# Patient Record
Sex: MALE | Race: WHITE | NOT HISPANIC OR LATINO | Employment: FULL TIME | URBAN - METROPOLITAN AREA
[De-identification: names, ages, dates, MRNs, and addresses within clinical notes are randomized per-mention and may not be internally consistent; named-entity substitution may affect disease eponyms.]

---

## 2018-06-12 ENCOUNTER — TELEPHONE (OUTPATIENT)
Dept: FAMILY MEDICINE | Facility: CLINIC | Age: 58
End: 2018-06-12

## 2018-06-12 NOTE — TELEPHONE ENCOUNTER
Will you take as new pt,     SPoke to wife and she stated the pt is having an emergency with Hemorids so I informed her that he needs to get to an urgent care or emergency room.  Wife verbalized understood.  She stated she will schedule pt a appt with latrell for a later date if he approves

## 2018-06-12 NOTE — TELEPHONE ENCOUNTER
----- Message from Jerome Sales sent at 2018 11:05 AM CDT -----  Contact: Wife - Katherine Curry  MRN: [id number]  : 1960  PCP: No primary care provider on file.  Home Phone      [phone]      MESSAGE: new patient ([employer] ins) -- has pretty bad hemorrhoids -- states family friend of Dr Jerome's -- requesting appt today    Call Katherine @ [phone]    PCP: ????

## 2018-07-25 ENCOUNTER — OFFICE VISIT (OUTPATIENT)
Dept: URGENT CARE | Facility: CLINIC | Age: 58
End: 2018-07-25
Payer: COMMERCIAL

## 2018-07-25 VITALS
HEART RATE: 86 BPM | HEIGHT: 72 IN | OXYGEN SATURATION: 98 % | RESPIRATION RATE: 16 BRPM | WEIGHT: 210 LBS | SYSTOLIC BLOOD PRESSURE: 135 MMHG | BODY MASS INDEX: 28.44 KG/M2 | DIASTOLIC BLOOD PRESSURE: 91 MMHG | TEMPERATURE: 98 F

## 2018-07-25 DIAGNOSIS — L03.119 CELLULITIS AND ABSCESS OF LOWER EXTREMITY: Primary | ICD-10-CM

## 2018-07-25 DIAGNOSIS — L02.419 CELLULITIS AND ABSCESS OF LOWER EXTREMITY: Primary | ICD-10-CM

## 2018-07-25 DIAGNOSIS — T63.621A JELLYFISH STING, ACCIDENTAL OR UNINTENTIONAL, INITIAL ENCOUNTER: ICD-10-CM

## 2018-07-25 PROCEDURE — 99204 OFFICE O/P NEW MOD 45 MIN: CPT | Mod: S$GLB,,, | Performed by: INTERNAL MEDICINE

## 2018-07-25 PROCEDURE — 3008F BODY MASS INDEX DOCD: CPT | Mod: CPTII,S$GLB,, | Performed by: INTERNAL MEDICINE

## 2018-07-25 RX ORDER — SILVER SULFADIAZINE 10 G/1000G
CREAM TOPICAL 2 TIMES DAILY
Qty: 50 G | Refills: 0 | Status: SHIPPED | OUTPATIENT
Start: 2018-07-25 | End: 2018-12-31

## 2018-07-25 RX ORDER — AMOXICILLIN AND CLAVULANATE POTASSIUM 875; 125 MG/1; MG/1
1 TABLET, FILM COATED ORAL 2 TIMES DAILY
Qty: 20 TABLET | Refills: 0 | Status: SHIPPED | OUTPATIENT
Start: 2018-07-25 | End: 2018-08-04

## 2018-07-25 RX ORDER — SULFAMETHOXAZOLE AND TRIMETHOPRIM 800; 160 MG/1; MG/1
1 TABLET ORAL 2 TIMES DAILY
Qty: 10 TABLET | Refills: 0 | Status: SHIPPED | OUTPATIENT
Start: 2018-07-25 | End: 2018-07-30

## 2018-07-26 NOTE — PATIENT INSTRUCTIONS
Marine Bite or Sting  Youre most likely to contact biting or stinging marine life while swimming or wading in saltwater. These include jellyfish and coral, as well as sculpins, stingrays, and many other kinds of fish. Most dont attack humans, but may bite or sting if they are stepped on or touched. Even a dead jellyfish can release poison (venom) when handled. In [place], most marine animal bites or stings arent deadly. But they can be painful. They can be serious if the wound is deep, becomes infected, or causes an allergic reaction.  Your injury will be cleaned and examined. Bite or puncture wounds are often not closed with stitches. A jellyfish sting may be rinsed with sterile saline solution or vinegar. This prevents more toxins from being released. Any tentacles left in your skin will be removed. If stingray barbs are present, they need to be removed. If you have an allergic reaction that is severe, you may be given a steroid medicine to help control it. If you are in pain, medicine may be prescribed to make you more comfortable. Blood tests, ultrasound, wound culture, or X-rays may be done to check for other injuries or signs of infection. Treatment will depend on the location and severity of the wound.  Home care  The doctor may prescribe medicines to help relieve pain and prevent infection. Follow the doctors instructions for taking these medicines. If antibiotics have been prescribed, finish all of the medicine until it is gone, even if you feel better.  General care  · Wash your hands with soap and warm water before and after caring for the wound.  · Clean the wound with mild soap and water and pat dry. Do this as often as instructed by the doctor.  · Watch for signs of infection, including fever, increasing pain, redness, or pus (discharge).  · If bleeding occurs, apply gentle, even pressure.  · Cover the wound with a new, clean bandage. Apply the bandage snugly, but not too tight. Keep the  bandage clean and dry.  · Get plenty of rest and fluids.  · To prevent future stings, never handle any marine life. Even dead jellyfish on the beach can cause stings.    Follow-up care  Follow up with your healthcare provider or as advised.  When to seek medical advice  Call your healthcare provider right away if any of these occur:  · Swelling of the affected body part, hands, or face  · A sensation that there is something in the wound  · Fever of 100.4°F (38°C) or higher, or as directed by your healthcare provider.  · Signs of infection, including increasing pain, increasing redness or swelling, or discharge from the wound  · Numbness or tingling in the area of the wound  · Pain that is not relieved or worsens after taking medicine  · Your wound isnt healing   Call 911  Call 911 if any of these occur:  · Trouble breathing  · Generalized itching  · Your wound doesnt stop bleeding even after pressure is applied  Date Last Reviewed: 9/1/2016  © 4749-5212 [employer]. [address] [place] [address]. All rights reserved. This information is not intended as a substitute for professional medical care. Always follow your healthcare professional's instructions.  Please return here or go to the Emergency Department for any concerns or worsening of condition.  If you were prescribed antibiotics, please take them to completion.  If you were prescribed a narcotic medication, do not drive or operate heavy equipment or machinery while taking these medications.  Please follow up with your primary care doctor or specialist as needed.    If you  smoke, please stop smoking.  [place] over the counter(pulls infection out of wound)  Mix two packets to a quart of boiled water.  Make sure bowl you mixed it in has a lid and is micro-waveable .  Take 3 clean wash cloths and put one into solution that you mixed and place on affected area(very warm solution but NOT BOILING  HOT) for 10 minutes then discard to  dirty laundry repeat with second clean wash cloth (dip into solution) and third wash cloth each for 10 minutes(total soaks on affected area are for 30 minutes).  Then clean area with soap and water put either Neosporin or other antibiotic creams to affected area and bandage with sterile/clean dressing.  Place lid on solution and put in refrigerator then when ready to repeat process take bowl out and place in microwave until solution is steamming .  REPEAT Put warm compresses on affected area 4 to 5 times a day for the first 5 to 6 days.  SEE DERMATOLOGIST IF WORSEN OR WOUND CARE

## 2018-07-26 NOTE — PROGRESS NOTES
Subjective:       Patient ID: Luis Eduardo Curry is a 58 y.o. male.    Vitals:  height is 6' (1.829 m) and weight is 95.3 kg (210 lb). His oral temperature is 98.3 °F (36.8 °C). His blood pressure is 135/91 (abnormal) and his pulse is 86. His respiration is 16 and oxygen saturation is 98%.     Chief Complaint: Trauma (Bilateral Foot) and Blister    Rash   This is a new problem. The current episode started in the past 7 days. The problem has been gradually worsening (Pt. c/o jellyfish sting to bilateral ankle.) since onset. The affected locations include the left ankle and right ankle. The rash is characterized by blistering, redness, swelling and itchiness. He was exposed to an insect bite/sting and shellfish. Pertinent negatives include no fever, joint pain, shortness of breath or sore throat. Past treatments include antihistamine. The treatment provided no relief.     Review of Systems   Constitution: Negative for chills, fever, weakness and malaise/fatigue.   HENT: Negative for nosebleeds and sore throat.    Cardiovascular: Negative for chest pain and syncope.   Respiratory: Negative for shortness of breath.    Skin: Positive for itching and rash. Negative for dry skin.   Musculoskeletal: Negative for back pain, joint pain and neck pain.   Gastrointestinal: Negative for abdominal pain.   Genitourinary: Negative for hematuria.   Neurological: Negative for dizziness and numbness.       Objective:      Physical Exam   Constitutional: He is oriented to person, place, and time. He appears well-developed and well-nourished. He is cooperative.  Non-toxic appearance. He does not appear ill. No distress.   HENT:   Head: Normocephalic and atraumatic.   Right Ear: Hearing, tympanic membrane, external ear and ear canal normal.   Left Ear: Hearing, tympanic membrane, external ear and ear canal normal.   Nose: Nose normal. No mucosal edema, rhinorrhea or nasal deformity. No epistaxis. Right sinus exhibits no maxillary sinus  tenderness and no frontal sinus tenderness. Left sinus exhibits no maxillary sinus tenderness and no frontal sinus tenderness.   Mouth/Throat: Uvula is midline, oropharynx is clear and moist and mucous membranes are normal. No trismus in the jaw. Normal dentition. No uvula swelling. No posterior oropharyngeal erythema.   Eyes: Conjunctivae and lids are normal. Right eye exhibits no discharge. Left eye exhibits no discharge. No scleral icterus.   Sclera clear bilat   Neck: Trachea normal, normal range of motion, full passive range of motion without pain and phonation normal. Neck supple.   Cardiovascular: Normal rate, regular rhythm, normal heart sounds, intact distal pulses and normal pulses.    Pulmonary/Chest: Effort normal and breath sounds normal. No respiratory distress.   Abdominal: Soft. Normal appearance and bowel sounds are normal. He exhibits no distension, no pulsatile midline mass and no mass. There is no tenderness.   Musculoskeletal: Normal range of motion. He exhibits no edema or deformity.   Neurological: He is alert and oriented to person, place, and time. He exhibits normal muscle tone. Coordination normal.   Skin: Skin is warm, dry and intact. Capillary refill takes less than 2 seconds. Purpura and rash noted. Rash is pustular and vesicular. He is not diaphoretic. There is erythema. No pallor.        Psychiatric: He has a normal mood and affect. His speech is normal and behavior is normal. Judgment and thought content normal. Cognition and memory are normal.   Nursing note and vitals reviewed.      Assessment:       1. Cellulitis and abscess of lower extremity    2. Jellyfish sting, accidental or unintentional, initial encounter        Plan:         Cellulitis and abscess of lower extremity  -     amoxicillin-clavulanate 875-125mg (AUGMENTIN) 875-125 mg per tablet; Take 1 tablet by mouth 2 (two) times daily. for 10 days  Dispense: 20 tablet; Refill: 0  -     sulfamethoxazole-trimethoprim 800-160mg  (BACTRIM DS) 800-160 mg Tab; Take 1 tablet by mouth 2 (two) times daily. for 5 days  Dispense: 10 tablet; Refill: 0  -     silver sulfADIAZINE 1% (SILVADENE) 1 % cream; Apply topically 2 (two) times daily. KEEP REFRIGERATED  Dispense: 50 g; Refill: 0    Jellyfish sting, accidental or unintentional, initial encounter  -     amoxicillin-clavulanate 875-125mg (AUGMENTIN) 875-125 mg per tablet; Take 1 tablet by mouth 2 (two) times daily. for 10 days  Dispense: 20 tablet; Refill: 0  -     sulfamethoxazole-trimethoprim 800-160mg (BACTRIM DS) 800-160 mg Tab; Take 1 tablet by mouth 2 (two) times daily. for 5 days  Dispense: 10 tablet; Refill: 0  -     silver sulfADIAZINE 1% (SILVADENE) 1 % cream; Apply topically 2 (two) times daily. KEEP REFRIGERATED  Dispense: 50 g; Refill: 0      Take meds

## 2018-07-27 ENCOUNTER — OFFICE VISIT (OUTPATIENT)
Dept: WOUND CARE | Facility: HOSPITAL | Age: 58
End: 2018-07-27
Attending: NURSE PRACTITIONER
Payer: COMMERCIAL

## 2018-07-27 VITALS — SYSTOLIC BLOOD PRESSURE: 120 MMHG | HEART RATE: 63 BPM | DIASTOLIC BLOOD PRESSURE: 8 MMHG | RESPIRATION RATE: 20 BRPM

## 2018-07-27 DIAGNOSIS — S90.522A: ICD-10-CM

## 2018-07-27 PROCEDURE — 99211 OFF/OP EST MAY X REQ PHY/QHP: CPT

## 2018-07-27 NOTE — PROGRESS NOTES
[facility]  Wound Care  Progress Note    Problem List Items Addressed This Visit        Other    Non-thermal blister of ankle, left, initial encounter    Overview     HPI: 58 yr old male with history of being stung by a jelly fish on July 17, 2018. Developed blisters to left foot and posterior ankle    Location: left posterior ankle    Duration: July 17, 2018    Context: non-thermal blister due to jelly fish sting    Associated Signs & Symptoms: denies pain    Timing: n/a    Severity: n/a    Quality: n/a    Modifying Factors: n/a                 Physical Exam   Constitutional: He is oriented to person, place, and time. He appears well-developed and well-nourished.   HENT:   Head: Normocephalic.   Pulmonary/Chest: Effort normal.   Musculoskeletal: Normal range of motion.   Neurological: He is alert and oriented to person, place, and time.   Skin: Skin is warm and dry.   Non-thermal blister due to jelly fish sting. Blister opened up on the back of his left ankle. Superficial.   Psychiatric: He has a normal mood and affect. His behavior is normal. Judgment and thought content normal.   Vitals reviewed.    Will continue silvadene cream to left posterior ankle, E-scribed triamcinolone 0.1% cream to intact blisters tid. Will recheck in one week  See wound doc progress notes. Documents will be scanned.        Jessica Hopkins  [facility]

## 2018-07-31 ENCOUNTER — TELEPHONE (OUTPATIENT)
Dept: URGENT CARE | Facility: CLINIC | Age: 58
End: 2018-07-31

## 2018-12-31 ENCOUNTER — OFFICE VISIT (OUTPATIENT)
Dept: INTERNAL MEDICINE | Facility: CLINIC | Age: 58
End: 2018-12-31
Payer: COMMERCIAL

## 2018-12-31 VITALS
OXYGEN SATURATION: 96 % | DIASTOLIC BLOOD PRESSURE: 70 MMHG | RESPIRATION RATE: 16 BRPM | SYSTOLIC BLOOD PRESSURE: 112 MMHG | HEIGHT: 72 IN | WEIGHT: 220.25 LBS | BODY MASS INDEX: 29.83 KG/M2 | HEART RATE: 73 BPM

## 2018-12-31 DIAGNOSIS — Z00.00 WELLNESS EXAMINATION: Primary | ICD-10-CM

## 2018-12-31 DIAGNOSIS — Z12.11 SPECIAL SCREENING FOR MALIGNANT NEOPLASMS, COLON: ICD-10-CM

## 2018-12-31 DIAGNOSIS — R35.0 URINARY FREQUENCY: ICD-10-CM

## 2018-12-31 PROCEDURE — 99999 PR PBB SHADOW E&M-EST. PATIENT-LVL III: CPT | Mod: PBBFAC,,, | Performed by: INTERNAL MEDICINE

## 2018-12-31 PROCEDURE — 3008F BODY MASS INDEX DOCD: CPT | Mod: CPTII,S$GLB,, | Performed by: INTERNAL MEDICINE

## 2018-12-31 PROCEDURE — 99203 OFFICE O/P NEW LOW 30 MIN: CPT | Mod: S$GLB,,, | Performed by: INTERNAL MEDICINE

## 2018-12-31 NOTE — PROGRESS NOTES
Subjective:       Patient ID: Luis Eduardo Curry is a 58 y.o. male.    Chief Complaint: [facility] Care    Luis Eduardo Curry is a 58 y.o. male  Here for wellness exam.    Doing well .  No major issues.   Some urinary frequency .      Review of Systems   Constitutional: Negative for activity change and unexpected weight change.   HENT: Positive for hearing loss. Negative for rhinorrhea and trouble swallowing.    Eyes: Negative for discharge.   Respiratory: Negative for chest tightness and wheezing.    Cardiovascular: Negative for chest pain and palpitations.   Gastrointestinal: Negative for blood in stool, constipation, diarrhea and vomiting.   Endocrine: Negative for polydipsia and polyuria.   Genitourinary: Positive for difficulty urinating, frequency and urgency. Negative for hematuria.   Musculoskeletal: Negative for arthralgias, joint swelling and neck pain.   Neurological: Negative for weakness and headaches.   Psychiatric/Behavioral: Negative for confusion and dysphoric mood.       Objective:      Physical Exam   Constitutional: He is oriented to person, place, and time. He appears well-developed and well-nourished.   HENT:   Head: Normocephalic and atraumatic.   Neck: No JVD present.   Cardiovascular: Normal rate, regular rhythm and normal heart sounds.   Pulmonary/Chest: Effort normal and breath sounds normal.   Abdominal: Soft. Bowel sounds are normal. There is no tenderness.   Musculoskeletal: He exhibits no edema.   Neurological: He is alert and oriented to person, place, and time.   Skin: Skin is warm and dry.   Psychiatric: He has a normal mood and affect. His behavior is normal. Judgment and thought content normal.   Nursing note and vitals reviewed.      Assessment:       1. Wellness examination    2. Special screening for malignant neoplasms, colon    3. Urinary frequency        Plan:   Luis Eduardo was seen today for [facility].    Diagnoses and all orders for this visit:    Wellness examination  -     CBC  auto differential; Future  -     Comprehensive metabolic panel; Future  -     TSH; Future  -     PSA, Screening; Future  -     Lipid panel; Future  -     H.Pylori Antibody IgG; Future    Special screening for malignant neoplasms, colon  -     Case request GI: COLONOSCOPY    Urinary frequency  -     Urinalysis; Future      Problem List Items Addressed This Visit     None      Visit Diagnoses     Wellness examination    -  Primary    Relevant Orders    CBC auto differential    Comprehensive metabolic panel    TSH    PSA, Screening    Lipid panel    H.Pylori Antibody IgG    Special screening for malignant neoplasms, colon        Relevant Orders    Case request GI: COLONOSCOPY (Completed)

## 2019-01-05 ENCOUNTER — LAB VISIT (OUTPATIENT)
Dept: LAB | Facility: HOSPITAL | Age: 59
End: 2019-01-05
Attending: INTERNAL MEDICINE
Payer: COMMERCIAL

## 2019-01-05 DIAGNOSIS — R35.0 URINARY FREQUENCY: ICD-10-CM

## 2019-01-05 DIAGNOSIS — Z00.00 WELLNESS EXAMINATION: ICD-10-CM

## 2019-01-05 LAB
ALBUMIN SERPL BCP-MCNC: 4 G/DL
ALP SERPL-CCNC: 33 U/L
ALT SERPL W/O P-5'-P-CCNC: 26 U/L
ANION GAP SERPL CALC-SCNC: 7 MMOL/L
AST SERPL-CCNC: 16 U/L
BASOPHILS # BLD AUTO: 0.03 K/UL
BASOPHILS NFR BLD: 0.5 %
BILIRUB SERPL-MCNC: 0.6 MG/DL
BILIRUB UR QL STRIP: NEGATIVE
BUN SERPL-MCNC: 15 MG/DL
CALCIUM SERPL-MCNC: 9.1 MG/DL
CHLORIDE SERPL-SCNC: 109 MMOL/L
CHOLEST SERPL-MCNC: 195 MG/DL
CHOLEST/HDLC SERPL: 5.3 {RATIO}
CLARITY UR: CLEAR
CO2 SERPL-SCNC: 24 MMOL/L
COLOR UR: YELLOW
COMPLEXED PSA SERPL-MCNC: 4.6 NG/ML
CREAT SERPL-MCNC: 1.1 MG/DL
DIFFERENTIAL METHOD: NORMAL
EOSINOPHIL # BLD AUTO: 0.4 K/UL
EOSINOPHIL NFR BLD: 5.9 %
ERYTHROCYTE [DISTWIDTH] IN BLOOD BY AUTOMATED COUNT: 14.1 %
EST. GFR  (AFRICAN AMERICAN): >60 ML/MIN/1.73 M^2
EST. GFR  (NON AFRICAN AMERICAN): >60 ML/MIN/1.73 M^2
GLUCOSE SERPL-MCNC: 95 MG/DL
GLUCOSE UR QL STRIP: NEGATIVE
HCT VFR BLD AUTO: 48.5 %
HDLC SERPL-MCNC: 37 MG/DL
HDLC SERPL: 19 %
HGB BLD-MCNC: 16.2 G/DL
HGB UR QL STRIP: ABNORMAL
KETONES UR QL STRIP: NEGATIVE
LDLC SERPL CALC-MCNC: 138.8 MG/DL
LEUKOCYTE ESTERASE UR QL STRIP: NEGATIVE
LYMPHOCYTES # BLD AUTO: 2.5 K/UL
LYMPHOCYTES NFR BLD: 40.4 %
MCH RBC QN AUTO: 29.7 PG
MCHC RBC AUTO-ENTMCNC: 33.4 G/DL
MCV RBC AUTO: 89 FL
MONOCYTES # BLD AUTO: 0.4 K/UL
MONOCYTES NFR BLD: 7.1 %
NEUTROPHILS # BLD AUTO: 2.9 K/UL
NEUTROPHILS NFR BLD: 46.1 %
NITRITE UR QL STRIP: NEGATIVE
NONHDLC SERPL-MCNC: 158 MG/DL
PH UR STRIP: 7 [PH] (ref 5–8)
PLATELET # BLD AUTO: 208 K/UL
PMV BLD AUTO: 10.9 FL
POTASSIUM SERPL-SCNC: 4.5 MMOL/L
PROT SERPL-MCNC: 6.6 G/DL
PROT UR QL STRIP: NEGATIVE
RBC # BLD AUTO: 5.45 M/UL
SODIUM SERPL-SCNC: 140 MMOL/L
SP GR UR STRIP: 1.02 (ref 1–1.03)
TRIGL SERPL-MCNC: 96 MG/DL
TSH SERPL DL<=0.005 MIU/L-ACNC: 2.12 UIU/ML
URN SPEC COLLECT METH UR: ABNORMAL
UROBILINOGEN UR STRIP-ACNC: NEGATIVE EU/DL
WBC # BLD AUTO: 6.22 K/UL

## 2019-01-05 PROCEDURE — 36415 COLL VENOUS BLD VENIPUNCTURE: CPT

## 2019-01-05 PROCEDURE — 84443 ASSAY THYROID STIM HORMONE: CPT

## 2019-01-05 PROCEDURE — 85025 COMPLETE CBC W/AUTO DIFF WBC: CPT

## 2019-01-05 PROCEDURE — 86677 HELICOBACTER PYLORI ANTIBODY: CPT

## 2019-01-05 PROCEDURE — 80061 LIPID PANEL: CPT

## 2019-01-05 PROCEDURE — 84153 ASSAY OF PSA TOTAL: CPT

## 2019-01-05 PROCEDURE — 81003 URINALYSIS AUTO W/O SCOPE: CPT

## 2019-01-05 PROCEDURE — 80053 COMPREHEN METABOLIC PANEL: CPT

## 2019-01-07 ENCOUNTER — TELEPHONE (OUTPATIENT)
Dept: INTERNAL MEDICINE | Facility: CLINIC | Age: 59
End: 2019-01-07

## 2019-01-07 DIAGNOSIS — R97.20 ELEVATED PSA: Primary | ICD-10-CM

## 2019-01-07 LAB — H PYLORI IGG SERPL QL IA: NEGATIVE

## 2019-01-18 RX ORDER — BISACODYL 5 MG
20 TABLET, DELAYED RELEASE (ENTERIC COATED) ORAL ONCE
Qty: 4 TABLET | Refills: 0 | Status: ON HOLD | OUTPATIENT
Start: 2019-01-23 | End: 2019-01-24 | Stop reason: HOSPADM

## 2019-01-21 ENCOUNTER — HOSPITAL ENCOUNTER (OUTPATIENT)
Dept: PREADMISSION TESTING | Facility: HOSPITAL | Age: 59
Discharge: HOME | End: 2019-01-21
Attending: COLON & RECTAL SURGERY
Payer: COMMERCIAL

## 2019-01-21 ENCOUNTER — ANESTHESIA EVENT (OUTPATIENT)
Dept: ENDOSCOPY | Facility: HOSPITAL | Age: 59
End: 2019-01-21
Payer: COMMERCIAL

## 2019-01-21 VITALS — WEIGHT: 220.25 LBS | HEIGHT: 72 IN | BODY MASS INDEX: 29.83 KG/M2

## 2019-01-21 DIAGNOSIS — Z12.11 SCREENING FOR MALIGNANT NEOPLASM OF COLON: Primary | ICD-10-CM

## 2019-01-21 NOTE — DISCHARGE INSTRUCTIONS
Follow instructions as written on Dr. Tarango's Colonoscopy Prep Sheet      Colonoscopy Outpatient procedure instructions    Prep Review  . Dr. Tarango patients have nothing to eat or drink after morning prep is complete      Take medications as instructed by your doctor.    Wear something comfortable that is easy for you to take off and put on.   Do not wear any makeup, jewelry, or body piercings. Leave valuables at home or let your family member keep them for you. Do not bring them to the Surgery area.     Date/Day of Procedure: Thursday 1/24/19    Arrival time: Someone will call you the workday day before the procedure    between 1pm and 5pm to give you an arrival time   If asked to report to the hospital before 7:00 am report to the Emergency Room.  If asked to report to the hospital at 7:00 a.m. or later report to Patient Registration  It is not necessary to report earlier than the time you are told.   Ignore any automated/computer generated calls telling to what time to report to the hospital.   Plan to be at the hospital for about 4 hours, however, it could be longer.

## 2019-01-21 NOTE — ANESTHESIA PREPROCEDURE EVALUATION
01/21/2019  Luis Eduardo Curry is a 58 y.o., male.    Anesthesia Evaluation    I have reviewed the Patient Summary Reports.    I have reviewed the Nursing Notes.   I have reviewed the Medications.     Review of Systems  Anesthesia Hx:  No previous Anesthesia  Neg history of prior surgery. Denies Family Hx of Anesthesia complications.    Social:  Non-Smoker, Social Alcohol Use    Hematology/Oncology:  Hematology Normal   Oncology Normal     EENT/Dental:EENT/Dental Normal   Cardiovascular:  Cardiovascular Normal Exercise tolerance: good     Pulmonary:  Pulmonary Normal    Renal/:  Renal/ Normal     Hepatic/GI:  Hepatic/GI Normal    Musculoskeletal:  Musculoskeletal Normal    Neurological:  Neurology Normal    Endocrine:  Endocrine Normal    Dermatological:  Skin Normal    Psych:  Psychiatric Normal           Physical Exam  General:  Well nourished    Airway/Jaw/Neck:  Airway Findings: Mouth Opening: Normal Tongue: Normal  General Airway Assessment: Adult  Mallampati: II  TM Distance: Normal, at least 6 cm      Dental:  Dental Findings: In tact        Mental Status:  Mental Status Findings:  Cooperative, Alert and Oriented         Anesthesia Plan  Type of Anesthesia, risks & benefits discussed:  Anesthesia Type:  general  Patient's Preference:   Intra-op Monitoring Plan: standard ASA monitors  Intra-op Monitoring Plan Comments:   Post Op Pain Control Plan: multimodal analgesia  Post Op Pain Control Plan Comments:   Induction:   IV  Beta Blocker:  Patient is not currently on a Beta-Blocker (No further documentation required).       Informed Consent: Patient understands risks and agrees with Anesthesia plan.  Questions answered. Anesthesia consent signed with patient.  ASA Score: 1     Day of Surgery Review of History & Physical: I have interviewed and examined the patient. I have reviewed the patient's H&P  dated: 1/24/19. There are no significant changes.  H&P update referred to the surgeon.         Ready For Surgery From Anesthesia Perspective.

## 2019-01-24 ENCOUNTER — ANESTHESIA (OUTPATIENT)
Dept: ENDOSCOPY | Facility: HOSPITAL | Age: 59
End: 2019-01-24
Payer: COMMERCIAL

## 2019-01-24 ENCOUNTER — HOSPITAL ENCOUNTER (OUTPATIENT)
Facility: HOSPITAL | Age: 59
Discharge: HOME | End: 2019-01-24
Attending: COLON & RECTAL SURGERY | Admitting: COLON & RECTAL SURGERY
Payer: COMMERCIAL

## 2019-01-24 DIAGNOSIS — Z12.11 COLON CANCER SCREENING: ICD-10-CM

## 2019-01-24 PROCEDURE — 00811 ANES LWR INTST NDSC NOS: CPT | Mod: QZ,P1,33

## 2019-01-24 PROCEDURE — 45380 PR COLONOSCOPY,BIOPSY: ICD-10-PCS | Mod: 59,,, | Performed by: COLON & RECTAL SURGERY

## 2019-01-24 PROCEDURE — 63600175 PHARM REV CODE 636 W HCPCS

## 2019-01-24 PROCEDURE — 45380 COLONOSCOPY AND BIOPSY: CPT | Mod: 59,,, | Performed by: COLON & RECTAL SURGERY

## 2019-01-24 PROCEDURE — 88305 TISSUE EXAM BY PATHOLOGIST: CPT | Mod: 26,,, | Performed by: PATHOLOGY

## 2019-01-24 PROCEDURE — 45385 PR COLONOSCOPY,REMV LESN,SNARE: ICD-10-PCS | Mod: 33,,, | Performed by: COLON & RECTAL SURGERY

## 2019-01-24 PROCEDURE — 88305 TISSUE SPECIMEN TO PATHOLOGY - SURGERY: ICD-10-PCS | Mod: 26,,, | Performed by: PATHOLOGY

## 2019-01-24 PROCEDURE — 37000009 HC ANESTHESIA EA ADD 15 MINS: Performed by: COLON & RECTAL SURGERY

## 2019-01-24 PROCEDURE — 25000003 PHARM REV CODE 250: Performed by: COLON & RECTAL SURGERY

## 2019-01-24 PROCEDURE — 27201089 HC SNARE, DISP (ANY): Performed by: COLON & RECTAL SURGERY

## 2019-01-24 PROCEDURE — 45380 COLONOSCOPY AND BIOPSY: CPT | Performed by: COLON & RECTAL SURGERY

## 2019-01-24 PROCEDURE — 88305 TISSUE EXAM BY PATHOLOGIST: CPT | Performed by: PATHOLOGY

## 2019-01-24 PROCEDURE — 27201012 HC FORCEPS, HOT/COLD, DISP: Performed by: COLON & RECTAL SURGERY

## 2019-01-24 PROCEDURE — 37000008 HC ANESTHESIA 1ST 15 MINUTES: Performed by: COLON & RECTAL SURGERY

## 2019-01-24 PROCEDURE — 45385 COLONOSCOPY W/LESION REMOVAL: CPT | Mod: 33,,, | Performed by: COLON & RECTAL SURGERY

## 2019-01-24 PROCEDURE — 45385 COLONOSCOPY W/LESION REMOVAL: CPT | Performed by: COLON & RECTAL SURGERY

## 2019-01-24 RX ORDER — PROPOFOL 10 MG/ML
INJECTION, EMULSION INTRAVENOUS CONTINUOUS PRN
Status: DISCONTINUED | OUTPATIENT
Start: 2019-01-24 | End: 2019-01-24

## 2019-01-24 RX ORDER — PROPOFOL 10 MG/ML
INJECTION, EMULSION INTRAVENOUS
Status: DISCONTINUED | OUTPATIENT
Start: 2019-01-24 | End: 2019-01-24

## 2019-01-24 RX ORDER — LIDOCAINE HCL/PF 100 MG/5ML
SYRINGE (ML) INTRAVENOUS
Status: DISCONTINUED | OUTPATIENT
Start: 2019-01-24 | End: 2019-01-24

## 2019-01-24 RX ORDER — SODIUM CHLORIDE, SODIUM LACTATE, POTASSIUM CHLORIDE, CALCIUM CHLORIDE 600; 310; 30; 20 MG/100ML; MG/100ML; MG/100ML; MG/100ML
INJECTION, SOLUTION INTRAVENOUS CONTINUOUS
Status: DISCONTINUED | OUTPATIENT
Start: 2019-01-24 | End: 2019-01-24 | Stop reason: HOSPADM

## 2019-01-24 RX ADMIN — PROPOFOL 50 MG: 10 INJECTION, EMULSION INTRAVENOUS at 11:01

## 2019-01-24 RX ADMIN — PROPOFOL 50 MG: 10 INJECTION, EMULSION INTRAVENOUS at 10:01

## 2019-01-24 RX ADMIN — SODIUM CHLORIDE, SODIUM LACTATE, POTASSIUM CHLORIDE, AND CALCIUM CHLORIDE: .6; .31; .03; .02 INJECTION, SOLUTION INTRAVENOUS at 09:01

## 2019-01-24 RX ADMIN — PROPOFOL 120 MG: 10 INJECTION, EMULSION INTRAVENOUS at 10:01

## 2019-01-24 RX ADMIN — LIDOCAINE HYDROCHLORIDE 50 MG: 20 INJECTION, SOLUTION INTRAVENOUS at 10:01

## 2019-01-24 RX ADMIN — PROPOFOL 150 MCG/KG/MIN: 10 INJECTION, EMULSION INTRAVENOUS at 10:01

## 2019-01-24 NOTE — H&P
Procedure : Colonoscopy    Indication(s):  Family hx of CRC (sister, dx'd @ age 50)    Review of patient's allergies indicates:  No Known Allergies    History reviewed. No pertinent past medical history.    Prior to Admission medications    Medication Sig Start Date End Date Taking? Authorizing Provider   bisacodyl (DULCOLAX) 5 mg EC tablet TAKE 4 TABLETS BY MOUTH ONCE PER DR. AGUERO'S INSTRUCTION SHEET FOR 1 DOSE 1/23/19 1/23/19  Reece Aguero MD   polyethylene glycol (COLYTE) 240-22.72-6.72 -5.84 gram SolR TAKE 4,000 MILLILITERS BY MOUTH ONCE PER DR. AGUERO'S INSTRUCTION SHEET FOR 1 DOSE 1/23/19 1/23/19  Reece Aguero MD       Sedation Problems: NO    Family History   Problem Relation Age of Onset    No Known Problems Mother     Hypertension Father     No Known Problems Sister     No Known Problems Brother     No Known Problems Daughter     No Known Problems Son     No Known Problems Maternal Aunt     No Known Problems Maternal Uncle     No Known Problems Paternal Aunt     No Known Problems Paternal Uncle     No Known Problems Maternal Grandmother     No Known Problems Maternal Grandfather     No Known Problems Paternal Grandmother     No Known Problems Paternal Grandfather        Fam Hx of Sedation Problems: NO    Social History     Socioeconomic History    Marital status:      Spouse name: Not on file    Number of children: Not on file    Years of education: Not on file    Highest education level: Not on file   Social Needs    Financial resource strain: Not on file    Food insecurity - worry: Not on file    Food insecurity - inability: Not on file    Transportation needs - medical: Not on file    Transportation needs - non-medical: Not on file   Occupational History    Not on file   Tobacco Use    Smoking status: Former Smoker     Packs/day: 1.00     Years: 30.00     Pack years: 30.00     Types: Cigarettes    Smokeless tobacco: Never Used   Substance and Sexual Activity    Alcohol  use: Yes     Frequency: Monthly or less     Binge frequency: Weekly    Drug use: No    Sexual activity: Yes   Other Topics Concern    Not on file   Social History Narrative    Not on file       Review of Systems -     Respiratory ROS: no cough, shortness of breath, or wheezing  Cardiovascular ROS: no chest pain or dyspnea on exertion  Gastrointestinal ROS: no abdominal pain, change in bowel habits, or black or bloody stools  Musculoskeletal ROS: negative  Neurological ROS: no TIA or stroke symptoms        Physical Exam:  General: no distress  Head: normocephalic  Airway:  normal oropharynx, airway normal  Neck: supple, symmetrical, trachea midline  Lungs:  normal respiratory effort  Heart: regular rate and rhythm  Abdomen: soft, non-tender non-distented; bowel sounds normal; no masses,  no organomegaly  Extremities: no cyanosis or edema, or clubbing       Deep Sedation: Mallampati Score per anesthesia     SedationPlan :Moderate     ASA : I    Patient is medically cleared for anesthesia.    Anesthesia/Surgery risks, benefits and alternative options discussed and understood by patient/family.

## 2019-01-24 NOTE — TRANSFER OF CARE
Anesthesia Transfer of Care Note    Patient: Luis Eduardo Curry    Procedure(s) Performed: Procedure(s) (LRB):  COLONOSCOPY (N/A)    Patient location: PACU    Anesthesia Type: general    Transport from OR: Transported from OR on 6-10 L/min O2 by face mask with adequate spontaneous ventilation    Post pain: adequate analgesia    Post assessment: no apparent anesthetic complications and tolerated procedure well    Post vital signs: stable    Level of consciousness: sedated    Nausea/Vomiting: no nausea/vomiting    Complications: none    Transfer of care protocol was followed      Last vitals:   Visit Vitals  /86 (BP Location: Left arm, Patient Position: Lying)   Pulse 82   Temp 35.9 °C (96.6 °F)   Resp 16   SpO2 98%

## 2019-01-24 NOTE — DISCHARGE SUMMARY
Discharge Note  Short Stay      SUMMARY     Admit Date: 1/24/2019    Attending Physician: Reece Tarango MD     Discharge Physician: Reece Tarango MD    Discharge Date: 1/24/2019 11:11 AM    Admission Diagnosis: asymptomatic screening exam and family history of colon cancer (sister)    Final Diagnosis:  Colon polyps, diverticulosis    Procedures Performed:    Colonoscopy polypectomy cold forceps and Colonoscopy polypectomy hot snare    Disposition: Home or Self Care    Condition at Discharge:  Stable    Patient Instructions:   Current Discharge Medication List      STOP taking these medications       bisacodyl (DULCOLAX) 5 mg EC tablet Comments:   Reason for Stopping:         polyethylene glycol (COLYTE) 240-22.72-6.72 -5.84 gram SolR Comments:   Reason for Stopping:               Discharge Procedure Orders (must include Diet, Follow-up, Activity)   Discharge Procedure Orders (must include Diet, Follow-up, Activity)   Activity as tolerated        Repeat colonoscopy 3 years.  High fiber diet.

## 2019-01-24 NOTE — OP NOTE
Patient Name: Luis Eduardo Curry   Procedure Date: 2019  MRN: [id number]  : 1960  Age: 58 y.o.  Gender: male   Referring Physician :  Kiko Connor MD  Plan for Procedure: Monitored anaesthesia care  Indication: family history of colon cancer (sister, dx'd @49 yo)    Procedure:   Colonoscopy polypectomy cold forceps and Colonoscopy polypectomy hot snare    Surgeon(s) and Role:     * Reece Tarango MD - Primary    Complications: None     Medicines: monitored anesthesia care    Procedure:  Prior to the procedure, a History and Physical was performed, and patient medications, allergies and sensitivities were reviewed.  The patient's tolerance of previous anesthesia was reviewed. The risks and benefits of the procedure and the sedation options and risks were discussed with the patient.  All questions were answered and informed consent was obtained.    After I obtained informed consent, the scope was passed under direct vision.  Throughout the procedure, the patient's blood pressure, pulse, and oxygen saturations were monitored continuously.  The Olympus scope CF - [id number] was introduced through the anus and advanced to the cecum, identified by appendiceal orifice and ileocecal valve.  The colonoscopy was performed without difficulty.  The patient tolerated the procedure well.  The quality of the bowel preparation was good     Findings:  diverticulosis,  Marked in degree, involving the sigmoid  polyp(s) #1, 4 mm in size, located in the descending colon removed by cold biopsy and sent for pathology  #2, 8 mm in size, located in the rectum removed by snare cautery and retrieved for pathology  #3, 10 mm in size, located in the rectum removed by snare cautery and retrieved for pathology    EBL: none    Impression:  Three benign appearing colon polyps, removed as above.  Marked colonic diverticulosis involving  the sigmoid    Recommendation:  Repeat exam: 3 years  Return to my office prn  High fiber diet

## 2019-01-25 NOTE — ANESTHESIA POSTPROCEDURE EVALUATION
Anesthesia Post Evaluation    Patient: Luis Eduardo Curry    Procedure(s) Performed: Procedure(s) (LRB):  COLONOSCOPY (N/A)    Final Anesthesia Type: general  Patient location during evaluation: PACU  Patient participation: Yes- Able to Participate  Level of consciousness: awake and alert, oriented and awake  Post-procedure vital signs: reviewed and stable  Pain management: adequate  Airway patency: patent  PONV status at discharge: No PONV  Anesthetic complications: no      Cardiovascular status: blood pressure returned to baseline, hemodynamically stable and stable  Respiratory status: unassisted, spontaneous ventilation and room air  Hydration status: euvolemic  Follow-up not needed.        Visit Vitals  /86 (BP Location: Left arm, Patient Position: Lying)   Pulse 65   Temp 35.9 °C (96.6 °F)   Resp 16   SpO2 95%       Pain/Chon Score: Chon Score: 10 (1/24/2019 11:34 AM)

## 2019-02-20 VITALS
OXYGEN SATURATION: 95 % | DIASTOLIC BLOOD PRESSURE: 86 MMHG | SYSTOLIC BLOOD PRESSURE: 131 MMHG | TEMPERATURE: 97 F | RESPIRATION RATE: 16 BRPM | HEART RATE: 65 BPM

## 2019-04-03 ENCOUNTER — OFFICE VISIT (OUTPATIENT)
Dept: UROLOGY | Facility: CLINIC | Age: 59
End: 2019-04-03
Attending: UROLOGY
Payer: COMMERCIAL

## 2019-04-03 VITALS
DIASTOLIC BLOOD PRESSURE: 94 MMHG | HEART RATE: 75 BPM | WEIGHT: 220.25 LBS | SYSTOLIC BLOOD PRESSURE: 126 MMHG | BODY MASS INDEX: 29.83 KG/M2 | HEIGHT: 72 IN

## 2019-04-03 DIAGNOSIS — R97.20 ELEVATED PSA: ICD-10-CM

## 2019-04-03 DIAGNOSIS — N13.8 BPH WITH URINARY OBSTRUCTION: Primary | ICD-10-CM

## 2019-04-03 DIAGNOSIS — N40.1 BPH WITH URINARY OBSTRUCTION: Primary | ICD-10-CM

## 2019-04-03 PROCEDURE — 99999 PR PBB SHADOW E&M-EST. PATIENT-LVL III: ICD-10-PCS | Mod: PBBFAC,,, | Performed by: UROLOGY

## 2019-04-03 PROCEDURE — 99204 PR OFFICE/OUTPT VISIT, NEW, LEVL IV, 45-59 MIN: ICD-10-PCS | Mod: S$GLB,,, | Performed by: UROLOGY

## 2019-04-03 PROCEDURE — 99204 OFFICE O/P NEW MOD 45 MIN: CPT | Mod: S$GLB,,, | Performed by: UROLOGY

## 2019-04-03 PROCEDURE — 3008F BODY MASS INDEX DOCD: CPT | Mod: CPTII,S$GLB,, | Performed by: UROLOGY

## 2019-04-03 PROCEDURE — 99999 PR PBB SHADOW E&M-EST. PATIENT-LVL III: CPT | Mod: PBBFAC,,, | Performed by: UROLOGY

## 2019-04-03 PROCEDURE — 3008F PR BODY MASS INDEX (BMI) DOCUMENTED: ICD-10-PCS | Mod: CPTII,S$GLB,, | Performed by: UROLOGY

## 2019-04-03 RX ORDER — SULFAMETHOXAZOLE AND TRIMETHOPRIM 800; 160 MG/1; MG/1
TABLET ORAL
Qty: 4 TABLET | Refills: 0 | Status: SHIPPED | OUTPATIENT
Start: 2019-04-03 | End: 2019-05-24

## 2019-04-03 RX ORDER — TAMSULOSIN HYDROCHLORIDE 0.4 MG/1
0.4 CAPSULE ORAL DAILY
Qty: 30 CAPSULE | Refills: 12 | Status: SHIPPED | OUTPATIENT
Start: 2019-04-03 | End: 2020-04-29 | Stop reason: SDUPTHER

## 2019-04-03 NOTE — LETTER
April 3, 2019      Kiko Connor MD  [address]  [facility] [address]           [facility]  [address] [facility] [address]  Phone: [phone]          Patient: Luis Eduardo Curry   MR Number: [id number]   YOB: 1960   Date of Visit: 4/3/2019       Dear Dr. Kiko Connor:    Thank you for referring Luis Eduardo Curry to me for evaluation. Attached you will find relevant portions of my assessment and plan of care.    If you have questions, please do not hesitate to call me. I look forward to following Luis Eduardo Curry along with you.    Sincerely,    Chintan Mendez Jr., MD    Enclosure  CC:  No Recipients    If you would like to receive this communication electronically, please contact [email or web address] or [phone] to request more information on [employer] access.    For providers and/or their staff who would like to refer a patient to [facility], please contact us through our one-stop-shop provider referral line, [facility], at [phone].    If you feel you have received this communication in error or would no longer like to receive these types of communications, please e-mail [email or web address]

## 2019-04-03 NOTE — H&P (VIEW-ONLY)
Subjective:       Patient ID: Luis Eduardo Curry is a 59 y.o. male.    Chief Complaint: Elevated PSA (4.6) and Urinary Frequency (x4-5/nocturia x1-2)    HPI  the .  He is not taking Flomax.  patient has had lower urinary tract symptoms for over a year.  The getting worse.  His postvoid residual is 500 cc.  He has not wish to learn CIC or have a catheter put in for now.  He is comfortable.  He has a family history of prostate cancer and his PSA is 4.6  History reviewed. No pertinent past medical history.    Past Surgical History:   Procedure Laterality Date    COLONOSCOPY  2006    COLONOSCOPY N/A 1/24/2019    [facility]    POLYPECTOMY N/A 1/24/2019    [facility]       Family History   Problem Relation Age of Onset    No Known Problems Mother     Hypertension Father     No Known Problems Sister     No Known Problems Brother     No Known Problems Daughter     No Known Problems Son     No Known Problems Maternal Aunt     No Known Problems Maternal Uncle     No Known Problems Paternal Aunt     No Known Problems Paternal Uncle     No Known Problems Maternal Grandmother     No Known Problems Maternal Grandfather     No Known Problems Paternal Grandmother     No Known Problems Paternal Grandfather        Social History     Socioeconomic History    Marital status:      Spouse name: Not on file    Number of children: Not on file    Years of education: Not on file    Highest education level: Not on file   Occupational History    Not on file   Social Needs    Financial resource strain: Not on file    Food insecurity:     Worry: Not on file     Inability: Not on file    Transportation needs:     Medical: Not on file     Non-medical: Not on file   Tobacco Use    Smoking status: Former Smoker     Packs/day: 1.00     Years: 30.00     Pack years: 30.00     Types: Cigarettes    Smokeless tobacco: Never Used   Substance and Sexual  Activity    Alcohol use: Yes     Frequency: Monthly or less     Binge frequency: Weekly    Drug use: No    Sexual activity: Yes   Lifestyle    Physical activity:     Days per week: Not on file     Minutes per session: Not on file    Stress: Not on file   Relationships    Social connections:     Talks on phone: Not on file     Gets together: Not on file     Attends Gnosticism service: Not on file     Active member of club or organization: Not on file     Attends meetings of clubs or organizations: Not on file     Relationship status: Not on file   Other Topics Concern    Not on file   Social History Narrative    Not on file       Allergies:  Patient has no known allergies.    Medications:    Current Outpatient Medications:     tamsulosin (FLOMAX) 0.4 mg Cap, Take 1 capsule (0.4 mg total) by mouth once daily., Disp: 30 capsule, Rfl: 12    Review of Systems   Genitourinary: Positive for decreased urine volume and difficulty urinating.       Objective:      Physical Exam   Constitutional: He appears well-developed.   HENT:   Head: Normocephalic.   Cardiovascular: Normal rate.    Pulmonary/Chest: Effort normal.   Abdominal: Soft.   Genitourinary: Prostate normal.   Genitourinary Comments: 35 g benign no masses.  Urinalysis is clear   Neurological: He is alert.   Skin: Skin is warm.     Psychiatric: He has a normal mood and affect.       Assessment:       1. BPH with urinary obstruction    2. Elevated PSA        Plan:       Luis Eduardo was seen today for elevated psa and urinary frequency.    Diagnoses and all orders for this visit:    BPH with urinary obstruction  -     US Retroperitoneal Complete (Kidney and; Future    Elevated PSA    Other orders  -     tamsulosin (FLOMAX) 0.4 mg Cap; Take 1 capsule (0.4 mg total) by mouth once daily.     Schedule truss with biopsy and cystoscopy under IV sedation

## 2019-04-03 NOTE — PROGRESS NOTES
Subjective:       Patient ID: Luis Eduardo Curry is a 59 y.o. male.    Chief Complaint: Elevated PSA (4.6) and Urinary Frequency (x4-5/nocturia x1-2)    HPI  the .  He is not taking Flomax.  patient has had lower urinary tract symptoms for over a year.  The getting worse.  His postvoid residual is 500 cc.  He has not wish to learn CIC or have a catheter put in for now.  He is comfortable.  He has a family history of prostate cancer and his PSA is 4.6  History reviewed. No pertinent past medical history.    Past Surgical History:   Procedure Laterality Date    COLONOSCOPY  2006    COLONOSCOPY N/A 1/24/2019    [facility]    POLYPECTOMY N/A 1/24/2019    [facility]       Family History   Problem Relation Age of Onset    No Known Problems Mother     Hypertension Father     No Known Problems Sister     No Known Problems Brother     No Known Problems Daughter     No Known Problems Son     No Known Problems Maternal Aunt     No Known Problems Maternal Uncle     No Known Problems Paternal Aunt     No Known Problems Paternal Uncle     No Known Problems Maternal Grandmother     No Known Problems Maternal Grandfather     No Known Problems Paternal Grandmother     No Known Problems Paternal Grandfather        Social History     Socioeconomic History    Marital status:      Spouse name: Not on file    Number of children: Not on file    Years of education: Not on file    Highest education level: Not on file   Occupational History    Not on file   Social Needs    Financial resource strain: Not on file    Food insecurity:     Worry: Not on file     Inability: Not on file    Transportation needs:     Medical: Not on file     Non-medical: Not on file   Tobacco Use    Smoking status: Former Smoker     Packs/day: 1.00     Years: 30.00     Pack years: 30.00     Types: Cigarettes    Smokeless tobacco: Never Used   Substance and Sexual  Activity    Alcohol use: Yes     Frequency: Monthly or less     Binge frequency: Weekly    Drug use: No    Sexual activity: Yes   Lifestyle    Physical activity:     Days per week: Not on file     Minutes per session: Not on file    Stress: Not on file   Relationships    Social connections:     Talks on phone: Not on file     Gets together: Not on file     Attends Latter-day service: Not on file     Active member of club or organization: Not on file     Attends meetings of clubs or organizations: Not on file     Relationship status: Not on file   Other Topics Concern    Not on file   Social History Narrative    Not on file       Allergies:  Patient has no known allergies.    Medications:    Current Outpatient Medications:     tamsulosin (FLOMAX) 0.4 mg Cap, Take 1 capsule (0.4 mg total) by mouth once daily., Disp: 30 capsule, Rfl: 12    Review of Systems   Genitourinary: Positive for decreased urine volume and difficulty urinating.       Objective:      Physical Exam   Constitutional: He appears well-developed.   HENT:   Head: Normocephalic.   Cardiovascular: Normal rate.    Pulmonary/Chest: Effort normal.   Abdominal: Soft.   Genitourinary: Prostate normal.   Genitourinary Comments: 35 g benign no masses.  Urinalysis is clear   Neurological: He is alert.   Skin: Skin is warm.     Psychiatric: He has a normal mood and affect.       Assessment:       1. BPH with urinary obstruction    2. Elevated PSA        Plan:       Luis Eduardo was seen today for elevated psa and urinary frequency.    Diagnoses and all orders for this visit:    BPH with urinary obstruction  -     US Retroperitoneal Complete (Kidney and; Future    Elevated PSA    Other orders  -     tamsulosin (FLOMAX) 0.4 mg Cap; Take 1 capsule (0.4 mg total) by mouth once daily.     Schedule truss with biopsy and cystoscopy under IV sedation

## 2019-04-08 ENCOUNTER — HOSPITAL ENCOUNTER (OUTPATIENT)
Dept: RADIOLOGY | Facility: HOSPITAL | Age: 59
Discharge: HOME | End: 2019-04-08
Attending: UROLOGY
Payer: COMMERCIAL

## 2019-04-08 DIAGNOSIS — N13.8 BPH WITH URINARY OBSTRUCTION: ICD-10-CM

## 2019-04-08 DIAGNOSIS — N40.1 BPH WITH URINARY OBSTRUCTION: ICD-10-CM

## 2019-04-08 PROCEDURE — 76770 US EXAM ABDO BACK WALL COMP: CPT | Mod: 26,,, | Performed by: RADIOLOGY

## 2019-04-08 PROCEDURE — 76770 US RETROPERITONEAL COMPLETE: ICD-10-PCS | Mod: 26,,, | Performed by: RADIOLOGY

## 2019-04-08 PROCEDURE — 76770 US EXAM ABDO BACK WALL COMP: CPT | Mod: TC

## 2019-04-09 DIAGNOSIS — N28.89 LEFT RENAL MASS: Primary | ICD-10-CM

## 2019-04-11 ENCOUNTER — TELEPHONE (OUTPATIENT)
Dept: UROLOGY | Facility: CLINIC | Age: 59
End: 2019-04-11

## 2019-04-11 NOTE — TELEPHONE ENCOUNTER
----- Message from Chintan Mendez Jr., MD sent at 4/9/2019  7:23 AM CDT -----  L renal mass  Needs ct urogram before TRUS w bx and needs cysto also if not scheduled

## 2019-04-15 ENCOUNTER — TELEPHONE (OUTPATIENT)
Dept: UROLOGY | Facility: CLINIC | Age: 59
End: 2019-04-15

## 2019-04-15 NOTE — TELEPHONE ENCOUNTER
----- Message from Imelda Watson sent at 4/15/2019  4:33 PM CDT -----  Contact: pt [phone]  Patient Returning Call from [facility]    Who Left Message for Patient: Verena   Communication Preference: [phone]  Additional Information:

## 2019-04-18 DIAGNOSIS — Z11.59 NEED FOR HEPATITIS C SCREENING TEST: ICD-10-CM

## 2019-04-23 ENCOUNTER — HOSPITAL ENCOUNTER (OUTPATIENT)
Dept: RADIOLOGY | Facility: HOSPITAL | Age: 59
Discharge: HOME | End: 2019-04-23
Attending: UROLOGY
Payer: COMMERCIAL

## 2019-04-23 DIAGNOSIS — N28.89 LEFT RENAL MASS: Primary | ICD-10-CM

## 2019-04-23 PROCEDURE — 74178 CT UROGRAM ABD PELVIS W WO: ICD-10-PCS | Mod: 26,,, | Performed by: RADIOLOGY

## 2019-04-23 PROCEDURE — 74178 CT ABD&PLV WO CNTR FLWD CNTR: CPT | Mod: 26,,, | Performed by: RADIOLOGY

## 2019-04-23 PROCEDURE — 74178 CT ABD&PLV WO CNTR FLWD CNTR: CPT | Mod: TC

## 2019-04-23 PROCEDURE — 25500020 PHARM REV CODE 255: Performed by: UROLOGY

## 2019-04-23 RX ADMIN — IOHEXOL 150 ML: 350 INJECTION, SOLUTION INTRAVENOUS at 09:04

## 2019-04-26 DIAGNOSIS — N28.1 RENAL CYST, ACQUIRED, RIGHT: Primary | ICD-10-CM

## 2019-04-29 ENCOUNTER — TELEPHONE (OUTPATIENT)
Dept: UROLOGY | Facility: CLINIC | Age: 59
End: 2019-04-29

## 2019-04-29 NOTE — TELEPHONE ENCOUNTER
----- Message from Chintan Mendez Jr., MD sent at 4/26/2019  4:59 PM CDT -----  No evidense solid mass  On cysts  RTC 6 mos w repeat renal us

## 2019-05-01 ENCOUNTER — HOSPITAL ENCOUNTER (OUTPATIENT)
Facility: HOSPITAL | Age: 59
Discharge: HOME | End: 2019-05-01
Attending: UROLOGY | Admitting: UROLOGY
Payer: COMMERCIAL

## 2019-05-01 VITALS
HEART RATE: 57 BPM | TEMPERATURE: 98 F | OXYGEN SATURATION: 92 % | DIASTOLIC BLOOD PRESSURE: 83 MMHG | RESPIRATION RATE: 16 BRPM | SYSTOLIC BLOOD PRESSURE: 115 MMHG

## 2019-05-01 DIAGNOSIS — N40.1 BPH WITH URINARY OBSTRUCTION: Primary | ICD-10-CM

## 2019-05-01 DIAGNOSIS — R97.20 ELEVATED PSA: ICD-10-CM

## 2019-05-01 DIAGNOSIS — N13.8 BPH WITH URINARY OBSTRUCTION: Primary | ICD-10-CM

## 2019-05-01 LAB
BILIRUB UR QL STRIP: NEGATIVE
CLARITY UR: CLEAR
COLOR UR: YELLOW
GLUCOSE UR QL STRIP: NEGATIVE
HGB UR QL STRIP: ABNORMAL
KETONES UR QL STRIP: NEGATIVE
LEUKOCYTE ESTERASE UR QL STRIP: NEGATIVE
NITRITE UR QL STRIP: NEGATIVE
PH UR STRIP: 6 [PH] (ref 5–8)
PROT UR QL STRIP: NEGATIVE
SP GR UR STRIP: 1.02 (ref 1–1.03)
URN SPEC COLLECT METH UR: ABNORMAL
UROBILINOGEN UR STRIP-ACNC: NEGATIVE EU/DL

## 2019-05-01 PROCEDURE — 25000003 PHARM REV CODE 250: Performed by: UROLOGY

## 2019-05-01 PROCEDURE — 52000 CYSTOURETHROSCOPY: CPT | Mod: 59,,, | Performed by: UROLOGY

## 2019-05-01 PROCEDURE — 76872 US TRANSRECTAL: CPT | Mod: 26,,, | Performed by: UROLOGY

## 2019-05-01 PROCEDURE — 76942 ECHO GUIDE FOR BIOPSY: CPT | Mod: 26,59,, | Performed by: UROLOGY

## 2019-05-01 PROCEDURE — 88305 TISSUE SPECIMEN TO PATHOLOGY, UROLOGY: ICD-10-PCS | Mod: 26,,, | Performed by: PATHOLOGY

## 2019-05-01 PROCEDURE — 88305 TISSUE EXAM BY PATHOLOGIST: CPT | Performed by: PATHOLOGY

## 2019-05-01 PROCEDURE — 88341 IMHCHEM/IMCYTCHM EA ADD ANTB: CPT | Mod: 26,,, | Performed by: PATHOLOGY

## 2019-05-01 PROCEDURE — 88305 TISSUE EXAM BY PATHOLOGIST: CPT | Mod: 26,,, | Performed by: PATHOLOGY

## 2019-05-01 PROCEDURE — 63600175 PHARM REV CODE 636 W HCPCS: Performed by: UROLOGY

## 2019-05-01 PROCEDURE — 88342 IMHCHEM/IMCYTCHM 1ST ANTB: CPT | Performed by: PATHOLOGY

## 2019-05-01 PROCEDURE — 36000707: Performed by: UROLOGY

## 2019-05-01 PROCEDURE — 76942 PR U/S GUIDANCE FOR NEEDLE GUIDANCE: ICD-10-PCS | Mod: 26,59,, | Performed by: UROLOGY

## 2019-05-01 PROCEDURE — 88341 TISSUE SPECIMEN TO PATHOLOGY, UROLOGY: ICD-10-PCS | Mod: 26,,, | Performed by: PATHOLOGY

## 2019-05-01 PROCEDURE — 88342 TISSUE SPECIMEN TO PATHOLOGY, UROLOGY: ICD-10-PCS | Mod: 26,,, | Performed by: PATHOLOGY

## 2019-05-01 PROCEDURE — 55700 PR BIOPSY OF PROSTATE,NEEDLE/PUNCH: ICD-10-PCS | Mod: ,,, | Performed by: UROLOGY

## 2019-05-01 PROCEDURE — 52000 PR CYSTOURETHROSCOPY: ICD-10-PCS | Mod: 59,,, | Performed by: UROLOGY

## 2019-05-01 PROCEDURE — 55700 PR BIOPSY OF PROSTATE,NEEDLE/PUNCH: CPT | Mod: ,,, | Performed by: UROLOGY

## 2019-05-01 PROCEDURE — 36000706: Performed by: UROLOGY

## 2019-05-01 PROCEDURE — 76872 PR US TRANSRECTAL: ICD-10-PCS | Mod: 26,,, | Performed by: UROLOGY

## 2019-05-01 PROCEDURE — 81003 URINALYSIS AUTO W/O SCOPE: CPT

## 2019-05-01 PROCEDURE — 88342 IMHCHEM/IMCYTCHM 1ST ANTB: CPT | Mod: 26,,, | Performed by: PATHOLOGY

## 2019-05-01 RX ORDER — LIDOCAINE HYDROCHLORIDE 20 MG/ML
JELLY TOPICAL ONCE
Status: DISCONTINUED | OUTPATIENT
Start: 2019-05-01 | End: 2020-10-10 | Stop reason: HOSPADM

## 2019-05-01 RX ORDER — CIPROFLOXACIN 500 MG/1
500 TABLET ORAL ONCE
Status: DISCONTINUED | OUTPATIENT
Start: 2019-05-01 | End: 2019-05-24

## 2019-05-01 RX ORDER — FENTANYL CITRATE 50 UG/ML
INJECTION, SOLUTION INTRAMUSCULAR; INTRAVENOUS
Status: DISCONTINUED | OUTPATIENT
Start: 2019-05-01 | End: 2019-05-01 | Stop reason: HOSPADM

## 2019-05-01 RX ORDER — LIDOCAINE HYDROCHLORIDE 10 MG/ML
10 INJECTION INFILTRATION; PERINEURAL ONCE
Status: DISCONTINUED | OUTPATIENT
Start: 2019-05-01 | End: 2020-10-10 | Stop reason: HOSPADM

## 2019-05-01 RX ORDER — MIDAZOLAM HYDROCHLORIDE 1 MG/ML
INJECTION, SOLUTION INTRAMUSCULAR; INTRAVENOUS
Status: DISCONTINUED | OUTPATIENT
Start: 2019-05-01 | End: 2019-05-01 | Stop reason: HOSPADM

## 2019-05-01 RX ORDER — LIDOCAINE HYDROCHLORIDE 20 MG/ML
JELLY TOPICAL
Status: DISCONTINUED | OUTPATIENT
Start: 2019-05-01 | End: 2019-05-01 | Stop reason: HOSPADM

## 2019-05-01 NOTE — BRIEF OP NOTE
[facility]  Brief Operative Note     SUMMARY     Surgery Date: 5/1/2019     Surgeon(s) and Role:     * Chintan Mendez Jr., MD - Primary    Assisting Surgeon: None    Pre-op Diagnosis:  BPH with urinary obstruction [N40.1, N13.8]  Elevated PSA [R97.20]    Post-op Diagnosis:  Post-Op Diagnosis Codes:     * BPH with urinary obstruction [N40.1, N13.8]     * Elevated PSA [R97.20]    Procedure(s) (LRB):  BIOPSY-TRUS (N/A)  CYSTOSCOPY (N/A)    Anesthesia: RN IV Sedation    Description of the findings of the procedure: bph  El psa    Findings/Key Components: bph    Estimated Blood Loss: * No values recorded between 5/1/2019  8:16 AM and 5/1/2019  8:36 AM *         Specimens:   Specimen (12h ago, onward)    None          Discharge Note    SUMMARY     Admit Date: 5/1/2019    Discharge Date and Time:  05/01/2019 8:44 AM    Hospital Course (synopsis of major diagnoses, care, treatment, and services provided during the course of the hospital stay): sisi well     Final Diagnosis: Post-Op Diagnosis Codes:     * BPH with urinary obstruction [N40.1, N13.8]     * Elevated PSA [R97.20]    Disposition: Home or Self Care    Follow Up/Patient Instructions:     Medications:  Reconciled Home Medications:      Medication List      ASK your doctor about these medications    sulfamethoxazole-trimethoprim 800-160mg 800-160 mg Tab  Commonly known as:  BACTRIM DS  Start night before prostate biopsy every 12 hours for 4 doses only     tamsulosin 0.4 mg Cap  Commonly known as:  FLOMAX  Take 1 capsule (0.4 mg total) by mouth once daily.          Discharge Procedure Orders   Notify your health care provider if you experience any of the following:  temperature >100.4     Notify your health care provider if you experience any of the following:  persistent nausea and vomiting or diarrhea     Notify your health care provider if you experience any of the following:  severe uncontrolled pain     Notify your health care provider if you  experience any of the following:  difficulty breathing or increased cough     Notify your health care provider if you experience any of the following:  severe persistent headache     Notify your health care provider if you experience any of the following:  persistent dizziness, light-headedness, or visual disturbances     Notify your health care provider if you experience any of the following:  increased confusion or weakness     No dressing needed     Tissue Specimen To Pathology, Urology   Order Comments: 1. Left apex  2. Left middle  3. Left base  4. Right apex  5. Right middle  6. Right base     Order Specific Question Answer Comments   Directional Terms: Other(comment)    Clinical Information: see label    Specific Site: Prostate

## 2019-05-01 NOTE — DISCHARGE INSTRUCTIONS
1. Following your procedure, you will be taken by wheelchair to your car by an attendant.  2. Do not drive, operate machinery or drink alcoholic beverage for twelve (12) hours.  3. Do not smoke immediately after your procedure. It is recommended that you refrain from smoking for twelve (12) hours.  4. You may not remember your results of your tests as discussed by your physician. You need to make a follow-up appointment as discussed.  5. After Cystoscopy, you can anticipate burning on urination, frequency, and even passage of some blood. A chelsea intake of fluids will decrease these symptoms.  6. Increased fall risk due to sedation.  7. Circumstances may necessitate your physician admitting you to the hospital. Please be prepared for this if the doctor decides this is necessary.  8. If you must contact your physician and are unable to reach him, CALL THE [facility] EMERGENCY ROOM for assistance at [phone]. Explain your problem and you will be referred to a physician.Study Performed: Transrectal biopsy of prostate      Transrectal Ultrasound and Biopsy    A transrectal ultrasound is an imaging test. It uses sound waves to create pictures of a mans prostate gland. Your prostate gland is in front of your rectum. For this test, a special probe (transducer) is placed directly into your rectum. During the test, tissue samples (a biopsy) may also be taken. The test is done by a specially trained technologist called a sonographer.  Getting ready for your test  · You may be asked to clear your bowel before the test. This may be done by injecting liquid into your rectum (an enema). Or it can be done by drinking a special liquid.  · You may be asked not to eat or drink anything after midnight the night before the test.  · Tell your health care provider about any medicines, herbs, or supplements you are taking. This includes any over-the-counter medicines such as aspirin or ibuprofen.  · Answer any questions your provider  has about your medical history. This will help your provider tailor the test to your health needs.  During your test  · You may be asked to change into a gown. You will then lie on your side on an exam table, with your knees bent.  · The test is done with a hand-held probe. This is a short, slender aditi. It has a sterile, disposable cover. It is also greased (lubricated) with some gel. It is then gently placed inside your rectum.  · You will feel pressure from the probe. If you feel pain, let your provider know.  · If a biopsy is taken, it is done using a small probe with a very tiny needle on the end. This needle enters your prostate and removes several tiny samples of tissue. These samples are then sent to a lab to be examined. Any mild pain from the biopsy is usually minor.   After your test  Before leaving, you may need to wait for a short time while the images are reviewed. In most cases, you can go back to your normal routine after the test. If you had a biopsy, you may see some blood in your urine, sperm, or stool for a day or so. This is normal. Your health care provider will let you know when your test results are ready.  In some cases, a diagnosis cant be made from the tissue sample that was taken. If this happens, your provider will talk with you about whether you may need another biopsy. Or you may need a different procedure.  When to call your health care provider  Call your provider if you have:  · Very bloody urine or stool  · A fever lasting 24 to 48 hours  · Any other symptoms that your provider asks you to report, based on your medical condition   Date Last Reviewed: 6/19/2015 © 2000-2017 [employer]. [address] [place] [address]. All rights reserved. This information is not intended as a substitute for professional medical care. Always follow your healthcare professional's instructions.

## 2019-05-01 NOTE — OP NOTE
DATE OF PROCEDURE:  05/01/2019    PREOPERATIVE DIAGNOSES:  BPH with outlet obstruction and elevated PSA.    PROCEDURES PERFORMED:  Flexible cystoscopy and transrectal prostate ultrasound   with biopsy.    ANESTHESIA:  IV sedation per RN.    SURGEON:  Chintan Mendez M.D.    BLOOD LOSS:  Minimal.    NUMBER OF BIOPSY SPECIMENS TAKEN:  12.    INDICATIONS FOR PROCEDURE:  This gentleman had an elevated residual urine in the   office.  It was recommended that he do CIC, however, he preferred not doing   this.  I placed him on Flomax and he has been voiding better.  He also had an   elevated PSA and it was felt that TRUS with biopsy was indicated.    PROCEDURE IN DETAIL:  The patient was prepped and draped in supine position.    Xylocaine jelly was instilled per urethra.  The anterior urethra was normal.  No   strictures were noted.  The prostatic urethra was 4 cm with lateral lobe   enlargement.  There was a definite channel.  Both ureteral orifices were normal   size, shape and position.  The bladder was heavily trabeculated with grade 3   trabeculations.  There were no stones, tumors, foreign bodies or active   infections noted.  The patient was then placed in left lateral decubitus   position.  Sagittal and transverse views of the prostate were made.  The   patient's PSA was 4.5.  The prostate measured 4.6 x 3.2 x 5.8 cm for a total   volume of 46 g.  Random sextant biopsies were performed under IV sedation per   RN.  A total of 12 biopsies were made.  The patient tolerated the procedure   well.  He was discharged in satisfactory condition.  He will take his   antibiotics tonight and tomorrow and will call should he develop fever, chills   or any problems whatsoever.      TIMMY/IN  dd: 05/01/2019 08:48:59 (CDT)  td: 05/01/2019 09:01:20 (CDT)  Doc ID   #[id number]  Job ID #[id number]    CC:

## 2019-05-09 ENCOUNTER — TELEPHONE (OUTPATIENT)
Dept: UROLOGY | Facility: CLINIC | Age: 59
End: 2019-05-09

## 2019-05-24 ENCOUNTER — OFFICE VISIT (OUTPATIENT)
Dept: UROLOGY | Facility: CLINIC | Age: 59
End: 2019-05-24
Payer: COMMERCIAL

## 2019-05-24 VITALS
DIASTOLIC BLOOD PRESSURE: 87 MMHG | SYSTOLIC BLOOD PRESSURE: 129 MMHG | HEART RATE: 81 BPM | WEIGHT: 218 LBS | BODY MASS INDEX: 29.57 KG/M2

## 2019-05-24 DIAGNOSIS — N39.0 URINARY TRACT INFECTION IN MALE: ICD-10-CM

## 2019-05-24 DIAGNOSIS — N13.8 BPH WITH URINARY OBSTRUCTION: Primary | ICD-10-CM

## 2019-05-24 DIAGNOSIS — N40.1 BPH WITH URINARY OBSTRUCTION: Primary | ICD-10-CM

## 2019-05-24 PROBLEM — R82.90 ABNORMAL URINALYSIS: Status: ACTIVE | Noted: 2019-05-24

## 2019-05-24 PROCEDURE — 87086 URINE CULTURE/COLONY COUNT: CPT

## 2019-05-24 PROCEDURE — 99214 OFFICE O/P EST MOD 30 MIN: CPT | Mod: S$GLB,,, | Performed by: UROLOGY

## 2019-05-24 PROCEDURE — 87088 URINE BACTERIA CULTURE: CPT

## 2019-05-24 PROCEDURE — 99999 PR PBB SHADOW E&M-EST. PATIENT-LVL III: CPT | Mod: PBBFAC,,,

## 2019-05-24 PROCEDURE — 3008F PR BODY MASS INDEX (BMI) DOCUMENTED: ICD-10-PCS | Mod: CPTII,S$GLB,, | Performed by: UROLOGY

## 2019-05-24 PROCEDURE — 99999 PR PBB SHADOW E&M-EST. PATIENT-LVL III: ICD-10-PCS | Mod: PBBFAC,,,

## 2019-05-24 PROCEDURE — 99214 PR OFFICE/OUTPT VISIT, EST, LEVL IV, 30-39 MIN: ICD-10-PCS | Mod: S$GLB,,, | Performed by: UROLOGY

## 2019-05-24 PROCEDURE — 3008F BODY MASS INDEX DOCD: CPT | Mod: CPTII,S$GLB,, | Performed by: UROLOGY

## 2019-05-24 RX ORDER — SULFAMETHOXAZOLE AND TRIMETHOPRIM 800; 160 MG/1; MG/1
1 TABLET ORAL 2 TIMES DAILY
Qty: 20 TABLET | Refills: 0 | Status: SHIPPED | OUTPATIENT
Start: 2019-05-24 | End: 2019-06-03

## 2019-05-24 NOTE — PROGRESS NOTES
Urology - [facility]  Resident Clinic  Staff - Dr. Omalley    SUBJECTIVE:     Chief Complaint: urinary retention    History of Present Illness:  Luis Eduardo Curry is a 59 y.o. male who presents to clinic for symptoms of retention and dysuria.    He has had elevated PVRs >500 cc and started on flomax which has improved his symptoms.   His dad had prostate cancer and his PSA was 4.6. He underwent TRUS biopsy and cystoscopy 5/1. Biopsy results were negative. Volume was 46 g. Cysto showed trabeculated bladder.    Since the procedure he has been having worsening symptoms such as straining to void, weak stream, intermittency. He reports bilateral flank pain when straining to urinate. He reports more recent onset of cloudy urine.  AUA SS: 31 severe.    Subjective fever yesterday no chills. He has been taking tylenol and overall feels better but still has the urinary complaints.     Review of patient's allergies indicates:  No Known Allergies    History reviewed. No pertinent past medical history.  Past Surgical History:   Procedure Laterality Date    BIOPSY, WITH TRANSRECTAL PROSTATE ULTRASOUND N/A 5/1/2019    Performed by Chintan Mendez Jr., MD at [facility] OR    COLONOSCOPY  2006    COLONOSCOPY N/A 1/24/2019    [facility]    FLEXIBLE CYSTOSCOPY N/A 5/1/2019    Performed by [facility]intan [facility]endez Jr., MD at [facility] OR    POLYPECTOMY N/A 1/24/2019    Performed by Reece Tarango MD at [facility] ENDO     Family History   Problem Relation Age of Onset    No Known Problems Mother     Hypertension Father     No Known Problems Sister     No Known Problems Brother     No Known Problems Daughter     No Known Problems Son     No Known Problems Maternal Aunt     No Known Problems Maternal Uncle     No Known Problems Paternal Aunt     No Known Problems Paternal Uncle     No Known Problems Maternal Grandmother     No Known Problems Maternal Grandfather     No Known Problems Paternal Grandmother      No Known Problems Paternal Grandfather      Social History     Tobacco Use    Smoking status: Former Smoker     Packs/day: 1.00     Years: 30.00     Pack years: 30.00     Types: Cigarettes    Smokeless tobacco: Never Used   Substance Use Topics    Alcohol use: Yes     Frequency: Monthly or less     Binge frequency: Weekly    Drug use: No        Review of Systems   Constitutional: Negative for activity change, fatigue, fever and unexpected weight change.   HENT: Negative for sore throat and trouble swallowing.    Eyes: Negative for pain and discharge.   Respiratory: Negative for chest tightness and shortness of breath.    Cardiovascular: Negative for chest pain and palpitations.   Gastrointestinal: Negative for abdominal pain, constipation, diarrhea, nausea and vomiting.   Genitourinary:        As per HPI   Musculoskeletal: Negative for back pain and gait problem.   Skin: Negative for rash and wound.   Neurological: Negative for seizures and numbness.   Psychiatric/Behavioral: Negative for hallucinations. The patient is not nervous/anxious.        OBJECTIVE:     Anticoagulation:  No    Estimated body mass index is 29.57 kg/m² as calculated from the following:    Height as of 4/3/19: 6' (1.829 m).    Weight as of this encounter: 98.9 kg (218 lb).    Vital Signs (Most Recent)  Pulse: 81 (05/24/19 1416)  BP: 129/87 (05/24/19 1416)    Physical Exam   Nursing note and vitals reviewed.  Constitutional: He is oriented to person, place, and time. He appears well-developed and well-nourished. No distress.   Neck: No tracheal deviation present. No thyromegaly present.   Cardiovascular: Normal rate and intact distal pulses.    Pulmonary/Chest: Effort normal. No accessory muscle usage. No respiratory distress.   Abdominal: Soft. He exhibits no distension and no mass. There is no splenomegaly or hepatomegaly. There is no tenderness. There is no CVA tenderness. No hernia.   Suprapubic tenderness   Musculoskeletal: He  exhibits no edema or tenderness.   Neurological: He is alert and oriented to person, place, and time.   Skin: Skin is warm and dry. No lesion and no rash noted. No cyanosis.     Psychiatric: He has a normal mood and affect.       BMP  Lab Results   Component Value Date     01/05/2019    K 4.5 01/05/2019     01/05/2019    CO2 24 01/05/2019    BUN 15 01/05/2019    CREATININE 1.1 04/23/2019    CALCIUM 9.1 01/05/2019    ANIONGAP 7 (L) 01/05/2019    ESTGFRAFRICA >60 04/23/2019    EGFRNONAA >60 04/23/2019       Lab Results   Component Value Date    WBC 6.22 01/05/2019    HGB 16.2 01/05/2019    HCT 48.5 01/05/2019    MCV 89 01/05/2019     01/05/2019       Lab Results   Component Value Date    PSA 4.6 (H) 01/05/2019     Imaging:    CT Urogram 4/23/19: no evidence of renal masses or hydronephrosis. There are 2 bladder diverticula    Urine dipstick today + leuks, negative nitrite, and ++ blood.    A post void residual bladder scan was performed immediately after voiding. The patient's PVR was noted to be 308.    ASSESSMENT     1. BPH with urinary obstruction    2. Urinary tract infection in male        PLAN:     1. Urine culture today. Empiric bactrim sent to his pharmacy. Will tailor antibiotics as indicated.  He was instructed to go to the ED if he has fevers or call the resident on call.     2.  Discussed CIC vs indwelling catheter with the patient. Will plan to wait on either for now.  Discussed outlet procedures such as rezum and TURP in detail. He is interested in pursuing therapy but would like to research his options     Will have him follow up with Dr. Mendez to re-assess symptoms when he is not actively infected and discuss how to proceed for outlet procedure.    I spent 30 minutes with the patient of which more than half was spent in direct consultation with the patient in regards to our treatment and plan.     Frank De La Cruz MD

## 2019-05-26 LAB — BACTERIA UR CULT: NORMAL

## 2019-07-17 ENCOUNTER — OFFICE VISIT (OUTPATIENT)
Dept: UROLOGY | Facility: CLINIC | Age: 59
End: 2019-07-17
Attending: UROLOGY
Payer: COMMERCIAL

## 2019-07-17 VITALS
BODY MASS INDEX: 29.53 KG/M2 | HEIGHT: 72 IN | RESPIRATION RATE: 14 BRPM | SYSTOLIC BLOOD PRESSURE: 122 MMHG | HEART RATE: 81 BPM | DIASTOLIC BLOOD PRESSURE: 64 MMHG | WEIGHT: 218 LBS

## 2019-07-17 DIAGNOSIS — N40.1 BPH WITH URINARY OBSTRUCTION: Primary | ICD-10-CM

## 2019-07-17 DIAGNOSIS — N13.8 BPH WITH URINARY OBSTRUCTION: Primary | ICD-10-CM

## 2019-07-17 PROCEDURE — 3008F BODY MASS INDEX DOCD: CPT | Mod: CPTII,S$GLB,, | Performed by: UROLOGY

## 2019-07-17 PROCEDURE — 99999 PR PBB SHADOW E&M-EST. PATIENT-LVL III: CPT | Mod: PBBFAC,,, | Performed by: UROLOGY

## 2019-07-17 PROCEDURE — 99213 PR OFFICE/OUTPT VISIT, EST, LEVL III, 20-29 MIN: ICD-10-PCS | Mod: S$GLB,,, | Performed by: UROLOGY

## 2019-07-17 PROCEDURE — 99213 OFFICE O/P EST LOW 20 MIN: CPT | Mod: S$GLB,,, | Performed by: UROLOGY

## 2019-07-17 PROCEDURE — 99999 PR PBB SHADOW E&M-EST. PATIENT-LVL III: ICD-10-PCS | Mod: PBBFAC,,, | Performed by: UROLOGY

## 2019-07-17 PROCEDURE — 3008F PR BODY MASS INDEX (BMI) DOCUMENTED: ICD-10-PCS | Mod: CPTII,S$GLB,, | Performed by: UROLOGY

## 2019-07-17 NOTE — PROGRESS NOTES
Subjective:       Patient ID: Luis Eduardo Curry is a 59 y.o. male.    Chief Complaint: Follow-up    HPI patient has BPH without obstruction recent UTI.  Since his cystoscopy he is voiding much better and is very happy with his symptoms.  We will hold off on doing any sort of surgery but if he develops recurrent UTIs or his symptoms come back then he would benefit most from a laser TURP versus resume.  His urinalysis is negative today    History reviewed. No pertinent past medical history.    Past Surgical History:   Procedure Laterality Date    BIOPSY, WITH TRANSRECTAL PROSTATE ULTRASOUND N/A 5/1/2019    Performed by [facility]an [facility] at [facility]    COLONOSCOPY  2006    COLONOSCOPY N/A 1/24/2019    Performed by Reece Tarango [facility] at [facility]    FLEXIBLE CYSTOSCOPY N/A 5/1/2019    Performed by Chintan Mendez Jr., MD at [facility]    POLYPECTOMY N/A 1/24/2019    Performed by Reece Tarango MD at [facility]       Family History   Problem Relation Age of Onset    No Known Problems Mother     Hypertension Father     No Known Problems Sister     No Known Problems Brother     No Known Problems Daughter     No Known Problems Son     No Known Problems Maternal Aunt     No Known Problems Maternal Uncle     No Known Problems Paternal Aunt     No Known Problems Paternal Uncle     No Known Problems Maternal Grandmother     No Known Problems Maternal Grandfather     No Known Problems Paternal Grandmother     No Known Problems Paternal Grandfather        Social History     Socioeconomic History    Marital status:      Spouse name: Not on file    Number of children: Not on file    Years of education: Not on file    Highest education level: Not on file   Occupational History    Not on file   Social Needs    Financial resource strain: Not on file    Food insecurity:     Worry: Not on file     Inability: Not on file    Transportation needs:     Medical: Not on file     Non-medical: Not on file    Tobacco Use    Smoking status: Former Smoker     Packs/day: 1.00     Years: 30.00     Pack years: 30.00     Types: Cigarettes    Smokeless tobacco: Never Used   Substance and Sexual Activity    Alcohol use: Yes     Frequency: Monthly or less     Binge frequency: Weekly    Drug use: No    Sexual activity: Yes   Lifestyle    Physical activity:     Days per week: Not on file     Minutes per session: Not on file    Stress: Not on file   Relationships    Social connections:     Talks on phone: Not on file     Gets together: Not on file     Attends Sikhism service: Not on file     Active member of club or organization: Not on file     Attends meetings of clubs or organizations: Not on file     Relationship status: Not on file   Other Topics Concern    Not on file   Social History Narrative    Not on file       Allergies:  Patient has no known allergies.    Medications:    Current Outpatient Medications:     tamsulosin (FLOMAX) 0.4 mg Cap, Take 1 capsule (0.4 mg total) by mouth once daily., Disp: 30 capsule, Rfl: 12    Current Facility-Administered Medications:     lidocaine HCL 10 mg/ml (1%) injection 10 mL, 10 mL, Infiltration, Once, Chintan Mendez Jr., MD    lidocaine HCL 2% jelly, , Topical (Top), Once, Chintan Mendez Jr., MD    lidocaine HCl 2% urojet, , Urethral, Once, Chintan Mendez Jr., MD    Review of Systems   Constitutional: Negative for activity change, appetite change, chills, diaphoresis, fatigue, fever and unexpected weight change.   HENT: Negative for congestion, dental problem, hearing loss, mouth sores, postnasal drip, rhinorrhea, sinus pressure and trouble swallowing.    Eyes: Negative for pain, discharge and itching.   Respiratory: Negative for apnea, cough, choking, chest tightness, shortness of breath and wheezing.    Cardiovascular: Negative for chest pain, palpitations and leg swelling.   Gastrointestinal: Negative for abdominal distention, abdominal pain, anal bleeding, blood  in stool, constipation, diarrhea, nausea, rectal pain and vomiting.   Endocrine: Negative for polydipsia and polyuria.   Genitourinary: Negative for decreased urine volume, difficulty urinating, discharge, dysuria, enuresis, flank pain, frequency, genital sores, hematuria, penile pain, penile swelling, scrotal swelling, testicular pain and urgency.   Musculoskeletal: Negative for arthralgias, back pain and myalgias.   Skin: Negative for color change, rash and wound.   Neurological: Negative for dizziness, syncope, speech difficulty, light-headedness and headaches.   Hematological: Negative for adenopathy. Does not bruise/bleed easily.   Psychiatric/Behavioral: Negative for behavioral problems, confusion, hallucinations and sleep disturbance.       Objective:      Physical Exam   Constitutional: He appears well-developed.   HENT:   Head: Normocephalic.   Cardiovascular: Normal rate.    Pulmonary/Chest: Effort normal.   Abdominal: Soft.   Neurological: He is alert.   Skin: Skin is warm.     Psychiatric: He has a normal mood and affect.       Assessment:       1. BPH with urinary obstruction        Plan:       Luis Eduardo was seen today for follow-up.    Diagnoses and all orders for this visit:    BPH with urinary obstruction        return to clinic 6 months to evaluate symptoms

## 2019-11-19 ENCOUNTER — TELEPHONE (OUTPATIENT)
Dept: INTERNAL MEDICINE | Facility: CLINIC | Age: 59
End: 2019-11-19

## 2019-11-19 ENCOUNTER — OFFICE VISIT (OUTPATIENT)
Dept: INTERNAL MEDICINE | Facility: CLINIC | Age: 59
End: 2019-11-19
Payer: COMMERCIAL

## 2019-11-19 VITALS
RESPIRATION RATE: 16 BRPM | HEIGHT: 72 IN | BODY MASS INDEX: 29.95 KG/M2 | SYSTOLIC BLOOD PRESSURE: 100 MMHG | HEART RATE: 84 BPM | DIASTOLIC BLOOD PRESSURE: 80 MMHG | WEIGHT: 221.13 LBS

## 2019-11-19 DIAGNOSIS — K42.9 UMBILICAL HERNIA WITHOUT OBSTRUCTION AND WITHOUT GANGRENE: Primary | ICD-10-CM

## 2019-11-19 DIAGNOSIS — K40.90 RIGHT INGUINAL HERNIA: ICD-10-CM

## 2019-11-19 PROCEDURE — 99213 PR OFFICE/OUTPT VISIT, EST, LEVL III, 20-29 MIN: ICD-10-PCS | Mod: S$GLB,,, | Performed by: INTERNAL MEDICINE

## 2019-11-19 PROCEDURE — 99999 PR PBB SHADOW E&M-EST. PATIENT-LVL III: ICD-10-PCS | Mod: PBBFAC,,, | Performed by: INTERNAL MEDICINE

## 2019-11-19 PROCEDURE — 3008F PR BODY MASS INDEX (BMI) DOCUMENTED: ICD-10-PCS | Mod: CPTII,S$GLB,, | Performed by: INTERNAL MEDICINE

## 2019-11-19 PROCEDURE — 3008F BODY MASS INDEX DOCD: CPT | Mod: CPTII,S$GLB,, | Performed by: INTERNAL MEDICINE

## 2019-11-19 PROCEDURE — 99999 PR PBB SHADOW E&M-EST. PATIENT-LVL III: CPT | Mod: PBBFAC,,, | Performed by: INTERNAL MEDICINE

## 2019-11-19 PROCEDURE — 99213 OFFICE O/P EST LOW 20 MIN: CPT | Mod: S$GLB,,, | Performed by: INTERNAL MEDICINE

## 2019-11-19 NOTE — TELEPHONE ENCOUNTER
Referred to Dr. Mas per Dr. Connor for hernia. Referral, clinic notes, insurance and demographic information faxed to Dr. Mas's office.

## 2019-11-19 NOTE — PROGRESS NOTES
Subjective:       Patient ID: Luis Eduardo Curry is a 59 y.o. male.    Chief Complaint: Hernia    Luis Eduardo Curry is a 59 y.o. male  Went for [employer] physical .  Noted to have umbilical hernia .    Review of Systems   Constitutional: Negative for activity change and unexpected weight change.   HENT: Negative for rhinorrhea and trouble swallowing.    Eyes: Negative for discharge.   Respiratory: Negative for chest tightness and wheezing.    Cardiovascular: Negative for chest pain and palpitations.   Gastrointestinal: Negative for blood in stool, constipation, diarrhea and vomiting.        ++ hernia    Endocrine: Negative for polydipsia and polyuria.   Genitourinary: Negative for hematuria.   Musculoskeletal: Negative for arthralgias, joint swelling and neck pain.   Neurological: Negative for weakness and headaches.   Psychiatric/Behavioral: Negative for confusion and dysphoric mood.       Objective:      Physical Exam   Constitutional: He is oriented to person, place, and time. He appears well-developed and well-nourished.   HENT:   Head: Normocephalic and atraumatic.   Neck: No JVD present.   Cardiovascular: Normal rate, regular rhythm and normal heart sounds.   Pulmonary/Chest: Effort normal and breath sounds normal.   Abdominal: Soft. Bowel sounds are normal. There is no tenderness.       umbilical hernia +++ reducible.      R inguinal hernia     Musculoskeletal: He exhibits no edema.   Neurological: He is alert and oriented to person, place, and time.   Skin: Skin is warm and dry.   Psychiatric: He has a normal mood and affect. His behavior is normal. Judgment and thought content normal.   Nursing note and vitals reviewed.      Assessment:       1. Umbilical hernia without obstruction and without gangrene    2. Right inguinal hernia        Plan:   Luis Eduardo was seen today for hernia.    Diagnoses and all orders for this visit:    Umbilical hernia without obstruction and without gangrene  -     Ambulatory referral to  General Surgery    Right inguinal hernia  -     Ambulatory referral to General Surgery      Problem List Items Addressed This Visit     None      Visit Diagnoses     Umbilical hernia without obstruction and without gangrene    -  Primary    Relevant Orders    Ambulatory referral to General Surgery    Right inguinal hernia        Relevant Orders    Ambulatory referral to General Surgery

## 2019-12-02 ENCOUNTER — PATIENT OUTREACH (OUTPATIENT)
Dept: ADMINISTRATIVE | Facility: OTHER | Age: 59
End: 2019-12-02

## 2019-12-03 ENCOUNTER — OFFICE VISIT (OUTPATIENT)
Dept: SURGERY | Facility: CLINIC | Age: 59
End: 2019-12-03
Payer: COMMERCIAL

## 2019-12-03 VITALS
RESPIRATION RATE: 16 BRPM | HEART RATE: 80 BPM | WEIGHT: 225.31 LBS | SYSTOLIC BLOOD PRESSURE: 122 MMHG | HEIGHT: 72 IN | BODY MASS INDEX: 30.52 KG/M2 | DIASTOLIC BLOOD PRESSURE: 88 MMHG

## 2019-12-03 DIAGNOSIS — K42.9 UMBILICAL HERNIA WITHOUT OBSTRUCTION AND WITHOUT GANGRENE: ICD-10-CM

## 2019-12-03 DIAGNOSIS — K40.90 RIGHT INGUINAL HERNIA: Primary | ICD-10-CM

## 2019-12-03 PROCEDURE — 3008F PR BODY MASS INDEX (BMI) DOCUMENTED: ICD-10-PCS | Mod: CPTII,S$GLB,, | Performed by: SURGERY

## 2019-12-03 PROCEDURE — 99999 PR PBB SHADOW E&M-EST. PATIENT-LVL IV: CPT | Mod: PBBFAC,,, | Performed by: SURGERY

## 2019-12-03 PROCEDURE — 99204 OFFICE O/P NEW MOD 45 MIN: CPT | Mod: S$GLB,,, | Performed by: SURGERY

## 2019-12-03 PROCEDURE — 99999 PR PBB SHADOW E&M-EST. PATIENT-LVL IV: ICD-10-PCS | Mod: PBBFAC,,, | Performed by: SURGERY

## 2019-12-03 PROCEDURE — 99204 PR OFFICE/OUTPT VISIT, NEW, LEVL IV, 45-59 MIN: ICD-10-PCS | Mod: S$GLB,,, | Performed by: SURGERY

## 2019-12-03 PROCEDURE — 3008F BODY MASS INDEX DOCD: CPT | Mod: CPTII,S$GLB,, | Performed by: SURGERY

## 2019-12-03 RX ORDER — SODIUM CHLORIDE, SODIUM LACTATE, POTASSIUM CHLORIDE, CALCIUM CHLORIDE 600; 310; 30; 20 MG/100ML; MG/100ML; MG/100ML; MG/100ML
INJECTION, SOLUTION INTRAVENOUS CONTINUOUS
Status: CANCELLED | OUTPATIENT
Start: 2019-12-03

## 2019-12-03 RX ORDER — LIDOCAINE HYDROCHLORIDE 10 MG/ML
1 INJECTION, SOLUTION EPIDURAL; INFILTRATION; INTRACAUDAL; PERINEURAL ONCE
Status: DISCONTINUED | OUTPATIENT
Start: 2019-12-03 | End: 2020-10-10 | Stop reason: HOSPADM

## 2019-12-03 NOTE — PROGRESS NOTES
Subjective:       Patient ID: Luis Eduardo Curry is a 59 y.o. male.    Chief Complaint: Hernia    Review of patient's allergies indicates:  No Known Allergies  59-year-old male referred to me for umbilical and right inguinal hernia. He was going for a physical at work and was diagnosed with an umbilical hernia.  Then went see his primary care physician who diagnosed him with not only an umbilical hernia but a right inguinal hernia.  Patient really reports no pain or complaints but needs these hernias fixed prior to passing his physical exam.  Patient has never had hernia repair before.    History reviewed. No pertinent past medical history.  Past Surgical History:   Procedure Laterality Date    BIOPSY WITH TRANSRECTAL ULTRASOUND (TRUS) GUIDANCE N/A 5/1/2019    Procedure: BIOPSY, WITH TRANSRECTAL PROSTATE ULTRASOUND;  Surgeon: Chintan Mendez Jr., MD;  Location: [facility];  Service: Urology;  Laterality: N/A;    COLONOSCOPY  2006    COLONOSCOPY N/A 1/24/2019    Procedure: COLONOSCOPY;  Surgeon: Reece Tarango MD;  Location[facility];  Service: Endoscopy;  Laterality: N/A;    CYSTOSCOPY N/A 5/1/2019    Procedure: FLEXIBLE CYSTOSCOPY;  Surgeon: Chintan Mendez Jr., MD;  Location: [facility];  Service: Urology;  Laterality: N/A;     Family History   Problem Relation Age of Onset    No Known Problems Mother     Hypertension Father     No Known Problems Sister     No Known Problems Brother     No Known Problems Daughter     No Known Problems Son     No Known Problems Maternal Aunt     No Known Problems Maternal Uncle     No Known Problems Paternal Aunt     No Known Problems Paternal Uncle     No Known Problems Maternal Grandmother     No Known Problems Maternal Grandfather     No Known Problems Paternal Grandmother     No Known Problems Paternal Grandfather      Social History     Socioeconomic History    Marital status:      Spouse name: Not on file    Number of children: Not on file    Years of  education: Not on file    Highest education level: Not on file   Occupational History    Not on file   Social Needs    Financial resource strain: Not on file    Food insecurity:     Worry: Not on file     Inability: Not on file    Transportation needs:     Medical: Not on file     Non-medical: Not on file   Tobacco Use    Smoking status: Former Smoker     Packs/day: 1.00     Years: 30.00     Pack years: 30.00     Types: Cigarettes    Smokeless tobacco: Never Used   Substance and Sexual Activity    Alcohol use: Yes     Frequency: Monthly or less     Binge frequency: Weekly    Drug use: No    Sexual activity: Yes   Lifestyle    Physical activity:     Days per week: Not on file     Minutes per session: Not on file    Stress: Only a little   Relationships    Social connections:     Talks on phone: Not on file     Gets together: Not on file     Attends Hinduism service: Not on file     Active member of club or organization: Not on file     Attends meetings of clubs or organizations: Not on file     Relationship status: Not on file   Other Topics Concern    Not on file   Social History Narrative    Not on file     Vitals:    12/03/19 0933   BP: 122/88   Pulse: 80   Resp: 16       Review of Systems   All other systems reviewed and are negative.      Objective:      Physical Exam   Constitutional: He is oriented to person, place, and time. He appears well-developed and well-nourished.   HENT:   Head: Normocephalic.   Eyes: Pupils are equal, round, and reactive to light.   Neck: Normal range of motion.   Pulmonary/Chest: Effort normal.   Abdominal: Soft. A hernia (Small umbilical hernia and medium size right inguinal hernia both reducible.) is present.   Musculoskeletal: Normal range of motion.   Neurological: He is alert and oriented to person, place, and time.   Skin: Skin is warm and dry.   Psychiatric: He has a normal mood and affect.   Nursing note and vitals reviewed.      Assessment:       1. Umbilical  hernia without obstruction and without gangrene    2. Right inguinal hernia        Plan:         Luis Eduardo was seen today for hernia.    Diagnoses and all orders for this visit:    Umbilical hernia without obstruction and without gangrene    Right inguinal hernia      Patient will be scheduled for umbilical and right inguinal hernia repair with mesh.  No follow-ups on file.          Caio Mas Jr, MD

## 2019-12-03 NOTE — LETTER
December 3, 2019      Kiko Connor MD  [address]  [facility] [address]           [facility]  [address] [place] [id number]  Phone: [phone]          Patient: Luis Eduardo Curry   MR Number: [id number]   YOB: 1960   Date of Visit: 12/3/2019       Dear Dr. Kiko Connor:    Thank you for referring Luis Eduardo Curry to me for evaluation. Attached you will find relevant portions of my assessment and plan of care.    If you have questions, please do not hesitate to call me. I look forward to following Luis Eduardo Curry along with you.    Sincerely,    Caio Mas Jr., MD    Enclosure  CC:  No Recipients    If you would like to receive this communication electronically, please contact [email or web address] or [phone] to request more information on [facility] Link access.    For providers and/or their staff who would like to refer a patient to [facility], please contact us through our one-stop-shop provider referral line, [facility], at [phone].    If you feel you have received this communication in error or would no longer like to receive these types of communications, please e-mail [email or web address]

## 2020-01-06 ENCOUNTER — ANESTHESIA EVENT (OUTPATIENT)
Dept: SURGERY | Facility: HOSPITAL | Age: 60
End: 2020-01-06
Payer: COMMERCIAL

## 2020-01-06 ENCOUNTER — HOSPITAL ENCOUNTER (OUTPATIENT)
Dept: RADIOLOGY | Facility: HOSPITAL | Age: 60
Discharge: HOME | End: 2020-01-06
Attending: SURGERY
Payer: COMMERCIAL

## 2020-01-06 ENCOUNTER — HOSPITAL ENCOUNTER (OUTPATIENT)
Dept: PREADMISSION TESTING | Facility: HOSPITAL | Age: 60
Discharge: HOME | End: 2020-01-06
Attending: SURGERY
Payer: COMMERCIAL

## 2020-01-06 ENCOUNTER — HOSPITAL ENCOUNTER (OUTPATIENT)
Dept: PULMONOLOGY | Facility: HOSPITAL | Age: 60
Discharge: HOME | End: 2020-01-06
Attending: SURGERY
Payer: COMMERCIAL

## 2020-01-06 DIAGNOSIS — K40.90 RIGHT INGUINAL HERNIA: ICD-10-CM

## 2020-01-06 DIAGNOSIS — K42.9 UMBILICAL HERNIA WITHOUT OBSTRUCTION AND WITHOUT GANGRENE: ICD-10-CM

## 2020-01-06 PROCEDURE — 71046 X-RAY EXAM CHEST 2 VIEWS: CPT | Mod: TC

## 2020-01-06 PROCEDURE — 71046 XR CHEST PA AND LATERAL: ICD-10-PCS | Mod: 26,,, | Performed by: RADIOLOGY

## 2020-01-06 PROCEDURE — 93010 EKG 12-LEAD: ICD-10-PCS | Mod: ,,, | Performed by: INTERNAL MEDICINE

## 2020-01-06 PROCEDURE — 93010 ELECTROCARDIOGRAM REPORT: CPT | Mod: ,,, | Performed by: INTERNAL MEDICINE

## 2020-01-06 PROCEDURE — 93005 ELECTROCARDIOGRAM TRACING: CPT

## 2020-01-06 PROCEDURE — 71046 X-RAY EXAM CHEST 2 VIEWS: CPT | Mod: 26,,, | Performed by: RADIOLOGY

## 2020-01-06 NOTE — ANESTHESIA PREPROCEDURE EVALUATION
01/06/2020  Luis Eduardo Curry is a 59 y.o., male.    Pre-op Assessment    I have reviewed the Patient Summary Reports.    I have reviewed the Nursing Notes.   I have reviewed the Medications.     Review of Systems  Anesthesia Hx:  No previous Anesthesia  History of prior surgery of interest to airway management or planning: Denies Family Hx of Anesthesia complications.   Denies Personal Hx of Anesthesia complications.   Social:  Non-Smoker, Social Alcohol Use    Hematology/Oncology:  Hematology Normal   Oncology Normal     EENT/Dental:EENT/Dental Normal   Cardiovascular:  Cardiovascular Normal Exercise tolerance: good     Pulmonary:  Pulmonary Normal    Renal/:  Renal/ Normal     Hepatic/GI:  Hepatic/GI Normal    Musculoskeletal:  Musculoskeletal Normal    Neurological:  Neurology Normal    Endocrine:  Endocrine Normal    Dermatological:  Skin Normal    Psych:  Psychiatric Normal           Physical Exam  General:  Well nourished    Airway/Jaw/Neck:  Airway Findings: Mouth Opening: Normal Tongue: Normal  General Airway Assessment: Adult  Mallampati: II  TM Distance: Normal, at least 6 cm      Dental:  Dental Findings: In tact        Mental Status:  Mental Status Findings:  Cooperative, Alert and Oriented         Anesthesia Plan  Type of Anesthesia, risks & benefits discussed:  Anesthesia Type:  general  Patient's Preference:   Intra-op Monitoring Plan: standard ASA monitors  Intra-op Monitoring Plan Comments:   Post Op Pain Control Plan: multimodal analgesia  Post Op Pain Control Plan Comments:   Induction:   IV  Beta Blocker:  Patient is not currently on a Beta-Blocker (No further documentation required).       Informed Consent: Patient understands risks and agrees with Anesthesia plan.  Questions answered. Anesthesia consent signed with patient.  ASA Score: 1     Day of Surgery Review of History &  Physical: I have interviewed and examined the patient. I have reviewed the patient's H&P dated: 11/4/20. There are no significant changes.  H&P update referred to the surgeon.

## 2020-01-06 NOTE — DISCHARGE INSTRUCTIONS
[facility]  Pre Admit Instructions    Day and Date of Procedure:  Tuesday 1/14/20  Arrival time: 9am      · Call your doctor if you become ill before your surgery  · Someone will call you between 1 p.m. And 5 p.m.the workday before the procedure to give you an arrival time       - 7 a.m. To 5 p.m. Enter through Patient Registration Main Lobby  · You must have a responsible  to bring you home    Do NOT eat anything past: midnight   Ok for clear liquids until: 8am  Examples of clear liquids are: water, juice, or gatorade - NO Dairy     Please    · Do not wear makeup, jewelry, nail polish or body piercings  · Bring containers/solution for contacts, dentures, bridges - these and hearing aids will be removed before your procedure  · Do not bring cash, jewelry or valuables the day of your procedure   · No smoking at least 24 hours before your procedure  · Wear clothing that is comfortable and easy to take off and put on  · Do NOT shave for at least 5 days before your surgery    Review skin preparation handout before using. Shower with Hibiclens the Night before and morning of the procedure.                 Information about your stay (Please Review)    Before Surgery  1. Cafeteria Meals: 7am to 10am; 11am to 1:30 pm; Dinner/Supper must may be ordered between 11:00 am and 4 pm from the \[facility]\"" Cafe After [employer] Menu. Food will be available to  between 5 pm and 6 pm. The kitchen phone extension is [phone].  2. Your doctor may order and review labs, x-rays, ECG or other tests as a pre-surgery workup and will call you if there is need for follow up.  3. No smoking inside or outside the hospital on hospital grounds.  4. Wear clothing that is easy to take off and put on.  The hospital will provide you with a gown.  5. You may bring robe, slippers, nightwear, and toiletries (toothbrush, toothpaste, makeup).  6. If your doctor orders a Fleets Enema or other prep, follow package and/or doctors  orders.  7. Brush your teeth and rinse your mouth the morning of surgery, but dont swallow the water.  8. The nurse will ask questions and check your condition.  The doctor may thien your surgical site.  9. Compression boots may be put on your calves to reduce the risk of blood clots.  10. The doctor may order medicine to help you relax before surgery.  After Surgery  1. The nurse will check your temperature, breathing, blood pressure, heart rate, IV site, and surgery site.  2. A diet will be ordered-most start with ice chips and then advance slowly to other foods.  3. If you have IV fluids the IV pump will beep to let the nurse know that she needs to check it.  4. You may have a urinary catheter and staff may measure your oral intake and urine output.  5. Pain medication may be ordered by the doctor after surgery.  If you have a pain management device tell your caretakers not to press the button because of OVERDOSE RISK.  6. When the nurse or doctor tells you it is okay to get out of bed, ask for help until you are stable.  7. The nurse may ask you to turn, cough, and deep breathe to prevent lung problems.  You can use a pillow to hold your incision when you deep breathe or cough to reduce pain.  8. The nurse will give you discharge instructions--incision care, symptoms to report to your doctor, and your follow-up appointment when you are discharged.  You cannot drive yourself home.  Goal for Discharge from One Day Surgery  · Control pain with an oral medication  · Walk without feeling dizzy or weak  · Tolerate liquids well  · Urinate without difficulty    Things you can do to  Reduce the Risk of Infections or Complications  Wash Hands and use Waterless Hand Sanitizers  · Wash hands frequently with soap and warm water for at least 15 seconds.   · Use hand sanitizers (alcohol based) often at home and in public if hands are not visibly soiled  Take Antibiotic Exactly as Prescribed  · Do not stop antibiotics too soon;  you risk developing infection resistant to antibiotics  · Take your antibiotic even if you are feeling better and even if they upset your stomach  · Call the doctor if you cant tolerate the antibiotic or you have an allergic reaction  Stay Healthy  · Take medicines as prescribed by your doctor  · Keep your diabetes under control - diet and medication  · Get enough rest, exercise and eat a healthy diet  Keep the Wound Clean and Dry  · Wash hands before and after taking care of the incision (cut)  · Wash hands when you remove a dressing, before you touch/apply a new dressing  · Shower and clean incision with antibacterial soap and rinse well if the doctor approves  · Allow the cut to dry completely before putting on a clean dressing  · Do not touch the part of the bandage that will cover the incision  · Do not use ointments unless your doctor tells you to-can promote bacterial growth  · If ordered, put ointment directly on the dressing-do not touch the end of the tube  · Do not scrub, remove scabs, or leave a damp dressing on the incision  · Do not use peroxide or alcohol to clean the incision unless the doctor tells you to   · Do not let children, pets or anyone else contaminate the incision  Stop Smoking To Prevent Infection  · Stop smoking-Centers for Disease Control recommends 30 days before surgery  · Smokers get more infections after surgery-studies have shown 6 times the risk  · Smokers have more scarring and heal slower-open wounds get infected easier  Prevent Respiratory complications  · Stop smoking  · Turn, cough, and deep breathe even if you have some pain when you do so.  · Splint your incision with a pillow when you cough/deep breath, to help control pain.  · Do not lie in one position for long periods of time.   Prevent Blood Clots  · When you wake move your legs, flex your feet, rotate your ankles, wiggle your toes  · Get up when the doctor says its ok.  Dangle your feet from the side of the  bed  · Report symptoms-leg pain, redness/swelling, warm to touch; fever; shortness of breath, chest pain, severe upper back pain.

## 2020-01-14 ENCOUNTER — HOSPITAL ENCOUNTER (OUTPATIENT)
Facility: HOSPITAL | Age: 60
Discharge: HOME | End: 2020-01-14
Attending: SURGERY | Admitting: SURGERY
Payer: COMMERCIAL

## 2020-01-14 ENCOUNTER — ANESTHESIA (OUTPATIENT)
Dept: SURGERY | Facility: HOSPITAL | Age: 60
End: 2020-01-14
Payer: COMMERCIAL

## 2020-01-14 DIAGNOSIS — K42.9 UMBILICAL HERNIA WITHOUT OBSTRUCTION AND WITHOUT GANGRENE: Primary | ICD-10-CM

## 2020-01-14 DIAGNOSIS — K40.90 RIGHT INGUINAL HERNIA: ICD-10-CM

## 2020-01-14 PROCEDURE — 36000706: Performed by: SURGERY

## 2020-01-14 PROCEDURE — 25000003 PHARM REV CODE 250: Performed by: SURGERY

## 2020-01-14 PROCEDURE — 00830 ANES HERNIA RPR LWR ABD NOS: CPT | Mod: QZ,P1 | Performed by: NURSE ANESTHETIST, CERTIFIED REGISTERED

## 2020-01-14 PROCEDURE — 25000003 PHARM REV CODE 250: Performed by: NURSE ANESTHETIST, CERTIFIED REGISTERED

## 2020-01-14 PROCEDURE — 63600175 PHARM REV CODE 636 W HCPCS: Performed by: SURGERY

## 2020-01-14 PROCEDURE — 36000707: Performed by: SURGERY

## 2020-01-14 PROCEDURE — 49505 PR REPAIR ING HERNIA,5+Y/O,REDUCIBL: ICD-10-PCS | Mod: RT,,, | Performed by: SURGERY

## 2020-01-14 PROCEDURE — C1781 MESH (IMPLANTABLE): HCPCS | Performed by: SURGERY

## 2020-01-14 PROCEDURE — 63600175 PHARM REV CODE 636 W HCPCS: Performed by: NURSE ANESTHETIST, CERTIFIED REGISTERED

## 2020-01-14 PROCEDURE — 49505 PRP I/HERN INIT REDUC >5 YR: CPT | Mod: RT,,, | Performed by: SURGERY

## 2020-01-14 PROCEDURE — 71000033 HC RECOVERY, INTIAL HOUR: Performed by: SURGERY

## 2020-01-14 PROCEDURE — C1729 CATH, DRAINAGE: HCPCS | Performed by: SURGERY

## 2020-01-14 PROCEDURE — 37000008 HC ANESTHESIA 1ST 15 MINUTES: Performed by: SURGERY

## 2020-01-14 PROCEDURE — 49585 PR REPAIR UMBILICAL HERN,5+Y/O,REDUC: CPT | Mod: 51,,, | Performed by: SURGERY

## 2020-01-14 PROCEDURE — 37000009 HC ANESTHESIA EA ADD 15 MINS: Performed by: SURGERY

## 2020-01-14 PROCEDURE — 49585 PR REPAIR UMBILICAL HERN,5+Y/O,REDUC: ICD-10-PCS | Mod: 51,,, | Performed by: SURGERY

## 2020-01-14 DEVICE — MESH PROLENE HERNIA EXTENDED: Type: IMPLANTABLE DEVICE | Site: INGUINAL | Status: FUNCTIONAL

## 2020-01-14 RX ORDER — NEOSTIGMINE METHYLSULFATE 1 MG/ML
INJECTION, SOLUTION INTRAVENOUS
Status: DISCONTINUED | OUTPATIENT
Start: 2020-01-14 | End: 2020-01-14

## 2020-01-14 RX ORDER — SODIUM CHLORIDE 9 MG/ML
INJECTION, SOLUTION INTRAVENOUS CONTINUOUS
Status: DISCONTINUED | OUTPATIENT
Start: 2020-01-14 | End: 2020-01-14 | Stop reason: HOSPADM

## 2020-01-14 RX ORDER — MIDAZOLAM HYDROCHLORIDE 1 MG/ML
INJECTION INTRAMUSCULAR; INTRAVENOUS
Status: DISCONTINUED | OUTPATIENT
Start: 2020-01-14 | End: 2020-01-14

## 2020-01-14 RX ORDER — ROCURONIUM BROMIDE 10 MG/ML
INJECTION, SOLUTION INTRAVENOUS
Status: DISCONTINUED | OUTPATIENT
Start: 2020-01-14 | End: 2020-01-14

## 2020-01-14 RX ORDER — DEXAMETHASONE SODIUM PHOSPHATE 4 MG/ML
INJECTION, SOLUTION INTRA-ARTICULAR; INTRALESIONAL; INTRAMUSCULAR; INTRAVENOUS; SOFT TISSUE
Status: DISCONTINUED | OUTPATIENT
Start: 2020-01-14 | End: 2020-01-14

## 2020-01-14 RX ORDER — BUPIVACAINE HYDROCHLORIDE AND EPINEPHRINE 5; 5 MG/ML; UG/ML
INJECTION, SOLUTION EPIDURAL; INTRACAUDAL; PERINEURAL
Status: DISCONTINUED | OUTPATIENT
Start: 2020-01-14 | End: 2020-01-14 | Stop reason: HOSPADM

## 2020-01-14 RX ORDER — FENTANYL CITRATE 50 UG/ML
INJECTION, SOLUTION INTRAMUSCULAR; INTRAVENOUS
Status: DISCONTINUED | OUTPATIENT
Start: 2020-01-14 | End: 2020-01-14

## 2020-01-14 RX ORDER — HYDROMORPHONE HYDROCHLORIDE 1 MG/ML
1 INJECTION, SOLUTION INTRAMUSCULAR; INTRAVENOUS; SUBCUTANEOUS EVERY 4 HOURS PRN
Status: DISCONTINUED | OUTPATIENT
Start: 2020-01-14 | End: 2020-01-14 | Stop reason: HOSPADM

## 2020-01-14 RX ORDER — LIDOCAINE HYDROCHLORIDE 20 MG/ML
INJECTION, SOLUTION EPIDURAL; INFILTRATION; INTRACAUDAL; PERINEURAL
Status: DISCONTINUED | OUTPATIENT
Start: 2020-01-14 | End: 2020-01-14

## 2020-01-14 RX ORDER — ONDANSETRON HYDROCHLORIDE 2 MG/ML
INJECTION, SOLUTION INTRAMUSCULAR; INTRAVENOUS
Status: DISCONTINUED | OUTPATIENT
Start: 2020-01-14 | End: 2020-01-14

## 2020-01-14 RX ORDER — SODIUM CHLORIDE, SODIUM LACTATE, POTASSIUM CHLORIDE, CALCIUM CHLORIDE 600; 310; 30; 20 MG/100ML; MG/100ML; MG/100ML; MG/100ML
INJECTION, SOLUTION INTRAVENOUS CONTINUOUS
Status: DISCONTINUED | OUTPATIENT
Start: 2020-01-14 | End: 2020-01-14 | Stop reason: HOSPADM

## 2020-01-14 RX ORDER — ACETAMINOPHEN 10 MG/ML
INJECTION, SOLUTION INTRAVENOUS
Status: DISCONTINUED | OUTPATIENT
Start: 2020-01-14 | End: 2020-01-14

## 2020-01-14 RX ORDER — PROPOFOL 10 MG/ML
VIAL (ML) INTRAVENOUS
Status: DISCONTINUED | OUTPATIENT
Start: 2020-01-14 | End: 2020-01-14

## 2020-01-14 RX ORDER — CEFAZOLIN SODIUM 2 G/50ML
2 SOLUTION INTRAVENOUS
Status: COMPLETED | OUTPATIENT
Start: 2020-01-14 | End: 2020-01-14

## 2020-01-14 RX ORDER — KETOROLAC TROMETHAMINE 30 MG/ML
INJECTION, SOLUTION INTRAMUSCULAR; INTRAVENOUS
Status: DISCONTINUED | OUTPATIENT
Start: 2020-01-14 | End: 2020-01-14

## 2020-01-14 RX ORDER — HYDROCODONE BITARTRATE AND ACETAMINOPHEN 5; 325 MG/1; MG/1
2 TABLET ORAL EVERY 6 HOURS PRN
Qty: 30 TABLET | Refills: 0 | Status: ON HOLD | OUTPATIENT
Start: 2020-01-14 | End: 2020-10-10 | Stop reason: HOSPADM

## 2020-01-14 RX ORDER — GLYCOPYRROLATE 0.2 MG/ML
INJECTION INTRAMUSCULAR; INTRAVENOUS
Status: DISCONTINUED | OUTPATIENT
Start: 2020-01-14 | End: 2020-01-14

## 2020-01-14 RX ORDER — ONDANSETRON 2 MG/ML
4 INJECTION INTRAMUSCULAR; INTRAVENOUS EVERY 12 HOURS PRN
Status: DISCONTINUED | OUTPATIENT
Start: 2020-01-14 | End: 2020-01-14 | Stop reason: HOSPADM

## 2020-01-14 RX ORDER — HYDROCODONE BITARTRATE AND ACETAMINOPHEN 5; 325 MG/1; MG/1
1 TABLET ORAL EVERY 4 HOURS PRN
Status: DISCONTINUED | OUTPATIENT
Start: 2020-01-14 | End: 2020-01-14 | Stop reason: HOSPADM

## 2020-01-14 RX ADMIN — SODIUM CHLORIDE, SODIUM LACTATE, POTASSIUM CHLORIDE, AND CALCIUM CHLORIDE: .6; .31; .03; .02 INJECTION, SOLUTION INTRAVENOUS at 10:01

## 2020-01-14 RX ADMIN — FENTANYL CITRATE 50 MCG: 50 INJECTION, SOLUTION INTRAMUSCULAR; INTRAVENOUS at 11:01

## 2020-01-14 RX ADMIN — SODIUM CHLORIDE, SODIUM LACTATE, POTASSIUM CHLORIDE, AND CALCIUM CHLORIDE: .6; .31; .03; .02 INJECTION, SOLUTION INTRAVENOUS at 11:01

## 2020-01-14 RX ADMIN — KETOROLAC TROMETHAMINE 30 MG: 30 INJECTION, SOLUTION INTRAMUSCULAR; INTRAVENOUS at 11:01

## 2020-01-14 RX ADMIN — GLYCOPYRROLATE 0.4 MG: 0.2 INJECTION INTRAMUSCULAR; INTRAVENOUS at 11:01

## 2020-01-14 RX ADMIN — SODIUM CHLORIDE: 0.9 INJECTION, SOLUTION INTRAVENOUS at 12:01

## 2020-01-14 RX ADMIN — FENTANYL CITRATE 100 MCG: 50 INJECTION, SOLUTION INTRAMUSCULAR; INTRAVENOUS at 10:01

## 2020-01-14 RX ADMIN — PROPOFOL 160 MG: 10 INJECTION, EMULSION INTRAVENOUS at 10:01

## 2020-01-14 RX ADMIN — LIDOCAINE HYDROCHLORIDE 50 MG: 20 INJECTION, SOLUTION EPIDURAL; INFILTRATION; INTRACAUDAL; PERINEURAL at 10:01

## 2020-01-14 RX ADMIN — MIDAZOLAM HYDROCHLORIDE 2 MG: 1 INJECTION, SOLUTION INTRAMUSCULAR; INTRAVENOUS at 10:01

## 2020-01-14 RX ADMIN — ONDANSETRON 8 MG: 2 INJECTION, SOLUTION INTRAMUSCULAR; INTRAVENOUS at 10:01

## 2020-01-14 RX ADMIN — ROCURONIUM BROMIDE 30 MG: 10 INJECTION, SOLUTION INTRAVENOUS at 10:01

## 2020-01-14 RX ADMIN — DEXAMETHASONE SODIUM PHOSPHATE 8 MG: 4 INJECTION, SOLUTION INTRAMUSCULAR; INTRAVENOUS at 10:01

## 2020-01-14 RX ADMIN — NEOSTIGMINE METHYLSULFATE 4 MG: 1 INJECTION INTRAVENOUS at 11:01

## 2020-01-14 RX ADMIN — GLYCOPYRROLATE 0.2 MG: 0.2 INJECTION INTRAMUSCULAR; INTRAVENOUS at 11:01

## 2020-01-14 RX ADMIN — PROPOFOL 40 MG: 10 INJECTION, EMULSION INTRAVENOUS at 10:01

## 2020-01-14 RX ADMIN — CEFAZOLIN SODIUM 2 G: 2 SOLUTION INTRAVENOUS at 10:01

## 2020-01-14 RX ADMIN — FENTANYL CITRATE 50 MCG: 50 INJECTION, SOLUTION INTRAMUSCULAR; INTRAVENOUS at 10:01

## 2020-01-14 RX ADMIN — ACETAMINOPHEN 1000 MG: 10 INJECTION, SOLUTION INTRAVENOUS at 10:01

## 2020-01-14 NOTE — NURSING
Discharge instructions explained to patient and spouse and copy of said instructions provided. Patient and spouse verbalized an understanding of instructions regarding medications and upcoming follow-up appointments. PIV discontinued. Patient aaox4 without any distress, sob, or c/o pain. Patient transported via wheelchair for discharge home with spouse.

## 2020-01-14 NOTE — TRANSFER OF CARE
Anesthesia Transfer of Care Note    Patient: Luis Eduardo Curry    Procedure(s) Performed: Procedure(s) (LRB):  REPAIR, HERNIA, UMBILICAL (N/A)  REPAIR, HERNIA, INGUINAL (Right)    Patient location: PACU    Anesthesia Type: general    Transport from OR: Transported from OR on 6-10 L/min O2 by face mask with adequate spontaneous ventilation    Post pain: adequate analgesia    Post assessment: no apparent anesthetic complications and tolerated procedure well    Post vital signs: stable    Level of consciousness: awake    Nausea/Vomiting: no nausea/vomiting    Complications: none    Transfer of care protocol was followed      Last vitals:   Visit Vitals  /81

## 2020-01-14 NOTE — ANESTHESIA POSTPROCEDURE EVALUATION
Anesthesia Post Evaluation    Patient: Luis Eduardo Curry    Procedure(s) Performed: Procedure(s) (LRB):  REPAIR, HERNIA, UMBILICAL (N/A)  REPAIR, HERNIA, INGUINAL (Right)    Final Anesthesia Type: general    Patient location during evaluation: PACU  Patient participation: Yes- Able to Participate  Level of consciousness: awake and alert, oriented and awake  Post-procedure vital signs: reviewed and stable  Pain management: adequate  Airway patency: patent    PONV status at discharge: No PONV  Anesthetic complications: no      Cardiovascular status: blood pressure returned to baseline, hemodynamically stable and stable  Respiratory status: unassisted, spontaneous ventilation and room air  Hydration status: euvolemic  Follow-up not needed.          Vitals Value Taken Time   /91 1/14/2020 12:30 PM   Temp 35.7 °C (96.3 °F) 1/14/2020 12:16 PM   Pulse 68 1/14/2020 12:30 PM   Resp 19 1/14/2020 12:16 PM   SpO2 96 % 1/14/2020 12:16 PM         Event Time     Out of Recovery 12:05:05          Pain/Chon Score: Chon Score: 10 (1/14/2020 12:00 PM)

## 2020-01-14 NOTE — H&P
Patient ID: Luis Eduardo Curry is a 59 y.o. male.     Chief Complaint: Hernia     Review of patient's allergies indicates:  No Known Allergies  59-year-old male referred to me for umbilical and right inguinal hernia. He was going for a physical at work and was diagnosed with an umbilical hernia.  Then went see his primary care physician who diagnosed him with not only an umbilical hernia but a right inguinal hernia.  Patient really reports no pain or complaints but needs these hernias fixed prior to passing his physical exam.  Patient has never had hernia repair before.     History reviewed. No pertinent past medical history.        Past Surgical History:   Procedure Laterality Date    BIOPSY WITH TRANSRECTAL ULTRASOUND (TRUS) GUIDANCE N/A 5/1/2019     Procedure: BIOPSY, WITH TRANSRECTAL PROSTATE ULTRASOUND;  Surgeon: Chintan Mendez Jr., MD;  Location: [facility];  Service: Urology;  Laterality: N/A;    COLONOSCOPY   2006    COLONOSCOPY N/A 1/24/2019     Procedure: COLONOSCOPY;  Surgeon: Reece Tarango MD;  Location: [facility];  Service: Endoscopy;  Laterality: N/A;    CYSTOSCOPY N/A 5/1/2019     Procedure: FLEXIBLE CYSTOSCOPY;  Surgeon: Chintan Mendez Jr., MD;  Location: [facility];  Service: Urology;  Laterality: N/A;            Family History   Problem Relation Age of Onset    No Known Problems Mother      Hypertension Father      No Known Problems Sister      No Known Problems Brother      No Known Problems Daughter      No Known Problems Son      No Known Problems Maternal Aunt      No Known Problems Maternal Uncle      No Known Problems Paternal Aunt      No Known Problems Paternal Uncle      No Known Problems Maternal Grandmother      No Known Problems Maternal Grandfather      No Known Problems Paternal Grandmother      No Known Problems Paternal Grandfather        Social History               Socioeconomic History    Marital status:        Spouse name: Not on file    Number of  children: Not on file    Years of education: Not on file    Highest education level: Not on file   Occupational History    Not on file   Social Needs    Financial resource strain: Not on file    Food insecurity:       Worry: Not on file       Inability: Not on file    Transportation needs:       Medical: Not on file       Non-medical: Not on file   Tobacco Use    Smoking status: Former Smoker       Packs/day: 1.00       Years: 30.00       Pack years: 30.00       Types: Cigarettes    Smokeless tobacco: Never Used   Substance and Sexual Activity    Alcohol use: Yes       Frequency: Monthly or less       Binge frequency: Weekly    Drug use: No    Sexual activity: Yes   Lifestyle    Physical activity:       Days per week: Not on file       Minutes per session: Not on file    Stress: Only a little   Relationships    Social connections:       Talks on phone: Not on file       Gets together: Not on file       Attends [employer] service: Not on file       Active member of club or organization: Not on file       Attends meetings of clubs or organizations: Not on file       Relationship status: Not on file   Other Topics Concern    Not on file   Social History Narrative    Not on file             Vitals:     12/03/19 0933   BP: 122/88   Pulse: 80   Resp: 16         Review of Systems   All other systems reviewed and are negative.      Objective:   Physical Exam   Constitutional: He is oriented to person, place, and time. He appears well-developed and well-nourished.   HENT:   Head: Normocephalic.   Eyes: Pupils are equal, round, and reactive to light.   Neck: Normal range of motion.   Pulmonary/Chest: Effort normal.   Abdominal: Soft. A hernia (Small umbilical hernia and medium size right inguinal hernia both reducible.) is present.   Musculoskeletal: Normal range of motion.   Neurological: He is alert and oriented to person, place, and time.   Skin: Skin is warm and dry.   Psychiatric: He has a normal mood and  affect.   Nursing note and vitals reviewed.      Assessment:       1. Umbilical hernia without obstruction and without gangrene    2. Right inguinal hernia        Plan:          Luis Eduardo was seen today for hernia.     Diagnoses and all orders for this visit:     Umbilical hernia without obstruction and without gangrene     Right inguinal hernia        Patient will be scheduled for umbilical and right inguinal hernia repair with mesh.  No follow-ups on file.            Caio Mas Jr, MD

## 2020-01-14 NOTE — BRIEF OP NOTE
[facility]  Brief Operative Note    Surgery Date: 1/14/2020     Surgeon(s) and Role:     * Caio Mas Jr., MD - Primary    Assisting Surgeon: None    Pre-op Diagnosis:  Umbilical hernia without obstruction and without gangrene [K42.9]  Right inguinal hernia [K40.90]    Post-op Diagnosis:  Post-Op Diagnosis Codes:     * Umbilical hernia without obstruction and without gangrene [K42.9]     * Right inguinal hernia [K40.90]    Procedure(s) (LRB):  REPAIR, HERNIA, UMBILICAL (N/A)  REPAIR, HERNIA, INGUINAL (Right)    Anesthesia: General    Description of the findings of the procedure(s):  Small umbilical hernia, direct right inguinal hernia    Estimated Blood Loss:  15 mL         Specimens:   Specimen (12h ago, onward)    None            Discharge Note    OUTCOME: Patient tolerated treatment/procedure well without complication and is now ready for discharge.    DISPOSITION: Home or Self Care    FINAL DIAGNOSIS:  Umbilical hernia without obstruction and without gangrene    FOLLOWUP: In clinic

## 2020-01-14 NOTE — DISCHARGE INSTRUCTIONS
Having Laparoscopic Hernia Repair: DYLON    A hernia is a bulge at a weak spot in the wall of the abdomen. Tissue or organs may press into the weak spot. This may cause symptoms of discomfort or pain. If left untreated, a hernia can get worse and may lead to serious problems. Surgery can be done to repair a hernia.  What is laparoscopy?  Your hernia operation will be done with a technique called laparoscopy. For this, a thin lighted tube called a laparoscope is used. The scope allows the surgeon to work through a few small incisions. This is instead of the one larger incision that is made for open surgery. Recovery from laparoscopy is often faster and less painful than from open surgery.  What is DYLON?  DYLON is one way to do a hernia repair. It stands for transabdominal preperitoneal. The peritoneum is a membrane that covers the organs in the abdomen. During DYLON, the peritoneum is opened to reach the hernia.  Preparing for your surgery  · Schedule tests as you have been told. These make sure your heart and lungs are healthy for surgery.  · Tell your healthcare provider about all medicine you take. This includes aspirin, NSAIDs, blood thinning medicines (such as warfarin), herbs and other supplements. You may need to stop taking some or all of them before surgery.  · Ask your healthcare provider for help to quit smoking. This will help stop the hernia from being strained by smokers cough. It will also promote good blood flow for healing.  · Avoid heavy lifting. It can strain your hernia and make it worse.  · Follow any directions you are given for taking medicines and for not eating or drinking before surgery.  · Plan to have an adult family member or friend drive you home from the surgery. Arrange for help with chores and errands while you recover.  During the procedure  The surgery takes 1 to 2 hours. You can likely go home the same day. Before the surgery begins, an IV line is put into a vein in your arm or  hand. This line supplies fluids and medicines.   · To keep you free of pain during the surgery, youll be given anesthesia. This may be general anesthesia. This medicine puts you in a state like deep sleep through the procedure. Or, you may be given regional anesthesia. This numbs the abdomen and makes you relaxed and drowsy through the surgery.  · The surgeon makes 2 to 4 small incisions in the abdomen. The scope is put through one of the incisions. The scope sends live pictures to a video screen. This allows the surgeon to see inside the abdomen. Surgical tools are placed through the other small incisions.  · Your abdomen is inflated with carbon dioxide. This gas provides space for the surgeon to see and work to repair the hernia.  · After the repair, a patch of strong mesh is put over the weak spot in your abdominal wall. The patch acts like a patch on a tire. It stays in place permanently.  · The gas is released from your abdomen. Your incisions are then closed with sutures.  · For a small number of people, the laparoscopic procedure may not be able to be performed due to certain factors and the surgeon will have to perform an open procedure. These factors can include obesity, past history of abdominal surgery, bleeding, and the inability to see the organs. The surgeon will make this decision either before or during the procedure.  Risks and possible complications of hernia repair  · Bleeding  · Infection  · Numbness or pain in the groin or leg  · Urinary retention (inability to urinate)  · Bowel or bladder injury  · Hernia comes back or new hernia forms  · Injury to testes  · Problems from mesh  · Risks of anesthesia    Date Last Reviewed: 11/1/2016 © 2000-2017 [employer], [employer]. [address] [place] [address], [place] [address]. All rights reserved. This information is not intended as a substitute for professional medical care. Always follow your healthcare professional's instructions.

## 2020-01-14 NOTE — OP NOTE
[facility]  Umbilical Herniorrhaphy  Procedure Note    SUMMARY     Date of Procedure: 1/14/2020     Procedure: Procedure(s) (LRB):  REPAIR, HERNIA, UMBILICAL (N/A)  REPAIR, HERNIA, INGUINAL (Right)     Surgeon(s) and Role:     * Caio Mas Jr., MD - Primary    Assisting Surgeon: None    Indications: Symptomatic umbilical hernia, right inguinal hernia    Pre-Operative Diagnosis: umbilical hernia, right inguinal hernia    Post-Operative Diagnosis: umbilical hernia, direct right inguinal hernia    Anesthesia: General    Technical Procedures Used:  Open umbilical hernia repair, right inguinal hernia repair with mesh    Description of the Findings of the Procedure:     The patient was seen in the Holding Room. The risks, benefits, complications, treatment options, and expected outcomes were discussed with the patient. The possibilities of reaction to medication, pulmonary aspiration, perforation of viscus, bleeding, recurrent infection, the need for additional procedures, failure to diagnose a condition, and creating a complication requiring transfusion or operation were discussed with the patient. The patient concurred with the proposed plan, giving informed consent.  The site of surgery properly noted/marked. The patient was taken to Operating Room, identified as Luis Eduardo Curry and the procedure verified as Umbilical Herniorrhaphy and right inguinal hernia repair with mesh. A Time Out was held and the above information confirmed.    The patient was placed supine.  After establishing general anesthesia, the abdomen and right groin were prepped and draped in standard fashion. I injected 0.5% Marcaine with epinephrine prior to any skin incision. I made a small transverse incision just below the umbilicus with a scalpel and cautery was used to get through subcutaneous tissue down to the fascia.  I got around the hernia defect with a Lakia clamp and then dissected off the underside of the  umbilical skin away from the hernia sac. I then cleared around the fascia and hernia sac and placed the preperitoneal fat back through the hernia defect.  The defect was 1.5 cm and I did not need to use mesh.  I closed the defect primarily with 0 PDS interrupted x3.  I then tacked the underside of the umbilical skin to the fascia with a 3 0 Vicryl.  Skin was closed with interrupted 3 0 Vicryl subcuticular and then glue.  I then made incision in the right groin and cautery was used to get through the subcutaneous tissue through Saba's down to the external oblique aponeurosis.  The external oblique was sharply opened. I never did visualize the ilioinguinal nerve. I then got around the cord structures with blunt finger dissection and placed a Penrose around the cord structures.  I could immediately see the patient had a direct defect.  I did search for an indirect hernia sac running with the cord but patient did not have 1.  I then opened up the floor of the inguinal canal by incising transversalis fascia with cautery exposing the preperitoneal fat in the floor.  I then placed 3 ray tecs inside the floor in order to create a space from a mesh.  I then removed the Ray tecs and the extended Prolene hernia system mesh was brought on the field.  The round posterior layer was placed on top of the preperitoneal fat in the floor and then the anterior layer was sutured 1st to Prashant's ligament with 2-0 Vicryl interrupted.  I then sutured it to the shelving edge of the inguinal ligament and cut a slit in the mesh wrapped the mesh around the cord structures and sutured that this shelving edge.  The extended part of the mesh was tucked laterally underneath the external oblique and then superiorly and medially I sutured the mesh to the conjoint tendon with 2-0 Vicryl interrupted.  I then injected more 0.5% Marcaine with epinephrine where placed my sutures. The external oblique aponeurosis was closed with a running 3-0 Prolene.   Saba's was closed with a running 3-0 Vicryl. Skin was closed with running 4-O Monocryl subcuticular and then glue.  There was no specimen.    Instrument, sponge, and needle counts were correct prior to closure and at the conclusion of the case.     Significant Surgical Tasks Conducted by the Assistant(s), if Applicable:     Complications: None; patient tolerated the procedure well.    Total IV Fluids: ml    Estimated Blood Loss (EBL): Minimal           Drains:     Implants:  Extended Prolene hernia system mes    Specimens:  None            Condition: stable    Disposition: PACU - hemodynamically stable.    Attestation: I performed the procedure.

## 2020-01-15 VITALS
RESPIRATION RATE: 20 BRPM | HEART RATE: 70 BPM | TEMPERATURE: 97 F | OXYGEN SATURATION: 98 % | BODY MASS INDEX: 31.54 KG/M2 | SYSTOLIC BLOOD PRESSURE: 121 MMHG | DIASTOLIC BLOOD PRESSURE: 76 MMHG | WEIGHT: 225.31 LBS | HEIGHT: 71 IN

## 2020-01-15 NOTE — PLAN OF CARE
Informed by BRANDON Greenwood RN, patient lying in bed and appears asleep. Pulse ox dropped to 85% when asleep. Upon entrance into the patient's room, BRANDON Greenwood RN awakened patient and pulse ox laya to 98%. Patient and his wife states he has known sleep apnea and does wear cpap at home. Instructed to make sure to wear cpap machine upon arrival home and sleeping during the day. Both verbalized an understanding. No c/o distress, nausea or vomiting at this time. Will monitor.

## 2020-01-15 NOTE — NURSING
Bedside handoff report received from OR ALCIRA viveros RN. Patient aaox4 however visibly anxious. Wife at the bedside. Post op POC discussed. Patient concerned about something to eat and drink. Care assumed. Will monitor.

## 2020-01-20 ENCOUNTER — PATIENT OUTREACH (OUTPATIENT)
Dept: ADMINISTRATIVE | Facility: OTHER | Age: 60
End: 2020-01-20

## 2020-01-21 ENCOUNTER — OFFICE VISIT (OUTPATIENT)
Dept: SURGERY | Facility: CLINIC | Age: 60
End: 2020-01-21
Payer: COMMERCIAL

## 2020-01-21 VITALS — HEIGHT: 71 IN | BODY MASS INDEX: 31.36 KG/M2 | WEIGHT: 224 LBS

## 2020-01-21 DIAGNOSIS — K42.9 UMBILICAL HERNIA WITHOUT OBSTRUCTION AND WITHOUT GANGRENE: ICD-10-CM

## 2020-01-21 DIAGNOSIS — K40.90 RIGHT INGUINAL HERNIA: Primary | ICD-10-CM

## 2020-01-21 PROCEDURE — 99999 PR PBB SHADOW E&M-EST. PATIENT-LVL III: CPT | Mod: PBBFAC,,, | Performed by: SURGERY

## 2020-01-21 PROCEDURE — 99499 UNLISTED E&M SERVICE: CPT | Mod: S$GLB,,, | Performed by: SURGERY

## 2020-01-21 PROCEDURE — 99499 NO LOS: ICD-10-PCS | Mod: S$GLB,,, | Performed by: SURGERY

## 2020-01-21 PROCEDURE — 99999 PR PBB SHADOW E&M-EST. PATIENT-LVL III: ICD-10-PCS | Mod: PBBFAC,,, | Performed by: SURGERY

## 2020-01-21 NOTE — PROGRESS NOTES
Status post umbilical and right inguinal hernia repair with mesh.  Patient has really no complaints this morning.  He is postoperative day 7.  Minimal pain. Incisions are healing appropriately.  No evidence of redness or drainage. Patient will be discharged from surgery clinic in follow-up as needed.

## 2020-01-21 NOTE — LETTER
January 21, 2020      [facility]  [address] [place] [id number]  Phone: [phone]       Patient: Luis Eduardo Curry   YOB: 1960  Date of Visit: 01/21/2020    To Whom It May Concern:    Bob Curry  was at [facility] on 01/21/2020. He may return to work on 02/03/2020 with no restrictions. If you have any questions or concerns, or if I can be of further assistance, please do not hesitate to contact me.    Sincerely,    BOO Powell MD

## 2020-04-29 RX ORDER — TAMSULOSIN HYDROCHLORIDE 0.4 MG/1
0.4 CAPSULE ORAL DAILY
Qty: 30 CAPSULE | Refills: 11 | Status: SHIPPED | OUTPATIENT
Start: 2020-04-29 | End: 2020-10-15 | Stop reason: SDUPTHER

## 2020-04-29 RX ORDER — TAMSULOSIN HYDROCHLORIDE 0.4 MG/1
0.4 CAPSULE ORAL DAILY
Qty: 90 CAPSULE | Refills: 3 | Status: ON HOLD | OUTPATIENT
Start: 2020-04-29 | End: 2020-10-10 | Stop reason: HOSPADM

## 2020-10-04 ENCOUNTER — HOSPITAL ENCOUNTER (EMERGENCY)
Facility: HOSPITAL | Age: 60
Discharge: ANOTHER HOSPITAL | End: 2020-10-04
Attending: SURGERY
Payer: COMMERCIAL

## 2020-10-04 ENCOUNTER — HOSPITAL ENCOUNTER (INPATIENT)
Facility: HOSPITAL | Age: 60
LOS: 5 days | Discharge: HOME | DRG: 300 | End: 2020-10-10
Attending: EMERGENCY MEDICINE | Admitting: SURGERY
Payer: COMMERCIAL

## 2020-10-04 VITALS
HEIGHT: 72 IN | SYSTOLIC BLOOD PRESSURE: 126 MMHG | RESPIRATION RATE: 18 BRPM | TEMPERATURE: 98 F | WEIGHT: 225 LBS | BODY MASS INDEX: 30.48 KG/M2 | DIASTOLIC BLOOD PRESSURE: 76 MMHG | HEART RATE: 74 BPM | OXYGEN SATURATION: 93 %

## 2020-10-04 DIAGNOSIS — I16.9 HYPERTENSIVE CRISIS: Primary | ICD-10-CM

## 2020-10-04 DIAGNOSIS — R07.9 CHEST PAIN: ICD-10-CM

## 2020-10-04 DIAGNOSIS — I71.019 DISSECTION OF THORACIC AORTA: ICD-10-CM

## 2020-10-04 DIAGNOSIS — N19 RENAL FAILURE, UNSPECIFIED CHRONICITY: ICD-10-CM

## 2020-10-04 DIAGNOSIS — I71.00 AORTIC DISSECTION: ICD-10-CM

## 2020-10-04 DIAGNOSIS — I71.03 DISSECTION OF THORACOABDOMINAL AORTA: ICD-10-CM

## 2020-10-04 DIAGNOSIS — I71.00 DISSECTION OF AORTA: ICD-10-CM

## 2020-10-04 DIAGNOSIS — I71.019 ACUTE THORACIC AORTIC DISSECTION: ICD-10-CM

## 2020-10-04 DIAGNOSIS — I71.00 DISSECTION OF AORTA, UNSPECIFIED PORTION OF AORTA: ICD-10-CM

## 2020-10-04 LAB
ALBUMIN SERPL BCP-MCNC: 4.3 G/DL (ref 3.5–5.2)
ALP SERPL-CCNC: 37 U/L (ref 55–135)
ALT SERPL W/O P-5'-P-CCNC: 18 U/L (ref 10–44)
ANION GAP SERPL CALC-SCNC: 13 MMOL/L (ref 8–16)
APTT BLDCRRT: 26.8 SEC (ref 21–32)
AST SERPL-CCNC: 18 U/L (ref 10–40)
BASOPHILS # BLD AUTO: 0.06 K/UL (ref 0–0.2)
BASOPHILS NFR BLD: 0.7 % (ref 0–1.9)
BILIRUB SERPL-MCNC: 0.4 MG/DL (ref 0.1–1)
BNP SERPL-MCNC: 91 PG/ML (ref 0–99)
BUN SERPL-MCNC: 26 MG/DL (ref 6–20)
CALCIUM SERPL-MCNC: 9.7 MG/DL (ref 8.7–10.5)
CHLORIDE SERPL-SCNC: 104 MMOL/L (ref 95–110)
CK MB SERPL-MCNC: 2.8 NG/ML (ref 0.1–6.5)
CK MB SERPL-RTO: 1.1 % (ref 0–5)
CK SERPL-CCNC: 262 U/L (ref 20–200)
CK SERPL-CCNC: 262 U/L (ref 20–200)
CO2 SERPL-SCNC: 23 MMOL/L (ref 23–29)
CREAT SERPL-MCNC: 2.8 MG/DL (ref 0.5–1.4)
D DIMER PPP IA.FEU-MCNC: 2.04 MG/L FEU
DIFFERENTIAL METHOD: ABNORMAL
EOSINOPHIL # BLD AUTO: 0.5 K/UL (ref 0–0.5)
EOSINOPHIL NFR BLD: 5.3 % (ref 0–8)
ERYTHROCYTE [DISTWIDTH] IN BLOOD BY AUTOMATED COUNT: 12.6 % (ref 11.5–14.5)
EST. GFR  (AFRICAN AMERICAN): 27 ML/MIN/1.73 M^2
EST. GFR  (NON AFRICAN AMERICAN): 24 ML/MIN/1.73 M^2
GLUCOSE SERPL-MCNC: 114 MG/DL (ref 70–110)
HCT VFR BLD AUTO: 39.9 % (ref 40–54)
HGB BLD-MCNC: 13.3 G/DL (ref 14–18)
IMM GRANULOCYTES # BLD AUTO: 0.03 K/UL (ref 0–0.04)
IMM GRANULOCYTES NFR BLD AUTO: 0.3 % (ref 0–0.5)
INR PPP: 1 (ref 0.8–1.2)
LYMPHOCYTES # BLD AUTO: 2.7 K/UL (ref 1–4.8)
LYMPHOCYTES NFR BLD: 29.4 % (ref 18–48)
MCH RBC QN AUTO: 29.1 PG (ref 27–31)
MCHC RBC AUTO-ENTMCNC: 33.3 G/DL (ref 32–36)
MCV RBC AUTO: 87 FL (ref 82–98)
MONOCYTES # BLD AUTO: 0.6 K/UL (ref 0.3–1)
MONOCYTES NFR BLD: 6.9 % (ref 4–15)
NEUTROPHILS # BLD AUTO: 5.3 K/UL (ref 1.8–7.7)
NEUTROPHILS NFR BLD: 57.4 % (ref 38–73)
NRBC BLD-RTO: 0 /100 WBC
PLATELET # BLD AUTO: 195 K/UL (ref 150–350)
PMV BLD AUTO: 10.7 FL (ref 9.2–12.9)
POTASSIUM SERPL-SCNC: 3.8 MMOL/L (ref 3.5–5.1)
PROT SERPL-MCNC: 7 G/DL (ref 6–8.4)
PROTHROMBIN TIME: 10.4 SEC (ref 9–12.5)
RBC # BLD AUTO: 4.57 M/UL (ref 4.6–6.2)
SARS-COV-2 RDRP RESP QL NAA+PROBE: NEGATIVE
SODIUM SERPL-SCNC: 140 MMOL/L (ref 136–145)
TROPONIN I SERPL DL<=0.01 NG/ML-MCNC: 0.03 NG/ML (ref 0–0.03)
WBC # BLD AUTO: 9.17 K/UL (ref 3.9–12.7)

## 2020-10-04 PROCEDURE — 80053 COMPREHEN METABOLIC PANEL: CPT

## 2020-10-04 PROCEDURE — 82553 CREATINE MB FRACTION: CPT

## 2020-10-04 PROCEDURE — 96376 TX/PRO/DX INJ SAME DRUG ADON: CPT

## 2020-10-04 PROCEDURE — 82550 ASSAY OF CK (CPK): CPT

## 2020-10-04 PROCEDURE — 99291 PR CRITICAL CARE, E/M 30-74 MINUTES: ICD-10-PCS | Mod: ,,, | Performed by: EMERGENCY MEDICINE

## 2020-10-04 PROCEDURE — 96365 THER/PROPH/DIAG IV INF INIT: CPT

## 2020-10-04 PROCEDURE — 85025 COMPLETE CBC W/AUTO DIFF WBC: CPT

## 2020-10-04 PROCEDURE — 93010 ELECTROCARDIOGRAM REPORT: CPT | Mod: ,,, | Performed by: INTERNAL MEDICINE

## 2020-10-04 PROCEDURE — 99285 EMERGENCY DEPT VISIT HI MDM: CPT | Mod: 25,27

## 2020-10-04 PROCEDURE — 83880 ASSAY OF NATRIURETIC PEPTIDE: CPT

## 2020-10-04 PROCEDURE — U0002 COVID-19 LAB TEST NON-CDC: HCPCS

## 2020-10-04 PROCEDURE — 85730 THROMBOPLASTIN TIME PARTIAL: CPT

## 2020-10-04 PROCEDURE — 85610 PROTHROMBIN TIME: CPT

## 2020-10-04 PROCEDURE — 85379 FIBRIN DEGRADATION QUANT: CPT

## 2020-10-04 PROCEDURE — 93005 ELECTROCARDIOGRAM TRACING: CPT

## 2020-10-04 PROCEDURE — 93010 EKG 12-LEAD: ICD-10-PCS | Mod: ,,, | Performed by: INTERNAL MEDICINE

## 2020-10-04 PROCEDURE — 25000003 PHARM REV CODE 250: Performed by: STUDENT IN AN ORGANIZED HEALTH CARE EDUCATION/TRAINING PROGRAM

## 2020-10-04 PROCEDURE — 96361 HYDRATE IV INFUSION ADD-ON: CPT

## 2020-10-04 PROCEDURE — 99285 EMERGENCY DEPT VISIT HI MDM: CPT | Mod: 25

## 2020-10-04 PROCEDURE — 96368 THER/DIAG CONCURRENT INF: CPT

## 2020-10-04 PROCEDURE — 25000003 PHARM REV CODE 250

## 2020-10-04 PROCEDURE — 84484 ASSAY OF TROPONIN QUANT: CPT

## 2020-10-04 PROCEDURE — 99291 CRITICAL CARE FIRST HOUR: CPT | Mod: ,,, | Performed by: EMERGENCY MEDICINE

## 2020-10-04 PROCEDURE — 96374 THER/PROPH/DIAG INJ IV PUSH: CPT

## 2020-10-04 PROCEDURE — 63600175 PHARM REV CODE 636 W HCPCS: Performed by: SURGERY

## 2020-10-04 PROCEDURE — 96375 TX/PRO/DX INJ NEW DRUG ADDON: CPT

## 2020-10-04 PROCEDURE — 51702 INSERT TEMP BLADDER CATH: CPT

## 2020-10-04 PROCEDURE — 25000003 PHARM REV CODE 250: Performed by: SURGERY

## 2020-10-04 RX ORDER — NICARDIPINE HYDROCHLORIDE 0.2 MG/ML
5 INJECTION INTRAVENOUS CONTINUOUS
Status: DISCONTINUED | OUTPATIENT
Start: 2020-10-04 | End: 2020-10-04 | Stop reason: HOSPADM

## 2020-10-04 RX ORDER — PANTOPRAZOLE SODIUM 40 MG/1
40 TABLET, DELAYED RELEASE ORAL
Status: COMPLETED | OUTPATIENT
Start: 2020-10-04 | End: 2020-10-04

## 2020-10-04 RX ORDER — ESMOLOL HYDROCHLORIDE 20 MG/ML
INJECTION, SOLUTION INTRAVENOUS
Status: COMPLETED
Start: 2020-10-04 | End: 2020-10-04

## 2020-10-04 RX ORDER — ESMOLOL HYDROCHLORIDE 20 MG/ML
50 INJECTION, SOLUTION INTRAVENOUS CONTINUOUS
Status: DISCONTINUED | OUTPATIENT
Start: 2020-10-05 | End: 2020-10-07

## 2020-10-04 RX ORDER — NICARDIPINE HYDROCHLORIDE 0.2 MG/ML
5 INJECTION INTRAVENOUS CONTINUOUS
Status: DISCONTINUED | OUTPATIENT
Start: 2020-10-04 | End: 2020-10-05

## 2020-10-04 RX ORDER — ASPIRIN 325 MG
325 TABLET ORAL
Status: COMPLETED | OUTPATIENT
Start: 2020-10-04 | End: 2020-10-04

## 2020-10-04 RX ORDER — SODIUM CHLORIDE 9 MG/ML
1000 INJECTION, SOLUTION INTRAVENOUS
Status: COMPLETED | OUTPATIENT
Start: 2020-10-04 | End: 2020-10-04

## 2020-10-04 RX ORDER — ATORVASTATIN CALCIUM 40 MG/1
40 TABLET, FILM COATED ORAL
Status: COMPLETED | OUTPATIENT
Start: 2020-10-04 | End: 2020-10-04

## 2020-10-04 RX ORDER — MORPHINE SULFATE 2 MG/ML
2 INJECTION, SOLUTION INTRAMUSCULAR; INTRAVENOUS
Status: COMPLETED | OUTPATIENT
Start: 2020-10-04 | End: 2020-10-04

## 2020-10-04 RX ORDER — ESMOLOL HYDROCHLORIDE 20 MG/ML
20 INJECTION, SOLUTION INTRAVENOUS CONTINUOUS
Status: DISCONTINUED | OUTPATIENT
Start: 2020-10-04 | End: 2020-10-04 | Stop reason: HOSPADM

## 2020-10-04 RX ORDER — ONDANSETRON 2 MG/ML
4 INJECTION INTRAMUSCULAR; INTRAVENOUS
Status: COMPLETED | OUTPATIENT
Start: 2020-10-04 | End: 2020-10-04

## 2020-10-04 RX ADMIN — NICARDIPINE HYDROCHLORIDE 5 MG/HR: 0.2 INJECTION, SOLUTION INTRAVENOUS at 08:10

## 2020-10-04 RX ADMIN — MORPHINE SULFATE 2 MG: 2 INJECTION, SOLUTION INTRAMUSCULAR; INTRAVENOUS at 10:10

## 2020-10-04 RX ADMIN — ESMOLOL HYDROCHLORIDE 20 MCG/KG/MIN: 20 INJECTION INTRAVENOUS at 11:10

## 2020-10-04 RX ADMIN — NICARDIPINE HYDROCHLORIDE 10 MG/HR: 0.2 INJECTION, SOLUTION INTRAVENOUS at 11:10

## 2020-10-04 RX ADMIN — SODIUM CHLORIDE 1000 ML: 0.9 INJECTION, SOLUTION INTRAVENOUS at 08:10

## 2020-10-04 RX ADMIN — ESMOLOL HYDROCHLORIDE 20 MCG/KG/MIN: 20 INJECTION INTRAVENOUS at 10:10

## 2020-10-04 RX ADMIN — NITROGLYCERIN 1 INCH: 20 OINTMENT TOPICAL at 08:10

## 2020-10-04 RX ADMIN — MORPHINE SULFATE 2 MG: 2 INJECTION, SOLUTION INTRAMUSCULAR; INTRAVENOUS at 08:10

## 2020-10-04 RX ADMIN — ONDANSETRON 4 MG: 2 INJECTION INTRAMUSCULAR; INTRAVENOUS at 08:10

## 2020-10-04 RX ADMIN — PANTOPRAZOLE SODIUM 40 MG: 40 TABLET, DELAYED RELEASE ORAL at 08:10

## 2020-10-04 RX ADMIN — ATORVASTATIN CALCIUM 40 MG: 40 TABLET, FILM COATED ORAL at 08:10

## 2020-10-04 RX ADMIN — ASPIRIN 325 MG ORAL TABLET 325 MG: 325 PILL ORAL at 07:10

## 2020-10-05 PROBLEM — I71.00 DISSECTION OF AORTA: Status: ACTIVE | Noted: 2020-10-05

## 2020-10-05 LAB
ABO + RH BLD: NORMAL
ALBUMIN SERPL BCP-MCNC: 3.6 G/DL (ref 3.5–5.2)
ALP SERPL-CCNC: 28 U/L (ref 55–135)
ALT SERPL W/O P-5'-P-CCNC: 14 U/L (ref 10–44)
ANION GAP SERPL CALC-SCNC: 11 MMOL/L (ref 8–16)
ASCENDING AORTA: 3.88 CM
ASCENDING AORTA: 3.9 CM
AST SERPL-CCNC: 14 U/L (ref 10–40)
AV INDEX (PROSTH): 1.16
AV MEAN GRADIENT: 2 MMHG
AV PEAK GRADIENT: 4 MMHG
AV VALVE AREA: 3.93 CM2
AV VELOCITY RATIO: 1.06
BASOPHILS # BLD AUTO: 0.02 K/UL (ref 0–0.2)
BASOPHILS NFR BLD: 0.2 % (ref 0–1.9)
BILIRUB SERPL-MCNC: 0.6 MG/DL (ref 0.1–1)
BLD GP AB SCN CELLS X3 SERPL QL: NORMAL
BSA FOR ECHO PROCEDURE: 2.29 M2
BSA FOR ECHO PROCEDURE: 2.29 M2
BUN SERPL-MCNC: 26 MG/DL (ref 6–20)
CALCIUM SERPL-MCNC: 8.4 MG/DL (ref 8.7–10.5)
CHLORIDE SERPL-SCNC: 106 MMOL/L (ref 95–110)
CO2 SERPL-SCNC: 19 MMOL/L (ref 23–29)
CREAT SERPL-MCNC: 2.3 MG/DL (ref 0.5–1.4)
CV ECHO LV RWT: 0.45 CM
DIFFERENTIAL METHOD: ABNORMAL
DOP CALC AO PEAK VEL: 1.01 M/S
DOP CALC AO VTI: 21.04 CM
DOP CALC LVOT AREA: 3.4 CM2
DOP CALC LVOT DIAMETER: 2.08 CM
DOP CALC LVOT PEAK VEL: 1.07 M/S
DOP CALC LVOT STROKE VOLUME: 82.63 CM3
DOP CALCLVOT PEAK VEL VTI: 24.33 CM
ECHO LV POSTERIOR WALL: 1.03 CM (ref 0.6–1.1)
EOSINOPHIL # BLD AUTO: 0 K/UL (ref 0–0.5)
EOSINOPHIL NFR BLD: 0.2 % (ref 0–8)
ERYTHROCYTE [DISTWIDTH] IN BLOOD BY AUTOMATED COUNT: 12.4 % (ref 11.5–14.5)
EST. GFR  (AFRICAN AMERICAN): 34.9 ML/MIN/1.73 M^2
EST. GFR  (NON AFRICAN AMERICAN): 30.2 ML/MIN/1.73 M^2
FRACTIONAL SHORTENING: 43 % (ref 28–44)
GLUCOSE SERPL-MCNC: 129 MG/DL (ref 70–110)
HCT VFR BLD AUTO: 34.7 % (ref 40–54)
HGB BLD-MCNC: 11.5 G/DL (ref 14–18)
IMM GRANULOCYTES # BLD AUTO: 0.04 K/UL (ref 0–0.04)
IMM GRANULOCYTES NFR BLD AUTO: 0.4 % (ref 0–0.5)
INTERVENTRICULAR SEPTUM: 0.98 CM (ref 0.6–1.1)
LA MAJOR: 5.65 CM
LA MINOR: 5.25 CM
LA WIDTH: 5.16 CM
LEFT ATRIUM SIZE: 2.13 CM
LEFT ATRIUM VOLUME INDEX: 22.6 ML/M2
LEFT ATRIUM VOLUME: 50.85 CM3
LEFT INTERNAL DIMENSION IN SYSTOLE: 2.61 CM (ref 2.1–4)
LEFT VENTRICLE DIASTOLIC VOLUME INDEX: 42.83 ML/M2
LEFT VENTRICLE DIASTOLIC VOLUME: 96.47 ML
LEFT VENTRICLE MASS INDEX: 70 G/M2
LEFT VENTRICLE SYSTOLIC VOLUME INDEX: 11.1 ML/M2
LEFT VENTRICLE SYSTOLIC VOLUME: 24.96 ML
LEFT VENTRICULAR INTERNAL DIMENSION IN DIASTOLE: 4.58 CM (ref 3.5–6)
LEFT VENTRICULAR MASS: 158.78 G
LYMPHOCYTES # BLD AUTO: 1.1 K/UL (ref 1–4.8)
LYMPHOCYTES NFR BLD: 11.6 % (ref 18–48)
MCH RBC QN AUTO: 29 PG (ref 27–31)
MCHC RBC AUTO-ENTMCNC: 33.1 G/DL (ref 32–36)
MCV RBC AUTO: 88 FL (ref 82–98)
MONOCYTES # BLD AUTO: 0.6 K/UL (ref 0.3–1)
MONOCYTES NFR BLD: 6.7 % (ref 4–15)
NEUTROPHILS # BLD AUTO: 7.3 K/UL (ref 1.8–7.7)
NEUTROPHILS NFR BLD: 80.9 % (ref 38–73)
NRBC BLD-RTO: 0 /100 WBC
PLATELET # BLD AUTO: 154 K/UL (ref 150–350)
PMV BLD AUTO: 11 FL (ref 9.2–12.9)
POTASSIUM SERPL-SCNC: 4.2 MMOL/L (ref 3.5–5.1)
PROT SERPL-MCNC: 6 G/DL (ref 6–8.4)
RA MAJOR: 3.54 CM
RA PRESSURE: 8 MMHG
RA WIDTH: 2.39 CM
RBC # BLD AUTO: 3.96 M/UL (ref 4.6–6.2)
RV TISSUE DOPPLER FREE WALL SYSTOLIC VELOCITY 1 (APICAL 4 CHAMBER VIEW): 12.96 CM/S
SINUS: 4 CM
SINUS: 4.34 CM
SODIUM SERPL-SCNC: 136 MMOL/L (ref 136–145)
STJ: 3.56 CM
STJ: 3.6 CM
TDI LATERAL: 0.08 M/S
TDI SEPTAL: 0.05 M/S
TDI: 0.07 M/S
TRICUSPID ANNULAR PLANE SYSTOLIC EXCURSION: 2.04 CM
WBC # BLD AUTO: 9.07 K/UL (ref 3.9–12.7)

## 2020-10-05 PROCEDURE — 63600175 PHARM REV CODE 636 W HCPCS

## 2020-10-05 PROCEDURE — 25000003 PHARM REV CODE 250: Performed by: STUDENT IN AN ORGANIZED HEALTH CARE EDUCATION/TRAINING PROGRAM

## 2020-10-05 PROCEDURE — 94761 N-INVAS EAR/PLS OXIMETRY MLT: CPT

## 2020-10-05 PROCEDURE — 96366 THER/PROPH/DIAG IV INF ADDON: CPT

## 2020-10-05 PROCEDURE — 99291 CRITICAL CARE FIRST HOUR: CPT | Mod: ,,, | Performed by: STUDENT IN AN ORGANIZED HEALTH CARE EDUCATION/TRAINING PROGRAM

## 2020-10-05 PROCEDURE — 85025 COMPLETE CBC W/AUTO DIFF WBC: CPT

## 2020-10-05 PROCEDURE — 99291 PR CRITICAL CARE, E/M 30-74 MINUTES: ICD-10-PCS | Mod: ,,, | Performed by: STUDENT IN AN ORGANIZED HEALTH CARE EDUCATION/TRAINING PROGRAM

## 2020-10-05 PROCEDURE — 25000003 PHARM REV CODE 250: Performed by: SURGERY

## 2020-10-05 PROCEDURE — 99223 PR INITIAL HOSPITAL CARE,LEVL III: ICD-10-PCS | Mod: ,,, | Performed by: SURGERY

## 2020-10-05 PROCEDURE — 63600175 PHARM REV CODE 636 W HCPCS: Performed by: STUDENT IN AN ORGANIZED HEALTH CARE EDUCATION/TRAINING PROGRAM

## 2020-10-05 PROCEDURE — 20000000 HC ICU ROOM

## 2020-10-05 PROCEDURE — 27000221 HC OXYGEN, UP TO 24 HOURS

## 2020-10-05 PROCEDURE — 99255 PR INITIAL INPATIENT CONSULT,LEVL V: ICD-10-PCS | Mod: ,,, | Performed by: NURSE PRACTITIONER

## 2020-10-05 PROCEDURE — 99223 1ST HOSP IP/OBS HIGH 75: CPT | Mod: ,,, | Performed by: SURGERY

## 2020-10-05 PROCEDURE — 80053 COMPREHEN METABOLIC PANEL: CPT

## 2020-10-05 PROCEDURE — 99900035 HC TECH TIME PER 15 MIN (STAT)

## 2020-10-05 PROCEDURE — 99255 IP/OBS CONSLTJ NEW/EST HI 80: CPT | Mod: ,,, | Performed by: NURSE PRACTITIONER

## 2020-10-05 PROCEDURE — 86901 BLOOD TYPING SEROLOGIC RH(D): CPT

## 2020-10-05 PROCEDURE — 63600175 PHARM REV CODE 636 W HCPCS: Performed by: INTERNAL MEDICINE

## 2020-10-05 RX ORDER — SODIUM CHLORIDE 0.9 % (FLUSH) 0.9 %
10 SYRINGE (ML) INJECTION
Status: DISCONTINUED | OUTPATIENT
Start: 2020-10-05 | End: 2020-10-10 | Stop reason: HOSPADM

## 2020-10-05 RX ORDER — MIDAZOLAM HYDROCHLORIDE 1 MG/ML
2 INJECTION INTRAMUSCULAR; INTRAVENOUS ONCE
Status: COMPLETED | OUTPATIENT
Start: 2020-10-05 | End: 2020-10-05

## 2020-10-05 RX ORDER — ACETAMINOPHEN 500 MG
1000 TABLET ORAL
Status: COMPLETED | OUTPATIENT
Start: 2020-10-05 | End: 2020-10-05

## 2020-10-05 RX ORDER — TAMSULOSIN HYDROCHLORIDE 0.4 MG/1
0.4 CAPSULE ORAL DAILY
Status: DISCONTINUED | OUTPATIENT
Start: 2020-10-05 | End: 2020-10-10 | Stop reason: HOSPADM

## 2020-10-05 RX ORDER — FENTANYL CITRATE 50 UG/ML
INJECTION, SOLUTION INTRAMUSCULAR; INTRAVENOUS
Status: COMPLETED
Start: 2020-10-05 | End: 2020-10-05

## 2020-10-05 RX ORDER — SODIUM CHLORIDE, SODIUM LACTATE, POTASSIUM CHLORIDE, CALCIUM CHLORIDE 600; 310; 30; 20 MG/100ML; MG/100ML; MG/100ML; MG/100ML
INJECTION, SOLUTION INTRAVENOUS CONTINUOUS
Status: DISCONTINUED | OUTPATIENT
Start: 2020-10-05 | End: 2020-10-05

## 2020-10-05 RX ORDER — FENTANYL CITRATE 50 UG/ML
25 INJECTION, SOLUTION INTRAMUSCULAR; INTRAVENOUS ONCE
Status: COMPLETED | OUTPATIENT
Start: 2020-10-05 | End: 2020-10-05

## 2020-10-05 RX ORDER — OXYCODONE HYDROCHLORIDE 5 MG/1
5 TABLET ORAL EVERY 6 HOURS PRN
Status: DISCONTINUED | OUTPATIENT
Start: 2020-10-05 | End: 2020-10-10 | Stop reason: HOSPADM

## 2020-10-05 RX ORDER — SODIUM CHLORIDE 9 MG/ML
INJECTION, SOLUTION INTRAVENOUS CONTINUOUS
Status: DISCONTINUED | OUTPATIENT
Start: 2020-10-05 | End: 2020-10-06

## 2020-10-05 RX ORDER — NICARDIPINE HYDROCHLORIDE 0.2 MG/ML
5 INJECTION INTRAVENOUS CONTINUOUS
Status: DISCONTINUED | OUTPATIENT
Start: 2020-10-05 | End: 2020-10-07

## 2020-10-05 RX ORDER — ACETAMINOPHEN 325 MG/1
650 TABLET ORAL EVERY 6 HOURS
Status: DISCONTINUED | OUTPATIENT
Start: 2020-10-05 | End: 2020-10-06

## 2020-10-05 RX ORDER — MIDAZOLAM HYDROCHLORIDE 1 MG/ML
INJECTION INTRAMUSCULAR; INTRAVENOUS
Status: DISPENSED
Start: 2020-10-05 | End: 2020-10-06

## 2020-10-05 RX ORDER — HEPARIN SODIUM 5000 [USP'U]/ML
5000 INJECTION, SOLUTION INTRAVENOUS; SUBCUTANEOUS EVERY 8 HOURS
Status: DISCONTINUED | OUTPATIENT
Start: 2020-10-05 | End: 2020-10-10 | Stop reason: HOSPADM

## 2020-10-05 RX ORDER — MORPHINE SULFATE 4 MG/ML
4 INJECTION, SOLUTION INTRAMUSCULAR; INTRAVENOUS EVERY 4 HOURS PRN
Status: DISCONTINUED | OUTPATIENT
Start: 2020-10-05 | End: 2020-10-10 | Stop reason: HOSPADM

## 2020-10-05 RX ORDER — LABETALOL HCL 20 MG/4 ML
20 SYRINGE (ML) INTRAVENOUS EVERY 6 HOURS PRN
Status: DISCONTINUED | OUTPATIENT
Start: 2020-10-05 | End: 2020-10-10 | Stop reason: HOSPADM

## 2020-10-05 RX ORDER — MIDAZOLAM HYDROCHLORIDE 1 MG/ML
INJECTION INTRAMUSCULAR; INTRAVENOUS
Status: COMPLETED
Start: 2020-10-05 | End: 2020-10-05

## 2020-10-05 RX ADMIN — OXYCODONE HYDROCHLORIDE 5 MG: 5 TABLET ORAL at 04:10

## 2020-10-05 RX ADMIN — SODIUM CHLORIDE: 0.9 INJECTION, SOLUTION INTRAVENOUS at 05:10

## 2020-10-05 RX ADMIN — MIDAZOLAM 2 MG: 1 INJECTION INTRAMUSCULAR; INTRAVENOUS at 12:10

## 2020-10-05 RX ADMIN — ACETAMINOPHEN 650 MG: 325 TABLET ORAL at 11:10

## 2020-10-05 RX ADMIN — HEPARIN SODIUM 5000 UNITS: 5000 INJECTION INTRAVENOUS; SUBCUTANEOUS at 05:10

## 2020-10-05 RX ADMIN — NICARDIPINE HYDROCHLORIDE 15 MG/HR: 0.2 INJECTION, SOLUTION INTRAVENOUS at 11:10

## 2020-10-05 RX ADMIN — Medication 20 MG: at 07:10

## 2020-10-05 RX ADMIN — ESMOLOL HYDROCHLORIDE 25 MCG/KG/MIN: 20 INJECTION INTRAVENOUS at 02:10

## 2020-10-05 RX ADMIN — SODIUM CHLORIDE, SODIUM LACTATE, POTASSIUM CHLORIDE, AND CALCIUM CHLORIDE: 600; 310; 30; 20 INJECTION, SOLUTION INTRAVENOUS at 02:10

## 2020-10-05 RX ADMIN — MIDAZOLAM HYDROCHLORIDE 2 MG: 1 INJECTION INTRAMUSCULAR; INTRAVENOUS at 12:10

## 2020-10-05 RX ADMIN — TAMSULOSIN HYDROCHLORIDE 0.4 MG: 0.4 CAPSULE ORAL at 09:10

## 2020-10-05 RX ADMIN — NICARDIPINE HYDROCHLORIDE 15 MG/HR: 0.2 INJECTION, SOLUTION INTRAVENOUS at 06:10

## 2020-10-05 RX ADMIN — FENTANYL CITRATE 25 MCG: 50 INJECTION, SOLUTION INTRAMUSCULAR; INTRAVENOUS at 12:10

## 2020-10-05 RX ADMIN — ESMOLOL HYDROCHLORIDE 50 MCG/KG/MIN: 20 INJECTION INTRAVENOUS at 07:10

## 2020-10-05 RX ADMIN — ESMOLOL HYDROCHLORIDE 50 MCG/KG/MIN: 20 INJECTION INTRAVENOUS at 10:10

## 2020-10-05 RX ADMIN — SODIUM CHLORIDE, SODIUM LACTATE, POTASSIUM CHLORIDE, AND CALCIUM CHLORIDE: 600; 310; 30; 20 INJECTION, SOLUTION INTRAVENOUS at 11:10

## 2020-10-05 RX ADMIN — NICARDIPINE HYDROCHLORIDE 15 MG/HR: 0.2 INJECTION, SOLUTION INTRAVENOUS at 08:10

## 2020-10-05 RX ADMIN — NICARDIPINE HYDROCHLORIDE 15 MG/HR: 0.2 INJECTION, SOLUTION INTRAVENOUS at 12:10

## 2020-10-05 RX ADMIN — MIDAZOLAM 2 MG: 1 INJECTION INTRAMUSCULAR; INTRAVENOUS at 01:10

## 2020-10-05 RX ADMIN — NICARDIPINE HYDROCHLORIDE 5 MG/HR: 0.2 INJECTION, SOLUTION INTRAVENOUS at 09:10

## 2020-10-05 RX ADMIN — NICARDIPINE HYDROCHLORIDE 15 MG/HR: 0.2 INJECTION, SOLUTION INTRAVENOUS at 05:10

## 2020-10-05 RX ADMIN — ACETAMINOPHEN 1000 MG: 500 TABLET ORAL at 01:10

## 2020-10-05 RX ADMIN — HEPARIN SODIUM 5000 UNITS: 5000 INJECTION INTRAVENOUS; SUBCUTANEOUS at 01:10

## 2020-10-05 RX ADMIN — ACETAMINOPHEN 650 MG: 325 TABLET ORAL at 06:10

## 2020-10-05 RX ADMIN — HEPARIN SODIUM 5000 UNITS: 5000 INJECTION INTRAVENOUS; SUBCUTANEOUS at 09:10

## 2020-10-05 RX ADMIN — NICARDIPINE HYDROCHLORIDE 10 MG/HR: 0.2 INJECTION, SOLUTION INTRAVENOUS at 04:10

## 2020-10-05 NOTE — ED PROVIDER NOTES
Encounter Date: 10/4/2020       History     Chief Complaint   Patient presents with    Abdominal Pain     Transfer from [place] for descending aortic disection. On Esmolol at 20 and cardene at 10     HPI   60yoM h/o BPH transferred from [facility] for concern for descending thoracic aortic dissection. The pt presented tonight with tearing substernal chest pain and back pain. Pt was started on cardene and esmolol ggt for BP and HR control. The pt was found to have mildly elevated trop and Cr 2.8. He was hemodynamically stable on transfer with pain improved to 2/10. Here the pt is having mild chest pain and a headache. He was found to have nitro paste on his chest which was removed. Pt also notes some abdominal discomfort. Denies any n/v/d, fevers/chills, cough. The pt denies prior diagnosis of HTN.     Review of patient's allergies indicates:  No Known Allergies  History reviewed. No pertinent past medical history.  Past Surgical History:   Procedure Laterality Date    BIOPSY WITH TRANSRECTAL ULTRASOUND (TRUS) GUIDANCE N/A 5/1/2019    Procedure: BIOPSY, WITH TRANSRECTAL PROSTATE ULTRASOUND;  Surgeon: Chintan Mendez Jr., MD;  Location: [facility];  Service: Urology;  Laterality: N/A;    COLONOSCOPY  2006    COLONOSCOPY N/A 1/24/2019    Procedure: COLONOSCOPY;  Surgeon: Reece Tarango MD;  Location: [facility];  Service: Endoscopy;  Laterality: N/A;    CYSTOSCOPY N/A 5/1/2019    Procedure: FLEXIBLE CYSTOSCOPY;  Surgeon: Chintan Mendez Jr., MD;  Location: [facility];  Service: Urology;  Laterality: N/A;    UMBILICAL HERNIA REPAIR N/A 1/14/2020    Procedure: REPAIR, HERNIA, UMBILICAL;  Surgeon: Caio Mas Jr., MD;  Location: [facility];  Service: General;  Laterality: N/A;     Family History   Problem Relation Age of Onset    No Known Problems Mother     Hypertension Father     No Known Problems Sister     No Known Problems Brother     No Known Problems Daughter     No Known Problems Son     No Known  Problems Maternal Aunt     No Known Problems Maternal Uncle     No Known Problems Paternal Aunt     No Known Problems Paternal Uncle     No Known Problems Maternal Grandmother     No Known Problems Maternal Grandfather     No Known Problems Paternal Grandmother     No Known Problems Paternal Grandfather      Social History     Tobacco Use    Smoking status: Former Smoker     Packs/day: 1.00     Years: 30.00     Pack years: 30.00     Types: Cigarettes    Smokeless tobacco: Never Used   Substance Use Topics    Alcohol use: Yes     Frequency: Monthly or less     Binge frequency: Weekly     Comment: socially    Drug use: No     Review of Systems   Constitutional: Negative for chills and fever.   HENT: Negative for sore throat.    Respiratory: Negative for shortness of breath.    Cardiovascular: Positive for chest pain.   Gastrointestinal: Positive for abdominal distention and abdominal pain. Negative for diarrhea and nausea.   Genitourinary: Negative for dysuria.   Musculoskeletal: Negative for back pain.   Skin: Negative for rash.   Neurological: Positive for headaches. Negative for weakness.   Hematological: Does not bruise/bleed easily.       Physical Exam     Initial Vitals [10/04/20 2323]   BP Pulse Resp Temp SpO2   (!) 133/90 70 20 97.6 °F (36.4 °C) 96 %      MAP       --         Physical Exam    Nursing note and vitals reviewed.  Constitutional:   Lying in bed. No acute respiratory distress.    HENT:   Head: Normocephalic and atraumatic.   Eyes: EOM are normal. Pupils are equal, round, and reactive to light.   Neck: Normal range of motion. Neck supple.   Cardiovascular: Normal rate, regular rhythm and intact distal pulses.   No murmur heard.  Pulmonary/Chest: Breath sounds normal. No respiratory distress. He has no wheezes. He has no rales.   Abdominal:   abd is distended, full. No focal tenderness. No rebound or guarding.    Musculoskeletal: Normal range of motion. No tenderness or edema.    Neurological: He is alert and oriented to person, place, and time. He has normal strength. No cranial nerve deficit.   Skin: Skin is warm and dry. Capillary refill takes less than 2 seconds.       MDm PGY4   61yo M transferred with aortic dissection on esmolol and cardene ggt. VS are notable for /76 pulse 71 RR 15 95% RA. On exam the pt appears mildly uncomfortable. No respiratory distress. Abd is full, distended. No rebound or guarding. Bedside US shows very distended bladder 2+L of urine. The pt is still requiring cardene and esmolol. CT abd/pelvis was non-contrast and shows concern for dissection in descending an ascending aorta. No pericardial effusion seen. Discussed with vascular surgery who requested a TTE from cardiology and a renal doppler US. The TTE did not show evidence of ascending proximal dissection however, this is not definitive. They recommend further evaluation with JANAE.     Discussed with vascular surgery who will talk with their attending for final plan. Anticipate admission to the CCU.     Pulliam placed with 2L of output. Cr elevation is likely in part related to obstruction.     [facility]   Emergency Medicine PGY4  2:13 AM    ED Course   Procedures  Labs Reviewed   TYPE & SCREEN          Imaging Results          US Renal Artery Stenosis Hyperten (xpd) (Final result)  Result time 10/05/20 05:42:26   Procedure changed from US Renal Artery Stenosis Hyperten Ltd     Final result by Violeta Perla MD (10/05/20 05:42:26)                 Impression:      No sonographic evidence of renal artery stenosis or other acute sonographic abnormality.    Electronically signed by resident: Gianni Medrano  Date:    10/05/2020  Time:    05:19    Electronically signed by: Violeta Perla MD  Date:    10/05/2020  Time:    05:42             Narrative:    EXAMINATION:  US RENAL ARTERY STENOSIS HYPERTEN (XPD)    CLINICAL HISTORY:  aortic dissection, ? renal artery involvement;    TECHNIQUE:  Renal Ultrasound  with Grayscale and Doppler were performed.    COMPARISON:  Renal ultrasound 04/08/2019, CT urogram 04/23/2019    FINDINGS:  Duplex scan of the kidneys was performed using B-mode/gray scale imaging and Doppler spectral analysis and color flow.    RIGHT: The right kidney measures 12.9cm and demonstrates no focal abnormality.  No right-sided hydronephrosis. The right renal vein appears patent.  The highest right renal artery velocity is 59.6 centimeters/second with a renal artery to aortic ratio of 0.7.    Resistive indices: Upper 0.65.  Mid 0.72.  Lower 0.66.    LEFT: The left kidney measures 12.1cm and demonstrates no focal abnormality.  No left-sided hydronephrosis.  The left renal vein appears patent.  The highest renal artery velocity is 79.5 cm/s and the RAA to aorta ratio is 1.0.    Resistive indices: Upper 0.57.  Mid 0.64.  Lower 0.63.    The urinary bladder is decompressed and contains a Pulliam catheter..                                              Attending Attestation:   Physician Attestation Statement for Resident:  As the supervising MD   Physician Attestation Statement: I have personally seen and examined this patient.   I agree with the above history. -:   As the supervising MD I agree with the above PE.    As the supervising MD I agree with the above treatment, course, plan, and disposition.        Attending Critical Care:   Critical Care Times:   Direct Patient Care (initial evaluation, reassessments, and time considering the case)................................................................25 minutes.   Additional History from reviewing old medical records or taking additional history from the family, EMS, PCP, etc.......................5 minutes.   Ordering, Reviewing, and Interpreting Diagnostic Studies...............................................................................................................5 minutes.    Documentation..................................................................................................................................................................................5 minutes.   Consultation with other Physicians. .................................................................................................................................................10 minutes.   ==============================================================  · Total Critical Care Time - exclusive of procedural time: 50 minutes.  ==============================================================  Critical care was necessary to treat or prevent imminent or life-threatening deterioration of the following conditions: abdominal aortic aneurysm (AAA) (Dissection instead of aneurysm).   Critical care was time spent personally by me on the following activities: obtaining history from patient or relative, examination of patient, review of x-rays / CT sent with the patient, review of old charts, ordering lab, x-rays, and/or EKG, development of treatment plan with patient or relative, ordering and performing treatments and interventions, evaluation of patient's response to treatment, discussion with consultants, interpretation of cardiac measurements and re-evaluation of patient's conition.   Critical Care Condition: potentially life-threatening       Attending ED Notes:   STAFF ATTENDING PHYSICIAN NOTE:  I have individually/jointly evaluated Luis Eduardo Curry and discussed their ED management with the resident physician. I have also reviewed their notes, assessments, and procedures documented.  I was present during all critical portions of any procedure(s) performed on Luis Eduardo Curry.   ____________________  Colby Parker MD, [facility]  Emergency Medicine Staff                      Clinical Impression:       ICD-10-CM ICD-9-CM   1. Aortic dissection  I71.00 441.00   2. Dissection of aorta  I71.00 441.00   3. Dissection of thoracic  aorta  I71.01 441.01                      Disposition:   Disposition: Admitted  Condition: Critical     ED Disposition Condition    Admit                             Harmeet Parker MD  10/06/20 2040

## 2020-10-05 NOTE — PLAN OF CARE
SICU PLAN OF CARE NOTE    Dx: Abdominal aneurysm     Goals of Care: SBP <120, HR <80  Vital Signs: /66   Pulse 69   Temp 97.6 °F (36.4 °C) (Oral)   Resp (!) 26   Ht 6' (1.829 m)   Wt 102.1 kg (225 lb 1.4 oz)   SpO2 96%   BMI 30.53 kg/m²     Exam: well developed, well nourished, fatigued, no distress    Cardiac: NSR    Resp: 5L NC    Neuro: AAO x4, Follows Commands and Moves All Extremities    Gtts: Cardene @15, esmolol @50, LR @125    Urine Output: Urinary Catheter 625cc/shift    Diet: NPO     Labs/Accuchecks: CBC and CMP trended and reviewed    Skin: Admit Skin Note: Skin assessed for breakdown on admit, redness noted to sacrum, no breakdown present, foam dressing applied, pt can freely move all extremities, pt repositions independently, bed rest order implemented, bed plugged into wall, will continue to monitor and assess skin frequently for breakdown.        Shift Events: pt admitted to SICU from ED @0230, all VSS at this time.   Renal US done at bedside, Arterial line placed at bedside by MD Vizcarra. Patient and Spouse updated on plan of care, all questions answered and emotional support provided.

## 2020-10-05 NOTE — CONSULTS
[facility]  Adult Nutrition  Consult Note    SUMMARY     Recommendations    1.) Advance diet as tolerated to regular per SLP texture recommendations once medically appropriate.   2.) RD will continue to monitor.    Goals: 1.) Pt to meet >75% of estimated energy and protein needs over the course of 7 days.  Nutrition Goal Status: new  Communication of RD Recs: (POC)    Reason for Assessment    Reason For Assessment: consult  Diagnosis: (Dissection of aorta)  Relevant Medical History: none  Interdisciplinary Rounds: did not attend  General Information Comments: Pt reports UBW of 102.2kg, pt denies any weight changes and reports typically consuming 1 meal per day. NFPE completed, however no s/s of malnutrition observed. GI- abdominal discomfort, LBM unknown. Pt NPO for potential procedure today.  Nutrition Discharge Planning: Unable to determine at this time.    Nutrition Risk Screen    Nutrition Risk Screen: no indicators present    Nutrition/Diet History    Spiritual, Cultural Beliefs, Alevism Practices, Values that Affect Care: no    Anthropometrics    Temp: 97.7 °F (36.5 °C)  Height: 6' (182.9 cm)  Height (inches): 72 in  Weight Method: Bed Scale  Weight: 103.4 kg (228 lb)  Weight (lb): 228 lb  Ideal Body Weight (IBW), Male: 178 lb  % Ideal Body Weight, Male (lb): 128.09 %  BMI (Calculated): 30.9  BMI Grade: 30 - 34.9- obesity - grade I  Usual Body Weight (UBW), k kg  % Usual Body Weight: 101.6       Lab/Procedures/Meds    Pertinent Labs Reviewed: reviewed  Pertinent Labs Comments: BUN 26, Cr 1.8, GFR 24, Glucose 114  Pertinent Medications Reviewed: reviewed  Pertinent Medications Comments: -    Physical Findings/Assessment     Edema 1+ generalized  Skin redness sacrum    Estimated/Assessed Needs    Weight Used For Calorie Calculations: 103.5 kg (228 lb 2.8 oz)  Energy Calorie Requirements (kcal): 2271  Energy Need Method: Montague-St Jeor(x1.2PAL)  Protein Requirements: 121-145gm  (1.5-1.8gm/kg IBW)  Weight Used For Protein Calculations: 80.9 kg (178 lb 5.6 oz)(IBW)  Fluid Requirements (mL): 1mL/kcal or per MD Recommendations  Estimated Fluid Requirement Method: RDA Method  RDA Method (mL): 2271         Nutrition Prescription Ordered    Current Diet Order: NPO (10/5)    Evaluation of Received Nutrient/Fluid Intake    I/O: I: 784; O: 2475; -1.8L since admission  Energy Calories Required: not meeting needs  Protein Required: not meeting needs  % Intake of Estimated Energy Needs: 0  % Meal Intake: 0    Nutrition Risk    Level of Risk/Frequency of Follow-up: high , 2x weekly    Assessment and Plan        Nutrition Problem  Inadequate energy intake    Related to (etiology):   NPO     Signs and Symptoms (as evidenced by):   NPO    Interventions(treatment strategy):  1.) Collaboration with other providers    Nutrition Diagnosis Status:   new     Monitor and Evaluation    Food and Nutrient Intake: energy intake, food and beverage intake  Food and Nutrient Adminstration: diet order  Knowledge/Beliefs/Attitudes: food and nutrition knowledge/skill  Anthropometric Measurements: weight, weight change  Biochemical Data, Medical Tests and Procedures: electrolyte and renal panel, gastrointestinal profile  Nutrition-Focused Physical Findings: skin, overall appearance     Malnutrition Assessment  Malnutrition Type: acute illness or injury              Orbital Region (Subcutaneous Fat Loss): well nourished  Upper Arm Region (Subcutaneous Fat Loss): well nourished   Judaism Region (Muscle Loss): well nourished  Clavicle Bone Region (Muscle Loss): well nourished  Clavicle and Acromion Bone Region (Muscle Loss): well nourished  Anterior Thigh Region (Muscle Loss): well nourished  Posterior Calf Region (Muscle Loss): well nourished   Edema (Fluid Accumulation): 1-->trace   Subcutaneous Fat Loss (Final Summary): well nourished  Muscle Loss Evaluation (Final Summary): well nourished         Nutrition Follow-Up    RD  Follow-up?: Yes

## 2020-10-05 NOTE — EICU
Rounding (Video Assessment):  Yes    Intervention Initiated From:  Bedside    Zach Communicated with Bedside Nurse regarding:  Documentation    Nurse Notified: Adrien completed timeout, prior to calling eLERT  Yes    Doctor Notified: Dr. Sanchez  Yes    Comments: Called for documentation for JANAE. Patient has received Versed 2 mg IVP and Fentanyl 25 mcg IVP, prior toarrival..Sleeping soundly. Mouth probe in  1252 Advanced JANAE probe pt wakened  1253 Versed 2 mg IVP per Adrien, RN  1254 JANAE probe advanced. Patient provided with emotional support. VSS  1309 Consult to CT surgeon for Type A dissection.   1316 Versed 2 mg IVP per RN  1320 JANAE completed. VSS, Patient tolerated well.

## 2020-10-05 NOTE — ED TRIAGE NOTES
60 y.o. male presents to ER ED 05/ED 05   Chief Complaint   Patient presents with    Chest Pain     Pt appears distressed, uncomfortable.     Midsternal chest pain, began about 30-45 mins PTA while riding tractor.   Pain is constant since onset, 9/10, and described as burning, radiates to back. Worse with inspiration.   Denies precipitating factors, slight relief with belching.     Denies SOB, dizziness.    States he took 600 mg ibuprofen PTA.

## 2020-10-05 NOTE — PLAN OF CARE
CM obtained discharge planning assessment at bedside from patient and spouse.  No discharge needs are noted at this time.      Kiko Connor MD  [address] / [facility] [address]      [facility] [place] - [address]  [address]  [facility] [address]  Phone: [phone] Fax: [phone]    Payor: [facility] BLUE SHIELD / Plan: BCBS ALL OUT OF STATE / Product Type: PPO /     Extended Emergency Contact Information  Primary Emergency Contact: Katherine Curry  Address: [address]           [place] [address] [facility]  Home Phone: [phone]  Mobile Phone: [phone]  Relation: Spouse    Future Appointments   Date Time Provider Department Center   10/5/2020  2:00 PM INPATIENT, JANAE [facility]          10/05/20 1133   Discharge Assessment   Assessment Type Discharge Planning Assessment   Confirmed/corrected address and phone number on facesheet? Yes   Assessment information obtained from? Caregiver;Patient;Medical Record   Communicated expected length of stay with patient/caregiver yes   Prior to hospitilization cognitive status: Alert/Oriented   Prior to hospitalization functional status: Independent   Current cognitive status: Alert/Oriented   Current Functional Status:   (unable to assess)   Facility Arrived From: [facility]   Lives With spouse   Able to Return to Prior Arrangements yes   Is patient able to care for self after discharge? Unable to determine at this time (comments)   Who are your caregiver(s) and their phone number(s)? Katherine Curry (spouse)- [phone]   Readmission Within the Last 30 Days other (see comments)  (transfer from [facility])   Patient currently being followed by outpatient case management? No   Patient currently receives any other outside agency services? No   Equipment Currently Used at Home CPAP   Do you have any problems affording any of your prescribed medications? No    Is the patient taking medications as prescribed? yes   Does the patient have transportation home? Yes   Transportation Anticipated family or friend will provide   Dialysis Name and Scheduled days N/A   Does the patient receive services at the [facility]mad[facility] Clinic? No   Discharge Plan A Home with family   Discharge Plan B Home Health  (no preference)   DME Needed Upon Discharge  other (see comments)  (TBD)   Patient/Family in Agreement with Plan yes       Casie Abreu MPH, RN, CM  Ext. [phone]

## 2020-10-05 NOTE — ED PROVIDER NOTES
[facility]                                                 Chief Complaint  60 y.o. male with Chest Pain      History of Present Illness  Luis Eduardo Curry presents to the emergency room with excruciating chest pain  Patient with sharp excruciating chest pain started 30 minutes ago per interview  Patient has no coronary artery disease history, patient had no cardiac history   Patient is normal sinus rhythm on EKG with 10/10 left parasternal chest pain now    The history is provided by the patient   device was not used during this ER visit  History reviewed. No pertinent past medical history.     Past Surgical History   -- BIOPSY WITH TRANSRECTAL ULTRASOUND (TRUS) GUIDANCE     -- COLONOSCOPY     -- COLONOSCOPY     -- CYSTOSCOPY     -- UMBILICAL HERNIA REPAIR        No Known Allergies     I have reviewed all of this patient's past medical, surgical, family, and social   histories as well as active allergies and medications documented in the  electronic medical record    Review of Systems and Physical Exam      Review of Systems  -- Constitution - no fever, denies fatigue, no weakness, no chills  -- Eyes - no tearing or redness, no visual disturbance  -- Ear, Nose - no tinnitus or earache, no nasal congestion or discharge  -- Mouth,Throat - no sore throat, no toothache, normal voice, normal swallowing  -- Respiratory - denies cough and congestion, no shortness of breath, no ROSADO  -- Cardiovascular - chest pain, no palpitations, denies claudication  -- Gastrointestinal - denies abdominal pain, nausea, vomiting, or diarrhea  -- Musculoskeletal - denies back pain, negative for trauma or injury  -- Neurological - no headache, denies weakness or seizure; no LOC  -- Skin - denies pallor, rash, or changes in skin. no hives or welts noted    Vital Signs  His tympanic temperature is 96.6 °F (35.9 °C).   His blood pressure is 175/82 and his pulse is 79.   His respiration is 22 and oxygen  saturation is 94%     Physical Exam  -- Nursing note and vitals reviewed  -- Constitutional: Appears well-developed and well-nourished  -- Head: Atraumatic. Normocephalic. No obvious abnormality  -- Eyes: Pupils are equal and reactive to light. Normal conjunctiva and lids  -- Cardiac: Normal rate, regular rhythm and normal heart sounds  -- Pulmonary: Normal respiratory effort, breath sounds clear to auscultation  -- Abdominal: Soft, no tenderness. Normal bowel sounds. Normal liver edge  -- Musculoskeletal: Normal range of motion, no effusions. Joints stable   -- Neurological: No focal deficits. Showed good interaction with staff  -- Vascular: Posterior tibial, dorsalis pedis and radial pulses 2+ bilaterally      Emergency Room Course      Lab Results     K 3.8      CO2 23   BUN 26 (H)   CREATININE 2.8 (H)    (H)   ALKPHOS 37 (L)   AST 18   ALT 18   BILITOT 0.4   ALBUMIN 4.3   PROT 7.0   WBC 9.17   HGB 13.3 (L)   HCT 39.9 (L)       (H)    (H)   CPKMB 2.8   TROPONINI 0.033 (H)   INR 1.0   BNP 91   DDIMER 2.04 (H)     EKG  -- The EKG findings today were without concerning findings from baseline     Radiology  -- Chest x-ray showed no infiltrate and showed no acute pathology  -- The CT of the abdomen and pelvis was negative for acute pathology     Additional Work up  -- rapid Coronavirus PCR was negative    Medications Given  niCARdipine 40 mg/200 mL (0.2 mg/mL) infusion (7.5 mg/hr Intravenous Rate/Dose Change 10/4/20 2205)   esmolol 2000 mg in sodium chloride 0.9% 100 mL (20 mg/mL)   aspirin tablet 325 mg (325 mg Oral Given 10/4/20 1940)   0.9%  NaCl infusion (1,000 mLs Intravenous New Bag 10/4/20 2039)   atorvastatin tablet 40 mg (40 mg Oral Given 10/4/20 2039)   pantoprazole EC tablet 40 mg (40 mg Oral Given 10/4/20 2039)   nitroGLYCERIN 2% TD oint ointment 1 inch (1 inch Topical (Top) Given 10/4/20 2039)   morphine injection 2 mg (2 mg Intravenous Given 10/4/20 2034)    ondansetron injection 4 mg (4 mg Intravenous Given 10/4/20 2034)     Critical Care ED Physician Time (minutes):  -- Performed by: Ramón West M.D.  -- Date/Time: 10:13 PM 10/4/2020   -- Direct Patient Care (Face Time): 5  -- Additional History from Records or Taking Additional History: 0  -- Ordering, Reviewing, and Interpreting Diagnostic Studies: 5  -- Total Time in Documentation: 5  -- Consultation with Other Physicians: 5  -- Consultation with Family Related to Condition: 0  -- Total Critical Care Time: 20     ED Physician Management  -- Diagnosis management comments: 60 y.o. male with aortic dissection  -- patient with severe acute dissection noted on CT of the chest today  -- I immediately contacted vascular surgery at [facility] on the finding  -- patient discussed at length, esmolol and Cardene drips initiated ASAP  -- patient will be transferred to [facility] as soon as possible for eval  -- patient with no previous knowledge of dissection or or vascular disease  -- blood pressure markedly improved, patient stable, transfer asap to [facility]  -- patient wife and son extensively counseled on urgency and diagnosis    Diagnosis  [R07.9] Chest pain  [I16.9] Hypertensive crisis (Primary)  [N19] Renal failure, unspecified chronicity  [I71.00] Dissection of aorta, unspecified portion of aorta    Disposition and Plan  -- Disposition: transfer  -- Condition: stable    This note is dictated on M*Modal word recognition program.  There are word recognition mistakes that are occasionally missed on review.         Ramón West MD  10/04/20 2400

## 2020-10-05 NOTE — ED NOTES
Pt reporting headache at this time. Pt rates pain 7/10 on 0-10 scale. MD Kirk aware. Awaiting medication orders.

## 2020-10-05 NOTE — ED NOTES
Patient placed on continuous cardiac monitor, automatic blood pressure cuff and continuous pulse oximeter. BP cuff cycling Q10 minutes.

## 2020-10-05 NOTE — ED NOTES
Pt's wife at bedside and provided with SICU room #[id number] and educated on visitation policy of 8am -6pm. Pt's wife verbalized understanding.

## 2020-10-05 NOTE — ED NOTES
Pt departing via [facility] [place] [facility].  Wife will drive and meet pt there.  Pt remains AAO x 4, RR even/unlabored on 2 L NC. VSS.   Pt resting comfortably.   Esmolol and cardene, 2L NC, cardiac monitoring continuing in route.  Report given to receiving nurse and transport team.

## 2020-10-05 NOTE — ED NOTES
Admitted to 10th floor SICU. Diagnosis, medications, & POC discussed with patient and patient's wife. Both verbalized understanding. All questions and concerns answered. No needs expressed at the time. Pt is awake, alert and oriented x4 with no acute distress noted. Respirations even and unlabored. Pt on 5L oxygen per NC, sats 95%. Per stretcher out of ED to floor by RN on continuous cardiac monitor, automated BP cuff, and pulse oximeter.

## 2020-10-05 NOTE — ED NOTES
Pt request additional pain meds prior to departure. Reports pain 8/10 at this time.   Provider notified and order placed for morphine 2mg.

## 2020-10-05 NOTE — HPI
Mr. Curry is a 60 year old male with a mhx of HTN, BPH who initially presented to [place] for tearing substernal chest and back pain. Pt had a CT chest without contrast significant for a descending thoracic aortic aneurysm. Pt started on cardene and esmolol and transferred to [facility] for futher care.   Upon arrival, patient BP was 133/90, HR 70, saturating 96% on 6L NC. Labwork significant for elevated sCr 2.8. Vascular surgery and cardiology evaluated pt in ED. TTE without evidence of a type A dissection. Antihypertensives were titrated to SBP <120, HR <80, patient admitted to the critical care service for closer monitoring.

## 2020-10-05 NOTE — ASSESSMENT & PLAN NOTE
JANAE to rule out type A dissection  -No absolute contraindications of esophageal stricture, tumor, perforation, laceration,or diverticulum and/or active GI bleed  -The risks, benefits & alternatives of the procedure were explained to the patient.   -The risks of transesophageal echo include but are not limited to:  Dental trauma, esophageal trauma/perforation, bleeding, laryngospasm/brochospasm, aspiration, sore throat/hoarseness, & dislodgement of the endotracheal tube/nasogastric tube (where applicable).    -The risks of moderate sedation include hypotension, respiratory depression, arrhythmias, bronchospasm, & death.    -Informed consent was obtained. The patient is agreeable to proceed with the procedure and all questions and concerns addressed.    Case discussed with an attending in echocardiography lab.    Further recommendations per attending addendum

## 2020-10-05 NOTE — ED NOTES
Patient does not want to receive CT scan at this time per MD Elena. Vascular surgery at bedside with patient and patient's wife.

## 2020-10-05 NOTE — PLAN OF CARE
SICU PLAN OF CARE NOTE    Dx: Dissection of aorta    Neuro: AAO x4, follows commands, moves all extremities    Vital Signs: BP (!) 109/59   Pulse 64   Temp 97.6 °F (36.4 °C)   Resp 10   Ht 6' (1.829 m)   Wt 103 kg (227 lb 1.2 oz)   SpO2 (!) 94%   BMI 30.80 kg/m²     Cardiac: 60-70s, NSR, goal = SBP <120 / HR <80    Respiratory: Nasal Cannula, 3L O2    Diet: NPO    Gtts: Esmolol, Nicardipine and MIVF    Urine Output: Urinary Catheter 1180 cc/shift    Skin: Pt repositioned independently Q2H. Redness on sacrum evaluated by wound care, no orders placed for wound care. Sacral foam dressing in place. No new skin breakdown over shift.      Shift Events: Pt's VSS throughout shift. Pt had AJNAE completed by cardiology team at bedside, revealed type A dissection. CTS consulted, results of JANAE further reviewed, deemed not conclusive. CT scan of abd/chest/pelvis with contrast scheduled @ 0400 tomorrow morning. Plan for surgical intervention or furhter medical management pending scan results. Pt and spouse updated on Plan of care, all questions answered. Will continue to monitor.

## 2020-10-05 NOTE — CONSULTS
[facility]  Cardiology  Consult Note    Patient Name: Luis Eduardo Curry  MRN: [id number]  Admission Date: 10/4/2020  Hospital Length of Stay: 0 days  Code Status: Full Code   Attending Provider: JOSY Santos II, MD   Consulting Provider: Tacho Melendez MD  Primary Care Physician: Kiko Connor MD  Principal Problem:<principal problem not specified>    Patient information was obtained from patient.     Consults  Subjective:     Chief Complaint:  Chest and back pain     HPI:   Luis Eduardo Curry is a 60 y.o. male who presents to [facility] ER for evaluation of chest pain. Patient without significant past medical history. Tearing substernal chest pain and midscapular back pain beginning yesterday evening prompting presentation to outside ER. Non-contrast CT scan of chest revealed likely descending thoracic aortic aneurysm. Anti-impulse control was started and the patient was transferred. He was admitted to vascular surgery overnight. CT w/o contrast demonstrating descending aneurysm and dissection with possible involvement into the arch.    Limited study given the lack of intravenous contrast material.  There is aneurysmal dilatation of the distal aortic arch and ectasia of the descending thoracic aorta.  There is an acute appearing dissection involving at least the distal aortic arch and descending thoracic aorta with associated periaortic mediastinal hemorrhage.  The absence of IV contrast, evaluation for active bleeding and the extent of the dissection cannot be adequately assessed.       Dysphagia or odynophagia:  No  Liver Disease, esophageal disease, or known varices:  No  Upper GI Bleeding: No  Snoring:  No  Sleep Apnea:  No  Prior neck surgery or radiation:  no  History of anesthetic difficulties:  No  Family history of anesthetic difficulties:  No  Last oral intake:  last night before midnight  Able to move neck in all directions:  Yes  Anticoagulation/Antiplatelets:   Platelet count:  150  INR: 1      History reviewed. No pertinent past medical history.    Past Surgical History:   Procedure Laterality Date    BIOPSY WITH TRANSRECTAL ULTRASOUND (TRUS) GUIDANCE N/A 5/1/2019    Procedure: BIOPSY, WITH TRANSRECTAL PROSTATE ULTRASOUND;  Surgeon: Chintan Mendez Jr., MD;  Location: [facility] OR;  Service: Urology;  Laterality: N/A;    COLONOSCOPY  2006    COLONOSCOPY N/A 1/24/2019    Procedure: COLONOSCOPY;  Surgeon: Reece Tarango MD;  Location: [facility];  Service: Endoscopy;  Laterality: N/A;    CYSTOSCOPY N/A 5/1/2019    Procedure: FLEXIBLE CYSTOSCOPY;  Surgeon: Chintan Mendez Jr., MD;  Location: [facility] OR;  Service: Urology;  Laterality: N/A;    UMBILICAL HERNIA REPAIR N/A 1/14/2020    Procedure: REPAIR, HERNIA, UMBILICAL;  Surgeon: Caio Mas Jr., MD;  Location: [facility] OR;  Service: General;  Laterality: N/A;       Review of patient's allergies indicates:  No Known Allergies    Current Facility-Administered Medications on File Prior to Encounter   Medication    [COMPLETED] 0.9%  NaCl infusion    [COMPLETED] aspirin tablet 325 mg    [COMPLETED] atorvastatin tablet 40 mg    [COMPLETED] morphine injection 2 mg    [COMPLETED] morphine injection 2 mg    [COMPLETED] nitroGLYCERIN 2% TD oint ointment 1 inch    [COMPLETED] ondansetron injection 4 mg    [COMPLETED] pantoprazole EC tablet 40 mg    [DISCONTINUED] esmolol 2000 mg in sodium chloride 0.9% 100 mL (20 mg/mL)    [DISCONTINUED] niCARdipine 40 mg/200 mL (0.2 mg/mL) infusion     Current Outpatient Medications on File Prior to Encounter   Medication Sig    HYDROcodone-acetaminophen (NORCO) 5-325 mg per tablet Take 2 tablets by mouth every 6 (six) hours as needed for Pain.    tamsulosin (FLOMAX) 0.4 mg Cap Take 1 capsule (0.4 mg total) by mouth once daily.    tamsulosin (FLOMAX) 0.4 mg Cap Take 1 capsule (0.4 mg total) by mouth once daily.     Family History     Problem Relation (Age of Onset)    Hypertension Father    No Known  Problems Mother, Sister, Brother, Daughter, Son, Maternal Aunt, Maternal Uncle, Paternal Aunt, Paternal Uncle, Maternal Grandmother, Maternal Grandfather, Paternal Grandmother, Paternal Grandfather        Tobacco Use    Smoking status: Former Smoker     Packs/day: 1.00     Years: 30.00     Pack years: 30.00     Types: Cigarettes    Smokeless tobacco: Never Used   Substance and Sexual Activity    Alcohol use: Yes     Frequency: Monthly or less     Binge frequency: Weekly     Comment: socially    Drug use: No    Sexual activity: Yes     Review of Systems   All other systems reviewed and are negative.    Objective:     Vital Signs (Most Recent):  Temp: 97.7 °F (36.5 °C) (10/05/20 1100)  Pulse: 66 (10/05/20 1200)  Resp: (!) 22 (10/05/20 1200)  BP: (!) 106/56 (10/05/20 1100)  SpO2: 95 % (10/05/20 1200) Vital Signs (24h Range):  Temp:  [96.6 °F (35.9 °C)-97.7 °F (36.5 °C)] 97.7 °F (36.5 °C)  Pulse:  [59-82] 66  Resp:  [2-34] 22  SpO2:  [91 %-100 %] 95 %  BP: ()/() 106/56  Arterial Line BP: (113-129)/(55-67) 125/61     Weight: 103.4 kg (228 lb)  Body mass index is 30.92 kg/m².    SpO2: 95 %  O2 Device (Oxygen Therapy): nasal cannula      Intake/Output Summary (Last 24 hours) at 10/5/2020 1240  Last data filed at 10/5/2020 1200  Gross per 24 hour   Intake 784 ml   Output 3340 ml   Net -2556 ml       Lines/Drains/Airways     Drain                 Urethral Catheter 10/05/20 0103 Non-latex 16 Fr. less than 1 day          Arterial Line            Arterial Line 10/05/20 0630 Right Radial less than 1 day          Peripheral Intravenous Line                 Peripheral IV - Single Lumen 10/04/20 1925 18 G Left Antecubital less than 1 day         Peripheral IV - Single Lumen 10/04/20 2049 18 G Right Antecubital less than 1 day                Physical Exam   Constitutional: He appears well-developed and well-nourished. No distress.   HENT:   Head: Normocephalic and atraumatic.   Eyes: EOM are normal. Right eye  exhibits no discharge. Left eye exhibits no discharge. No scleral icterus.   Neck: Normal range of motion. Neck supple.   Cardiovascular: Normal rate, regular rhythm, S1 normal, S2 normal, normal heart sounds, intact distal pulses and normal pulses.  No extrasystoles are present. Exam reveals no gallop, no S3, no S4, no distant heart sounds, no friction rub and no opening snap.   No murmur heard.  Pulses:       Radial pulses are 2+ on the right side and 2+ on the left side.        Dorsalis pedis pulses are 2+ on the right side and 2+ on the left side.        Posterior tibial pulses are 2+ on the right side and 2+ on the left side.   Pulmonary/Chest: Effort normal. No respiratory distress. He has no wheezes. He has no rales.   Abdominal: Soft. Bowel sounds are normal. He exhibits no distension. There is no abdominal tenderness.   Musculoskeletal: Normal range of motion.         General: No deformity or edema.   Neurological: He is alert.   Skin: Skin is warm and dry. No rash noted. He is not diaphoretic. No erythema.   Nursing note and vitals reviewed.      Significant Labs: All pertinent lab results from the last 24 hours have been reviewed.    Significant Imaging: Reviewed    Assessment and Plan:     Dissection of aorta  JANAE to rule out type A dissection  -No absolute contraindications of esophageal stricture, tumor, perforation, laceration,or diverticulum and/or active GI bleed  -The risks, benefits & alternatives of the procedure were explained to the patient.   -The risks of transesophageal echo include but are not limited to:  Dental trauma, esophageal trauma/perforation, bleeding, laryngospasm/brochospasm, aspiration, sore throat/hoarseness, & dislodgement of the endotracheal tube/nasogastric tube (where applicable).    -The risks of moderate sedation include hypotension, respiratory depression, arrhythmias, bronchospasm, & death.    -Informed consent was obtained. The patient is agreeable to proceed with the  procedure and all questions and concerns addressed.    Case discussed with an attending in echocardiography lab.    Further recommendations per attending addendum          VTE Risk Mitigation (From admission, onward)         Ordered     heparin (porcine) injection 5,000 Units  Every 8 hours      10/05/20 0133     IP VTE HIGH RISK PATIENT  Once      10/05/20 0133     Place sequential compression device  Until discontinued      10/05/20 0133                Thank you for your consult. I will follow-up with patient. Please contact us if you have any additional questions.    Tacho Melendez MD  Cardiology   [facility]

## 2020-10-05 NOTE — HPI
Mr. Curry is a 60 year old male with a mhx of HTN, BPH who initially presented to [place] for tearing substernal chest and back pain. Pt had a CT chest without contrast significant for a descending thoracic aortic aneurysm. Pt started on cardene and esmolol and transferred [place] [facility] [place]her [place].   Upon arrival, patient BP was 133/90, HR 70, saturating 96% on 6L NC. Labwork significant for elevated sCr 2.8. Vascular surgery and cardiology evaluated pt in ED. TTE without evidence of a type A dissection. Antihypertensives were titrated to SBP <120, HR <80, patient admitted to the critical care service for closer monitoring.

## 2020-10-05 NOTE — PLAN OF CARE
Recommendations     1.) Advance diet as tolerated to regular per SLP texture recommendations once medically appropriate.   2.) RD will continue to monitor.     Goals: 1.) Pt to meet >75% of estimated energy and protein needs over the course of 7 days.  Nutrition Goal Status: new  Communication of RD Recs: (POC)

## 2020-10-05 NOTE — PLAN OF CARE
SW is following this Pt for DC planning needs. There are no identified needs at this time. Discharge Disposition: TBD - pending patient progress; JANAE to be completed.     SW will continue to coordinate with patient, family, team and insurance to complete patient's discharge plan.    Aura Vines LMSW   - Case Management

## 2020-10-05 NOTE — ED NOTES
Pt aware of pending transfer and v/u.  Risks and benefits explained, and pt v/u, agrees with plan of care. Wife to bedside, also aware and agrees with plan.   Witnessed signing of transfer consent by pt.

## 2020-10-05 NOTE — ASSESSMENT & PLAN NOTE
Would consider CTA of chest/abd/pelvis for more accurate images of possible dissection. Dr. Hollingsworth to review and give final recommendations.

## 2020-10-05 NOTE — ED TRIAGE NOTES
Luis Eduardo Curry, a 60 y.o. male presents to the ED w/ complaint of abdominal pain. Pt transferred from [facility] for further management of descending aortic dissection. Pt reporting 5/10 pain on 0-10 scale to chest at this time. Pt also reports headache at this time. Pt arrives to ED with 2in nitro paste to chest. Removed per MD Kirk. Pt has Cardene infusing at 10 mcg/kg/min and Esmolol at 20 mcg/kg/min. SBP goal <140 and HR goal <90 per MD Kirk. Pt also on 6L oxygen per NC. Pt denies SOB, N/V, dizziness.     Triage note:  Chief Complaint   Patient presents with    Abdominal Pain     Transfer from [place] for descending aortic disection. On Esmolol at 20 and cardene at 10     Review of patient's allergies indicates:  No Known Allergies  History reviewed. No pertinent past medical history.

## 2020-10-05 NOTE — PROGRESS NOTES
Report received from RN. Pt transported to SICU [id number] with portable telemetry Nasal Cannula 5L. Pt connected to ICU monitor Wall O2. MD Thompson called and made aware of patient arrival. New orders received and implemented. Pt remains on cardene @15, esmolol @ 50, and LR @125. Pt assessed, immediate needs met. Family updated on the patient's current condition and plan of care and educated on visiting hours. All questions answered, emotional support provided.     Admit Skin Note: Skin assessed for breakdown on admit, redness noted to sacrum, no breakdown present, foam dressing applied, pt can freely move all extremities, pt repositions independently, bed rest order implemented, bed plugged into wall, will continue to monitor and assess skin frequently for breakdown.

## 2020-10-05 NOTE — SUBJECTIVE & OBJECTIVE
Past medical hx as noted above.     Past Surgical History:   Procedure Laterality Date    BIOPSY WITH TRANSRECTAL ULTRASOUND (TRUS) GUIDANCE N/A 5/1/2019    Procedure: BIOPSY, WITH TRANSRECTAL PROSTATE ULTRASOUND;  Surgeon: Chintan Mendez Jr., MD;  Location: [facility];  Service: Urology;  Laterality: N/A;    COLONOSCOPY  2006    COLONOSCOPY N/A 1/24/2019    Procedure: COLONOSCOPY;  Surgeon: Reece Traango MD;  Location: [facility];  Service: Endoscopy;  Laterality: N/A;    CYSTOSCOPY N/A 5/1/2019    Procedure: FLEXIBLE CYSTOSCOPY;  Surgeon: Chintan Mendez Jr., MD;  Location: [facility];  Service: Urology;  Laterality: N/A;    UMBILICAL HERNIA REPAIR N/A 1/14/2020    Procedure: REPAIR, HERNIA, UMBILICAL;  Surgeon: Caio Mas Jr., MD;  Location: [facility];  Service: General;  Laterality: N/A;       Review of patient's allergies indicates:  No Known Allergies    Family History     Problem Relation (Age of Onset)    Hypertension Father    No Known Problems Mother, Sister, Brother, Daughter, Son, Maternal Aunt, Maternal Uncle, Paternal Aunt, Paternal Uncle, Maternal Grandmother, Maternal Grandfather, Paternal Grandmother, Paternal Grandfather        Tobacco Use    Smoking status: Former Smoker     Packs/day: 1.00     Years: 30.00     Pack years: 30.00     Types: Cigarettes    Smokeless tobacco: Never Used   Substance and Sexual Activity    Alcohol use: Yes     Frequency: Monthly or less     Binge frequency: Weekly     Comment: socially    Drug use: No    Sexual activity: Yes      Review of Systems   Constitutional: Negative for activity change, appetite change, fatigue and fever.   Respiratory: Positive for shortness of breath.    Cardiovascular: Positive for chest pain.   Gastrointestinal: Negative for abdominal pain, diarrhea, nausea and vomiting.   Endocrine: Negative for cold intolerance and heat intolerance.   Genitourinary: Negative for difficulty urinating.   Musculoskeletal: Negative for arthralgias.    Skin: Negative for color change.   Neurological: Negative for headaches.   Psychiatric/Behavioral: Negative for agitation and confusion.     Objective:     Vital Signs (Most Recent):  Temp: 97.6 °F (36.4 °C) (10/05/20 0230)  Pulse: 73 (10/05/20 0230)  Resp: 17 (10/05/20 0230)  BP: 122/71 (10/05/20 0230)  SpO2: 95 % (10/05/20 0230) Vital Signs (24h Range):  Temp:  [96.6 °F (35.9 °C)-97.6 °F (36.4 °C)] 97.6 °F (36.4 °C)  Pulse:  [69-82] 73  Resp:  [14-22] 17  SpO2:  [91 %-100 %] 95 %  BP: (121-243)/() 122/71     Weight: 102.1 kg (225 lb 1.4 oz)  Body mass index is 30.53 kg/m².      Intake/Output Summary (Last 24 hours) at 10/5/2020 0300  Last data filed at 10/5/2020 0212  Gross per 24 hour   Intake --   Output 2050 ml   Net -2050 ml       Physical Exam  Vitals signs and nursing note reviewed.   Constitutional:       General: He is not in acute distress.     Appearance: Normal appearance.   HENT:      Head: Normocephalic.   Eyes:      Extraocular Movements: Extraocular movements intact.   Neck:      Musculoskeletal: Normal range of motion.   Cardiovascular:      Rate and Rhythm: Normal rate and regular rhythm.      Pulses: Normal pulses.   Pulmonary:      Effort: Pulmonary effort is normal. No respiratory distress.      Breath sounds: Normal breath sounds.   Abdominal:      Palpations: Abdomen is soft.      Tenderness: There is no abdominal tenderness.   Musculoskeletal:         General: No swelling.   Skin:     General: Skin is warm and dry.   Neurological:      General: No focal deficit present.      Mental Status: He is alert and oriented to person, place, and time.         Vents:       Lines/Drains/Airways     Drain                 Urethral Catheter 10/05/20 0103 Non-latex 16 Fr. less than 1 day          Peripheral Intravenous Line                 Peripheral IV - Single Lumen 10/04/20 1925 18 G Left Antecubital less than 1 day         Peripheral IV - Single Lumen 10/04/20 2049 18 G Right Antecubital less  than 1 day                Significant Labs:    CBC/Anemia Profile:  Recent Labs   Lab 10/04/20  1923   WBC 9.17   HGB 13.3*   HCT 39.9*      MCV 87   RDW 12.6        Chemistries:  Recent Labs   Lab 10/04/20  1923      K 3.8      CO2 23   BUN 26*   CREATININE 2.8*   CALCIUM 9.7   ALBUMIN 4.3   PROT 7.0   BILITOT 0.4   ALKPHOS 37*   ALT 18   AST 18      Significant Imaging: I have reviewed all pertinent imaging results/findings within the past 24 hours.

## 2020-10-05 NOTE — PROGRESS NOTES
SJ Skin Integrity Evaluation      Subjective    SJ skin integrity champion evaluation of patient as part of the comprehensive skin care team .       Luis Eduardo Curry is being evaluated as per the Epic  pressure injury skin report.  He has been admitted to SICU for 1 day .   Skin injury was noted on admit 10/4/20            Limited Physical exam     Lesion 1: Buttocks        Assessment :       Lesion 1:  Moisture Associated dermatitis     POA : yes    Consults: N/A    Follow up:    Patient will be re evaluated weekly.

## 2020-10-05 NOTE — CONSULTS
VASCULAR SURGERY SERVICE    CHIEF COMPLAINT: chest pain    HISTORY OF PRESENT ILLNESS: Luis Eduardo Curry is a 60 y.o. male who presents to [facility] ER for evaluation of chest pain. Patient without significant past medical history. Tearing substernal chest pain and midscapular back pain beginning yesterday evening prompting presentation to outside ER. Non-contrast CT scan of chest revealed likely descending thoracic aortic aneurysm. Anti-impulse control was started and the patient was transferred.     On my evaluation, the patient has resolved his chest pain. He reports no abdominal, or flank, or lower extremity pain.       History reviewed. No pertinent past medical history.    Past Surgical History:   Procedure Laterality Date    BIOPSY WITH TRANSRECTAL ULTRASOUND (TRUS) GUIDANCE N/A 5/1/2019    Procedure: BIOPSY, WITH TRANSRECTAL PROSTATE ULTRASOUND;  Surgeon: Chintan Mendez Jr., MD;  Location: [facility];  Service: Urology;  Laterality: N/A;    COLONOSCOPY  2006    COLONOSCOPY N/A 1/24/2019    Procedure: COLONOSCOPY;  Surgeon: Reece Tarango MD;  Location: [facility];  Service: Endoscopy;  Laterality: N/A;    CYSTOSCOPY N/A 5/1/2019    Procedure: FLEXIBLE CYSTOSCOPY;  Surgeon: Chintan Mendez Jr., MD;  Location: [facility] OR;  Service: Urology;  Laterality: N/A;    UMBILICAL HERNIA REPAIR N/A 1/14/2020    Procedure: REPAIR, HERNIA, UMBILICAL;  Surgeon: Caio Mas Jr., MD;  Location: [facility];  Service: General;  Laterality: N/A;         Current Facility-Administered Medications:     esmolol 2000 mg in sodium chloride 0.9% 100 mL (20 mg/mL), 50 mcg/kg/min, Intravenous, Continuous, Harmeet Parker MD, 20 mcg/kg/min at 10/04/20 2350    lidocaine (PF) 10 mg/ml (1%) injection 10 mg, 1 mL, Intradermal, Once, Caio Mas Jr., MD    lidocaine HCL 10 mg/ml (1%) injection 10 mL, 10 mL, Infiltration, Once, Chintan Mendez Jr., MD    lidocaine HCL 2% jelly, , Topical (Top), Once, Chintan Mendez Jr., MD     lidocaine HCl 2% urojet, , Urethral, Once, Chintan Mendez Jr., MD    niCARdipine 40 mg/200 mL (0.2 mg/mL) infusion, 5 mg/hr, Intravenous, Continuous, Hailey Ramirez MD, Last Rate: 50 mL/hr at 10/04/20 2350, 10 mg/hr at 10/04/20 2350    Current Outpatient Medications:     HYDROcodone-acetaminophen (NORCO) 5-325 mg per tablet, Take 2 tablets by mouth every 6 (six) hours as needed for Pain., Disp: 30 tablet, Rfl: 0    tamsulosin (FLOMAX) 0.4 mg Cap, Take 1 capsule (0.4 mg total) by mouth once daily., Disp: 30 capsule, Rfl: 11    tamsulosin (FLOMAX) 0.4 mg Cap, Take 1 capsule (0.4 mg total) by mouth once daily., Disp: 90 capsule, Rfl: 3    Review of patient's allergies indicates:  No Known Allergies    Family History   Problem Relation Age of Onset    No Known Problems Mother     Hypertension Father     No Known Problems Sister     No Known Problems Brother     No Known Problems Daughter     No Known Problems Son     No Known Problems Maternal Aunt     No Known Problems Maternal Uncle     No Known Problems Paternal Aunt     No Known Problems Paternal Uncle     No Known Problems Maternal Grandmother     No Known Problems Maternal Grandfather     No Known Problems Paternal Grandmother     No Known Problems Paternal Grandfather        Social History     Tobacco Use    Smoking status: Former Smoker     Packs/day: 1.00     Years: 30.00     Pack years: 30.00     Types: Cigarettes    Smokeless tobacco: Never Used   Substance Use Topics    Alcohol use: Yes     Frequency: Monthly or less     Binge frequency: Weekly     Comment: socially    Drug use: No       REVIEW OF SYSTEMS:  General: No chills, fever, malaise, changes in weight  HEENT: No visual changes, difficulty hearing  Cardiovascular: +chest pain, palpitations, claudication  Pulmonary: No dyspnea, cough, wheezing  Gastrointestinal: No nausea, vomiting, diarrhea, constipation  Genitourinary: No dysuria, low urine output, hematuria  Endocrine:  No polydipsia, polyphagia  Hematologic: No fatigue with exertion, pica, pallor  Musculoskeletal: No extremity or joint pain, no back pain, no difficulty with ambulation  Neurologic: no seizures, no headaches, no weakness  Psychiatric: no mood disturbance    PHYSICAL EXAM:   /81   Pulse 75   Temp 97.6 °F (36.4 °C) (Oral)   Resp 17   Ht 6' (1.829 m)   Wt 102.1 kg (225 lb 1.4 oz)   SpO2 96%   BMI 30.53 kg/m²   Constitutional:  Alert,   Well-appearing  In no distress.   Neurological: Normal speech  no focal findings  CN II - XII grossly intact.    Psychiatric: Mood and affect appropriate and symmetric.   HEENT: Normocephalic / atraumatic  PERRLA  Midline trachea  No scars across the neck   Cardiac: Regular rate and rhythm.   Pulmonary: Normal pulmonary effort.   Abdomen: Soft, not distended.    Skin: Warm and well perfused   Vascular: 2+ radial, 2+ femoral, 2+ DP   Musculoskeletal: Normal ROM     DIAGNOSTICS:  Recent Labs     10/04/20  1923   WBC 9.17   HGB 13.3*   HCT 39.9*           Recent Labs     10/04/20  1923      K 3.8      CO2 23   BUN 26*   CREATININE 2.8*   CALCIUM 9.7   ANIONGAP 13   ESTGFRAFRICA 27*   EGFRNONAA 24*       Recent Labs     10/04/20  1923   ALT 18   AST 18   ALKPHOS 37*   BILITOT 0.4     IMPRESSION & PLAN:  60 y.o. male with acute aortic dissection; acute renal insufficiency    Unclear if Type A or Type B based on non-contrast CT chest. No available evidence to suggest malperfusion of the mesenteric circulation or lower extremity circulation. Chemical evidence of renal insufficiency may suggest renal malperfusion.    Needs stat transthoracic echocardiogram in ER prior to disposition to better evaluate TAAD or TBAD. Needs stat renal duplex to evaluate if renal arteries are involved. These need to be performed prior to disposition from the ER.     If there is evidence of TAAD on echo would recommend cardiothoracic surgery consultation. Renal artery dissection on  duplex would prompt urgent endovascular intervention.    Continue strict anti-impulse control (HR<80, SBP<120).     Needs right radial arterial catheter.    Place Pulliam catheter.    Robert Smalls MD PGY-7  Vascular and Endovascular Surgery Fellow  10/05/2020

## 2020-10-05 NOTE — PROCEDURES
"Luis Eduardo Curry is a 60 y.o. male patient.    Temp: 97.6 °F (36.4 °C) (10/05/20 0230)  Pulse: 63 (10/05/20 0530)  Resp: 16 (10/05/20 0530)  BP: 112/69 (10/05/20 0515)  SpO2: 96 % (10/05/20 0530)  Weight: 103.5 kg (228 lb 2.8 oz) (10/05/20 0400)  Height: 6' (182.9 cm) (10/04/20 2345)       Arterial Line    Date/Time: 10/5/2020 6:32 AM  Location procedure was performed: [facility] CRITICAL CARE SURGERY  Performed by: Samuel Vizcarra MD  Authorized by: Anatoliy Moses MD   Assisting provider: Kailey Sanon MD  Pre-op Diagnosis: AAA  Consent Done: Yes  Consent: Written consent obtained.  Risks and benefits: risks, benefits and alternatives were discussed  Consent given by: patient  Patient understanding: patient states understanding of the procedure being performed  Patient consent: the patient's understanding of the procedure matches consent given  Procedure consent: procedure consent matches procedure scheduled  Relevant documents: relevant documents present and verified  Test results: test results available and properly labeled  Patient identity confirmed: , MRN and name  Time out: Immediately prior to procedure a "time out" was called to verify the correct patient, procedure, equipment, support staff and site/side marked as required.  Preparation: Patient was prepped and draped in the usual sterile fashion.  Indications: hemodynamic monitoring  Location: right radial  Anesthesia: local infiltration    Anesthesia:  Local Anesthetic: lidocaine 1% without epinephrine  Anesthetic total: 1 mL  Patient sedated: no  Seth's test normal: yes  Needle gauge: 20  Number of attempts: 2  Complications: No  Estimated blood loss (mL): 1  Specimens: No  Implants: No  Post-procedure: dressing applied  Post-procedure CMS: normal  Patient tolerance: Patient tolerated the procedure well with no immediate complications          Samuel Vizcarra  10/5/2020      Samuel Vizcarra MD  PGY-3 Anesthesiology   [phone] " (pager)  10/5/2020 6:34 AM

## 2020-10-05 NOTE — HPI
"Luis Eduardo Curry is a 60 y.o. male who presents to [facility] ER for evaluation of chest pain. Patient without significant past medical history. Tearing substernal chest pain and midscapular back pain beginning yesterday evening prompting presentation to outside ER. Non-contrast CT scan of chest revealed likely descending thoracic aortic aneurysm. Anti-impulse control was started and the patient was transferred.      On my evaluation, the patient has resolved his chest pain. He reports no abdominal, or flank, or lower extremity pain.      Dysphagia or odynophagia:  {Blank single:56720::"Yes","No"}  Liver Disease, esophageal disease, or known varices:  {Blank single:27929::"Yes","No"}  Upper GI Bleeding: {Blank single:19197::"Yes","No"}  Snoring:  {Blank single:19197::"Yes","No"}  Sleep Apnea:  {Blank single:19197::"Yes","No"}  Prior neck surgery or radiation:  {Blank single:19197::"Yes","No"}  History of anesthetic difficulties:  {Blank single:19197::"Yes","No"}  Family history of anesthetic difficulties:  {Blank single:89102::"Yes","No"}  Last oral intake:  {Blank single:19197::"last night before midnight","12 hours ago","24 hours ago"}  Able to move neck in all directions:  {Blank single:50309::"Yes","No"}  Anticoagulation/Antiplatelets:   Platelet count: 150  INR: 1    "

## 2020-10-05 NOTE — ASSESSMENT & PLAN NOTE
  Neuro/Psych:   -- Sedation: None  -- Pain: Oxycodone prn, tylenol prn             Cards:   -- Goal SBP <120, DBP <80  -- Cardene and esmolol infusions titrated to goal.  -- Beside TTE with normal EF, no overt sign of type A dissection. Recommend a JANAE for further investigation.  -- Plan discussed with vascular surgery, will continue with medical management at this time.     Pulm:   -- Goal O2 sat > 90%  -- Wean as able      Renal:  -- Keep buckley for strict I/O      FEN / GI:   -- Replace lytes as needed  -- Nutrition: NPO, IVF      ID:   -- Tm: afebrile; WBC 9.17  -- Abx: none needed at this time      Heme/Onc:   -- H/H stable 13.3/39.9  -- Daily CBC      Endo:   -- Gluc goal 140-180      PPx:   Feeding: NPO  Thromboembolic prevention: scd  HOB >30  Glucose control: Critical care goal 140-180 g/dl, ISS     Dispo/Code Status/Palliative:   -- SICU / Full Code

## 2020-10-05 NOTE — PROGRESS NOTES
No evidence of TAAD on TTE.  >2L UOP on placement of buckley.  Suspect post-obstructive renal insufficiency which will resolve with decompression.  Plan admit SICU for impulse control.  JANAE vs. CTA in AM pending renal function.    Robert Smalls MD PGY-7  Vascular and Endovascular Surgery Fellow  10/05/2020

## 2020-10-05 NOTE — CONSULTS
[facility]  Cardiothoracic Surgery  Consult Note    Patient Name: Luis Eduardo Curry  MRN: [id number]  Admission Date: 10/4/2020  Attending Physician: JOSY Santos II, MD  Referring Provider: Ramón West MD    Patient information was obtained from patient and past medical records.     Inpatient consult to Cardiothoracic Surgery  Consult performed by: Ashlie Wynn NP  Consult ordered by: JOSY Santos II, MD  Reason for consult: Aortic dissection         Subjective:     Principal Problem: Dissection of aorta    History of Present Illness: Mr. Curry is a 60 year old male with a mhx of HTN, BPH who initially presented to [place] for tearing substernal chest and back pain. Pt had a CT chest without contrast significant for a descending thoracic aortic aneurysm. Pt started on cardene and esmolol and [place] [facility] [place].   Upon arrival, patient BP was 133/90, HR 70, saturating 96% on 6L NC. Labwork significant for elevated sCr 2.8. Vascular surgery and cardiology evaluated pt in ED. TTE without evidence of a type A dissection. Antihypertensives were titrated to SBP <120, HR <80, patient admitted to the critical care service for closer monitoring.     Current Facility-Administered Medications on File Prior to Encounter   Medication    [COMPLETED] 0.9%  NaCl infusion    [COMPLETED] aspirin tablet 325 mg    [COMPLETED] atorvastatin tablet 40 mg    [COMPLETED] morphine injection 2 mg    [COMPLETED] morphine injection 2 mg    [COMPLETED] nitroGLYCERIN 2% TD oint ointment 1 inch    [COMPLETED] ondansetron injection 4 mg    [COMPLETED] pantoprazole EC tablet 40 mg    [DISCONTINUED] esmolol 2000 mg in sodium chloride 0.9% 100 mL (20 mg/mL)    [DISCONTINUED] niCARdipine 40 mg/200 mL (0.2 mg/mL) infusion     Current Outpatient Medications on File Prior to Encounter   Medication Sig    HYDROcodone-acetaminophen (NORCO) 5-325 mg per tablet Take 2 tablets by mouth every 6  (six) hours as needed for Pain.    tamsulosin (FLOMAX) 0.4 mg Cap Take 1 capsule (0.4 mg total) by mouth once daily.    tamsulosin (FLOMAX) 0.4 mg Cap Take 1 capsule (0.4 mg total) by mouth once daily.       Review of patient's allergies indicates:  No Known Allergies    History reviewed. No pertinent past medical history.  Past Surgical History:   Procedure Laterality Date    BIOPSY WITH TRANSRECTAL ULTRASOUND (TRUS) GUIDANCE N/A 5/1/2019    Procedure: BIOPSY, WITH TRANSRECTAL PROSTATE ULTRASOUND;  Surgeon: Chintan Mendez Jr., MD;  Location: [facility] OR;  Service: Urology;  Laterality: N/A;    COLONOSCOPY  2006    COLONOSCOPY N/A 1/24/2019    Procedure: COLONOSCOPY;  Surgeon: Reece Tarango MD;  Location: [facility];  Service: Endoscopy;  Laterality: N/A;    CYSTOSCOPY N/A 5/1/2019    Procedure: FLEXIBLE CYSTOSCOPY;  Surgeon: Chintan Mendez Jr., MD;  Location: [facility] OR;  Service: Urology;  Laterality: N/A;    UMBILICAL HERNIA REPAIR N/A 1/14/2020    Procedure: REPAIR, HERNIA, UMBILICAL;  Surgeon: Caio Mas Jr., MD;  Location: [facility] OR;  Service: General;  Laterality: N/A;     Family History     Problem Relation (Age of Onset)    Hypertension Father    No Known Problems Mother, Sister, Brother, Daughter, Son, Maternal Aunt, Maternal Uncle, Paternal Aunt, Paternal Uncle, Maternal Grandmother, Maternal Grandfather, Paternal Grandmother, Paternal Grandfather        Tobacco Use    Smoking status: Former Smoker     Packs/day: 1.00     Years: 30.00     Pack years: 30.00     Types: Cigarettes    Smokeless tobacco: Never Used   Substance and Sexual Activity    Alcohol use: Yes     Frequency: Monthly or less     Binge frequency: Weekly     Comment: socially    Drug use: No    Sexual activity: Yes     Review of Systems   Constitutional: Negative for activity change and fatigue.   Respiratory: Negative for shortness of breath.    Cardiovascular: Negative for chest pain, palpitations and leg swelling.    Gastrointestinal: Negative for abdominal pain, diarrhea and vomiting.   Endocrine: Negative for polydipsia, polyphagia and polyuria.   Genitourinary: Negative for dysuria.   Musculoskeletal: Negative for gait problem.   Skin: Negative for rash.   Allergic/Immunologic: Negative for immunocompromised state.   Neurological: Negative for dizziness, syncope and weakness.   Hematological: Does not bruise/bleed easily.   Psychiatric/Behavioral: Negative for behavioral problems.     Objective:     Vital Signs (Most Recent):  Temp: 97.7 °F (36.5 °C) (10/05/20 1100)  Pulse: 62 (10/05/20 1404)  Resp: 10 (10/05/20 1404)  BP: 118/65 (10/05/20 1400)  SpO2: 95 % (10/05/20 1404) Vital Signs (24h Range):  Temp:  [96.6 °F (35.9 °C)-97.7 °F (36.5 °C)] 97.7 °F (36.5 °C)  Pulse:  [59-82] 62  Resp:  [2-34] 10  SpO2:  [91 %-100 %] 95 %  BP: ()/() 118/65  Arterial Line BP: ()/(55-67) 112/61     Weight: 103.4 kg (228 lb)  Body mass index is 30.92 kg/m².    SpO2: 95 %  O2 Device (Oxygen Therapy): nasal cannula     Intake/Output - Last 3 Shifts       10/03 0700 - 10/04 0659 10/04 0700 - 10/05 0659 10/05 0700 - 10/06 0659    I.V. (mL/kg)  784 (7.6)     Total Intake(mL/kg)  784 (7.6)     Urine (mL/kg/hr)  2675 800 (1.1)    Total Output  2675 800    Net  -1891 -800                  Lines/Drains/Airways     Drain                 Urethral Catheter 10/05/20 0103 Non-latex 16 Fr. less than 1 day          Arterial Line            Arterial Line 10/05/20 0630 Right Radial less than 1 day          Peripheral Intravenous Line                 Peripheral IV - Single Lumen 10/04/20 1925 18 G Left Antecubital less than 1 day         Peripheral IV - Single Lumen 10/04/20 2049 18 G Right Antecubital less than 1 day                   Physical Exam  Constitutional:       Appearance: He is well-developed.   HENT:      Head: Normocephalic and atraumatic.   Eyes:      Extraocular Movements: Extraocular movements intact.   Neck:       Musculoskeletal: Normal range of motion.   Cardiovascular:      Rate and Rhythm: Normal rate and regular rhythm.      Heart sounds: Normal heart sounds.   Pulmonary:      Effort: Pulmonary effort is normal.      Breath sounds: Normal breath sounds.   Abdominal:      Palpations: Abdomen is soft.   Musculoskeletal: Normal range of motion.   Skin:     General: Skin is warm and dry.      Capillary Refill: Capillary refill takes less than 2 seconds.   Neurological:      Mental Status: He is alert and oriented to person, place, and time.   Psychiatric:         Mood and Affect: Mood normal.         Behavior: Behavior normal.         Significant Labs:  BMP:   Recent Labs   Lab 10/05/20  0748   *      K 4.2      CO2 19*   BUN 26*   CREATININE 2.3*   CALCIUM 8.4*     CBC:   Recent Labs   Lab 10/05/20  0529   WBC 9.07   RBC 3.96*   HGB 11.5*   HCT 34.7*      MCV 88   MCH 29.0   MCHC 33.1     Coagulation:   Recent Labs   Lab 10/04/20  1923   INR 1.0   APTT 26.8       Significant Diagnostics:  I have reviewed all pertinent imaging results/findings    JANAE: 10/5/20  · Aortic dissection from aortic arch to decending aorta. There also appears to be a dissection flap in the ascending aorta but I cannot be certain.  · Ascending aorta at the upper limits of normal.  · Enlarged descending thoracic aorta and arch.  · The left ventricle is normal in size with normal systolic function. The estimated ejection fraction is 55%.  · Normal right ventricular systolic function.  · No thrombus is present in the appendage.  · No interatrial septal defect present.  · Trivial pericardial effusion.  · Normal left ventricular diastolic function.    Assessment/Plan:     NYHA Score: NYHA I: cardiac disease, but without resulting limitations of physical activity    * Dissection of aorta  Would consider CTA of chest/abd/pelvis for more accurate images of possible dissection. Dr. Hollingsworth to review and give final recommendations.          Thank you for your consult. I will follow-up with patient. Please contact us if you have any additional questions.    Ashlie Wynn NP  Cardiothoracic Surgery  [facility]

## 2020-10-05 NOTE — H&P
[facility]  Critical Care - Surgery  History & Physical    Patient Name: Luis Eduardo Curry  MRN: [id number]  Admission Date: 10/4/2020  Code Status: Full Code  Attending Physician: JOSY Santos II, MD   Primary Care Provider: Kiko Connor MD   Principal Problem: <principal problem not specified>    Subjective:     HPI:  Mr. Curry is a 60 year old male with a mhx of HTN, BPH who initially presented to [place] for tearing substernal chest and back pain. Pt had a CT chest without contrast significant for a descending thoracic aortic aneurysm. Pt started on cardene and esmolol and transferred to [facility] [place] for futher care.   Upon arrival, patient BP was 133/90, HR 70, saturating 96% on 6L NC. Labwork significant for elevated sCr 2.8. Vascular surgery and cardiology evaluated pt in ED. TTE without evidence of a type A dissection. Antihypertensives were titrated to SBP <120, HR <80, patient admitted to the critical care service for closer monitoring.     Hospital/ICU Course:  No notes on file    Past medical hx as noted above.     Past Surgical History:   Procedure Laterality Date    BIOPSY WITH TRANSRECTAL ULTRASOUND (TRUS) GUIDANCE N/A 5/1/2019    Procedure: BIOPSY, WITH TRANSRECTAL PROSTATE ULTRASOUND;  Surgeon: Chintan Mendez Jr., MD;  Location: [facility];  Service: Urology;  Laterality: N/A;    COLONOSCOPY  2006    COLONOSCOPY N/A 1/24/2019    Procedure: COLONOSCOPY;  Surgeon: Reece Tarango MD;  Location: [facility];  Service: Endoscopy;  Laterality: N/A;    CYSTOSCOPY N/A 5/1/2019    Procedure: FLEXIBLE CYSTOSCOPY;  Surgeon: Chintan Mendez Jr., MD;  Location: [facility];  Service: Urology;  Laterality: N/A;    UMBILICAL HERNIA REPAIR N/A 1/14/2020    Procedure: REPAIR, HERNIA, UMBILICAL;  Surgeon: Caio Mas Jr., MD;  Location: [facility];  Service: General;  Laterality: N/A;       Review of patient's allergies indicates:  No Known Allergies    Family History     Problem Relation (Age of  Onset)    Hypertension Father    No Known Problems Mother, Sister, Brother, Daughter, Son, Maternal Aunt, Maternal Uncle, Paternal Aunt, Paternal Uncle, Maternal Grandmother, Maternal Grandfather, Paternal Grandmother, Paternal Grandfather        Tobacco Use    Smoking status: Former Smoker     Packs/day: 1.00     Years: 30.00     Pack years: 30.00     Types: Cigarettes    Smokeless tobacco: Never Used   Substance and Sexual Activity    Alcohol use: Yes     Frequency: Monthly or less     Binge frequency: Weekly     Comment: socially    Drug use: No    Sexual activity: Yes      Review of Systems   Constitutional: Negative for activity change, appetite change, fatigue and fever.   Respiratory: Positive for shortness of breath.    Cardiovascular: Positive for chest pain.   Gastrointestinal: Negative for abdominal pain, diarrhea, nausea and vomiting.   Endocrine: Negative for cold intolerance and heat intolerance.   Genitourinary: Negative for difficulty urinating.   Musculoskeletal: Negative for arthralgias.   Skin: Negative for color change.   Neurological: Negative for headaches.   Psychiatric/Behavioral: Negative for agitation and confusion.     Objective:     Vital Signs (Most Recent):  Temp: 97.6 °F (36.4 °C) (10/05/20 0230)  Pulse: 73 (10/05/20 0230)  Resp: 17 (10/05/20 0230)  BP: 122/71 (10/05/20 0230)  SpO2: 95 % (10/05/20 0230) Vital Signs (24h Range):  Temp:  [96.6 °F (35.9 °C)-97.6 °F (36.4 °C)] 97.6 °F (36.4 °C)  Pulse:  [69-82] 73  Resp:  [14-22] 17  SpO2:  [91 %-100 %] 95 %  BP: (121-243)/() 122/71     Weight: 102.1 kg (225 lb 1.4 oz)  Body mass index is 30.53 kg/m².      Intake/Output Summary (Last 24 hours) at 10/5/2020 0300  Last data filed at 10/5/2020 0212  Gross per 24 hour   Intake --   Output 2050 ml   Net -2050 ml       Physical Exam  Vitals signs and nursing note reviewed.   Constitutional:       General: He is not in acute distress.     Appearance: Normal appearance.   HENT:       Head: Normocephalic.   Eyes:      Extraocular Movements: Extraocular movements intact.   Neck:      Musculoskeletal: Normal range of motion.   Cardiovascular:      Rate and Rhythm: Normal rate and regular rhythm.      Pulses: Normal pulses.   Pulmonary:      Effort: Pulmonary effort is normal. No respiratory distress.      Breath sounds: Normal breath sounds.   Abdominal:      Palpations: Abdomen is soft.      Tenderness: There is no abdominal tenderness.   Musculoskeletal:         General: No swelling.   Skin:     General: Skin is warm and dry.   Neurological:      General: No focal deficit present.      Mental Status: He is alert and oriented to person, place, and time.         Vents:       Lines/Drains/Airways     Drain                 Urethral Catheter 10/05/20 0103 Non-latex 16 Fr. less than 1 day          Peripheral Intravenous Line                 Peripheral IV - Single Lumen 10/04/20 1925 18 G Left Antecubital less than 1 day         Peripheral IV - Single Lumen 10/04/20 2049 18 G Right Antecubital less than 1 day                Significant Labs:    CBC/Anemia Profile:  Recent Labs   Lab 10/04/20  1923   WBC 9.17   HGB 13.3*   HCT 39.9*      MCV 87   RDW 12.6        Chemistries:  Recent Labs   Lab 10/04/20  1923      K 3.8      CO2 23   BUN 26*   CREATININE 2.8*   CALCIUM 9.7   ALBUMIN 4.3   PROT 7.0   BILITOT 0.4   ALKPHOS 37*   ALT 18   AST 18      Significant Imaging: I have reviewed all pertinent imaging results/findings within the past 24 hours.    Assessment/Plan:     Dissection of aorta    Neuro/Psych:   -- Sedation: None  -- Pain: Oxycodone prn, tylenol prn             Cards: Descending Aortic Aneurysm   -- Goal SBP <120, DBP <80  -- Cardene and esmolol infusions titrated to goal.  -- Beside TTE with normal EF, no overt sign of type A dissection. Recommend a JANAE for further investigation.  -- Plan discussed with vascular surgery, will continue with medical management at this  time.  -- f/u official CT read.     Pulm:   -- Goal O2 sat > 90%  -- Wean as able      Renal:  -- Keep buckley for strict I/O      FEN / GI:   -- Replace lytes as needed  -- Nutrition: NPO, IVF      ID:   -- Tm: afebrile; WBC 9.17  -- Abx: none needed at this time      Heme/Onc:   -- H/H stable 13.3/39.9  -- Daily CBC      Endo:   -- Gluc goal 140-180      PPx:   Feeding: NPO  Thromboembolic prevention: scd  HOB >30  Glucose control: Critical care goal 140-180 g/dl, ISS     Dispo/Code Status/Palliative:   -- SICU / Full Code         Kailey Sanon MD  Critical Care - Surgery  [facility]

## 2020-10-05 NOTE — ED NOTES
O 2 sats improved with supplemental O2 via 2 L NC, tolerating well, cont to monitor.    Pt now lying in stretcher, resting comfortably, NAD noted.   Currently denies nausea, has not vomited since zofran administration.   Chest pain has decreased to 6/10.  Remains hypertensive but improving from arrival.   AAO x 4, RR even/unlabored, shallow.

## 2020-10-05 NOTE — SUBJECTIVE & OBJECTIVE
History reviewed. No pertinent past medical history.    Past Surgical History:   Procedure Laterality Date    BIOPSY WITH TRANSRECTAL ULTRASOUND (TRUS) GUIDANCE N/A 5/1/2019    Procedure: BIOPSY, WITH TRANSRECTAL PROSTATE ULTRASOUND;  Surgeon: Chintan Mendez Jr., MD;  Location: [facility];  Service: Urology;  Laterality: N/A;    COLONOSCOPY  2006    COLONOSCOPY N/A 1/24/2019    Procedure: COLONOSCOPY;  Surgeon: Reece Tarango MD;  Location: [facility];  Service: Endoscopy;  Laterality: N/A;    CYSTOSCOPY N/A 5/1/2019    Procedure: FLEXIBLE CYSTOSCOPY;  Surgeon: Chintan Mendez Jr., MD;  Location: [facility] OR;  Service: Urology;  Laterality: N/A;    UMBILICAL HERNIA REPAIR N/A 1/14/2020    Procedure: REPAIR, HERNIA, UMBILICAL;  Surgeon: Caio Mas Jr., MD;  Location: [facility];  Service: General;  Laterality: N/A;       Review of patient's allergies indicates:  No Known Allergies    Current Facility-Administered Medications on File Prior to Encounter   Medication    [COMPLETED] 0.9%  NaCl infusion    [COMPLETED] aspirin tablet 325 mg    [COMPLETED] atorvastatin tablet 40 mg    [COMPLETED] morphine injection 2 mg    [COMPLETED] morphine injection 2 mg    [COMPLETED] nitroGLYCERIN 2% TD oint ointment 1 inch    [COMPLETED] ondansetron injection 4 mg    [COMPLETED] pantoprazole EC tablet 40 mg    [DISCONTINUED] esmolol 2000 mg in sodium chloride 0.9% 100 mL (20 mg/mL)    [DISCONTINUED] niCARdipine 40 mg/200 mL (0.2 mg/mL) infusion     Current Outpatient Medications on File Prior to Encounter   Medication Sig    HYDROcodone-acetaminophen (NORCO) 5-325 mg per tablet Take 2 tablets by mouth every 6 (six) hours as needed for Pain.    tamsulosin (FLOMAX) 0.4 mg Cap Take 1 capsule (0.4 mg total) by mouth once daily.    tamsulosin (FLOMAX) 0.4 mg Cap Take 1 capsule (0.4 mg total) by mouth once daily.     Family History     Problem Relation (Age of Onset)    Hypertension Father    No Known Problems Mother,  Sister, Brother, Daughter, Son, Maternal Aunt, Maternal Uncle, Paternal Aunt, Paternal Uncle, Maternal Grandmother, Maternal Grandfather, Paternal Grandmother, Paternal Grandfather        Tobacco Use    Smoking status: Former Smoker     Packs/day: 1.00     Years: 30.00     Pack years: 30.00     Types: Cigarettes    Smokeless tobacco: Never Used   Substance and Sexual Activity    Alcohol use: Yes     Frequency: Monthly or less     Binge frequency: Weekly     Comment: socially    Drug use: No    Sexual activity: Yes     Review of Systems   All other systems reviewed and are negative.    Objective:     Vital Signs (Most Recent):  Temp: 97.7 °F (36.5 °C) (10/05/20 1100)  Pulse: 66 (10/05/20 1200)  Resp: (!) 22 (10/05/20 1200)  BP: (!) 106/56 (10/05/20 1100)  SpO2: 95 % (10/05/20 1200) Vital Signs (24h Range):  Temp:  [96.6 °F (35.9 °C)-97.7 °F (36.5 °C)] 97.7 °F (36.5 °C)  Pulse:  [59-82] 66  Resp:  [2-34] 22  SpO2:  [91 %-100 %] 95 %  BP: ()/() 106/56  Arterial Line BP: (113-129)/(55-67) 125/61     Weight: 103.4 kg (228 lb)  Body mass index is 30.92 kg/m².    SpO2: 95 %  O2 Device (Oxygen Therapy): nasal cannula      Intake/Output Summary (Last 24 hours) at 10/5/2020 1240  Last data filed at 10/5/2020 1200  Gross per 24 hour   Intake 784 ml   Output 3340 ml   Net -2556 ml       Lines/Drains/Airways     Drain                 Urethral Catheter 10/05/20 0103 Non-latex 16 Fr. less than 1 day          Arterial Line            Arterial Line 10/05/20 0630 Right Radial less than 1 day          Peripheral Intravenous Line                 Peripheral IV - Single Lumen 10/04/20 1925 18 G Left Antecubital less than 1 day         Peripheral IV - Single Lumen 10/04/20 2049 18 G Right Antecubital less than 1 day                Physical Exam   Constitutional: He appears well-developed and well-nourished. No distress.   HENT:   Head: Normocephalic and atraumatic.   Eyes: EOM are normal. Right eye exhibits no discharge.  Left eye exhibits no discharge. No scleral icterus.   Neck: Normal range of motion. Neck supple.   Cardiovascular: Normal rate, regular rhythm, S1 normal, S2 normal, normal heart sounds, intact distal pulses and normal pulses.  No extrasystoles are present. Exam reveals no gallop, no S3, no S4, no distant heart sounds, no friction rub and no opening snap.   No murmur heard.  Pulses:       Radial pulses are 2+ on the right side and 2+ on the left side.        Dorsalis pedis pulses are 2+ on the right side and 2+ on the left side.        Posterior tibial pulses are 2+ on the right side and 2+ on the left side.   Pulmonary/Chest: Effort normal. No respiratory distress. He has no wheezes. He has no rales.   Abdominal: Soft. Bowel sounds are normal. He exhibits no distension. There is no abdominal tenderness.   Musculoskeletal: Normal range of motion.         General: No deformity or edema.   Neurological: He is alert.   Skin: Skin is warm and dry. No rash noted. He is not diaphoretic. No erythema.   Nursing note and vitals reviewed.      Significant Labs: All pertinent lab results from the last 24 hours have been reviewed.    Significant Imaging: Reviewed

## 2020-10-05 NOTE — SUBJECTIVE & OBJECTIVE
Current Facility-Administered Medications on File Prior to Encounter   Medication    [COMPLETED] 0.9%  NaCl infusion    [COMPLETED] aspirin tablet 325 mg    [COMPLETED] atorvastatin tablet 40 mg    [COMPLETED] morphine injection 2 mg    [COMPLETED] morphine injection 2 mg    [COMPLETED] nitroGLYCERIN 2% TD oint ointment 1 inch    [COMPLETED] ondansetron injection 4 mg    [COMPLETED] pantoprazole EC tablet 40 mg    [DISCONTINUED] esmolol 2000 mg in sodium chloride 0.9% 100 mL (20 mg/mL)    [DISCONTINUED] niCARdipine 40 mg/200 mL (0.2 mg/mL) infusion     Current Outpatient Medications on File Prior to Encounter   Medication Sig    HYDROcodone-acetaminophen (NORCO) 5-325 mg per tablet Take 2 tablets by mouth every 6 (six) hours as needed for Pain.    tamsulosin (FLOMAX) 0.4 mg Cap Take 1 capsule (0.4 mg total) by mouth once daily.    tamsulosin (FLOMAX) 0.4 mg Cap Take 1 capsule (0.4 mg total) by mouth once daily.       Review of patient's allergies indicates:  No Known Allergies    History reviewed. No pertinent past medical history.  Past Surgical History:   Procedure Laterality Date    BIOPSY WITH TRANSRECTAL ULTRASOUND (TRUS) GUIDANCE N/A 5/1/2019    Procedure: BIOPSY, WITH TRANSRECTAL PROSTATE ULTRASOUND;  Surgeon: Chintan Mendez Jr., MD;  Location: [facility];  Service: Urology;  Laterality: N/A;    COLONOSCOPY  2006    COLONOSCOPY N/A 1/24/2019    Procedure: COLONOSCOPY;  Surgeon: Reece Tarango MD;  Location: [facility];  Service: Endoscopy;  Laterality: N/A;    CYSTOSCOPY N/A 5/1/2019    Procedure: FLEXIBLE CYSTOSCOPY;  Surgeon: Chintan Mendez Jr., MD;  Location: [facility];  Service: Urology;  Laterality: N/A;    UMBILICAL HERNIA REPAIR N/A 1/14/2020    Procedure: REPAIR, HERNIA, UMBILICAL;  Surgeon: Caio Mas Jr., MD;  Location: [facility];  Service: General;  Laterality: N/A;     Family History     Problem Relation (Age of Onset)    Hypertension Father    No Known Problems Mother, Sister,  Brother, Daughter, Son, Maternal Aunt, Maternal Uncle, Paternal Aunt, Paternal Uncle, Maternal Grandmother, Maternal Grandfather, Paternal Grandmother, Paternal Grandfather        Tobacco Use    Smoking status: Former Smoker     Packs/day: 1.00     Years: 30.00     Pack years: 30.00     Types: Cigarettes    Smokeless tobacco: Never Used   Substance and Sexual Activity    Alcohol use: Yes     Frequency: Monthly or less     Binge frequency: Weekly     Comment: socially    Drug use: No    Sexual activity: Yes     Review of Systems   Constitutional: Negative for activity change and fatigue.   Respiratory: Negative for shortness of breath.    Cardiovascular: Negative for chest pain, palpitations and leg swelling.   Gastrointestinal: Negative for abdominal pain, diarrhea and vomiting.   Endocrine: Negative for polydipsia, polyphagia and polyuria.   Genitourinary: Negative for dysuria.   Musculoskeletal: Negative for gait problem.   Skin: Negative for rash.   Allergic/Immunologic: Negative for immunocompromised state.   Neurological: Negative for dizziness, syncope and weakness.   Hematological: Does not bruise/bleed easily.   Psychiatric/Behavioral: Negative for behavioral problems.     Objective:     Vital Signs (Most Recent):  Temp: 97.7 °F (36.5 °C) (10/05/20 1100)  Pulse: 62 (10/05/20 1404)  Resp: 10 (10/05/20 1404)  BP: 118/65 (10/05/20 1400)  SpO2: 95 % (10/05/20 1404) Vital Signs (24h Range):  Temp:  [96.6 °F (35.9 °C)-97.7 °F (36.5 °C)] 97.7 °F (36.5 °C)  Pulse:  [59-82] 62  Resp:  [2-34] 10  SpO2:  [91 %-100 %] 95 %  BP: ()/() 118/65  Arterial Line BP: ()/(55-67) 112/61     Weight: 103.4 kg (228 lb)  Body mass index is 30.92 kg/m².    SpO2: 95 %  O2 Device (Oxygen Therapy): nasal cannula     Intake/Output - Last 3 Shifts       10/03 0700 - 10/04 0659 10/04 0700 - 10/05 0659 10/05 0700 - 10/06 0659    I.V. (mL/kg)  784 (7.6)     Total Intake(mL/kg)  784 (7.6)     Urine (mL/kg/hr)  0332 086  (1.1)    Total Output  2675 800    Net  -1891 -800                  Lines/Drains/Airways     Drain                 Urethral Catheter 10/05/20 0103 Non-latex 16 Fr. less than 1 day          Arterial Line            Arterial Line 10/05/20 0630 Right Radial less than 1 day          Peripheral Intravenous Line                 Peripheral IV - Single Lumen 10/04/20 1925 18 G Left Antecubital less than 1 day         Peripheral IV - Single Lumen 10/04/20 2049 18 G Right Antecubital less than 1 day                   Physical Exam  Constitutional:       Appearance: He is well-developed.   HENT:      Head: Normocephalic and atraumatic.   Eyes:      Extraocular Movements: Extraocular movements intact.   Neck:      Musculoskeletal: Normal range of motion.   Cardiovascular:      Rate and Rhythm: Normal rate and regular rhythm.      Heart sounds: Normal heart sounds.   Pulmonary:      Effort: Pulmonary effort is normal.      Breath sounds: Normal breath sounds.   Abdominal:      Palpations: Abdomen is soft.   Musculoskeletal: Normal range of motion.   Skin:     General: Skin is warm and dry.      Capillary Refill: Capillary refill takes less than 2 seconds.   Neurological:      Mental Status: He is alert and oriented to person, place, and time.   Psychiatric:         Mood and Affect: Mood normal.         Behavior: Behavior normal.         Significant Labs:  BMP:   Recent Labs   Lab 10/05/20  0748   *      K 4.2      CO2 19*   BUN 26*   CREATININE 2.3*   CALCIUM 8.4*     CBC:   Recent Labs   Lab 10/05/20  0529   WBC 9.07   RBC 3.96*   HGB 11.5*   HCT 34.7*      MCV 88   MCH 29.0   MCHC 33.1     Coagulation:   Recent Labs   Lab 10/04/20  1923   INR 1.0   APTT 26.8       Significant Diagnostics:  I have reviewed all pertinent imaging results/findings    JANAE: 10/5/20  · Aortic dissection from aortic arch to decending aorta. There also appears to be a dissection flap in the ascending aorta but I cannot be  certain.  · Ascending aorta at the upper limits of normal.  · Enlarged descending thoracic aorta and arch.  · The left ventricle is normal in size with normal systolic function. The estimated ejection fraction is 55%.  · Normal right ventricular systolic function.  · No thrombus is present in the appendage.  · No interatrial septal defect present.  · Trivial pericardial effusion.  · Normal left ventricular diastolic function.

## 2020-10-06 PROBLEM — I71.019 ACUTE THORACIC AORTIC DISSECTION: Status: ACTIVE | Noted: 2020-10-05

## 2020-10-06 LAB
ALBUMIN SERPL BCP-MCNC: 3.3 G/DL (ref 3.5–5.2)
ALP SERPL-CCNC: 28 U/L (ref 55–135)
ALT SERPL W/O P-5'-P-CCNC: 11 U/L (ref 10–44)
ANION GAP SERPL CALC-SCNC: 9 MMOL/L (ref 8–16)
AST SERPL-CCNC: 11 U/L (ref 10–40)
BASOPHILS # BLD AUTO: 0.02 K/UL (ref 0–0.2)
BASOPHILS NFR BLD: 0.3 % (ref 0–1.9)
BILIRUB SERPL-MCNC: 0.6 MG/DL (ref 0.1–1)
BUN SERPL-MCNC: 18 MG/DL (ref 6–20)
CALCIUM SERPL-MCNC: 8.2 MG/DL (ref 8.7–10.5)
CHLORIDE SERPL-SCNC: 108 MMOL/L (ref 95–110)
CO2 SERPL-SCNC: 20 MMOL/L (ref 23–29)
CREAT SERPL-MCNC: 1.9 MG/DL (ref 0.5–1.4)
DIFFERENTIAL METHOD: ABNORMAL
EOSINOPHIL # BLD AUTO: 0.2 K/UL (ref 0–0.5)
EOSINOPHIL NFR BLD: 2.4 % (ref 0–8)
ERYTHROCYTE [DISTWIDTH] IN BLOOD BY AUTOMATED COUNT: 12 % (ref 11.5–14.5)
EST. GFR  (AFRICAN AMERICAN): 43.9 ML/MIN/1.73 M^2
EST. GFR  (NON AFRICAN AMERICAN): 38 ML/MIN/1.73 M^2
GLUCOSE SERPL-MCNC: 106 MG/DL (ref 70–110)
HCT VFR BLD AUTO: 34 % (ref 40–54)
HGB BLD-MCNC: 11.3 G/DL (ref 14–18)
IMM GRANULOCYTES # BLD AUTO: 0.02 K/UL (ref 0–0.04)
IMM GRANULOCYTES NFR BLD AUTO: 0.3 % (ref 0–0.5)
LYMPHOCYTES # BLD AUTO: 1.6 K/UL (ref 1–4.8)
LYMPHOCYTES NFR BLD: 20.6 % (ref 18–48)
MAGNESIUM SERPL-MCNC: 1.9 MG/DL (ref 1.6–2.6)
MCH RBC QN AUTO: 29.6 PG (ref 27–31)
MCHC RBC AUTO-ENTMCNC: 33.2 G/DL (ref 32–36)
MCV RBC AUTO: 89 FL (ref 82–98)
MONOCYTES # BLD AUTO: 0.6 K/UL (ref 0.3–1)
MONOCYTES NFR BLD: 8.1 % (ref 4–15)
NEUTROPHILS # BLD AUTO: 5.4 K/UL (ref 1.8–7.7)
NEUTROPHILS NFR BLD: 68.3 % (ref 38–73)
NRBC BLD-RTO: 0 /100 WBC
PHOSPHATE SERPL-MCNC: 3.1 MG/DL (ref 2.7–4.5)
PLATELET # BLD AUTO: 147 K/UL (ref 150–350)
PMV BLD AUTO: 10.9 FL (ref 9.2–12.9)
POTASSIUM SERPL-SCNC: 4 MMOL/L (ref 3.5–5.1)
PROT SERPL-MCNC: 5.6 G/DL (ref 6–8.4)
RBC # BLD AUTO: 3.82 M/UL (ref 4.6–6.2)
SODIUM SERPL-SCNC: 137 MMOL/L (ref 136–145)
WBC # BLD AUTO: 7.92 K/UL (ref 3.9–12.7)

## 2020-10-06 PROCEDURE — 99233 PR SUBSEQUENT HOSPITAL CARE,LEVL III: ICD-10-PCS | Mod: ,,, | Performed by: SURGERY

## 2020-10-06 PROCEDURE — 25000003 PHARM REV CODE 250: Performed by: STUDENT IN AN ORGANIZED HEALTH CARE EDUCATION/TRAINING PROGRAM

## 2020-10-06 PROCEDURE — 84100 ASSAY OF PHOSPHORUS: CPT

## 2020-10-06 PROCEDURE — 99233 SBSQ HOSP IP/OBS HIGH 50: CPT | Mod: ,,, | Performed by: SURGERY

## 2020-10-06 PROCEDURE — 25500020 PHARM REV CODE 255: Performed by: SURGERY

## 2020-10-06 PROCEDURE — 94761 N-INVAS EAR/PLS OXIMETRY MLT: CPT

## 2020-10-06 PROCEDURE — 63600175 PHARM REV CODE 636 W HCPCS: Performed by: STUDENT IN AN ORGANIZED HEALTH CARE EDUCATION/TRAINING PROGRAM

## 2020-10-06 PROCEDURE — 25000003 PHARM REV CODE 250: Performed by: SURGERY

## 2020-10-06 PROCEDURE — 80053 COMPREHEN METABOLIC PANEL: CPT

## 2020-10-06 PROCEDURE — 99900035 HC TECH TIME PER 15 MIN (STAT)

## 2020-10-06 PROCEDURE — 83735 ASSAY OF MAGNESIUM: CPT

## 2020-10-06 PROCEDURE — 85025 COMPLETE CBC W/AUTO DIFF WBC: CPT

## 2020-10-06 PROCEDURE — 20000000 HC ICU ROOM

## 2020-10-06 RX ORDER — LABETALOL 100 MG/1
200 TABLET, FILM COATED ORAL EVERY 12 HOURS
Status: DISCONTINUED | OUTPATIENT
Start: 2020-10-06 | End: 2020-10-06

## 2020-10-06 RX ORDER — ACETAMINOPHEN 325 MG/1
650 TABLET ORAL EVERY 6 HOURS
Status: DISCONTINUED | OUTPATIENT
Start: 2020-10-06 | End: 2020-10-10 | Stop reason: HOSPADM

## 2020-10-06 RX ORDER — POLYETHYLENE GLYCOL 3350 17 G/17G
17 POWDER, FOR SOLUTION ORAL DAILY
Status: DISCONTINUED | OUTPATIENT
Start: 2020-10-06 | End: 2020-10-10 | Stop reason: HOSPADM

## 2020-10-06 RX ORDER — LISINOPRIL 20 MG/1
20 TABLET ORAL DAILY
Status: DISCONTINUED | OUTPATIENT
Start: 2020-10-06 | End: 2020-10-10 | Stop reason: HOSPADM

## 2020-10-06 RX ORDER — NIFEDIPINE 30 MG/1
30 TABLET, EXTENDED RELEASE ORAL DAILY
Status: DISCONTINUED | OUTPATIENT
Start: 2020-10-06 | End: 2020-10-10 | Stop reason: HOSPADM

## 2020-10-06 RX ORDER — MUPIROCIN 20 MG/G
OINTMENT TOPICAL 2 TIMES DAILY
Status: DISCONTINUED | OUTPATIENT
Start: 2020-10-06 | End: 2020-10-10 | Stop reason: HOSPADM

## 2020-10-06 RX ORDER — METOPROLOL TARTRATE 25 MG/1
12.5 TABLET ORAL 2 TIMES DAILY
Status: DISCONTINUED | OUTPATIENT
Start: 2020-10-06 | End: 2020-10-06

## 2020-10-06 RX ORDER — AMLODIPINE BESYLATE 5 MG/1
5 TABLET ORAL DAILY
Status: DISCONTINUED | OUTPATIENT
Start: 2020-10-06 | End: 2020-10-06

## 2020-10-06 RX ADMIN — NICARDIPINE HYDROCHLORIDE 15 MG/HR: 0.2 INJECTION, SOLUTION INTRAVENOUS at 01:10

## 2020-10-06 RX ADMIN — ESMOLOL HYDROCHLORIDE 50 MCG/KG/MIN: 20 INJECTION INTRAVENOUS at 04:10

## 2020-10-06 RX ADMIN — METOPROLOL TARTRATE 12.5 MG: 25 TABLET, FILM COATED ORAL at 10:10

## 2020-10-06 RX ADMIN — LISINOPRIL 20 MG: 20 TABLET ORAL at 01:10

## 2020-10-06 RX ADMIN — ACETAMINOPHEN 650 MG: 325 TABLET ORAL at 11:10

## 2020-10-06 RX ADMIN — NIFEDIPINE 30 MG: 30 TABLET, FILM COATED, EXTENDED RELEASE ORAL at 01:10

## 2020-10-06 RX ADMIN — MUPIROCIN: 20 OINTMENT TOPICAL at 10:10

## 2020-10-06 RX ADMIN — LABETALOL HYDROCHLORIDE 300 MG: 300 TABLET, FILM COATED ORAL at 08:10

## 2020-10-06 RX ADMIN — HEPARIN SODIUM 5000 UNITS: 5000 INJECTION INTRAVENOUS; SUBCUTANEOUS at 06:10

## 2020-10-06 RX ADMIN — HEPARIN SODIUM 5000 UNITS: 5000 INJECTION INTRAVENOUS; SUBCUTANEOUS at 01:10

## 2020-10-06 RX ADMIN — IOHEXOL 100 ML: 350 INJECTION, SOLUTION INTRAVENOUS at 05:10

## 2020-10-06 RX ADMIN — ESMOLOL HYDROCHLORIDE 50 MCG/KG/MIN: 20 INJECTION INTRAVENOUS at 10:10

## 2020-10-06 RX ADMIN — AMLODIPINE BESYLATE 5 MG: 5 TABLET ORAL at 10:10

## 2020-10-06 RX ADMIN — TAMSULOSIN HYDROCHLORIDE 0.4 MG: 0.4 CAPSULE ORAL at 07:10

## 2020-10-06 RX ADMIN — NICARDIPINE HYDROCHLORIDE 15 MG/HR: 0.2 INJECTION, SOLUTION INTRAVENOUS at 06:10

## 2020-10-06 RX ADMIN — ACETAMINOPHEN 650 MG: 325 TABLET ORAL at 05:10

## 2020-10-06 RX ADMIN — ACETAMINOPHEN 650 MG: 325 TABLET ORAL at 03:10

## 2020-10-06 RX ADMIN — HEPARIN SODIUM 5000 UNITS: 5000 INJECTION INTRAVENOUS; SUBCUTANEOUS at 10:10

## 2020-10-06 RX ADMIN — NICARDIPINE HYDROCHLORIDE 15 MG/HR: 0.2 INJECTION, SOLUTION INTRAVENOUS at 04:10

## 2020-10-06 RX ADMIN — ACETAMINOPHEN 650 MG: 325 TABLET ORAL at 07:10

## 2020-10-06 RX ADMIN — POLYETHYLENE GLYCOL 3350 17 G: 17 POWDER, FOR SOLUTION ORAL at 01:10

## 2020-10-06 NOTE — PLAN OF CARE
SW is following this Pt for DC planning needs. There are no identified needs at this time. Discharge Disposition: TBD    SW will continue to coordinate with patient, family, team and insurance to complete patient's discharge plan.    Aura Vines LMSW   - Case Management

## 2020-10-06 NOTE — PROGRESS NOTES
VASCULAR SURGERY SERVICE  Daily Progress Note    Assessment/Plan  60 y.o. male with acute TBAD  # Neurologic: PRN pain control. OK to mobilize to chair.  # Cardiovascular: CTA shows TBAD this morning. Improved hemodynamics. HR <80. SBP <120. Still requiring esmolol and cardene to achieve goals. Maintain monitoring lines.   # Pulmonary: Encourage pulmonary hygiene. Incentive spirometry.   # Gastrointestinal: OK to start clear liquid diet. Start bowel regimen.   # Genitourinary: Postobstructive CHRISTINE/ARF. Improving with Pulliam decompression. Excellent urine output. Replete electrolytes. Will need outpatient urology eval.  # Hematologic: Acceptable hemoglobin/hematocrit/platelets. No need for anticoagulation. No need for antiplatelets. No need for transfusion.  # Infectious Disease: No evidence of infection. No need for antibiotics.  # Endocrine: Keep blood glucose 100-180.   # Prophylaxis: DVT prophylaxis with SQH    Disposition: ICU.    Subjective  No chest pain. No abdominal pain. No lower extremity pain.    Objective  Vital Signs:  BP (!) 116/56 (BP Location: Right arm, Patient Position: Lying)   Pulse 70   Temp 97.6 °F (36.4 °C) (Oral)   Resp 13   Ht 6' (1.829 m)   Wt 103 kg (227 lb 1.2 oz)   SpO2 96%   BMI 30.80 kg/m²     Intake / Output:    Intake/Output Summary (Last 24 hours) at 10/6/2020 0657  Last data filed at 10/6/2020 0600  Gross per 24 hour   Intake 3115 ml   Output 3220 ml   Net -105 ml       Physical Exam:  General: NAD  Cardiac: RRR  Pulmonary: unlabored   Abdomen: soft    Diagnostic Data:  Recent Labs     10/04/20  1923 10/05/20  0529 10/06/20  0307   WBC 9.17 9.07 7.92   HGB 13.3* 11.5* 11.3*   HCT 39.9* 34.7* 34.0*    154 147*        Recent Labs     10/04/20  1923 10/05/20  0748 10/06/20  0307    136 137   K 3.8 4.2 4.0    106 108   CO2 23 19* 20*   BUN 26* 26* 18   CREATININE 2.8* 2.3* 1.9*   CALCIUM 9.7 8.4* 8.2*   ANIONGAP 13 11 9   ESTGFRAFRICA 27* 34.9* 43.9*   EGFRNONAA  24* 30.2* 38.0*       Recent Labs     10/04/20  1923 10/05/20  0748 10/06/20  0307   ALT 18 14 11   AST 18 14 11   ALKPHOS 37* 28* 28*   BILITOT 0.4 0.6 0.6       Robert Smalls MD PGY-7  Vascular and Endovascular Surgery Fellow  10/06/2020

## 2020-10-06 NOTE — SUBJECTIVE & OBJECTIVE
Interval History/Significant Events: NAEON. still requiring antihypertensive drips to reach SBP and HR goals    Follow-up For: Procedure(s) (LRB):  ECHOCARDIOGRAM, TRANSESOPHAGEAL (N/A)    Post-Operative Day:      Objective:     Vital Signs (Most Recent):  Temp: 97.4 °F (36.3 °C) (10/06/20 0700)  Pulse: 72 (10/06/20 0700)  Resp: 15 (10/06/20 0700)  BP: (!) 116/56 (10/05/20 1800)  SpO2: 95 % (10/06/20 0700) Vital Signs (24h Range):  Temp:  [97.4 °F (36.3 °C)-97.8 °F (36.6 °C)] 97.4 °F (36.3 °C)  Pulse:  [59-83] 72  Resp:  [7-33] 15  SpO2:  [91 %-97 %] 95 %  BP: ()/(52-68) 116/56  Arterial Line BP: ()/(54-68) 116/59     Weight: 103 kg (227 lb 1.2 oz)  Body mass index is 30.8 kg/m².      Intake/Output Summary (Last 24 hours) at 10/6/2020 0800  Last data filed at 10/6/2020 0700  Gross per 24 hour   Intake 3115 ml   Output 3345 ml   Net -230 ml       Physical Exam  Vitals signs and nursing note reviewed.   Constitutional:       General: He is not in acute distress.     Appearance: Normal appearance.   HENT:      Head: Normocephalic.   Eyes:      Extraocular Movements: Extraocular movements intact.   Neck:      Musculoskeletal: Normal range of motion.   Cardiovascular:      Rate and Rhythm: Normal rate and regular rhythm.      Pulses: Normal pulses.   Pulmonary:      Effort: Pulmonary effort is normal. No respiratory distress.      Breath sounds: Normal breath sounds.   Abdominal:      Palpations: Abdomen is soft.      Tenderness: There is no abdominal tenderness.   Musculoskeletal:         General: No swelling.   Skin:     General: Skin is warm and dry.   Neurological:      General: No focal deficit present.      Mental Status: He is alert and oriented to person, place, and time.         Vents:       Lines/Drains/Airways     Drain                 Urethral Catheter 10/05/20 0103 Non-latex 16 Fr. 1 day          Arterial Line            Arterial Line 10/05/20 0630 Right Radial 1 day          Peripheral  Intravenous Line                 Peripheral IV - Single Lumen 10/04/20 1925 18 G Left Antecubital 1 day         Peripheral IV - Single Lumen 10/04/20 2049 18 G Right Antecubital 1 day                Significant Labs:    CBC/Anemia Profile:  Recent Labs   Lab 10/04/20  1923 10/05/20  0529 10/06/20  0307   WBC 9.17 9.07 7.92   HGB 13.3* 11.5* 11.3*   HCT 39.9* 34.7* 34.0*    154 147*   MCV 87 88 89   RDW 12.6 12.4 12.0        Chemistries:  Recent Labs   Lab 10/04/20  1923 10/05/20  0748 10/06/20  0307    136 137   K 3.8 4.2 4.0    106 108   CO2 23 19* 20*   BUN 26* 26* 18   CREATININE 2.8* 2.3* 1.9*   CALCIUM 9.7 8.4* 8.2*   ALBUMIN 4.3 3.6 3.3*   PROT 7.0 6.0 5.6*   BILITOT 0.4 0.6 0.6   ALKPHOS 37* 28* 28*   ALT 18 14 11   AST 18 14 11   MG  --   --  1.9   PHOS  --   --  3.1       All pertinent labs within the past 24 hours have been reviewed.    Significant Imaging:  I have reviewed all pertinent imaging results/findings within the past 24 hours.

## 2020-10-06 NOTE — ASSESSMENT & PLAN NOTE
  Neuro/Psych:   -- Sedation: None  -- Pain: Oxycodone prn, tylenol prn             Cards:   -- Goal SBP <120, DBP <80  -- continue Cardene and esmolol infusions titrated to goal.  -- Beside TTE with normal EF, no overt sign of type A dissection. JANAE and CTA  Show TBAD distal to L subclavian with hypoperfusion to right renal artery  -- Plan discussed with vascular surgery, will continue with medical management at this time.     Pulm:   -- Goal O2 sat > 90%  -- on 3L NC , wean as tolerated      Renal:  - postobstructive CHRISTINE - cr. 2/3-> 1.9   - Keep buckley for strict I/O  - good UOP 150cc/hr overnight       FEN / GI:   -- Replace lytes as needed  -- Nutrition: NPO, IVF      ID:   -- Tm: afebrile; WBC 9.17  -- Abx: none needed at this time      Heme/Onc:   -- H/H stable 11.3/34  -- Daily CBC      Endo:   -- Gluc goal 140-180      PPx:   Feeding: NPO  Thromboembolic prevention: scd  HOB >30  Glucose control: Critical care goal 140-180 g/dl, ISS     Dispo/Code Status/Palliative:   -- SICU / Full Code

## 2020-10-06 NOTE — PLAN OF CARE
SICU PLAN OF CARE NOTE    Dx: Dissection of aorta    Shift Events: esmolol weaned off.    Neuro: AAO x4    Vital Signs: BP (!) 116/56 (BP Location: Right arm, Patient Position: Lying)   Pulse 78   Temp 97.5 °F (36.4 °C) (Oral)   Resp 17   Ht 6' (1.829 m)   Wt 103 kg (227 lb 1.2 oz)   SpO2 (!) 94%   BMI 30.80 kg/m²     Respiratory: Room Air    Diet: Clear Liquid    Gtts: Nicardipine    Urine Output: 100-300 cc/hr         Skin: Pt turned q 2. Waffle mattress inflated.

## 2020-10-06 NOTE — PLAN OF CARE
SICU PLAN OF CARE NOTE    Dx: Dissection of aorta    Shift Events: NAEON, CT scan    Goals of Care: Comfort, maintain BP/HR parameters, plan TBD    Neuro: AAO x4, Follows Commands, and Moves All Extremities    Vital Signs: BP (!) 116/56 (BP Location: Right arm, Patient Position: Lying)   Pulse 70   Temp 97.6 °F (36.4 °C) (Oral)   Resp 13   Ht 6' (1.829 m)   Wt 103 kg (227 lb 1.2 oz)   SpO2 96%   BMI 30.80 kg/m²     Respiratory: 3L Nasal Cannula    Diet: Clear Liquid    Gtts: MIVF, Esmolol and Nicardipine    Urine Output: Urinary Catheter 150 cc/hour    Skin: Valorie bed in use, foam on sacrum, pt repositions self in bed, no skin breakdown noted

## 2020-10-06 NOTE — PROGRESS NOTES
[facility]  Critical Care - Surgery  Progress Note    Patient Name: Luis Eduardo Curry  MRN: [id number]  Admission Date: 10/4/2020  Hospital Length of Stay: 1 days  Code Status: Full Code  Attending Provider: JOSY Santos II, MD  Primary Care Provider: Kiko Connor MD   Principal Problem: Dissection of aorta    Subjective:     Hospital/ICU Course:  No notes on file    Interval History/Significant Events: NAEON. still requiring antihypertensive drips to reach SBP and HR goals    Follow-up For: Procedure(s) (LRB):  ECHOCARDIOGRAM, TRANSESOPHAGEAL (N/A)    Post-Operative Day:      Objective:     Vital Signs (Most Recent):  Temp: 97.4 °F (36.3 °C) (10/06/20 0700)  Pulse: 72 (10/06/20 0700)  Resp: 15 (10/06/20 0700)  BP: (!) 116/56 (10/05/20 1800)  SpO2: 95 % (10/06/20 0700) Vital Signs (24h Range):  Temp:  [97.4 °F (36.3 °C)-97.8 °F (36.6 °C)] 97.4 °F (36.3 °C)  Pulse:  [59-83] 72  Resp:  [7-33] 15  SpO2:  [91 %-97 %] 95 %  BP: ()/(52-68) 116/56  Arterial Line BP: ()/(54-68) 116/59     Weight: 103 kg (227 lb 1.2 oz)  Body mass index is 30.8 kg/m².      Intake/Output Summary (Last 24 hours) at 10/6/2020 0800  Last data filed at 10/6/2020 0700  Gross per 24 hour   Intake 3115 ml   Output 3345 ml   Net -230 ml       Physical Exam  Vitals signs and nursing note reviewed.   Constitutional:       General: He is not in acute distress.     Appearance: Normal appearance.   HENT:      Head: Normocephalic.   Eyes:      Extraocular Movements: Extraocular movements intact.   Neck:      Musculoskeletal: Normal range of motion.   Cardiovascular:      Rate and Rhythm: Normal rate and regular rhythm.      Pulses: Normal pulses.   Pulmonary:      Effort: Pulmonary effort is normal. No respiratory distress.      Breath sounds: Normal breath sounds.   Abdominal:      Palpations: Abdomen is soft.      Tenderness: There is no abdominal tenderness.   Musculoskeletal:         General: No swelling.   Skin:      General: Skin is warm and dry.   Neurological:      General: No focal deficit present.      Mental Status: He is alert and oriented to person, place, and time.         Vents:       Lines/Drains/Airways     Drain                 Urethral Catheter 10/05/20 0103 Non-latex 16 Fr. 1 day          Arterial Line            Arterial Line 10/05/20 0630 Right Radial 1 day          Peripheral Intravenous Line                 Peripheral IV - Single Lumen 10/04/20 1925 18 G Left Antecubital 1 day         Peripheral IV - Single Lumen 10/04/20 2049 18 G Right Antecubital 1 day                Significant Labs:    CBC/Anemia Profile:  Recent Labs   Lab 10/04/20  1923 10/05/20  0529 10/06/20  0307   WBC 9.17 9.07 7.92   HGB 13.3* 11.5* 11.3*   HCT 39.9* 34.7* 34.0*    154 147*   MCV 87 88 89   RDW 12.6 12.4 12.0        Chemistries:  Recent Labs   Lab 10/04/20  1923 10/05/20  0748 10/06/20  0307    136 137   K 3.8 4.2 4.0    106 108   CO2 23 19* 20*   BUN 26* 26* 18   CREATININE 2.8* 2.3* 1.9*   CALCIUM 9.7 8.4* 8.2*   ALBUMIN 4.3 3.6 3.3*   PROT 7.0 6.0 5.6*   BILITOT 0.4 0.6 0.6   ALKPHOS 37* 28* 28*   ALT 18 14 11   AST 18 14 11   MG  --   --  1.9   PHOS  --   --  3.1       All pertinent labs within the past 24 hours have been reviewed.    Significant Imaging:  I have reviewed all pertinent imaging results/findings within the past 24 hours.    Assessment/Plan:     * Dissection of aorta    Neuro/Psych:   -- Sedation: None  -- Pain: Oxycodone prn, tylenol prn             Cards:   -- Goal SBP <120, DBP <80  -- continue Cardene and esmolol infusions titrated to goal.  -- Beside TTE with normal EF, no overt sign of type A dissection. JANAE and CTA  Show TBAD distal to L subclavian with hypoperfusion to right renal artery  -- Plan discussed with vascular surgery, will continue with medical management at this time.     Pulm:   -- Goal O2 sat > 90%  -- on 3L NC , wean as tolerated      Renal:  - postobstructive CHRISTINE -  cr. 2/3-> 1.9   - Keep buckley for strict I/O  - good UOP 150cc/hr overnight       FEN / GI:   -- Replace lytes as needed  -- Nutrition: NPO, IVF      ID:   -- Tm: afebrile; WBC 9.17  -- Abx: none needed at this time      Heme/Onc:   -- H/H stable 11.3/34  -- Daily CBC      Endo:   -- Gluc goal 140-180      PPx:   Feeding: NPO  Thromboembolic prevention: scd  HOB >30  Glucose control: Critical care goal 140-180 g/dl, ISS     Dispo/Code Status/Palliative:   -- SICU / Full Code         Critical care was time spent personally by me on the following activities: development of treatment plan with patient or surrogate and bedside caregivers, discussions with consultants, evaluation of patient's response to treatment, examination of patient, ordering and performing treatments and interventions, ordering and review of laboratory studies, ordering and review of radiographic studies, pulse oximetry, re-evaluation of patient's condition.  This critical care time did not overlap with that of any other provider or involve time for any procedures.     Danielle Alicea MD  Critical Care - Surgery  [facility]

## 2020-10-06 NOTE — NURSING
Pt transported to and from CT without acute events. Pt transported on cardiac monitoring, 3L O2 NC, and current gtts. Accompanied by RN and PCT. Returned to room 70822, spouse informed of room change. Cardiac monitoring and O2 in use. Call light in reach.

## 2020-10-07 LAB
ALBUMIN SERPL BCP-MCNC: 2.9 G/DL (ref 3.5–5.2)
ALP SERPL-CCNC: 29 U/L (ref 55–135)
ALT SERPL W/O P-5'-P-CCNC: 9 U/L (ref 10–44)
ANION GAP SERPL CALC-SCNC: 10 MMOL/L (ref 8–16)
AST SERPL-CCNC: 9 U/L (ref 10–40)
BASOPHILS # BLD AUTO: 0.03 K/UL (ref 0–0.2)
BASOPHILS NFR BLD: 0.4 % (ref 0–1.9)
BILIRUB SERPL-MCNC: 0.5 MG/DL (ref 0.1–1)
BUN SERPL-MCNC: 13 MG/DL (ref 6–20)
CALCIUM SERPL-MCNC: 7.6 MG/DL (ref 8.7–10.5)
CHLORIDE SERPL-SCNC: 109 MMOL/L (ref 95–110)
CO2 SERPL-SCNC: 19 MMOL/L (ref 23–29)
CREAT SERPL-MCNC: 1.7 MG/DL (ref 0.5–1.4)
DIFFERENTIAL METHOD: ABNORMAL
EOSINOPHIL # BLD AUTO: 0.3 K/UL (ref 0–0.5)
EOSINOPHIL NFR BLD: 4.6 % (ref 0–8)
ERYTHROCYTE [DISTWIDTH] IN BLOOD BY AUTOMATED COUNT: 12.1 % (ref 11.5–14.5)
EST. GFR  (AFRICAN AMERICAN): 50.3 ML/MIN/1.73 M^2
EST. GFR  (NON AFRICAN AMERICAN): 43.5 ML/MIN/1.73 M^2
GLUCOSE SERPL-MCNC: 94 MG/DL (ref 70–110)
HCT VFR BLD AUTO: 32.8 % (ref 40–54)
HGB BLD-MCNC: 10.5 G/DL (ref 14–18)
IMM GRANULOCYTES # BLD AUTO: 0.02 K/UL (ref 0–0.04)
IMM GRANULOCYTES NFR BLD AUTO: 0.3 % (ref 0–0.5)
LYMPHOCYTES # BLD AUTO: 1.8 K/UL (ref 1–4.8)
LYMPHOCYTES NFR BLD: 26.2 % (ref 18–48)
MAGNESIUM SERPL-MCNC: 1.7 MG/DL (ref 1.6–2.6)
MCH RBC QN AUTO: 28.7 PG (ref 27–31)
MCHC RBC AUTO-ENTMCNC: 32 G/DL (ref 32–36)
MCV RBC AUTO: 90 FL (ref 82–98)
MONOCYTES # BLD AUTO: 0.6 K/UL (ref 0.3–1)
MONOCYTES NFR BLD: 9.4 % (ref 4–15)
NEUTROPHILS # BLD AUTO: 4 K/UL (ref 1.8–7.7)
NEUTROPHILS NFR BLD: 59.1 % (ref 38–73)
NRBC BLD-RTO: 0 /100 WBC
PHOSPHATE SERPL-MCNC: 3 MG/DL (ref 2.7–4.5)
PLATELET # BLD AUTO: 144 K/UL (ref 150–350)
PMV BLD AUTO: 11.5 FL (ref 9.2–12.9)
POTASSIUM SERPL-SCNC: 3.9 MMOL/L (ref 3.5–5.1)
PROT SERPL-MCNC: 5.4 G/DL (ref 6–8.4)
RBC # BLD AUTO: 3.66 M/UL (ref 4.6–6.2)
SODIUM SERPL-SCNC: 138 MMOL/L (ref 136–145)
WBC # BLD AUTO: 6.8 K/UL (ref 3.9–12.7)

## 2020-10-07 PROCEDURE — 85025 COMPLETE CBC W/AUTO DIFF WBC: CPT

## 2020-10-07 PROCEDURE — 25000003 PHARM REV CODE 250: Performed by: STUDENT IN AN ORGANIZED HEALTH CARE EDUCATION/TRAINING PROGRAM

## 2020-10-07 PROCEDURE — 84100 ASSAY OF PHOSPHORUS: CPT

## 2020-10-07 PROCEDURE — 99233 PR SUBSEQUENT HOSPITAL CARE,LEVL III: ICD-10-PCS | Mod: ,,, | Performed by: SURGERY

## 2020-10-07 PROCEDURE — 20000000 HC ICU ROOM

## 2020-10-07 PROCEDURE — 63600175 PHARM REV CODE 636 W HCPCS: Performed by: STUDENT IN AN ORGANIZED HEALTH CARE EDUCATION/TRAINING PROGRAM

## 2020-10-07 PROCEDURE — 25000003 PHARM REV CODE 250: Performed by: SURGERY

## 2020-10-07 PROCEDURE — 80053 COMPREHEN METABOLIC PANEL: CPT

## 2020-10-07 PROCEDURE — 99233 SBSQ HOSP IP/OBS HIGH 50: CPT | Mod: ,,, | Performed by: SURGERY

## 2020-10-07 PROCEDURE — 83735 ASSAY OF MAGNESIUM: CPT

## 2020-10-07 RX ORDER — POTASSIUM CHLORIDE 1.5 G/1.58G
40 POWDER, FOR SOLUTION ORAL
Status: DISCONTINUED | OUTPATIENT
Start: 2020-10-07 | End: 2020-10-10 | Stop reason: HOSPADM

## 2020-10-07 RX ORDER — LANOLIN ALCOHOL/MO/W.PET/CERES
800 CREAM (GRAM) TOPICAL
Status: DISCONTINUED | OUTPATIENT
Start: 2020-10-07 | End: 2020-10-10 | Stop reason: HOSPADM

## 2020-10-07 RX ORDER — SODIUM,POTASSIUM PHOSPHATES 280-250MG
2 POWDER IN PACKET (EA) ORAL
Status: DISCONTINUED | OUTPATIENT
Start: 2020-10-07 | End: 2020-10-10 | Stop reason: HOSPADM

## 2020-10-07 RX ORDER — POTASSIUM CHLORIDE 1.5 G/1.58G
60 POWDER, FOR SOLUTION ORAL
Status: DISCONTINUED | OUTPATIENT
Start: 2020-10-07 | End: 2020-10-10 | Stop reason: HOSPADM

## 2020-10-07 RX ADMIN — HEPARIN SODIUM 5000 UNITS: 5000 INJECTION INTRAVENOUS; SUBCUTANEOUS at 05:10

## 2020-10-07 RX ADMIN — MUPIROCIN: 20 OINTMENT TOPICAL at 08:10

## 2020-10-07 RX ADMIN — ACETAMINOPHEN 650 MG: 325 TABLET ORAL at 05:10

## 2020-10-07 RX ADMIN — LISINOPRIL 20 MG: 20 TABLET ORAL at 08:10

## 2020-10-07 RX ADMIN — HEPARIN SODIUM 5000 UNITS: 5000 INJECTION INTRAVENOUS; SUBCUTANEOUS at 09:10

## 2020-10-07 RX ADMIN — ACETAMINOPHEN 650 MG: 325 TABLET ORAL at 11:10

## 2020-10-07 RX ADMIN — LABETALOL HYDROCHLORIDE 300 MG: 300 TABLET, FILM COATED ORAL at 08:10

## 2020-10-07 RX ADMIN — OXYCODONE HYDROCHLORIDE 5 MG: 5 TABLET ORAL at 03:10

## 2020-10-07 RX ADMIN — HEPARIN SODIUM 5000 UNITS: 5000 INJECTION INTRAVENOUS; SUBCUTANEOUS at 02:10

## 2020-10-07 RX ADMIN — NIFEDIPINE 30 MG: 30 TABLET, FILM COATED, EXTENDED RELEASE ORAL at 08:10

## 2020-10-07 RX ADMIN — POLYETHYLENE GLYCOL 3350 17 G: 17 POWDER, FOR SOLUTION ORAL at 08:10

## 2020-10-07 RX ADMIN — POTASSIUM CHLORIDE 40 MEQ: 1.5 POWDER, FOR SOLUTION ORAL at 06:10

## 2020-10-07 RX ADMIN — TAMSULOSIN HYDROCHLORIDE 0.4 MG: 0.4 CAPSULE ORAL at 08:10

## 2020-10-07 RX ADMIN — Medication 800 MG: at 10:10

## 2020-10-07 RX ADMIN — Medication 800 MG: at 06:10

## 2020-10-07 NOTE — PROGRESS NOTES
[facility]  Critical Care - Surgery  Progress Note    Patient Name: Luis Eduardo Curry  MRN: [id number]  Admission Date: 10/4/2020  Hospital Length of Stay: 2 days  Code Status: Full Code  Attending Provider: JOSY Santos II, MD  Primary Care Provider: Kiko Connor MD   Principal Problem: Acute thoracic aortic dissection    Subjective:     Hospital/ICU Course:  No notes on file    Interval History/Significant Events: NAEON    Follow-up For: Procedure(s) (LRB):  ECHOCARDIOGRAM, TRANSESOPHAGEAL (N/A)    Objective:     Vital Signs (Most Recent):  Temp: 97.6 °F (36.4 °C) (10/07/20 0300)  Pulse: 68 (10/07/20 0400)  Resp: 16 (10/07/20 0400)  BP: 108/65 (10/07/20 0400)  SpO2: 97 % (10/07/20 0400) Vital Signs (24h Range):  Temp:  [97.2 °F (36.2 °C)-98.2 °F (36.8 °C)] 97.6 °F (36.4 °C)  Pulse:  [65-82] 68  Resp:  [12-39] 16  SpO2:  [91 %-99 %] 97 %  BP: ()/(54-71) 108/65  Arterial Line BP: ()/(48-69) 107/57     Weight: 103 kg (227 lb 1.2 oz)  Body mass index is 30.8 kg/m².      Intake/Output Summary (Last 24 hours) at 10/7/2020 0535  Last data filed at 10/7/2020 0300  Gross per 24 hour   Intake 2676 ml   Output 4800 ml   Net -2124 ml       Physical Exam  Vitals signs and nursing note reviewed.   Constitutional:       General: He is not in acute distress.     Appearance: Normal appearance.   HENT:      Head: Normocephalic.   Eyes:      Extraocular Movements: Extraocular movements intact.   Neck:      Musculoskeletal: Normal range of motion.   Cardiovascular:      Rate and Rhythm: Normal rate and regular rhythm.      Pulses: Normal pulses.   Pulmonary:      Effort: Pulmonary effort is normal. No respiratory distress.      Breath sounds: Normal breath sounds.   Abdominal:      Palpations: Abdomen is soft.      Tenderness: There is no abdominal tenderness.   Musculoskeletal:         General: No swelling.   Skin:     General: Skin is warm and dry.   Neurological:      General: No focal deficit  present.      Mental Status: He is alert and oriented to person, place, and time.       Vents:       Lines/Drains/Airways     Drain                 Urethral Catheter 10/05/20 0103 Non-latex 16 Fr. 2 days          Arterial Line            Arterial Line 10/05/20 0630 Right Radial 1 day          Peripheral Intravenous Line                 Peripheral IV - Single Lumen 10/04/20 1925 18 G Left Antecubital 2 days         Peripheral IV - Single Lumen 10/04/20 2049 18 G Right Antecubital 2 days                Significant Labs:    CBC/Anemia Profile:  Recent Labs   Lab 10/06/20  0307 10/07/20  0328   WBC 7.92 6.80   HGB 11.3* 10.5*   HCT 34.0* 32.8*   * 144*   MCV 89 90   RDW 12.0 12.1        Chemistries:  Recent Labs   Lab 10/05/20  0748 10/06/20  0307 10/07/20  0328    137 138   K 4.2 4.0 3.9    108 109   CO2 19* 20* 19*   BUN 26* 18 13   CREATININE 2.3* 1.9* 1.7*   CALCIUM 8.4* 8.2* 7.6*   ALBUMIN 3.6 3.3* 2.9*   PROT 6.0 5.6* 5.4*   BILITOT 0.6 0.6 0.5   ALKPHOS 28* 28* 29*   ALT 14 11 9*   AST 14 11 9*   MG  --  1.9 1.7   PHOS  --  3.1 3.0           Assessment/Plan:     * Acute thoracic aortic dissection    Neuro/Psych:   -- Sedation: None  -- Pain: Oxycodone prn, tylenol prn             Cards:   -- Goal SBP <120, DBP <80  -- Cardene and esmolol infusions prn but transitioned to PO labetalol 300 mg q12, lisinopril 20 mg qd, nefedipine 30 mg qd   -- Beside TTE with normal EF, no overt sign of type A dissection. JANAE and CTA  Show TBAD distal to L subclavian with hypoperfusion to right renal artery  -- Plan discussed with vascular surgery, will continue with medical management at this time.     Pulm:   -- Goal O2 sat > 90%  -- on RA      Renal:  - postobstructive CHRISTINE - cr. 2/3-> 1.9 now 1.7  - Voiding trial  - good UOP      FEN / GI:   -- Replace lytes as needed  -- Nutrition: clear liquids      ID:   -- Tm: afebrile; WBC wnl  -- Abx: none needed at this time      Heme/Onc:   -- H/H stable   --  Daily CBC      Endo:   -- Gluc goal 140-180      PPx:   Feeding: NPO  Thromboembolic prevention: scd  HOB >30  Glucose control: Critical care goal 140-180 g/dl, ISS     Dispo/Code Status/Palliative:   -- SICU / Full Code  - step down per vascular         Critical care was time spent personally by me on the following activities: development of treatment plan with patient or surrogate and bedside caregivers, discussions with consultants, evaluation of patient's response to treatment, examination of patient, ordering and performing treatments and interventions, ordering and review of laboratory studies, ordering and review of radiographic studies, pulse oximetry, re-evaluation of patient's condition.  This critical care time did not overlap with that of any other provider or involve time for any procedures.     Samuel Vizcarra MD  Critical Care - Surgery  [facility]

## 2020-10-07 NOTE — ASSESSMENT & PLAN NOTE
60 year old male with type B aortic dissection    -Doing well.  Pain resolving  -Continue BP control, off drips since 10PM.  Would like to see 24hrs off drips for BP control prior to step down  -Continue buckley for urinary retention, will likely need long term  -Cr stabilized at 1.7  -Continue regular diet

## 2020-10-07 NOTE — PLAN OF CARE
SW is following this Pt for DC planning needs. There are no identified needs at this time. Discharge Disposition: home - pending patient progress    SW will continue to coordinate with patient, family, team and insurance to complete patient's discharge plan.    Aura Vines LMSW   - Case Management

## 2020-10-07 NOTE — ASSESSMENT & PLAN NOTE
  Neuro/Psych:   -- Sedation: None  -- Pain: Oxycodone prn, tylenol prn             Cards:   -- Goal SBP <120, DBP <80  -- Cardene and esmolol infusions prn but transitioned to PO labetalol 300 mg q12, lisinopril 20 mg qd, nefedipine 30 mg qd   -- Beside TTE with normal EF, no overt sign of type A dissection. JANAE and CTA  Show TBAD distal to L subclavian with hypoperfusion to right renal artery  -- Plan discussed with vascular surgery, will continue with medical management at this time.     Pulm:   -- Goal O2 sat > 90%  -- on RA      Renal:  - postobstructive CHRISTINE - cr. 2/3-> 1.9 now 1.7  - Voiding trial  - good UOP      FEN / GI:   -- Replace lytes as needed  -- Nutrition: clear liquids      ID:   -- Tm: afebrile; WBC wnl  -- Abx: none needed at this time      Heme/Onc:   -- H/H stable   -- Daily CBC      Endo:   -- Gluc goal 140-180      PPx:   Feeding: NPO  Thromboembolic prevention: scd  HOB >30  Glucose control: Critical care goal 140-180 g/dl, ISS     Dispo/Code Status/Palliative:   -- SICU / Full Code  - step down per vascular

## 2020-10-07 NOTE — PLAN OF CARE
Pt vss, afebrile, sats >90% on 4L NC, Esmolol gtt wean off.   Lab monitored .  UO monitored, WNL. Frequent rounds made in order to ensure pain control, none noted at this time. Plan for chest closure tomorrow. POC reviewed with patient and family, no questions/concerns at this time. Will continue to monitor.     No skin breakdown noted, pt turns and repositions independently. Linens changed. Will continue to monitor

## 2020-10-07 NOTE — SUBJECTIVE & OBJECTIVE
Interval History/Significant Events: NAEON    Follow-up For: Procedure(s) (LRB):  ECHOCARDIOGRAM, TRANSESOPHAGEAL (N/A)    Objective:     Vital Signs (Most Recent):  Temp: 97.6 °F (36.4 °C) (10/07/20 0300)  Pulse: 68 (10/07/20 0400)  Resp: 16 (10/07/20 0400)  BP: 108/65 (10/07/20 0400)  SpO2: 97 % (10/07/20 0400) Vital Signs (24h Range):  Temp:  [97.2 °F (36.2 °C)-98.2 °F (36.8 °C)] 97.6 °F (36.4 °C)  Pulse:  [65-82] 68  Resp:  [12-39] 16  SpO2:  [91 %-99 %] 97 %  BP: ()/(54-71) 108/65  Arterial Line BP: ()/(48-69) 107/57     Weight: 103 kg (227 lb 1.2 oz)  Body mass index is 30.8 kg/m².      Intake/Output Summary (Last 24 hours) at 10/7/2020 0535  Last data filed at 10/7/2020 0300  Gross per 24 hour   Intake 2676 ml   Output 4800 ml   Net -2124 ml       Physical Exam  Vitals signs and nursing note reviewed.   Constitutional:       General: He is not in acute distress.     Appearance: Normal appearance.   HENT:      Head: Normocephalic.   Eyes:      Extraocular Movements: Extraocular movements intact.   Neck:      Musculoskeletal: Normal range of motion.   Cardiovascular:      Rate and Rhythm: Normal rate and regular rhythm.      Pulses: Normal pulses.   Pulmonary:      Effort: Pulmonary effort is normal. No respiratory distress.      Breath sounds: Normal breath sounds.   Abdominal:      Palpations: Abdomen is soft.      Tenderness: There is no abdominal tenderness.   Musculoskeletal:         General: No swelling.   Skin:     General: Skin is warm and dry.   Neurological:      General: No focal deficit present.      Mental Status: He is alert and oriented to person, place, and time.       Vents:       Lines/Drains/Airways     Drain                 Urethral Catheter 10/05/20 0103 Non-latex 16 Fr. 2 days          Arterial Line            Arterial Line 10/05/20 0630 Right Radial 1 day          Peripheral Intravenous Line                 Peripheral IV - Single Lumen 10/04/20 1925 18 G Left Antecubital 2  days         Peripheral IV - Single Lumen 10/04/20 2049 18 G Right Antecubital 2 days                Significant Labs:    CBC/Anemia Profile:  Recent Labs   Lab 10/06/20  0307 10/07/20  0328   WBC 7.92 6.80   HGB 11.3* 10.5*   HCT 34.0* 32.8*   * 144*   MCV 89 90   RDW 12.0 12.1        Chemistries:  Recent Labs   Lab 10/05/20  0748 10/06/20  0307 10/07/20  0328    137 138   K 4.2 4.0 3.9    108 109   CO2 19* 20* 19*   BUN 26* 18 13   CREATININE 2.3* 1.9* 1.7*   CALCIUM 8.4* 8.2* 7.6*   ALBUMIN 3.6 3.3* 2.9*   PROT 6.0 5.6* 5.4*   BILITOT 0.6 0.6 0.5   ALKPHOS 28* 28* 29*   ALT 14 11 9*   AST 14 11 9*   MG  --  1.9 1.7   PHOS  --  3.1 3.0

## 2020-10-07 NOTE — PROGRESS NOTES
[facility]  Vascular Surgery  Progress Note    Patient Name: Luis Eduardo Curry  MRN: [id number]  Admission Date: 10/4/2020  Primary Care Provider: Kiko Connor MD    Subjective:     Interval History: No acute events overnight.  Pain well controlled, improving    Post-Op Info:  Procedure(s) (LRB):  ECHOCARDIOGRAM, TRANSESOPHAGEAL (N/A)           Medications:  Continuous Infusions:   esmolol Stopped (10/06/20 2300)    niCARdipine Stopped (10/06/20 1800)     Scheduled Meds:   acetaminophen  650 mg Oral Q6H    heparin (porcine)  5,000 Units Subcutaneous Q8H    labetalol  300 mg Oral Q12H    lisinopriL  20 mg Oral Daily    mupirocin   Nasal BID    NIFEdipine  30 mg Oral Daily    polyethylene glycol  17 g Oral Daily    tamsulosin  0.4 mg Oral Daily     PRN Meds:labetalol, magnesium oxide, magnesium oxide, morphine, oxyCODONE, potassium chloride, potassium chloride, potassium chloride, potassium, sodium phosphates, potassium, sodium phosphates, potassium, sodium phosphates, sodium chloride 0.9%     Objective:     Vital Signs (Most Recent):  Temp: 97.6 °F (36.4 °C) (10/07/20 0300)  Pulse: 77 (10/07/20 0645)  Resp: (!) 33 (10/07/20 0645)  BP: 116/68 (10/07/20 0600)  SpO2: 97 % (10/07/20 0645) Vital Signs (24h Range):  Temp:  [97.2 °F (36.2 °C)-98.2 °F (36.8 °C)] 97.6 °F (36.4 °C)  Pulse:  [65-82] 77  Resp:  [12-39] 33  SpO2:  [91 %-100 %] 97 %  BP: ()/(54-71) 116/68  Arterial Line BP: ()/(46-71) 110/56         Physical Exam  HENT:      Head: Normocephalic.      Mouth/Throat:      Mouth: Mucous membranes are moist.   Eyes:      Extraocular Movements: Extraocular movements intact.      Pupils: Pupils are equal, round, and reactive to light.   Cardiovascular:      Rate and Rhythm: Normal rate and regular rhythm.      Comments: Palpable DP pulses bilaterally  Pulmonary:      Effort: Pulmonary effort is normal. No respiratory distress.      Breath sounds: No wheezing or rhonchi.    Abdominal:      General: Abdomen is flat.      Palpations: Abdomen is soft.   Skin:     General: Skin is warm.      Capillary Refill: Capillary refill takes less than 2 seconds.   Neurological:      General: No focal deficit present.      Mental Status: He is alert and oriented to person, place, and time.         Significant Labs:  BMP:   Recent Labs   Lab 10/07/20  0328   GLU 94      K 3.9      CO2 19*   BUN 13   CREATININE 1.7*   CALCIUM 7.6*   MG 1.7     CBC:   Recent Labs   Lab 10/07/20  0328   WBC 6.80   RBC 3.66*   HGB 10.5*   HCT 32.8*   *   MCV 90   MCH 28.7   MCHC 32.0       Significant Diagnostics:  I have reviewed all pertinent imaging results/findings within the past 24 hours.    Assessment/Plan:     * Acute thoracic aortic dissection  60 year old male with type B aortic dissection    -Doing well.  Pain resolving  -Continue BP control, off drips since 10PM.  Would like to see 24hrs off drips for BP control prior to step down  -Continue buckley for urinary retention, will likely need long term  -Cr stabilized at 1.7  -Continue regular diet        Ashutosh Lake MD  Vascular Surgery  [facility]

## 2020-10-07 NOTE — PLAN OF CARE
POC reviewed with pt. Pt on room air, SpO2>95%. HR NSR, maintained <80. SBP maintained <120 off cardene and esmolol gtts. AAOx4, pt moves independently, OOBTC for most of day. [place] UOP ~ cc/hr. Pt progressed to regular diet today, tolerating well. Pt has transfer orders, awaiting bed on [facility]. All questions and concerns addressed with the team. See assessment flowsheets for more details.

## 2020-10-08 LAB
ALBUMIN SERPL BCP-MCNC: 2.8 G/DL (ref 3.5–5.2)
ALP SERPL-CCNC: 42 U/L (ref 55–135)
ALT SERPL W/O P-5'-P-CCNC: 17 U/L (ref 10–44)
ANION GAP SERPL CALC-SCNC: 10 MMOL/L (ref 8–16)
AST SERPL-CCNC: 22 U/L (ref 10–40)
BASOPHILS # BLD AUTO: 0.05 K/UL (ref 0–0.2)
BASOPHILS NFR BLD: 0.7 % (ref 0–1.9)
BILIRUB SERPL-MCNC: 0.4 MG/DL (ref 0.1–1)
BUN SERPL-MCNC: 15 MG/DL (ref 6–20)
CALCIUM SERPL-MCNC: 8 MG/DL (ref 8.7–10.5)
CHLORIDE SERPL-SCNC: 110 MMOL/L (ref 95–110)
CO2 SERPL-SCNC: 19 MMOL/L (ref 23–29)
CREAT SERPL-MCNC: 1.7 MG/DL (ref 0.5–1.4)
DIFFERENTIAL METHOD: ABNORMAL
EOSINOPHIL # BLD AUTO: 0.5 K/UL (ref 0–0.5)
EOSINOPHIL NFR BLD: 7.2 % (ref 0–8)
ERYTHROCYTE [DISTWIDTH] IN BLOOD BY AUTOMATED COUNT: 12.6 % (ref 11.5–14.5)
EST. GFR  (AFRICAN AMERICAN): 50.3 ML/MIN/1.73 M^2
EST. GFR  (NON AFRICAN AMERICAN): 43.5 ML/MIN/1.73 M^2
GLUCOSE SERPL-MCNC: 89 MG/DL (ref 70–110)
HCT VFR BLD AUTO: 33.4 % (ref 40–54)
HGB BLD-MCNC: 10.7 G/DL (ref 14–18)
IMM GRANULOCYTES # BLD AUTO: 0.01 K/UL (ref 0–0.04)
IMM GRANULOCYTES NFR BLD AUTO: 0.1 % (ref 0–0.5)
LYMPHOCYTES # BLD AUTO: 2.2 K/UL (ref 1–4.8)
LYMPHOCYTES NFR BLD: 32.4 % (ref 18–48)
MAGNESIUM SERPL-MCNC: 1.9 MG/DL (ref 1.6–2.6)
MCH RBC QN AUTO: 28.8 PG (ref 27–31)
MCHC RBC AUTO-ENTMCNC: 32 G/DL (ref 32–36)
MCV RBC AUTO: 90 FL (ref 82–98)
MONOCYTES # BLD AUTO: 0.6 K/UL (ref 0.3–1)
MONOCYTES NFR BLD: 9.4 % (ref 4–15)
NEUTROPHILS # BLD AUTO: 3.4 K/UL (ref 1.8–7.7)
NEUTROPHILS NFR BLD: 50.2 % (ref 38–73)
NRBC BLD-RTO: 0 /100 WBC
PHOSPHATE SERPL-MCNC: 3.6 MG/DL (ref 2.7–4.5)
PLATELET # BLD AUTO: 163 K/UL (ref 150–350)
PMV BLD AUTO: 11.4 FL (ref 9.2–12.9)
POTASSIUM SERPL-SCNC: 4.3 MMOL/L (ref 3.5–5.1)
PROT SERPL-MCNC: 5.6 G/DL (ref 6–8.4)
RBC # BLD AUTO: 3.71 M/UL (ref 4.6–6.2)
SODIUM SERPL-SCNC: 139 MMOL/L (ref 136–145)
WBC # BLD AUTO: 6.83 K/UL (ref 3.9–12.7)

## 2020-10-08 PROCEDURE — 83735 ASSAY OF MAGNESIUM: CPT

## 2020-10-08 PROCEDURE — 20000000 HC ICU ROOM

## 2020-10-08 PROCEDURE — 99233 SBSQ HOSP IP/OBS HIGH 50: CPT | Mod: ,,, | Performed by: SURGERY

## 2020-10-08 PROCEDURE — 80053 COMPREHEN METABOLIC PANEL: CPT

## 2020-10-08 PROCEDURE — 85025 COMPLETE CBC W/AUTO DIFF WBC: CPT

## 2020-10-08 PROCEDURE — 25000003 PHARM REV CODE 250: Performed by: STUDENT IN AN ORGANIZED HEALTH CARE EDUCATION/TRAINING PROGRAM

## 2020-10-08 PROCEDURE — 84100 ASSAY OF PHOSPHORUS: CPT

## 2020-10-08 PROCEDURE — 99233 PR SUBSEQUENT HOSPITAL CARE,LEVL III: ICD-10-PCS | Mod: ,,, | Performed by: SURGERY

## 2020-10-08 PROCEDURE — 25000003 PHARM REV CODE 250: Performed by: SURGERY

## 2020-10-08 PROCEDURE — 63600175 PHARM REV CODE 636 W HCPCS: Performed by: STUDENT IN AN ORGANIZED HEALTH CARE EDUCATION/TRAINING PROGRAM

## 2020-10-08 RX ORDER — AMOXICILLIN 250 MG
1 CAPSULE ORAL 2 TIMES DAILY
Status: DISCONTINUED | OUTPATIENT
Start: 2020-10-08 | End: 2020-10-10 | Stop reason: HOSPADM

## 2020-10-08 RX ADMIN — ACETAMINOPHEN 650 MG: 325 TABLET ORAL at 11:10

## 2020-10-08 RX ADMIN — ACETAMINOPHEN 650 MG: 325 TABLET ORAL at 05:10

## 2020-10-08 RX ADMIN — HEPARIN SODIUM 5000 UNITS: 5000 INJECTION INTRAVENOUS; SUBCUTANEOUS at 03:10

## 2020-10-08 RX ADMIN — DOCUSATE SODIUM 50MG AND SENNOSIDES 8.6MG 1 TABLET: 8.6; 5 TABLET, FILM COATED ORAL at 09:10

## 2020-10-08 RX ADMIN — DOCUSATE SODIUM 50MG AND SENNOSIDES 8.6MG 1 TABLET: 8.6; 5 TABLET, FILM COATED ORAL at 11:10

## 2020-10-08 RX ADMIN — HEPARIN SODIUM 5000 UNITS: 5000 INJECTION INTRAVENOUS; SUBCUTANEOUS at 05:10

## 2020-10-08 RX ADMIN — LABETALOL HYDROCHLORIDE 300 MG: 300 TABLET, FILM COATED ORAL at 08:10

## 2020-10-08 RX ADMIN — LISINOPRIL 20 MG: 20 TABLET ORAL at 08:10

## 2020-10-08 RX ADMIN — LABETALOL HYDROCHLORIDE 300 MG: 300 TABLET, FILM COATED ORAL at 09:10

## 2020-10-08 RX ADMIN — NIFEDIPINE 30 MG: 30 TABLET, FILM COATED, EXTENDED RELEASE ORAL at 08:10

## 2020-10-08 RX ADMIN — MUPIROCIN: 20 OINTMENT TOPICAL at 09:10

## 2020-10-08 RX ADMIN — POLYETHYLENE GLYCOL 3350 17 G: 17 POWDER, FOR SOLUTION ORAL at 08:10

## 2020-10-08 RX ADMIN — TAMSULOSIN HYDROCHLORIDE 0.4 MG: 0.4 CAPSULE ORAL at 08:10

## 2020-10-08 RX ADMIN — HEPARIN SODIUM 5000 UNITS: 5000 INJECTION INTRAVENOUS; SUBCUTANEOUS at 09:10

## 2020-10-08 RX ADMIN — ACETAMINOPHEN 650 MG: 325 TABLET ORAL at 12:10

## 2020-10-08 RX ADMIN — OXYCODONE HYDROCHLORIDE 5 MG: 5 TABLET ORAL at 04:10

## 2020-10-08 RX ADMIN — OXYCODONE HYDROCHLORIDE 5 MG: 5 TABLET ORAL at 03:10

## 2020-10-08 NOTE — PLAN OF CARE
POC reviewed with pt. Pt on 2 L NC, SpO2>95%. HR NSR, maintained <80. SBP maintained <120. AAOx4, pt moves independently. [place] UOP ~ cc/hr. Pain controlled with scheduled Tylenol and PRN oxy. Pt has transfer orders, awaiting bed on [facility]. All questions and concerns addressed with the team. See assessment flowsheets for more details.

## 2020-10-08 NOTE — SUBJECTIVE & OBJECTIVE
Medications:  Continuous Infusions:    Scheduled Meds:   acetaminophen  650 mg Oral Q6H    heparin (porcine)  5,000 Units Subcutaneous Q8H    labetalol  300 mg Oral Q12H    lisinopriL  20 mg Oral Daily    mupirocin   Nasal BID    NIFEdipine  30 mg Oral Daily    polyethylene glycol  17 g Oral Daily    tamsulosin  0.4 mg Oral Daily     PRN Meds:labetalol, magnesium oxide, magnesium oxide, morphine, oxyCODONE, potassium chloride, potassium chloride, potassium chloride, potassium, sodium phosphates, potassium, sodium phosphates, potassium, sodium phosphates, sodium chloride 0.9%     Objective:     Vital Signs (Most Recent):  Temp: 97.4 °F (36.3 °C) (10/08/20 0700)  Pulse: 67 (10/08/20 0800)  Resp: (!) 24 (10/08/20 0800)  BP: 109/72 (10/08/20 0800)  SpO2: 98 % (10/08/20 0800) Vital Signs (24h Range):  Temp:  [97.4 °F (36.3 °C)-98.1 °F (36.7 °C)] 97.4 °F (36.3 °C)  Pulse:  [63-78] 67  Resp:  [11-33] 24  SpO2:  [89 %-98 %] 98 %  BP: ()/(53-72) 109/72  Arterial Line BP: ()/(42-66) 94/65     Date 10/08/20 0700 - 10/09/20 0659   Shift 6662-4934 2146-1415 4931-5293 24 Hour Total   INTAKE   Shift Total(mL/kg)       OUTPUT   Urine(mL/kg/hr) 255   255   Shift Total(mL/kg) 255(2.6)   255(2.6)   Weight (kg) 98 98 98 98       Physical Exam  HENT:      Head: Normocephalic.      Mouth/Throat:      Mouth: Mucous membranes are moist.   Eyes:      Extraocular Movements: Extraocular movements intact.      Pupils: Pupils are equal, round, and reactive to light.   Cardiovascular:      Rate and Rhythm: Normal rate and regular rhythm.      Comments: Palpable DP pulses bilaterally  Pulmonary:      Effort: Pulmonary effort is normal. No respiratory distress.      Breath sounds: No wheezing or rhonchi.   Abdominal:      General: Abdomen is flat.      Palpations: Abdomen is soft.   Skin:     General: Skin is warm.      Capillary Refill: Capillary refill takes less than 2 seconds.   Neurological:      General: No focal  deficit present.      Mental Status: He is alert and oriented to person, place, and time.         Significant Labs:  BMP:   Recent Labs   Lab 10/08/20  0326   GLU 89      K 4.3      CO2 19*   BUN 15   CREATININE 1.7*   CALCIUM 8.0*   MG 1.9     CBC:   Recent Labs   Lab 10/08/20  0326   WBC 6.83   RBC 3.71*   HGB 10.7*   HCT 33.4*      MCV 90   MCH 28.8   MCHC 32.0       Significant Diagnostics:  I have reviewed all pertinent imaging results/findings within the past 24 hours.

## 2020-10-08 NOTE — ASSESSMENT & PLAN NOTE
  Neuro/Psych:   -- Sedation: None  -- Pain: Oxycodone prn, tylenol prn             Cards:   -- Goal SBP <120, DBP <80  -- Cardene and esmolol infusions prn but transitioned to PO labetalol 300 mg q12, lisinopril 20 mg qd, nefedipine 30 mg qd   -- Beside TTE with normal EF, no overt sign of type A dissection. JANAE and CTA  Show TBAD distal to L subclavian with hypoperfusion to right renal artery  -- Plan discussed with vascular surgery, will continue with medical management at this time.     Pulm:   -- Goal O2 sat > 90%  -- on RA      Renal:  - postobstructive CHRISTINE - cr. 2/3-> 1.9 now 1.7  - keep buckley  - good UOP      FEN / GI:   -- Replace lytes as needed  -- Nutrition: clear liquids, advance as tolerated      ID:   -- Tm: afebrile; WBC wnl  -- Abx: none needed at this time      Heme/Onc:   -- H/H stable   -- Daily CBC      Endo:   -- Gluc goal 140-180      PPx:   Feeding: NPO  Thromboembolic prevention: scd  HOB >30  Glucose control: Critical care goal 140-180 g/dl, ISS     Dispo/Code Status/Palliative:   -- SICU / Full Code  - step down per vascular

## 2020-10-08 NOTE — PLAN OF CARE
10/08/20 1256   Discharge Reassessment   Assessment Type Discharge Planning Reassessment   Provided patient/caregiver education on the expected discharge date and the discharge plan Yes   Do you have any problems affording any of your prescribed medications? No   Discharge Plan A Home with family   Discharge Plan B Home Health   DME Needed Upon Discharge  other (see comments)  (TBD)       Casie Abreu MPH, RN, CM  Ext. [phone]

## 2020-10-08 NOTE — PROGRESS NOTES
VASCULAR SURGERY  Afternoon Progress Note    Assessment/Plan:  60 y.o. male with IMH  - Continue current HTN regimen  - Off cardene, okay to stepdown and remove a-line when stepping down    Subjective:  No symptoms    Objective:  /65   Pulse 74   Temp 97.8 °F (36.6 °C) (Oral)   Resp (!) 28   Ht 6' (1.829 m)   Wt 102.5 kg (225 lb 15.5 oz)   SpO2 (!) 93%   BMI 30.65 kg/m²       Be Frank MD  General Surgery, PGY-3  [phone]

## 2020-10-08 NOTE — SUBJECTIVE & OBJECTIVE
Interval History/Significant Events: NAEON    Follow-up For: Procedure(s) (LRB):  ECHOCARDIOGRAM, TRANSESOPHAGEAL (N/A)    Post-Operative Day:      Objective:     Vital Signs (Most Recent):  Temp: 98 °F (36.7 °C) (10/08/20 0300)  Pulse: 63 (10/08/20 0545)  Resp: 13 (10/08/20 0545)  BP: (!) 97/53 (10/08/20 0530)  SpO2: (!) 93 % (10/08/20 0545) Vital Signs (24h Range):  Temp:  [97.6 °F (36.4 °C)-98.1 °F (36.7 °C)] 98 °F (36.7 °C)  Pulse:  [63-78] 63  Resp:  [11-33] 13  SpO2:  [89 %-98 %] 93 %  BP: ()/(53-72) 97/53  Arterial Line BP: ()/(42-66) 94/65     Weight: 98 kg (216 lb 0.8 oz)  Body mass index is 29.3 kg/m².      Intake/Output Summary (Last 24 hours) at 10/8/2020 0739  Last data filed at 10/8/2020 0500  Gross per 24 hour   Intake 1182 ml   Output 3390 ml   Net -2208 ml       Physical Exam  Vitals signs and nursing note reviewed.   Constitutional:       General: He is not in acute distress.     Appearance: Normal appearance.   HENT:      Head: Normocephalic.   Eyes:      Extraocular Movements: Extraocular movements intact.   Neck:      Musculoskeletal: Normal range of motion.   Cardiovascular:      Rate and Rhythm: Normal rate and regular rhythm.      Pulses: Normal pulses.   Pulmonary:      Effort: Pulmonary effort is normal. No respiratory distress.      Breath sounds: Normal breath sounds.   Abdominal:      Palpations: Abdomen is soft.      Tenderness: There is no abdominal tenderness.   Musculoskeletal:         General: No swelling.   Skin:     General: Skin is warm and dry.   Neurological:      General: No focal deficit present.      Mental Status: He is alert and oriented to person, place, and time.         Vents:       Lines/Drains/Airways     Drain                 Urethral Catheter 10/05/20 0103 Non-latex 16 Fr. 3 days          Peripheral Intravenous Line                 Peripheral IV - Single Lumen 10/04/20 2049 18 G Right Antecubital 3 days         Peripheral IV - Single Lumen 10/07/20 6653  20 G Right Forearm less than 1 day                Significant Labs:    CBC/Anemia Profile:  Recent Labs   Lab 10/07/20  0328 10/08/20  0326   WBC 6.80 6.83   HGB 10.5* 10.7*   HCT 32.8* 33.4*   * 163   MCV 90 90   RDW 12.1 12.6        Chemistries:  Recent Labs   Lab 10/07/20  0328 10/08/20  0326    139   K 3.9 4.3    110   CO2 19* 19*   BUN 13 15   CREATININE 1.7* 1.7*   CALCIUM 7.6* 8.0*   ALBUMIN 2.9* 2.8*   PROT 5.4* 5.6*   BILITOT 0.5 0.4   ALKPHOS 29* 42*   ALT 9* 17   AST 9* 22   MG 1.7 1.9   PHOS 3.0 3.6

## 2020-10-08 NOTE — ASSESSMENT & PLAN NOTE
60 year old male with type B aortic dissection    -Stable for stepdown  -Will need CTA for monitoring of aortic dissection, will discuss with staff about obtaining inpatient vs follow up outpatient.   -Doing well.  Pain resolving  -Continue BP control, off drips for 24 hours  -Continue buckley for urinary retention, will likely need long term  -Cr stabilized at 1.7  -Continue regular diet

## 2020-10-08 NOTE — PLAN OF CARE
Pt vss, afebrile, sats >90% on 3L NC.   Lab monitored .  UO monitored, WNL. Frequent rounds made in order to ensure pain control, PRN meds given. Plan for transfer to floor. POC reviewed with patient and family, no questions/concerns at this time. Will continue to monitor.      No skin breakdown noted, pt turns and repositions independently. Linens changed. Will continue to monitor

## 2020-10-08 NOTE — PROGRESS NOTES
[facility]  Critical Care - Surgery  Progress Note    Patient Name: Luis Eduardo Curry  MRN: [id number]  Admission Date: 10/4/2020  Hospital Length of Stay: 3 days  Code Status: Full Code  Attending Provider: JOSY Santos II, MD  Primary Care Provider: Kiko Connor MD   Principal Problem: Acute thoracic aortic dissection    Subjective:     Hospital/ICU Course:  No notes on file    Interval History/Significant Events: NAEON    Follow-up For: Procedure(s) (LRB):  ECHOCARDIOGRAM, TRANSESOPHAGEAL (N/A)    Post-Operative Day:      Objective:     Vital Signs (Most Recent):  Temp: 98 °F (36.7 °C) (10/08/20 0300)  Pulse: 63 (10/08/20 0545)  Resp: 13 (10/08/20 0545)  BP: (!) 97/53 (10/08/20 0530)  SpO2: (!) 93 % (10/08/20 0545) Vital Signs (24h Range):  Temp:  [97.6 °F (36.4 °C)-98.1 °F (36.7 °C)] 98 °F (36.7 °C)  Pulse:  [63-78] 63  Resp:  [11-33] 13  SpO2:  [89 %-98 %] 93 %  BP: ()/(53-72) 97/53  Arterial Line BP: ()/(42-66) 94/65     Weight: 98 kg (216 lb 0.8 oz)  Body mass index is 29.3 kg/m².      Intake/Output Summary (Last 24 hours) at 10/8/2020 0739  Last data filed at 10/8/2020 0500  Gross per 24 hour   Intake 1182 ml   Output 3390 ml   Net -2208 ml       Physical Exam  Vitals signs and nursing note reviewed.   Constitutional:       General: He is not in acute distress.     Appearance: Normal appearance.   HENT:      Head: Normocephalic.   Eyes:      Extraocular Movements: Extraocular movements intact.   Neck:      Musculoskeletal: Normal range of motion.   Cardiovascular:      Rate and Rhythm: Normal rate and regular rhythm.      Pulses: Normal pulses.   Pulmonary:      Effort: Pulmonary effort is normal. No respiratory distress.      Breath sounds: Normal breath sounds.   Abdominal:      Palpations: Abdomen is soft.      Tenderness: There is no abdominal tenderness.   Musculoskeletal:         General: No swelling.   Skin:     General: Skin is warm and dry.   Neurological:       General: No focal deficit present.      Mental Status: He is alert and oriented to person, place, and time.         Vents:       Lines/Drains/Airways     Drain                 Urethral Catheter 10/05/20 0103 Non-latex 16 Fr. 3 days          Peripheral Intravenous Line                 Peripheral IV - Single Lumen 10/04/20 2049 18 G Right Antecubital 3 days         Peripheral IV - Single Lumen 10/07/20 2230 20 G Right Forearm less than 1 day                Significant Labs:    CBC/Anemia Profile:  Recent Labs   Lab 10/07/20  0328 10/08/20  0326   WBC 6.80 6.83   HGB 10.5* 10.7*   HCT 32.8* 33.4*   * 163   MCV 90 90   RDW 12.1 12.6        Chemistries:  Recent Labs   Lab 10/07/20  0328 10/08/20  0326    139   K 3.9 4.3    110   CO2 19* 19*   BUN 13 15   CREATININE 1.7* 1.7*   CALCIUM 7.6* 8.0*   ALBUMIN 2.9* 2.8*   PROT 5.4* 5.6*   BILITOT 0.5 0.4   ALKPHOS 29* 42*   ALT 9* 17   AST 9* 22   MG 1.7 1.9   PHOS 3.0 3.6           Assessment/Plan:     * Acute thoracic aortic dissection    Neuro/Psych:   -- Sedation: None  -- Pain: Oxycodone prn, tylenol prn             Cards:   -- Goal SBP <120, DBP <80  -- Cardene and esmolol infusions prn but transitioned to PO labetalol 300 mg q12, lisinopril 20 mg qd, nefedipine 30 mg qd   -- Beside TTE with normal EF, no overt sign of type A dissection. JANAE and CTA  Show TBAD distal to L subclavian with hypoperfusion to right renal artery  -- Plan discussed with vascular surgery, will continue with medical management at this time.     Pulm:   -- Goal O2 sat > 90%  -- on RA      Renal:  - postobstructive CHRISTINE - cr. 2/3-> 1.9 now 1.7  - keep buckley  - good UOP      FEN / GI:   -- Replace lytes as needed  -- Nutrition: clear liquids, advance as tolerated      ID:   -- Tm: afebrile; WBC wnl  -- Abx: none needed at this time      Heme/Onc:   -- H/H stable   -- Daily CBC      Endo:   -- Gluc goal 140-180      PPx:   Feeding: NPO  Thromboembolic prevention: scd  HOB  >30  Glucose control: Critical care goal 140-180 g/dl, ISS     Dispo/Code Status/Palliative:   -- SICU / Full Code  - step down per vascular         Critical care was time spent personally by me on the following activities: development of treatment plan with patient or surrogate and bedside caregivers, discussions with consultants, evaluation of patient's response to treatment, examination of patient, ordering and performing treatments and interventions, ordering and review of laboratory studies, ordering and review of radiographic studies, pulse oximetry, re-evaluation of patient's condition.  This critical care time did not overlap with that of any other provider or involve time for any procedures.     Samuel Vizcarra MD  Critical Care - Surgery  [facility]

## 2020-10-08 NOTE — PLAN OF CARE
SW is following this Pt for DC planning needs. There are no identified needs at this time. Discharge Disposition: home    SW will continue to coordinate with patient, family, team and insurance to complete patient's discharge plan.    Aura Vines LMSW   - Case Management

## 2020-10-08 NOTE — PROGRESS NOTES
[facility]  Vascular Surgery  Progress Note    Patient Name: Luis Eduardo Curry  MRN: [id number]  Admission Date: 10/4/2020  Primary Care Provider: Kiko Connor MD    Subjective:     Interval History: Doing well with no acute events. Stable for stepdown.     Post-Op Info:  Procedure(s) (LRB):  ECHOCARDIOGRAM, TRANSESOPHAGEAL (N/A)           Medications:  Continuous Infusions:    Scheduled Meds:   acetaminophen  650 mg Oral Q6H    heparin (porcine)  5,000 Units Subcutaneous Q8H    labetalol  300 mg Oral Q12H    lisinopriL  20 mg Oral Daily    mupirocin   Nasal BID    NIFEdipine  30 mg Oral Daily    polyethylene glycol  17 g Oral Daily    tamsulosin  0.4 mg Oral Daily     PRN Meds:labetalol, magnesium oxide, magnesium oxide, morphine, oxyCODONE, potassium chloride, potassium chloride, potassium chloride, potassium, sodium phosphates, potassium, sodium phosphates, potassium, sodium phosphates, sodium chloride 0.9%     Objective:     Vital Signs (Most Recent):  Temp: 97.4 °F (36.3 °C) (10/08/20 0700)  Pulse: 67 (10/08/20 0800)  Resp: (!) 24 (10/08/20 0800)  BP: 109/72 (10/08/20 0800)  SpO2: 98 % (10/08/20 0800) Vital Signs (24h Range):  Temp:  [97.4 °F (36.3 °C)-98.1 °F (36.7 °C)] 97.4 °F (36.3 °C)  Pulse:  [63-78] 67  Resp:  [11-33] 24  SpO2:  [89 %-98 %] 98 %  BP: ()/(53-72) 109/72  Arterial Line BP: ()/(42-66) 94/65     Date 10/08/20 0700 - 10/09/20 0659   Shift 8431-5647 9351-6422 1048-3890 24 Hour Total   INTAKE   Shift Total(mL/kg)       OUTPUT   Urine(mL/kg/hr) 255   255   Shift Total(mL/kg) 255(2.6)   255(2.6)   Weight (kg) 98 98 98 98       Physical Exam  HENT:      Head: Normocephalic.      Mouth/Throat:      Mouth: Mucous membranes are moist.   Eyes:      Extraocular Movements: Extraocular movements intact.      Pupils: Pupils are equal, round, and reactive to light.   Cardiovascular:      Rate and Rhythm: Normal rate and regular rhythm.      Comments: Palpable DP  pulses bilaterally  Pulmonary:      Effort: Pulmonary effort is normal. No respiratory distress.      Breath sounds: No wheezing or rhonchi.   Abdominal:      General: Abdomen is flat.      Palpations: Abdomen is soft.   Skin:     General: Skin is warm.      Capillary Refill: Capillary refill takes less than 2 seconds.   Neurological:      General: No focal deficit present.      Mental Status: He is alert and oriented to person, place, and time.         Significant Labs:  BMP:   Recent Labs   Lab 10/08/20  0326   GLU 89      K 4.3      CO2 19*   BUN 15   CREATININE 1.7*   CALCIUM 8.0*   MG 1.9     CBC:   Recent Labs   Lab 10/08/20  0326   WBC 6.83   RBC 3.71*   HGB 10.7*   HCT 33.4*      MCV 90   MCH 28.8   MCHC 32.0       Significant Diagnostics:  I have reviewed all pertinent imaging results/findings within the past 24 hours.    Assessment/Plan:     * Acute thoracic aortic dissection  60 year old male with type B aortic dissection    -Stable for stepdown  -Will need CTA for monitoring of aortic dissection, will discuss with staff about obtaining inpatient vs follow up outpatient.   -Doing well.  Pain resolving  -Continue BP control, off drips for 24 hours  -Continue buckley for urinary retention, will likely need long term  -Cr stabilized at 1.7  -Continue regular diet        Jeremi Weiner MD  Vascular Surgery  [facility]

## 2020-10-09 LAB
ALBUMIN SERPL BCP-MCNC: 3.1 G/DL (ref 3.5–5.2)
ALP SERPL-CCNC: 82 U/L (ref 55–135)
ALT SERPL W/O P-5'-P-CCNC: 39 U/L (ref 10–44)
ANION GAP SERPL CALC-SCNC: 8 MMOL/L (ref 8–16)
AST SERPL-CCNC: 40 U/L (ref 10–40)
BASOPHILS # BLD AUTO: 0.06 K/UL (ref 0–0.2)
BASOPHILS NFR BLD: 0.9 % (ref 0–1.9)
BILIRUB SERPL-MCNC: 0.6 MG/DL (ref 0.1–1)
BUN SERPL-MCNC: 16 MG/DL (ref 6–20)
CALCIUM SERPL-MCNC: 9 MG/DL (ref 8.7–10.5)
CHLORIDE SERPL-SCNC: 106 MMOL/L (ref 95–110)
CO2 SERPL-SCNC: 24 MMOL/L (ref 23–29)
CREAT SERPL-MCNC: 1.6 MG/DL (ref 0.5–1.4)
DIFFERENTIAL METHOD: ABNORMAL
EOSINOPHIL # BLD AUTO: 0.5 K/UL (ref 0–0.5)
EOSINOPHIL NFR BLD: 7.5 % (ref 0–8)
ERYTHROCYTE [DISTWIDTH] IN BLOOD BY AUTOMATED COUNT: 12.6 % (ref 11.5–14.5)
EST. GFR  (AFRICAN AMERICAN): 54.1 ML/MIN/1.73 M^2
EST. GFR  (NON AFRICAN AMERICAN): 46.8 ML/MIN/1.73 M^2
GLUCOSE SERPL-MCNC: 94 MG/DL (ref 70–110)
HCT VFR BLD AUTO: 34.9 % (ref 40–54)
HGB BLD-MCNC: 11.4 G/DL (ref 14–18)
IMM GRANULOCYTES # BLD AUTO: 0.02 K/UL (ref 0–0.04)
IMM GRANULOCYTES NFR BLD AUTO: 0.3 % (ref 0–0.5)
LYMPHOCYTES # BLD AUTO: 1.8 K/UL (ref 1–4.8)
LYMPHOCYTES NFR BLD: 25.4 % (ref 18–48)
MAGNESIUM SERPL-MCNC: 2 MG/DL (ref 1.6–2.6)
MCH RBC QN AUTO: 29 PG (ref 27–31)
MCHC RBC AUTO-ENTMCNC: 32.7 G/DL (ref 32–36)
MCV RBC AUTO: 89 FL (ref 82–98)
MONOCYTES # BLD AUTO: 0.7 K/UL (ref 0.3–1)
MONOCYTES NFR BLD: 9.7 % (ref 4–15)
NEUTROPHILS # BLD AUTO: 4 K/UL (ref 1.8–7.7)
NEUTROPHILS NFR BLD: 56.2 % (ref 38–73)
NRBC BLD-RTO: 0 /100 WBC
PHOSPHATE SERPL-MCNC: 3.8 MG/DL (ref 2.7–4.5)
PLATELET # BLD AUTO: 167 K/UL (ref 150–350)
PMV BLD AUTO: 11.3 FL (ref 9.2–12.9)
POTASSIUM SERPL-SCNC: 4.4 MMOL/L (ref 3.5–5.1)
PROT SERPL-MCNC: 6.2 G/DL (ref 6–8.4)
RBC # BLD AUTO: 3.93 M/UL (ref 4.6–6.2)
SODIUM SERPL-SCNC: 138 MMOL/L (ref 136–145)
WBC # BLD AUTO: 7.04 K/UL (ref 3.9–12.7)

## 2020-10-09 PROCEDURE — 80053 COMPREHEN METABOLIC PANEL: CPT

## 2020-10-09 PROCEDURE — 25000003 PHARM REV CODE 250: Performed by: SURGERY

## 2020-10-09 PROCEDURE — 99222 1ST HOSP IP/OBS MODERATE 55: CPT | Mod: ,,, | Performed by: UROLOGY

## 2020-10-09 PROCEDURE — 25000003 PHARM REV CODE 250: Performed by: STUDENT IN AN ORGANIZED HEALTH CARE EDUCATION/TRAINING PROGRAM

## 2020-10-09 PROCEDURE — 84100 ASSAY OF PHOSPHORUS: CPT

## 2020-10-09 PROCEDURE — 99222 PR INITIAL HOSPITAL CARE,LEVL II: ICD-10-PCS | Mod: ,,, | Performed by: UROLOGY

## 2020-10-09 PROCEDURE — 63600175 PHARM REV CODE 636 W HCPCS: Performed by: STUDENT IN AN ORGANIZED HEALTH CARE EDUCATION/TRAINING PROGRAM

## 2020-10-09 PROCEDURE — 85025 COMPLETE CBC W/AUTO DIFF WBC: CPT

## 2020-10-09 PROCEDURE — 20600001 HC STEP DOWN PRIVATE ROOM

## 2020-10-09 PROCEDURE — 83735 ASSAY OF MAGNESIUM: CPT

## 2020-10-09 RX ADMIN — HEPARIN SODIUM 5000 UNITS: 5000 INJECTION INTRAVENOUS; SUBCUTANEOUS at 08:10

## 2020-10-09 RX ADMIN — MUPIROCIN: 20 OINTMENT TOPICAL at 08:10

## 2020-10-09 RX ADMIN — LABETALOL HYDROCHLORIDE 300 MG: 300 TABLET, FILM COATED ORAL at 08:10

## 2020-10-09 RX ADMIN — ACETAMINOPHEN 650 MG: 325 TABLET ORAL at 05:10

## 2020-10-09 RX ADMIN — DOCUSATE SODIUM 50MG AND SENNOSIDES 8.6MG 1 TABLET: 8.6; 5 TABLET, FILM COATED ORAL at 08:10

## 2020-10-09 RX ADMIN — OXYCODONE HYDROCHLORIDE 5 MG: 5 TABLET ORAL at 04:10

## 2020-10-09 RX ADMIN — TAMSULOSIN HYDROCHLORIDE 0.4 MG: 0.4 CAPSULE ORAL at 08:10

## 2020-10-09 RX ADMIN — ACETAMINOPHEN 650 MG: 325 TABLET ORAL at 11:10

## 2020-10-09 RX ADMIN — ACETAMINOPHEN 650 MG: 325 TABLET ORAL at 06:10

## 2020-10-09 RX ADMIN — HEPARIN SODIUM 5000 UNITS: 5000 INJECTION INTRAVENOUS; SUBCUTANEOUS at 05:10

## 2020-10-09 RX ADMIN — HEPARIN SODIUM 5000 UNITS: 5000 INJECTION INTRAVENOUS; SUBCUTANEOUS at 02:10

## 2020-10-09 RX ADMIN — POLYETHYLENE GLYCOL 3350 17 G: 17 POWDER, FOR SOLUTION ORAL at 08:10

## 2020-10-09 RX ADMIN — NIFEDIPINE 30 MG: 30 TABLET, FILM COATED, EXTENDED RELEASE ORAL at 08:10

## 2020-10-09 RX ADMIN — LISINOPRIL 20 MG: 20 TABLET ORAL at 08:10

## 2020-10-09 RX ADMIN — ACETAMINOPHEN 650 MG: 325 TABLET ORAL at 12:10

## 2020-10-09 NOTE — CONSULTS
[facility]  Urology  Consult Note    Patient Name: Luis Eduardo Curry  MRN: [id number]  Admission Date: 10/4/2020  Hospital Length of Stay: 4   Code Status: Full Code   Attending Provider: JOSY Santos II, MD   Consulting Provider: Troy Arce MD  Primary Care Physician: Kiko Connor MD  Principal Problem:Acute thoracic aortic dissection    Inpatient consult to Urology  Consult performed by: Troy Arce MD  Consult ordered by: Jeremi Weiner MD          Subjective:     HPI:  Mr. Curry is a 60 year old male with a mhx of HTN, BPH on flomax who initially presented to [place] for tearing substernal chest and back pain. Pt had a CT chest without contrast significant for a descending thoracic aortic aneurysm. He is now stepped down from ICU and is being conservatively managed. He was admitted to [facility] 10/5/2020. Pt started on cardene and esmolol. He was found to have an CHRISTINE with Cr. 2.3 (BL 1.1). He was in urinary retention at that time with 2L of urine output upon buckley insertion. His creatinine since admission is downtrending and is currently 1.6. He denies previous episodes of urinary retention. He is currently on flomax. No significant history of UTIs. He denies dysuria, hematuria, flank pain and fevers.   Urine output has been adequate.   CTA 10/5 shows a type B aortic dissection extending past the level of the renal arteries with what appears to be the false lumen supplying blood to the left kidney. There is also a notable delayed uptake of contrast within the right kidney compared to the left on the arterial phase.   There is no stones or hydronephrosis bilaterally. The bladder is decompressed around the buckley catheter. The prostate is mildly enlarged.     History reviewed. No pertinent past medical history.    Past Surgical History:   Procedure Laterality Date    BIOPSY WITH TRANSRECTAL ULTRASOUND (TRUS) GUIDANCE N/A 5/1/2019    Procedure: BIOPSY, WITH TRANSRECTAL PROSTATE  ULTRASOUND;  Surgeon: Chintan Mendez Jr., MD;  Location: [facility];  Service: Urology;  Laterality: N/A;    COLONOSCOPY  2006    COLONOSCOPY N/A 1/24/2019    Procedure: COLONOSCOPY;  Surgeon: Reece Tarango MD;  Location: [facility];  Service: Endoscopy;  Laterality: N/A;    CYSTOSCOPY N/A 5/1/2019    Procedure: FLEXIBLE CYSTOSCOPY;  Surgeon: Chintan Mendez Jr., MD;  Location: [facility];  Service: Urology;  Laterality: N/A;    UMBILICAL HERNIA REPAIR N/A 1/14/2020    Procedure: REPAIR, HERNIA, UMBILICAL;  Surgeon: Caio Mas Jr., MD;  Location: [facility] OR;  Service: General;  Laterality: N/A;       Review of patient's allergies indicates:  No Known Allergies    Family History     Problem Relation (Age of Onset)    Hypertension Father    No Known Problems Mother, Sister, Brother, Daughter, Son, Maternal Aunt, Maternal Uncle, Paternal Aunt, Paternal Uncle, Maternal Grandmother, Maternal Grandfather, Paternal Grandmother, Paternal Grandfather          Tobacco Use    Smoking status: Former Smoker     Packs/day: 1.00     Years: 30.00     Pack years: 30.00     Types: Cigarettes    Smokeless tobacco: Never Used   Substance and Sexual Activity    Alcohol use: Yes     Frequency: Monthly or less     Binge frequency: Weekly     Comment: socially    Drug use: No    Sexual activity: Yes       Review of Systems   Constitutional: Negative for chills and fever.   HENT: Negative for mouth sores.    Eyes: Negative for discharge.   Respiratory: Negative for chest tightness and shortness of breath.    Cardiovascular: Negative for chest pain.   Gastrointestinal: Negative for abdominal pain.   Genitourinary: Positive for difficulty urinating and frequency. Negative for hematuria.   Musculoskeletal: Negative for arthralgias.   Neurological: Negative for dizziness.   Psychiatric/Behavioral: Negative for agitation.       Objective:     Temp:  [97.4 °F (36.3 °C)-98.5 °F (36.9 °C)] 98.1 °F (36.7 °C)  Pulse:  [57-74] 66  Resp:   [8-32] 18  SpO2:  [91 %-99 %] 96 %  BP: ()/(54-80) 116/69     Body mass index is 30.35 kg/m².    Date 10/09/20 0700 - 10/10/20 0659   Shift 4984-3110 2916-0793 5648-0138 24 Hour Total   INTAKE   Shift Total(mL/kg)       OUTPUT   Urine(mL/kg/hr) 1010(1.3) 75  1085   Shift Total(mL/kg) 1010(10.3) 75(0.7)  1085(10.7)   Weight (kg) 98 101.5 101.5 101.5          Drains     Drain                 Urethral Catheter 10/05/20 0103 Non-latex 16 Fr. 4 days                Physical Exam   Constitutional: He is oriented to person, place, and time.   HENT:   Head: Atraumatic.   Neck: Neck supple.   Cardiovascular: Normal rate.    Pulmonary/Chest: Effort normal. No respiratory distress.   Abdominal: Soft.   Genitourinary:    Genitourinary Comments: Pulliam catheter draining clear yellow urine     Musculoskeletal: Normal range of motion.   Neurological: He is alert and oriented to person, place, and time.   Skin: Skin is warm and dry.         Significant Labs:    BMP:  Recent Labs   Lab 10/07/20  0328 10/08/20  0326 10/09/20  0310    139 138   K 3.9 4.3 4.4    110 106   CO2 19* 19* 24   BUN 13 15 16   CREATININE 1.7* 1.7* 1.6*   CALCIUM 7.6* 8.0* 9.0       CBC:  Recent Labs   Lab 10/07/20  0328 10/08/20  0326 10/09/20  0505   WBC 6.80 6.83 7.04   HGB 10.5* 10.7* 11.4*   HCT 32.8* 33.4* 34.9*   * 163 167       All pertinent labs results from the past 24 hours have been reviewed.    Significant Imaging:  All pertinent imaging results/findings from the past 24 hours have been reviewed.                    Assessment and Plan:     BPH with urinary obstruction  59yo M with a history of BPH on flomax who presented to [facility] with type B aortic dissection and urinary retention with CHRISTINE    - Pulliam to stay in for now.   - Will set up patient for outpatient voiding trial on Wednesday next week with SJ and also discuss outlet procedure  - Trend Cr  - I/Os  - Remainder of care per primary        VTE Risk Mitigation (From  admission, onward)         Ordered     heparin (porcine) injection 5,000 Units  Every 8 hours      10/05/20 0133     IP VTE HIGH RISK PATIENT  Once      10/05/20 0133     Place sequential compression device  Until discontinued      10/05/20 0133                Thank you for your consult. I will sign off. Please contact us if you have any additional questions.    Troy Arce MD  Urology  [facility]

## 2020-10-09 NOTE — PROGRESS NOTES
[facility]  Adult Nutrition  Progress Note    SUMMARY       Recommendations    1.) continue regular diet    Goals: 1.) Pt to meet >75% of estimated energy and protein needs over the course of 7 days.  Nutrition Goal Status: goal met  Communication of RD Recs: (POC)    Reason for Assessment    Reason For Assessment: RD follow-up  Diagnosis: (Dissection of aorta)  Relevant Medical History: none  Interdisciplinary Rounds: did not attend  General Information Comments: Pt resting during RD visit. NFPE completed 10/5 with no s/s of malnutrition observed. Nsg staff reports PO intake of 100% of meals. GI- LBM 10/4.   Nutrition Discharge Planning: Unable to determine at this time.    Nutrition Risk Screen    Nutrition Risk Screen: no indicators present    Nutrition/Diet History    Spiritual, Cultural Beliefs, Voodoo Practices, Values that Affect Care: no    Anthropometrics    Temp: 97.8 °F (36.6 °C)  Height Method: Stated  Height: 6' (182.9 cm)  Height (inches): 72 in  Weight Method: Bed Scale  Weight: 98 kg (216 lb 0.8 oz)  Weight (lb): 216.05 lb  Ideal Body Weight (IBW), Male: 178 lb  % Ideal Body Weight, Male (lb): 127.57 %  BMI (Calculated): 29.3  BMI Grade: 30 - 34.9- obesity - grade I  Usual Body Weight (UBW), k kg  % Usual Body Weight: 101.6       Lab/Procedures/Meds    Pertinent Labs Reviewed: reviewed  Pertinent Labs Comments: Cr 1.6, GFR 46.8, Alb 3.1  Pertinent Medications Reviewed: reviewed  Pertinent Medications Comments: polyethylene glycol, senna-docusate    Physical Findings/Assessment     Edema 1+ generalized    Estimated/Assessed Needs    Weight Used For Calorie Calculations: 103.5 kg (228 lb 2.8 oz)  Energy Calorie Requirements (kcal): 2271  Energy Need Method: Rabun-St Jeor(x1.2PAL)  Protein Requirements: 121-145gm (1.5-1.8gm/kg IBW)  Weight Used For Protein Calculations: 80.9 kg (178 lb 5.6 oz)(IBW)  Fluid Requirements (mL): 1mL/kcal or per MD Recommendations  Estimated  Fluid Requirement Method: RDA Method  RDA Method (mL): 2275         Nutrition Prescription Ordered    Current Diet Order: Regular    Evaluation of Received Nutrient/Fluid Intake    I/O: -10.5L since admisison  Energy Calories Required: meeting needs  Protein Required: meeting needs  Tolerance: tolerating  % Intake of Estimated Energy Needs: 100  % Meal Intake: 100    Nutrition Risk    Level of Risk/Frequency of Follow-up: low , 1x weekly    Assessment and Plan     Nutrition Problem  Inadequate energy intake     Related to (etiology):   NPO      Signs and Symptoms (as evidenced by):   NPO     Interventions(treatment strategy):  1.) Collaboration with other providers     Nutrition Diagnosis Status:   resolved    Monitor and Evaluation    Food and Nutrient Intake: energy intake, food and beverage intake  Food and Nutrient Adminstration: diet order  Knowledge/Beliefs/Attitudes: food and nutrition knowledge/skill  Anthropometric Measurements: weight, weight change  Biochemical Data, Medical Tests and Procedures: electrolyte and renal panel, gastrointestinal profile  Nutrition-Focused Physical Findings: skin, overall appearance     Malnutrition Assessment  Malnutrition Type: acute illness or injury              Orbital Region (Subcutaneous Fat Loss): well nourished  Upper Arm Region (Subcutaneous Fat Loss): well nourished   Yarsani Region (Muscle Loss): well nourished  Clavicle Bone Region (Muscle Loss): well nourished  Clavicle and Acromion Bone Region (Muscle Loss): well nourished  Anterior Thigh Region (Muscle Loss): well nourished  Posterior Calf Region (Muscle Loss): well nourished   Edema (Fluid Accumulation): 1-->trace   Subcutaneous Fat Loss (Final Summary): well nourished  Muscle Loss Evaluation (Final Summary): well nourished         Nutrition Follow-Up    RD Follow-up?: Yes

## 2020-10-09 NOTE — SUBJECTIVE & OBJECTIVE
History reviewed. No pertinent past medical history.    Past Surgical History:   Procedure Laterality Date    BIOPSY WITH TRANSRECTAL ULTRASOUND (TRUS) GUIDANCE N/A 5/1/2019    Procedure: BIOPSY, WITH TRANSRECTAL PROSTATE ULTRASOUND;  Surgeon: Chintan Mendez Jr., MD;  Location: [facility];  Service: Urology;  Laterality: N/A;    COLONOSCOPY  2006    COLONOSCOPY N/A 1/24/2019    Procedure: COLONOSCOPY;  Surgeon: Reece Tarango MD;  Location: [facility];  Service: Endoscopy;  Laterality: N/A;    CYSTOSCOPY N/A 5/1/2019    Procedure: FLEXIBLE CYSTOSCOPY;  Surgeon: Chintan Mendez Jr., MD;  Location: [facility] OR;  Service: Urology;  Laterality: N/A;    UMBILICAL HERNIA REPAIR N/A 1/14/2020    Procedure: REPAIR, HERNIA, UMBILICAL;  Surgeon: Caio Mas Jr., MD;  Location: [facility];  Service: General;  Laterality: N/A;       Review of patient's allergies indicates:  No Known Allergies    Family History     Problem Relation (Age of Onset)    Hypertension Father    No Known Problems Mother, Sister, Brother, Daughter, Son, Maternal Aunt, Maternal Uncle, Paternal Aunt, Paternal Uncle, Maternal Grandmother, Maternal Grandfather, Paternal Grandmother, Paternal Grandfather          Tobacco Use    Smoking status: Former Smoker     Packs/day: 1.00     Years: 30.00     Pack years: 30.00     Types: Cigarettes    Smokeless tobacco: Never Used   Substance and Sexual Activity    Alcohol use: Yes     Frequency: Monthly or less     Binge frequency: Weekly     Comment: socially    Drug use: No    Sexual activity: Yes       Review of Systems   Constitutional: Negative for chills and fever.   HENT: Negative for mouth sores.    Eyes: Negative for discharge.   Respiratory: Negative for chest tightness and shortness of breath.    Cardiovascular: Negative for chest pain.   Gastrointestinal: Negative for abdominal pain.   Genitourinary: Positive for difficulty urinating and frequency. Negative for hematuria.   Musculoskeletal: Negative  for arthralgias.   Neurological: Negative for dizziness.   Psychiatric/Behavioral: Negative for agitation.       Objective:     Temp:  [97.4 °F (36.3 °C)-98.5 °F (36.9 °C)] 98.1 °F (36.7 °C)  Pulse:  [57-74] 66  Resp:  [8-32] 18  SpO2:  [91 %-99 %] 96 %  BP: ()/(54-80) 116/69     Body mass index is 30.35 kg/m².    Date 10/09/20 0700 - 10/10/20 0659   Shift 0486-9986 4743-0025 9020-2963 24 Hour Total   INTAKE   Shift Total(mL/kg)       OUTPUT   Urine(mL/kg/hr) 1010(1.3) 75  1085   Shift Total(mL/kg) 1010(10.3) 75(0.7)  1085(10.7)   Weight (kg) 98 101.5 101.5 101.5          Drains     Drain                 Urethral Catheter 10/05/20 0103 Non-latex 16 Fr. 4 days                Physical Exam   Constitutional: He is oriented to person, place, and time.   HENT:   Head: Atraumatic.   Neck: Neck supple.   Cardiovascular: Normal rate.    Pulmonary/Chest: Effort normal. No respiratory distress.   Abdominal: Soft.   Genitourinary:    Genitourinary Comments: Pulliam catheter draining clear yellow urine     Musculoskeletal: Normal range of motion.   Neurological: He is alert and oriented to person, place, and time.   Skin: Skin is warm and dry.         Significant Labs:    BMP:  Recent Labs   Lab 10/07/20  0328 10/08/20  0326 10/09/20  0310    139 138   K 3.9 4.3 4.4    110 106   CO2 19* 19* 24   BUN 13 15 16   CREATININE 1.7* 1.7* 1.6*   CALCIUM 7.6* 8.0* 9.0       CBC:  Recent Labs   Lab 10/07/20  0328 10/08/20  0326 10/09/20  0505   WBC 6.80 6.83 7.04   HGB 10.5* 10.7* 11.4*   HCT 32.8* 33.4* 34.9*   * 163 167       All pertinent labs results from the past 24 hours have been reviewed.    Significant Imaging:  All pertinent imaging results/findings from the past 24 hours have been reviewed.

## 2020-10-09 NOTE — SUBJECTIVE & OBJECTIVE
Interval History/Significant Events: NAEON    Follow-up For: Procedure(s) (LRB):  ECHOCARDIOGRAM, TRANSESOPHAGEAL (N/A)    Post-Operative Day:      Objective:     Vital Signs (Most Recent):  Temp: 98.1 °F (36.7 °C) (10/09/20 0315)  Pulse: (!) 58 (10/09/20 0600)  Resp: 10 (10/09/20 0600)  BP: 110/67 (10/09/20 0600)  SpO2: 95 % (10/09/20 0600) Vital Signs (24h Range):  Temp:  [97.4 °F (36.3 °C)-98.5 °F (36.9 °C)] 98.1 °F (36.7 °C)  Pulse:  [57-73] 58  Resp:  [8-33] 10  SpO2:  [91 %-99 %] 95 %  BP: ()/(51-73) 110/67     Weight: 98 kg (216 lb 0.8 oz)  Body mass index is 29.3 kg/m².      Intake/Output Summary (Last 24 hours) at 10/9/2020 0626  Last data filed at 10/9/2020 0600  Gross per 24 hour   Intake --   Output 3630 ml   Net -3630 ml       Physical Exam  Vitals signs and nursing note reviewed.   Constitutional:       General: He is not in acute distress.     Appearance: Normal appearance.   HENT:      Head: Normocephalic.   Eyes:      Extraocular Movements: Extraocular movements intact.   Neck:      Musculoskeletal: Normal range of motion.   Cardiovascular:      Rate and Rhythm: Normal rate and regular rhythm.      Pulses: Normal pulses.   Pulmonary:      Effort: Pulmonary effort is normal. No respiratory distress.      Breath sounds: Normal breath sounds.   Abdominal:      Palpations: Abdomen is soft.      Tenderness: There is no abdominal tenderness.   Musculoskeletal:         General: No swelling.   Skin:     General: Skin is warm and dry.   Neurological:      General: No focal deficit present.      Mental Status: He is alert and oriented to person, place, and time.         Vents:       Lines/Drains/Airways     Drain                 Urethral Catheter 10/05/20 0103 Non-latex 16 Fr. 4 days          Peripheral Intravenous Line                 Peripheral IV - Single Lumen 10/04/20 2049 18 G Right Antecubital 4 days         Peripheral IV - Single Lumen 10/07/20 2230 20 G Right Forearm 1 day                 Significant Labs:    CBC/Anemia Profile:  Recent Labs   Lab 10/08/20  0326 10/09/20  0505   WBC 6.83 7.04   HGB 10.7* 11.4*   HCT 33.4* 34.9*    167   MCV 90 89   RDW 12.6 12.6        Chemistries:  Recent Labs   Lab 10/08/20  0326 10/09/20  0310    138   K 4.3 4.4    106   CO2 19* 24   BUN 15 16   CREATININE 1.7* 1.6*   CALCIUM 8.0* 9.0   ALBUMIN 2.8* 3.1*   PROT 5.6* 6.2   BILITOT 0.4 0.6   ALKPHOS 42* 82   ALT 17 39   AST 22 40   MG 1.9 2.0   PHOS 3.6 3.8

## 2020-10-09 NOTE — ASSESSMENT & PLAN NOTE
  Neuro/Psych:   -- Sedation: None  -- Pain: Oxycodone prn, tylenol prn             Cards:   -- Goal SBP <120, DBP <80  -- Cardene and esmolol infusions prn but transitioned to PO labetalol 300 mg q12, lisinopril 20 mg qd, nefedipine 30 mg qd   -- Beside TTE with normal EF, no overt sign of type A dissection. JANAE and CTA  Show TBAD distal to L subclavian with hypoperfusion to right renal artery  -- Plan discussed with vascular surgery, will continue with medical management at this time.     Pulm:   -- Goal O2 sat > 90%  -- on RA      Renal:  - postobstructive CHRISTINE - cr. 2/3-> 1.9>1.6  - keep buckley- voiding trial prior to d/c  - good UOP      FEN / GI:   -- Replace lytes as needed  -- Nutrition: clear liquids, advance as tolerated      ID:   -- Tm: afebrile; WBC wnl  -- Abx: none needed at this time      Heme/Onc:   -- H/H stable   -- Daily CBC      Endo:   -- Gluc goal 140-180      PPx:   Feeding: NPO  Thromboembolic prevention: scd  HOB >30  Glucose control: Critical care goal 140-180 g/dl, ISS     Dispo/Code Status/Palliative:   -- SICU / Full Code  - step down per vascular

## 2020-10-09 NOTE — PLAN OF CARE
SICU PLAN OF CARE NOTE    Dx: Acute thoracic aortic dissection    Shift Events: No acute events this shift. Afebrile, NSR, follows all commands, moves all extremities. Pain controlled with scheduled tylenol & PRN Oxycodone. Plan of care reviewed with patient & all questions & concerns addressed. Will continue to monitor.     Goals of Care: SBP<140    Neuro: AAO x4, Follows Commands, and Moves All Extremities    Vital Signs: /65   Pulse 63   Temp 98.5 °F (36.9 °C) (Oral)   Resp 13   Ht 6' (1.829 m)   Wt 98 kg (216 lb 0.8 oz)   SpO2 96%   BMI 29.30 kg/m²     Respiratory: Nasal Cannula    Diet: Regular Diet    Urine Output: Urinary Catheter *** cc/shift    Labs/Accuchecks: All labs trended. See flowsheets.     Skin: Bed plugged in & properly working. Foam applied to heels & sacrum. Patient able to reposition & shift weight independently. No skin breakdown noted.

## 2020-10-09 NOTE — NURSING TRANSFER
Nursing Transfer Note      10/9/2020     Transfer to: [facility] [address] from [facility] [id number]    Transfer via: wheelchair    Transfer with: Tele box, pt belongings, pt meds, pt chart    Transported by: [email or web address]    Medicines sent: Yes    Chart send with patient: Yes    Notified: DAVIS Lomeli    Patient reassessed at: N/A    Upon arrival to floor: Pt resting comfortably in bed. All questions and concerns addressed with DAVIS Lomeli at the bedside.

## 2020-10-09 NOTE — HPI
Mr. Curry is a 60 year old male with a mhx of HTN, BPH on flomax who initially presented to [place] for tearing substernal chest and back pain. Pt had a CT chest without contrast significant for a descending thoracic aortic aneurysm. He is now stepped down from ICU and is being conservatively managed. He was admitted to [facility] 10/5/2020. Pt started on cardene and esmolol. He was found to have an CHRISTINE with Cr. 2.3 (BL 1.1). He was in urinary retention at that time with 2L of urine output upon buckley insertion. His creatinine since admission is downtrending and is currently 1.6. He denies previous episodes of urinary retention. He is currently on flomax. No significant history of UTIs. He denies dysuria, hematuria, flank pain and fevers.   Urine output has been adequate.   CTA 10/5 shows a type B aortic dissection extending past the level of the renal arteries with what appears to be the false lumen supplying blood to the left kidney. There is also a notable delayed uptake of contrast within the right kidney compared to the left on the arterial phase.   There is no stones or hydronephrosis bilaterally. The bladder is decompressed around the buckley catheter. The prostate is mildly enlarged.

## 2020-10-09 NOTE — ASSESSMENT & PLAN NOTE
61yo M with a history of BPH on flomax who presented to [facility] with type B aortic dissection and urinary retention with CHRISTINE    - Pulliam to stay in for now.   - Will set up patient for outpatient voiding trial on Wednesday next week with [facility] and also discuss outlet procedure  - Trend Cr  - I/Os  - Remainder of care per primary

## 2020-10-10 VITALS
HEIGHT: 72 IN | RESPIRATION RATE: 18 BRPM | DIASTOLIC BLOOD PRESSURE: 66 MMHG | HEART RATE: 73 BPM | BODY MASS INDEX: 30.31 KG/M2 | WEIGHT: 223.75 LBS | OXYGEN SATURATION: 99 % | TEMPERATURE: 98 F | SYSTOLIC BLOOD PRESSURE: 106 MMHG

## 2020-10-10 LAB
ALBUMIN SERPL BCP-MCNC: 3.5 G/DL (ref 3.5–5.2)
ALP SERPL-CCNC: 106 U/L (ref 55–135)
ALT SERPL W/O P-5'-P-CCNC: 73 U/L (ref 10–44)
ANION GAP SERPL CALC-SCNC: 9 MMOL/L (ref 8–16)
AST SERPL-CCNC: 60 U/L (ref 10–40)
BASOPHILS # BLD AUTO: 0.04 K/UL (ref 0–0.2)
BASOPHILS NFR BLD: 0.5 % (ref 0–1.9)
BILIRUB SERPL-MCNC: 0.5 MG/DL (ref 0.1–1)
BUN SERPL-MCNC: 18 MG/DL (ref 6–20)
CALCIUM SERPL-MCNC: 9.5 MG/DL (ref 8.7–10.5)
CHLORIDE SERPL-SCNC: 103 MMOL/L (ref 95–110)
CO2 SERPL-SCNC: 23 MMOL/L (ref 23–29)
CREAT SERPL-MCNC: 1.5 MG/DL (ref 0.5–1.4)
DIFFERENTIAL METHOD: ABNORMAL
EOSINOPHIL # BLD AUTO: 0.5 K/UL (ref 0–0.5)
EOSINOPHIL NFR BLD: 7.3 % (ref 0–8)
ERYTHROCYTE [DISTWIDTH] IN BLOOD BY AUTOMATED COUNT: 12.7 % (ref 11.5–14.5)
EST. GFR  (AFRICAN AMERICAN): 58.5 ML/MIN/1.73 M^2
EST. GFR  (NON AFRICAN AMERICAN): 50.6 ML/MIN/1.73 M^2
GLUCOSE SERPL-MCNC: 92 MG/DL (ref 70–110)
HCT VFR BLD AUTO: 37.3 % (ref 40–54)
HGB BLD-MCNC: 12.1 G/DL (ref 14–18)
IMM GRANULOCYTES # BLD AUTO: 0.03 K/UL (ref 0–0.04)
IMM GRANULOCYTES NFR BLD AUTO: 0.4 % (ref 0–0.5)
LYMPHOCYTES # BLD AUTO: 1.8 K/UL (ref 1–4.8)
LYMPHOCYTES NFR BLD: 24.6 % (ref 18–48)
MAGNESIUM SERPL-MCNC: 2.1 MG/DL (ref 1.6–2.6)
MCH RBC QN AUTO: 29.2 PG (ref 27–31)
MCHC RBC AUTO-ENTMCNC: 32.4 G/DL (ref 32–36)
MCV RBC AUTO: 90 FL (ref 82–98)
MONOCYTES # BLD AUTO: 0.7 K/UL (ref 0.3–1)
MONOCYTES NFR BLD: 9.1 % (ref 4–15)
NEUTROPHILS # BLD AUTO: 4.3 K/UL (ref 1.8–7.7)
NEUTROPHILS NFR BLD: 58.1 % (ref 38–73)
NRBC BLD-RTO: 0 /100 WBC
PHOSPHATE SERPL-MCNC: 3.3 MG/DL (ref 2.7–4.5)
PLATELET # BLD AUTO: 213 K/UL (ref 150–350)
PMV BLD AUTO: 10.9 FL (ref 9.2–12.9)
POTASSIUM SERPL-SCNC: 4.2 MMOL/L (ref 3.5–5.1)
PROT SERPL-MCNC: 6.9 G/DL (ref 6–8.4)
RBC # BLD AUTO: 4.14 M/UL (ref 4.6–6.2)
SODIUM SERPL-SCNC: 135 MMOL/L (ref 136–145)
WBC # BLD AUTO: 7.44 K/UL (ref 3.9–12.7)

## 2020-10-10 PROCEDURE — 84100 ASSAY OF PHOSPHORUS: CPT

## 2020-10-10 PROCEDURE — 36415 COLL VENOUS BLD VENIPUNCTURE: CPT

## 2020-10-10 PROCEDURE — 63600175 PHARM REV CODE 636 W HCPCS: Performed by: STUDENT IN AN ORGANIZED HEALTH CARE EDUCATION/TRAINING PROGRAM

## 2020-10-10 PROCEDURE — 85025 COMPLETE CBC W/AUTO DIFF WBC: CPT

## 2020-10-10 PROCEDURE — 83735 ASSAY OF MAGNESIUM: CPT

## 2020-10-10 PROCEDURE — 25000003 PHARM REV CODE 250: Performed by: SURGERY

## 2020-10-10 PROCEDURE — 25000003 PHARM REV CODE 250: Performed by: STUDENT IN AN ORGANIZED HEALTH CARE EDUCATION/TRAINING PROGRAM

## 2020-10-10 PROCEDURE — 80053 COMPREHEN METABOLIC PANEL: CPT

## 2020-10-10 RX ORDER — NIFEDIPINE 30 MG/1
30 TABLET, EXTENDED RELEASE ORAL DAILY
Qty: 30 TABLET | Refills: 11 | Status: ON HOLD | OUTPATIENT
Start: 2020-10-11 | End: 2020-10-20 | Stop reason: SDUPTHER

## 2020-10-10 RX ORDER — LISINOPRIL 20 MG/1
20 TABLET ORAL DAILY
Qty: 90 TABLET | Refills: 3 | Status: SHIPPED | OUTPATIENT
Start: 2020-10-11 | End: 2020-11-12 | Stop reason: ALTCHOICE

## 2020-10-10 RX ORDER — LABETALOL 300 MG/1
300 TABLET, FILM COATED ORAL EVERY 12 HOURS
Qty: 60 TABLET | Refills: 0 | Status: SHIPPED | OUTPATIENT
Start: 2020-10-10 | End: 2020-11-09

## 2020-10-10 RX ADMIN — POLYETHYLENE GLYCOL 3350 17 G: 17 POWDER, FOR SOLUTION ORAL at 08:10

## 2020-10-10 RX ADMIN — OXYCODONE HYDROCHLORIDE 5 MG: 5 TABLET ORAL at 03:10

## 2020-10-10 RX ADMIN — MUPIROCIN: 20 OINTMENT TOPICAL at 08:10

## 2020-10-10 RX ADMIN — DOCUSATE SODIUM 50MG AND SENNOSIDES 8.6MG 1 TABLET: 8.6; 5 TABLET, FILM COATED ORAL at 08:10

## 2020-10-10 RX ADMIN — ACETAMINOPHEN 650 MG: 325 TABLET ORAL at 05:10

## 2020-10-10 RX ADMIN — LISINOPRIL 20 MG: 20 TABLET ORAL at 08:10

## 2020-10-10 RX ADMIN — TAMSULOSIN HYDROCHLORIDE 0.4 MG: 0.4 CAPSULE ORAL at 08:10

## 2020-10-10 RX ADMIN — NIFEDIPINE 30 MG: 30 TABLET, FILM COATED, EXTENDED RELEASE ORAL at 08:10

## 2020-10-10 RX ADMIN — HEPARIN SODIUM 5000 UNITS: 5000 INJECTION INTRAVENOUS; SUBCUTANEOUS at 05:10

## 2020-10-10 RX ADMIN — LABETALOL HYDROCHLORIDE 300 MG: 300 TABLET, FILM COATED ORAL at 08:10

## 2020-10-10 NOTE — ASSESSMENT & PLAN NOTE
60 year old male with type B aortic dissection    -Stable for stepdown  -CTA 10/6 with aortic dissection distal to the left subclavian artery and extending to the proximal common iliac arteries.  No involvement of the major brachiocephalic vessels.   -Continue BP control, off drips for over 24 hours  -Continue buckley for urinary retention, will likely need long term with Urology follow-up  - Will consult Urology for recs regarding urinary retention and BPH  -Cr stabilized at 1.6  -Continue regular diet  - possible discharge tomorrow

## 2020-10-10 NOTE — PLAN OF CARE
POC reviewed with pt. Pt. SHYAMOx4. VSS on RA. Urine output adequate. Pain managed with PRN meds. Diet tolerated w/o complaints of N/V. Lines CDI. Ambulates/repositions independently. Bed in low locked position. Possessions/call light within reach. WCTM

## 2020-10-10 NOTE — ASSESSMENT & PLAN NOTE
60 year old male with type B aortic dissection    -Stable for discharage  -CTA 10/6 with aortic dissection distal to the left subclavian artery and extending to the proximal common iliac arteries.  No involvement of the major brachiocephalic vessels.   -Continue BP control, off drips for over 24 hours  -Continue buckley for urinary retention, will likely need long term with Urology follow-up  - Will consult Urology for recs regarding urinary retention and BPH  -Cr stabilized at 1.6  -Continue regular diet  - possible discharge tomorrow

## 2020-10-10 NOTE — SUBJECTIVE & OBJECTIVE
Medications:  Continuous Infusions:    Scheduled Meds:   acetaminophen  650 mg Oral Q6H    heparin (porcine)  5,000 Units Subcutaneous Q8H    labetalol  300 mg Oral Q12H    lisinopriL  20 mg Oral Daily    mupirocin   Nasal BID    NIFEdipine  30 mg Oral Daily    polyethylene glycol  17 g Oral Daily    senna-docusate 8.6-50 mg  1 tablet Oral BID    tamsulosin  0.4 mg Oral Daily     PRN Meds:labetalol, magnesium oxide, magnesium oxide, morphine, oxyCODONE, potassium chloride, potassium chloride, potassium chloride, potassium, sodium phosphates, potassium, sodium phosphates, potassium, sodium phosphates, sodium chloride 0.9%     Objective:     Vital Signs (Most Recent):  Temp: 98.1 °F (36.7 °C) (10/09/20 1701)  Pulse: 66 (10/09/20 1701)  Resp: 18 (10/09/20 1701)  BP: 113/68 (10/09/20 1701)  SpO2: 96 % (10/09/20 1701) Vital Signs (24h Range):  Temp:  [97.4 °F (36.3 °C)-98.5 °F (36.9 °C)] 98.1 °F (36.7 °C)  Pulse:  [57-74] 66  Resp:  [8-32] 18  SpO2:  [91 %-99 %] 96 %  BP: ()/(55-80) 113/68     Date 10/09/20 0700 - 10/10/20 0659   Shift 2433-7746 9503-3429 3040-2186 24 Hour Total   INTAKE   Shift Total(mL/kg)       OUTPUT   Urine(mL/kg/hr) 1010(1.3) 675  1685   Shift Total(mL/kg) 1010(10.3) 675(6.7)  1685(16.6)   Weight (kg) 98 101.5 101.5 101.5       Physical Exam  HENT:      Head: Normocephalic.      Mouth/Throat:      Mouth: Mucous membranes are moist.   Eyes:      Extraocular Movements: Extraocular movements intact.      Pupils: Pupils are equal, round, and reactive to light.   Cardiovascular:      Rate and Rhythm: Normal rate and regular rhythm.      Comments: Palpable DP pulses bilaterally  Pulmonary:      Effort: Pulmonary effort is normal. No respiratory distress.      Breath sounds: No wheezing or rhonchi.   Abdominal:      General: Abdomen is flat.      Palpations: Abdomen is soft.   Skin:     General: Skin is warm.      Capillary Refill: Capillary refill takes less than 2 seconds.    Neurological:      General: No focal deficit present.      Mental Status: He is alert and oriented to person, place, and time.         Significant Labs:  BMP:   Recent Labs   Lab 10/09/20  0310   GLU 94      K 4.4      CO2 24   BUN 16   CREATININE 1.6*   CALCIUM 9.0   MG 2.0     CBC:   Recent Labs   Lab 10/09/20  0505   WBC 7.04   RBC 3.93*   HGB 11.4*   HCT 34.9*      MCV 89   MCH 29.0   MCHC 32.7       Significant Diagnostics:  I have reviewed all pertinent imaging results/findings within the past 24 hours.

## 2020-10-10 NOTE — NURSING
Reviewed discharge instructions with patient and wife.  No questions or complaints at this time.

## 2020-10-10 NOTE — DISCHARGE SUMMARY
[facility]  Vascular Surgery  Discharge Summary      Patient Name: Luis Eduardo Curry  MRN: [id number]  Admission Date: 10/4/2020  Hospital Length of Stay: 5 days  Discharge Date and Time:  10/10/2020 10:47 AM  Attending Physician: JOSY Stern II, MD   Discharging Provider: Eduar Villalobos MD  Primary Care Provider: Kiko Connor MD    HPI:   Luis Eduardo Curry is a 60 y.o. male who [place] [facility] for evaluation of chest pain. Patient without significant past medical history. Tearing substernal chest pain and midscapular back pain beginning yesterday evening prompting presentation to outside ER. Non-contrast CT scan of chest revealed likely descending thoracic aortic aneurysm. Anti-impulse control was started and the patient was transferred.      On my evaluation, the patient has resolved his chest pain. He reports no abdominal, or flank, or lower extremity pain.      Procedure(s) (LRB):  ECHOCARDIOGRAM, TRANSESOPHAGEAL (N/A)     Hospital Course: Dissection managed conservatively with medication. Antihypertensive regimen transitioned from IV drips to oral antihypertensives. Tolerating pain with meds well. Cr decreased from 1.9 to 1.5. Urology consulted for post-renal obstruction. Recommended keeping Pulliam in upon discharge and following up outpatient. No complaints at the time of discharge. Will leave with Pulliam in and follow up next week with Urology. Follow up with Dr. Stern in 2 weeks with CTA chest/abd/pelvis.     Consults:   Consults (From admission, onward)        Status Ordering Provider     Inpatient consult to Cardiothoracic Surgery  Once     Provider:  Darrel Hollingsworth MD    Completed JOSY STERN II     Inpatient consult to Registered Dietitian/Nutritionist  Once     Provider:  (Not yet assigned)    Completed JOSY STERN II     Inpatient consult to Urology  Once     Provider:  (Not yet assigned)    Completed EDUAR VILLALOBOS     Inpatient consult to Vascular Surgery   Once     Provider:  (Not yet assigned)    Completed CLARIBEL MEDINA        Physical Exam  HENT:      Head: Normocephalic.      Mouth/Throat:      Mouth: Mucous membranes are moist.   Eyes:      Extraocular Movements: Extraocular movements intact.      Pupils: Pupils are equal, round, and reactive to light.   Cardiovascular:      Rate and Rhythm: Normal rate and regular rhythm.      Comments: Palpable DP pulses bilaterally  Pulmonary:      Effort: Pulmonary effort is normal. No respiratory distress.      Breath sounds: No wheezing or rhonchi.   Abdominal:      General: Abdomen is flat.      Palpations: Abdomen is soft.   Skin:     General: Skin is warm.      Capillary Refill: Capillary refill takes less than 2 seconds.   Neurological:      General: No focal deficit present.      Mental Status: He is alert and oriented to person, place, and time.    Significant Diagnostic Studies: Labs:   CMP   Recent Labs   Lab 10/09/20  0310 10/10/20  0709    135*   K 4.4 4.2    103   CO2 24 23   GLU 94 92   BUN 16 18   CREATININE 1.6* 1.5*   CALCIUM 9.0 9.5   PROT 6.2 6.9   ALBUMIN 3.1* 3.5   BILITOT 0.6 0.5   ALKPHOS 82 106   AST 40 60*   ALT 39 73*   ANIONGAP 8 9   ESTGFRAFRICA 54.1* 58.5*   EGFRNONAA 46.8* 50.6*    and CBC   Recent Labs   Lab 10/09/20  0505 10/10/20  0709   WBC 7.04 7.44   HGB 11.4* 12.1*   HCT 34.9* 37.3*    213       Pending Diagnostic Studies:     None        Final Active Diagnoses:    Diagnosis Date Noted POA    PRINCIPAL PROBLEM:  Acute thoracic aortic dissection [I71.01] 10/05/2020 Unknown    BPH with urinary obstruction [N40.1, N13.8] 05/01/2019 Yes      Problems Resolved During this Admission:      Discharged Condition: fair    Disposition: Home or Self Care    Follow Up:  Follow-up Information     JOSY Santos II, MD In 4 weeks.    Specialty: Vascular Surgery  Contact information:  [address] [place] [id number] [phone]                 Please follow-up  with Urology     Patient Instructions:      CTA Chest Abdomen Pelvis   Standing Status: Future Standing Exp. Date: 10/10/21     Order Specific Question Answer Comments   Is the patient allergic to iodine or contrast? No    Is the patient on ANY Metformin drug such as Glucophage/Glucovance?           Should be off drug 48 hours after contrast. Check renal function before restart. No    History of Kidney Disease - including: decreased kidney function, dialysis, kidney transplay, single kidney, kidney cancer, kidney surgery? None    Does the patient have high blood pressure requiring medical treatment? Yes    Diabetes? No    May the Radiologist modify the order per protocol to meet the clinical needs of the patient? Yes      Diet Adult Regular     Lifting restrictions     Notify your health care provider if you experience any of the following:  temperature >100.4     Notify your health care provider if you experience any of the following:  persistent nausea and vomiting or diarrhea     Notify your health care provider if you experience any of the following:  severe uncontrolled pain     Notify your health care provider if you experience any of the following:  redness, tenderness, or signs of infection (pain, swelling, redness, odor or green/yellow discharge around incision site)     Notify your health care provider if you experience any of the following:  difficulty breathing or increased cough     Notify your health care provider if you experience any of the following:  persistent dizziness, light-headedness, or visual disturbances     Notify your health care provider if you experience any of the following:  increased confusion or weakness     Remove dressing in 24 hours     Medications:  Reconciled Home Medications:      Medication List      START taking these medications    labetaloL 300 MG tablet  Commonly known as: NORMODYNE  Take 1 tablet (300 mg total) by mouth every 12 (twelve) hours.     lisinopriL 20 MG  tablet  Commonly known as: PRINIVIL,ZESTRIL  Take 1 tablet (20 mg total) by mouth once daily.  Start taking on: October 11, 2020     NIFEdipine 30 MG (OSM) 24 hr tablet  Commonly known as: PROCARDIA-XL  Take 1 tablet (30 mg total) by mouth once daily.  Start taking on: October 11, 2020        CHANGE how you take these medications    tamsulosin 0.4 mg Cap  Commonly known as: FLOMAX  Take 1 capsule (0.4 mg total) by mouth once daily.  What changed: Another medication with the same name was removed. Continue taking this medication, and follow the directions you see here.        STOP taking these medications    HYDROcodone-acetaminophen 5-325 mg per tablet  Commonly known as: MORENA Weiner MD  Vascular Surgery  [facility]

## 2020-10-10 NOTE — PROGRESS NOTES
[facility]  Vascular Surgery  Progress Note    Patient Name: Luis Eduardo Curry  MRN: [id number]  Admission Date: 10/4/2020  Primary Care Provider: Kiko Connor MD    Subjective:     Interval History: No acute issues overnight. Pain well controlled.     Post-Op Info:  Procedure(s) (LRB):  ECHOCARDIOGRAM, TRANSESOPHAGEAL (N/A)           Medications:  Continuous Infusions:    Scheduled Meds:   acetaminophen  650 mg Oral Q6H    heparin (porcine)  5,000 Units Subcutaneous Q8H    labetalol  300 mg Oral Q12H    lisinopriL  20 mg Oral Daily    mupirocin   Nasal BID    NIFEdipine  30 mg Oral Daily    polyethylene glycol  17 g Oral Daily    senna-docusate 8.6-50 mg  1 tablet Oral BID    tamsulosin  0.4 mg Oral Daily     PRN Meds:labetalol, magnesium oxide, magnesium oxide, morphine, oxyCODONE, potassium chloride, potassium chloride, potassium chloride, potassium, sodium phosphates, potassium, sodium phosphates, potassium, sodium phosphates, sodium chloride 0.9%     Objective:     Vital Signs (Most Recent):  Temp: 98.1 °F (36.7 °C) (10/09/20 1701)  Pulse: 66 (10/09/20 1701)  Resp: 18 (10/09/20 1701)  BP: 113/68 (10/09/20 1701)  SpO2: 96 % (10/09/20 1701) Vital Signs (24h Range):  Temp:  [97.4 °F (36.3 °C)-98.5 °F (36.9 °C)] 98.1 °F (36.7 °C)  Pulse:  [57-74] 66  Resp:  [8-32] 18  SpO2:  [91 %-99 %] 96 %  BP: ()/(55-80) 113/68     Date 10/09/20 0700 - 10/10/20 0659   Shift 1486-5673 4806-4020 0708-4662 24 Hour Total   INTAKE   Shift Total(mL/kg)       OUTPUT   Urine(mL/kg/hr) 1010(1.3) 675  1685   Shift Total(mL/kg) 1010(10.3) 675(6.7)  1685(16.6)   Weight (kg) 98 101.5 101.5 101.5       Physical Exam  HENT:      Head: Normocephalic.      Mouth/Throat:      Mouth: Mucous membranes are moist.   Eyes:      Extraocular Movements: Extraocular movements intact.      Pupils: Pupils are equal, round, and reactive to light.   Cardiovascular:      Rate and Rhythm: Normal rate and regular rhythm.       Comments: Palpable DP pulses bilaterally  Pulmonary:      Effort: Pulmonary effort is normal. No respiratory distress.      Breath sounds: No wheezing or rhonchi.   Abdominal:      General: Abdomen is flat.      Palpations: Abdomen is soft.   Skin:     General: Skin is warm.      Capillary Refill: Capillary refill takes less than 2 seconds.   Neurological:      General: No focal deficit present.      Mental Status: He is alert and oriented to person, place, and time.         Significant Labs:  BMP:   Recent Labs   Lab 10/09/20  0310   GLU 94      K 4.4      CO2 24   BUN 16   CREATININE 1.6*   CALCIUM 9.0   MG 2.0     CBC:   Recent Labs   Lab 10/09/20  0505   WBC 7.04   RBC 3.93*   HGB 11.4*   HCT 34.9*      MCV 89   MCH 29.0   MCHC 32.7       Significant Diagnostics:  I have reviewed all pertinent imaging results/findings within the past 24 hours.    Assessment/Plan:     * Acute thoracic aortic dissection  60 year old male with type B aortic dissection    -Stable for stepdown  -CTA 10/6 with aortic dissection distal to the left subclavian artery and extending to the proximal common iliac arteries.  No involvement of the major brachiocephalic vessels.   -Continue BP control, off drips for over 24 hours  -Continue buckley for urinary retention, will likely need long term with Urology follow-up  - Will consult Urology for recs regarding urinary retention and BPH  -Cr stabilized at 1.6  -Continue regular diet  - possible discharge tomorrow        Jeremi Weiner MD  Vascular Surgery  [facility]

## 2020-10-10 NOTE — HOSPITAL COURSE
Dissection managed conservatively with medication. Antihypertensive regimen transitioned from IV drips to oral antihypertensives. Tolerating pain with meds well. Cr decreased from 1.9 to 1.5. Urology consulted for post-renal obstruction. Recommended keeping Pulliam in upon discharge and following up outpatient. No complaints at the time of discharge. Will leave with Pulliam in and follow up next week with Urology. Follow up with Dr. Santos in 3-4 weeks with CTA chest/abd/pelvis.

## 2020-10-10 NOTE — HPI
Luis Eduardo Curry is a 60 y.o. male who presents [place] [facility] for evaluation of chest pain. Patient without significant past medical history. Tearing substernal chest pain and midscapular back pain beginning yesterday evening prompting presentation to outside ER. Non-contrast CT scan of chest revealed likely descending thoracic aortic aneurysm. Anti-impulse control was started and the patient was transferred.      On my evaluation, the patient has resolved his chest pain. He reports no abdominal, or flank, or lower extremity pain.

## 2020-10-12 ENCOUNTER — PATIENT OUTREACH (OUTPATIENT)
Dept: ADMINISTRATIVE | Facility: CLINIC | Age: 60
End: 2020-10-12

## 2020-10-12 ENCOUNTER — TELEPHONE (OUTPATIENT)
Dept: UROLOGY | Facility: CLINIC | Age: 60
End: 2020-10-12

## 2020-10-12 NOTE — TELEPHONE ENCOUNTER
----- Message from Troy Arce MD sent at 10/10/2020 12:11 PM CDT -----  Please schedule patient for SJ visit on Wednesday 10/14/2020 for voiding trial and buckley removal.     Thanks,   Troy

## 2020-10-12 NOTE — TELEPHONE ENCOUNTER
I spoke with patient's wife , who agreed to appt date.ltime,location, for voiding trial with Renu Alvarado np

## 2020-10-12 NOTE — TELEPHONE ENCOUNTER
C3 nurse attempted to contact patient. The following occurred:   C3 nurse attempted to contact Luis Eduardo Curry for a TCC post hospital discharge follow up call. The patient is unable to conduct the call @ this time. The patient requested a callback.    The patient does not have a scheduled HOSFU appointment within 7 days post hospital discharge date 10/10/2020. Message sent to Physician staff to assist with HOSFU appointment scheduling.

## 2020-10-12 NOTE — PATIENT INSTRUCTIONS
Understanding Sepsis  Sepsis is a severe response the body has to an infection. It is most often caused by bacteria. It is also known as septicemia, or systemic inflammatory response syndrome (SIRS). Sepsis is a medical emergency. It needs to be treated right away.  What is sepsis?  Sepsis is when the body reacts to an infection with a severe inflammatory response. It can be caused by bacteria, fungus, or a virus. Sepsis can cause many kinds of problems around the body. It can lead severe low blood pressure (shock) and organ failure. This can lead to death if not treated.  Sepsis is most common in:  · Infants and older adults  · People with an infection such as pneumonia, meningitis, or a urinary tract infection  · People who have an illness such as cancer, AIDS, or diabetes  · People being treated with chemotherapy medications or radiation  · People who have had a transplant  Symptoms of sepsis  Symptoms of sepsis can include:  · Chills and shaking  · Rapid heartbeat  · Rapid breathing  · Shortness of breath  · Severe nausea or uncontrolled vomiting  · Confusion  · Dizziness  · Decreased urination  · Severe pain, including in the back or joints   Diagnosing sepsis  If your health care provider thinks you may have sepsis, you will be given tests. You may have blood and urine tests. These are done to look for bacteria, viruses, or fungus. You may also have X-rays or other imaging tests. These may be done to look at your organs to locate the source of infection.  Treating sepsis  If you have sepsis, your health care provider will give you antibiotics through a thin, flexible tube put into a vein in your arm (IV). You will also be given fluids through the IV. You may also be given nutrition or other medications through your IV. Your health care provider will talk with you about other treatments you may need. These may include using an oxygen mask or a ventilator to help with breathing. Treatment may last at least 7  to 10 days. Sepsis must be treated in the hospital.  Date Last Reviewed: 7/15/2015  © 9093-7317 [employer]. [address] [place] [address], [place] [address]. All rights reserved. This information is not intended as a substitute for professional medical care. Always follow your healthcare professional's instructions.        Recognizing a Heart Attack or Angina  If you have risk factors for heart problems, you should always watch for signs of angina or a heart attack. If you have a sudden heart problem, getting treatment right away could save your life.   Risk factors for a heart attack include advanced age, high cholesterol, high blood pressure, having a family member who had a heart attack before the age of 50, diabetes, smoking, and stress. There are other risk factors including eating a high-fat diet and getting minimal exercise.  Understanding angina and heart attack  · Angina is a painful burning, tightness, or pressure in the chest, back, neck, throat, or jaw. It means not enough blood is getting to the heart. This is usually from a blocked artery in the heart. Angina is a sign that you may be having, or are about to have, a heart attack. It needs to be addressed by a healthcare provider right away.  · A heart attack, also known as acute myocardial infarction, or AMI, is what happens when blood can't get to part of the heart muscle. That part of the heart muscle is damaged and starts to die. If enough of the heart is affected, it will severely reduce its ability to provide blood to the rest of the body. It may cause death. It is vital to get help as soon as possible for a heart attack.  Stable angina versus unstable angina  Stable angina, also known as chronic angina, has a typical pattern. It occurs predictably with physical exertion or strong emotion. Nitroglycerin, rest, or both, will easily relieve stable angina symptoms. Stable angina symptoms will most likely feel the same each time you have them.  It is important to discuss these symptoms with your healthcare provider. They can be a warning sign for a future heart attack.  Unstable angina causes unexpected or unpredictable symptoms, commonly occurring at rest, and is a medical emergency. Angina is also considered unstable if resting and nitroglycerin doesn't relieve symptoms, It's also unstable if symptoms are worsening, occurring more often, or are lasting longer. These symptoms suggest a severe blockage or a spasm of a heart artery. Unstable angina is commonly a sign of an active heart attack. Remember the following tips:  · Stable angina symptoms should go away with rest or medicine. If they dont go away, call 911!  · Stable angina symptoms last for only a few minutes. If they last longer than that, or if they go away and come back, you may be having a heart attack. Call 911!  · If you have shortness of breath, cold sweat, nausea, or lightheadedness, call 911!  For new-onset angina, there is only one response: ?call 911! You should never diagnose angina by yourself. If these symptoms are new, or worse than usual, call 911!  Warning signs of a heart attack  If you have symptoms that you cant explain, call 911 right away. Do not drive yourself to the emergency room. The following are warning signs of a possible heart attack:  · Chest discomfort. Most heart attacks involve discomfort in the center of the chest that lasts more than a few minutes, or that goes away and comes back. It can feel like uncomfortable pressure, squeezing, fullness or pain.  · Discomfort in other areas of the upper body. Symptoms can include pain or discomfort in one or both arms, the back, neck, jaw, or stomach.  · Shortness of breath with or without chest discomfort.  · Other signs may include breaking out in a cold sweat, nausea, or lightheadedness.  Note for women: Like men, women commonly have chest pain or discomfort as a heart attack symptom. But women are somewhat more likely  than men to have other common symptoms, such as shortness of breath, nausea and vomiting, back pain, or jaw pain.  Note for older adults: Older people may also have atypical symptoms of a heart attack. These symptoms include fainting, weakness, or confusion. Ignoring these symptoms can lead to critical illness or death. They should be investigated right away.  If you've had a heart attack: People who have had one heart attack are at risk for having another. Your provider may prescribe medicine such as nitroglycerin to take when chest pain starts. Or you may need medicines to lower your heart rate and blood pressure to prevent angina and another heart attack. Remember to take any medicines your provider has given and do not stop them without speaking with him or her first.     If you have diabetes: silent heart problems  Over time, high blood sugar can damage nerves in your body. This may keep you from feeling pain caused by a heart problem, leading to a silent heart problem. If you dont feel symptoms, you are less able to recognize that you may be having a heart attack and get treatment right away. Talk to your healthcare provider about how to lower your risk for silent heart problems.   Date Last Reviewed: 6/1/2016  © 6750-0242 [employer]. [address] [place] [address], [place] [address]. All rights reserved. This information is not intended as a substitute for professional medical care. Always follow your healthcare professional's instructions.

## 2020-10-14 ENCOUNTER — OFFICE VISIT (OUTPATIENT)
Dept: INTERNAL MEDICINE | Facility: CLINIC | Age: 60
End: 2020-10-14
Payer: COMMERCIAL

## 2020-10-14 ENCOUNTER — PATIENT OUTREACH (OUTPATIENT)
Dept: ADMINISTRATIVE | Facility: OTHER | Age: 60
End: 2020-10-14

## 2020-10-14 VITALS
BODY MASS INDEX: 28.9 KG/M2 | OXYGEN SATURATION: 98 % | HEART RATE: 66 BPM | WEIGHT: 213.38 LBS | DIASTOLIC BLOOD PRESSURE: 54 MMHG | HEIGHT: 72 IN | SYSTOLIC BLOOD PRESSURE: 80 MMHG | RESPIRATION RATE: 16 BRPM

## 2020-10-14 DIAGNOSIS — K21.9 GASTROESOPHAGEAL REFLUX DISEASE, UNSPECIFIED WHETHER ESOPHAGITIS PRESENT: ICD-10-CM

## 2020-10-14 DIAGNOSIS — R33.9 URINARY RETENTION: ICD-10-CM

## 2020-10-14 DIAGNOSIS — Z09 HOSPITAL DISCHARGE FOLLOW-UP: Primary | ICD-10-CM

## 2020-10-14 DIAGNOSIS — I71.019 ACUTE THORACIC AORTIC DISSECTION: ICD-10-CM

## 2020-10-14 PROCEDURE — 3008F PR BODY MASS INDEX (BMI) DOCUMENTED: ICD-10-PCS | Mod: CPTII,S$GLB,, | Performed by: INTERNAL MEDICINE

## 2020-10-14 PROCEDURE — 99214 PR OFFICE/OUTPT VISIT, EST, LEVL IV, 30-39 MIN: ICD-10-PCS | Mod: 25,S$GLB,, | Performed by: INTERNAL MEDICINE

## 2020-10-14 PROCEDURE — 99999 PR PBB SHADOW E&M-EST. PATIENT-LVL III: ICD-10-PCS | Mod: PBBFAC,,, | Performed by: INTERNAL MEDICINE

## 2020-10-14 PROCEDURE — 90686 FLU VACCINE (QUAD) GREATER THAN OR EQUAL TO 3YO PRESERVATIVE FREE IM: ICD-10-PCS | Mod: S$GLB,,, | Performed by: INTERNAL MEDICINE

## 2020-10-14 PROCEDURE — 3008F BODY MASS INDEX DOCD: CPT | Mod: CPTII,S$GLB,, | Performed by: INTERNAL MEDICINE

## 2020-10-14 PROCEDURE — 90471 FLU VACCINE (QUAD) GREATER THAN OR EQUAL TO 3YO PRESERVATIVE FREE IM: ICD-10-PCS | Mod: S$GLB,,, | Performed by: INTERNAL MEDICINE

## 2020-10-14 PROCEDURE — 99214 OFFICE O/P EST MOD 30 MIN: CPT | Mod: 25,S$GLB,, | Performed by: INTERNAL MEDICINE

## 2020-10-14 PROCEDURE — 90686 IIV4 VACC NO PRSV 0.5 ML IM: CPT | Mod: S$GLB,,, | Performed by: INTERNAL MEDICINE

## 2020-10-14 PROCEDURE — 99999 PR PBB SHADOW E&M-EST. PATIENT-LVL III: CPT | Mod: PBBFAC,,, | Performed by: INTERNAL MEDICINE

## 2020-10-14 PROCEDURE — 90471 IMMUNIZATION ADMIN: CPT | Mod: S$GLB,,, | Performed by: INTERNAL MEDICINE

## 2020-10-14 RX ORDER — OMEPRAZOLE 40 MG/1
40 CAPSULE, DELAYED RELEASE ORAL DAILY
Qty: 30 CAPSULE | Refills: 5 | Status: SHIPPED | OUTPATIENT
Start: 2020-10-14 | End: 2020-12-10

## 2020-10-14 NOTE — PROGRESS NOTES
Subjective:       Patient ID: Luis Eduardo Curry is a 60 y.o. male.    Chief Complaint: Hospital Follow Up and Chest Pain    Luis Eduardo Curry is a 60 y.o. male here with follow up after recent admit for acute aortic dissection .  He needed urinary catheter ;foleys cathter .  His medications are reviewed .    Review of Systems   Constitutional: Negative for chills and fever.   HENT: Negative for congestion, postnasal drip and sore throat.    Eyes: Negative for photophobia.   Respiratory: Negative for chest tightness and shortness of breath.    Cardiovascular: Negative for chest pain.   Gastrointestinal: Negative for abdominal distention, abdominal pain, blood in stool and vomiting.   Genitourinary: Negative for dysuria, flank pain and hematuria.   Musculoskeletal: Negative for back pain.   Skin: Negative for pallor.   Neurological: Negative for dizziness, seizures, facial asymmetry, speech difficulty and numbness.   Hematological: Does not bruise/bleed easily.   Psychiatric/Behavioral: Negative for agitation and suicidal ideas. The patient is not nervous/anxious.        Objective:      Physical Exam  Vitals signs and nursing note reviewed.   Constitutional:       Appearance: He is well-developed.   HENT:      Head: Normocephalic and atraumatic.   Neck:      Vascular: No JVD.   Cardiovascular:      Rate and Rhythm: Normal rate and regular rhythm.      Heart sounds: Normal heart sounds.   Pulmonary:      Effort: Pulmonary effort is normal.      Breath sounds: Normal breath sounds.   Abdominal:      General: Bowel sounds are normal.      Palpations: Abdomen is soft.      Tenderness: There is no abdominal tenderness.   Skin:     General: Skin is warm and dry.   Neurological:      Mental Status: He is alert and oriented to person, place, and time.   Psychiatric:         Behavior: Behavior normal.         Thought Content: Thought content normal.         Judgment: Judgment normal.         Assessment:       1. Hospital  discharge follow-up    2. Acute thoracic aortic dissection    3. Urinary retention        Plan:   Luis Eduardo was seen today for hospital follow up and chest pain.    Diagnoses and all orders for this visit:    Hospital discharge follow-up    Acute thoracic aortic dissection    Urinary retention    Gastroesophageal reflux disease, unspecified whether esophagitis present  -     omeprazole (PRILOSEC) 40 MG capsule; Take 1 capsule (40 mg total) by mouth once daily.    He will be seeing cardiovascular surgery in about two weeks.  Continue flomax.  Flu shot today       Problem List Items Addressed This Visit     Acute thoracic aortic dissection      Other Visit Diagnoses     Hospital discharge follow-up    -  Primary    Urinary retention

## 2020-10-14 NOTE — PROGRESS NOTES
LINKS immunization registry updated  Health Maintenance updated  Chart reviewed for overdue Proactive [facility] ([facility]) health maintenance testing (CRS, Breast Ca, Diabetic Eye Exam)   Orders entered:N/A

## 2020-10-15 ENCOUNTER — OFFICE VISIT (OUTPATIENT)
Dept: UROLOGY | Facility: CLINIC | Age: 60
End: 2020-10-15
Payer: COMMERCIAL

## 2020-10-15 VITALS
HEART RATE: 62 BPM | HEIGHT: 72 IN | DIASTOLIC BLOOD PRESSURE: 72 MMHG | SYSTOLIC BLOOD PRESSURE: 112 MMHG | BODY MASS INDEX: 28.85 KG/M2 | WEIGHT: 213 LBS

## 2020-10-15 DIAGNOSIS — Z46.6 ENCOUNTER FOR FOLEY CATHETER REMOVAL: ICD-10-CM

## 2020-10-15 DIAGNOSIS — N40.1 BPH WITH URINARY OBSTRUCTION: ICD-10-CM

## 2020-10-15 DIAGNOSIS — R33.9 URINARY RETENTION: Primary | ICD-10-CM

## 2020-10-15 DIAGNOSIS — Z80.42 FAMILY HISTORY OF PROSTATE CANCER IN FATHER: ICD-10-CM

## 2020-10-15 DIAGNOSIS — N13.8 BPH WITH URINARY OBSTRUCTION: ICD-10-CM

## 2020-10-15 PROCEDURE — 99999 PR PBB SHADOW E&M-EST. PATIENT-LVL III: ICD-10-PCS | Mod: PBBFAC,,, | Performed by: NURSE PRACTITIONER

## 2020-10-15 PROCEDURE — 99214 PR OFFICE/OUTPT VISIT, EST, LEVL IV, 30-39 MIN: ICD-10-PCS | Mod: 25,S$GLB,, | Performed by: NURSE PRACTITIONER

## 2020-10-15 PROCEDURE — 3008F PR BODY MASS INDEX (BMI) DOCUMENTED: ICD-10-PCS | Mod: CPTII,S$GLB,, | Performed by: NURSE PRACTITIONER

## 2020-10-15 PROCEDURE — 99999 PR PBB SHADOW E&M-EST. PATIENT-LVL III: CPT | Mod: PBBFAC,,, | Performed by: NURSE PRACTITIONER

## 2020-10-15 PROCEDURE — 51702 PR INSERTION OF TEMPORARY INDWELLING BLADDER CATHETER, SIMPLE: ICD-10-PCS | Mod: S$GLB,,, | Performed by: NURSE PRACTITIONER

## 2020-10-15 PROCEDURE — 99214 OFFICE O/P EST MOD 30 MIN: CPT | Mod: 25,S$GLB,, | Performed by: NURSE PRACTITIONER

## 2020-10-15 PROCEDURE — 51702 INSERT TEMP BLADDER CATH: CPT | Mod: S$GLB,,, | Performed by: NURSE PRACTITIONER

## 2020-10-15 PROCEDURE — 3008F BODY MASS INDEX DOCD: CPT | Mod: CPTII,S$GLB,, | Performed by: NURSE PRACTITIONER

## 2020-10-15 RX ORDER — TAMSULOSIN HYDROCHLORIDE 0.4 MG/1
0.4 CAPSULE ORAL DAILY
Qty: 90 CAPSULE | Refills: 3 | Status: SHIPPED | OUTPATIENT
Start: 2020-10-15 | End: 2021-12-03 | Stop reason: SDUPTHER

## 2020-10-15 NOTE — PROGRESS NOTES
CHIEF COMPLAINT:    Luis Eduardo Curry is a 60 y.o. male presents today due to recent urinary retention; He needs buckley removal.    HISTORY OF PRESENTING ILLINESS:    Luis Eduardo Curry is a 60 y.o. male with a history of BPH and elevated PSA.  He has a family history of Prostate Cancer with his father.    He is patient of Dr. Mendez.    He has history of UTI and elevated PSA.   05/01/2019:  - (-) prostate biopsy with a PSA of 4.6 & 46gm prostate.  - cysto showing trabeculated bladder    CT Urogram 4/23/19: no evidence of renal masses or hydronephrosis. There are 2 bladder diverticula     05/24/2019 Urology office visit showed a post void residual bladder scan was performed immediately after voiding. The patient's PVR was noted to be 308.    He was last seen in clinic 07/17/2019.    He is here today for a voiding trial after recent admission for a descending thoracic aortic aneurysm. [place] [facility] [place] was found to be in urinary retention and CHRISTINE with Cr. 2.3 (baseline 1.1)  He was evaluated by Urology (Dr. NITO Arce)      REVIEW OF SYSTEMS:  Review of Systems   Constitutional: Negative.  Negative for chills, diaphoresis and fever.   HENT: Negative for congestion and sore throat.    Eyes: Negative.  Negative for double vision.   Respiratory: Negative.  Negative for cough, shortness of breath and wheezing.    Cardiovascular: Negative.  Negative for chest pain, palpitations and leg swelling.   Gastrointestinal: Negative.  Negative for abdominal pain, blood in stool, constipation, diarrhea, nausea and vomiting.   Genitourinary: Negative.  Negative for dysuria, flank pain and hematuria.        Buckley draining well.   Some penile discomfort.     Musculoskeletal: Negative.  Negative for back pain, falls and neck pain.   Skin: Negative.  Negative for rash.   Neurological: Negative.  Negative for dizziness and seizures.   Endo/Heme/Allergies: Does not bruise/bleed easily.   Psychiatric/Behavioral: Negative.  Negative for  depression. The patient is not nervous/anxious.          PATIENT HISTORY:    No past medical history on file.    Past Surgical History:   Procedure Laterality Date    BIOPSY WITH TRANSRECTAL ULTRASOUND (TRUS) GUIDANCE N/A 5/1/2019    Procedure: BIOPSY, WITH TRANSRECTAL PROSTATE ULTRASOUND;  Surgeon: Chintan Mendez Jr., MD;  Location: [facility];  Service: Urology;  Laterality: N/A;    COLONOSCOPY  2006    COLONOSCOPY N/A 1/24/2019    Procedure: COLONOSCOPY;  Surgeon: Reece Tarango MD;  Location: [facility];  Service: Endoscopy;  Laterality: N/A;    CYSTOSCOPY N/A 5/1/2019    Procedure: FLEXIBLE CYSTOSCOPY;  Surgeon: Chintan Mendez Jr., MD;  Location: [facility];  Service: Urology;  Laterality: N/A;    UMBILICAL HERNIA REPAIR N/A 1/14/2020    Procedure: REPAIR, HERNIA, UMBILICAL;  Surgeon: Caio Mas Jr., MD;  Location: [facility];  Service: General;  Laterality: N/A;       Family History   Problem Relation Age of Onset    No Known Problems Mother     Hypertension Father     No Known Problems Sister     No Known Problems Brother     No Known Problems Daughter     No Known Problems Son     No Known Problems Maternal Aunt     No Known Problems Maternal Uncle     No Known Problems Paternal Aunt     No Known Problems Paternal Uncle     No Known Problems Maternal Grandmother     No Known Problems Maternal Grandfather     No Known Problems Paternal Grandmother     No Known Problems Paternal Grandfather        Social History     Socioeconomic History    Marital status:      Spouse name: Not on file    Number of children: Not on file    Years of education: Not on file    Highest education level: Not on file   Occupational History    Not on file   Social Needs    Financial resource strain: Not on file    Food insecurity     Worry: Not on file     Inability: Not on file    Transportation needs     Medical: Not on file     Non-medical: Not on file   Tobacco Use    Smoking status: Former Smoker      Packs/day: 1.00     Years: 30.00     Pack years: 30.00     Types: Cigarettes    Smokeless tobacco: Never Used   Substance and Sexual Activity    Alcohol use: Yes     Frequency: Monthly or less     Binge frequency: Weekly     Comment: socially    Drug use: No    Sexual activity: Yes   Lifestyle    Physical activity     Days per week: Not on file     Minutes per session: Not on file    Stress: Only a little   Relationships    Social connections     Talks on phone: Not on file     Gets together: Not on file     Attends [employer] service: Not on file     Active member of club or organization: Not on file     Attends meetings of clubs or organizations: Not on file     Relationship status: Not on file   Other Topics Concern    Not on file   Social History Narrative    Not on file       Allergies:  Patient has no known allergies.    Medications:    Current Outpatient Medications:     labetaloL (NORMODYNE) 300 MG tablet, Take 1 tablet (300 mg total) by mouth every 12 (twelve) hours., Disp: 60 tablet, Rfl: 0    lisinopriL (PRINIVIL,ZESTRIL) 20 MG tablet, Take 1 tablet (20 mg total) by mouth once daily., Disp: 90 tablet, Rfl: 3    NIFEdipine (PROCARDIA-XL) 30 MG (OSM) 24 hr tablet, Take 1 tablet (30 mg total) by mouth once daily., Disp: 30 tablet, Rfl: 11    omeprazole (PRILOSEC) 40 MG capsule, Take 1 capsule (40 mg total) by mouth once daily., Disp: 30 capsule, Rfl: 5    tamsulosin (FLOMAX) 0.4 mg Cap, Take 1 capsule (0.4 mg total) by mouth once daily., Disp: 30 capsule, Rfl: 11    PHYSICAL EXAMINATION:  Physical Exam  Vitals signs and nursing note reviewed.   Constitutional:       General: He is awake.      Appearance: Normal appearance. He is normal weight.   HENT:      Head: Normocephalic.      Right Ear: External ear normal.      Left Ear: External ear normal.      Nose: Nose normal.   Eyes:      Conjunctiva/sclera: Conjunctivae normal.   Neck:      Musculoskeletal: Normal range of motion.    Cardiovascular:      Rate and Rhythm: Normal rate.   Pulmonary:      Effort: Pulmonary effort is normal. No respiratory distress.   Abdominal:      General: There is no distension.      Tenderness: There is no abdominal tenderness. There is no right CVA tenderness, left CVA tenderness or rebound.   Genitourinary:     Penis: Normal.       Scrotum/Testes: Normal.   Musculoskeletal: Normal range of motion.   Skin:     General: Skin is warm and dry.   Neurological:      General: No focal deficit present.      Mental Status: He is alert and oriented to person, place, and time.   Psychiatric:         Mood and Affect: Mood normal.         Behavior: Behavior is cooperative.           LABS:          Lab Results   Component Value Date    PSA 4.6 (H) 01/05/2019             IMPRESSION:    Encounter Diagnoses   Name Primary?    Urinary retention Yes    Encounter for Buckley catheter removal     BPH with urinary obstruction     Family history of prostate cancer in father          Assessment:       1. Urinary retention    2. Encounter for Buckley catheter removal    3. BPH with urinary obstruction    4. Family history of prostate cancer in father        Plan:       I spent 35 minutes with the patient of which more than half was spent in direct consultation with the patient in regards to our treatment and plan.    Education and recommendations of today's plan of care including home remedies.  He did not pass the voiding trial.  Bladder Scan after buckley removal and he urinated was 457.  He voiced no complaints of bladder pain or pressure.  We discussed his LUTS and contributory factors.  Discussed that since he not uncomfortable he is use to high capacity bladder. The problem is his renal function and abdominal pressure from distended bladder; we don't want abdominal pressure with D-TAA.  We discussed his previous visit with Dr. Mendez and recommendation for a TURP. This would be next step, but we need to wait til after his CV  appointment and plan.  Voiced understanding  14fr buckley was placed by nurse using sterile technique  He tolerated well.   RTC one month for VT or buckley replacement

## 2020-10-17 ENCOUNTER — TELEPHONE (OUTPATIENT)
Dept: CARDIOTHORACIC SURGERY | Facility: HOSPITAL | Age: 60
End: 2020-10-17

## 2020-10-17 NOTE — TELEPHONE ENCOUNTER
After being informed by  that patient had questions, called number within chart and reached Mrs. Katherine Curry - pt was in background. She reports he received the flu vaccine earlier this week and starting yesterday began to feel ill. He endorses chest heaviness, mild cough, mild sore throat, and general ache-ness. The [facility] were requesting advice on what OTC cold remedies the pt should take as they tried a decongestant at first but this led to a rise in BP.    Given pt. current BP goals per vascular surgery in setting of aortic dissection, the pt was counseled not to take decongestant medications, particularly those containing phenylephrine. They were advised that mucus thinners such as guafeinisin, cough suppressants such a dextromathorpan, and pain medications such as tylenol could be safely used for symptom relief.    They were also advised to return to the ED should Mr. Curry begin to develop persistent high grade fever, difficulty breathing, persistently elevated SBP above 160, or other severe symptoms.    All questions were answered and they expressed understanding.    Roxy Hester MD  PGY-1, General Surgery  [facility]

## 2020-10-18 ENCOUNTER — HOSPITAL ENCOUNTER (INPATIENT)
Facility: HOSPITAL | Age: 60
LOS: 1 days | Discharge: HOME | DRG: 314 | End: 2020-10-20
Attending: EMERGENCY MEDICINE | Admitting: EMERGENCY MEDICINE
Payer: COMMERCIAL

## 2020-10-18 ENCOUNTER — NURSE TRIAGE (OUTPATIENT)
Dept: ADMINISTRATIVE | Facility: CLINIC | Age: 60
End: 2020-10-18

## 2020-10-18 ENCOUNTER — HOSPITAL ENCOUNTER (EMERGENCY)
Facility: HOSPITAL | Age: 60
Discharge: ANOTHER HOSPITAL | End: 2020-10-18
Attending: SURGERY
Payer: COMMERCIAL

## 2020-10-18 VITALS
SYSTOLIC BLOOD PRESSURE: 94 MMHG | DIASTOLIC BLOOD PRESSURE: 57 MMHG | WEIGHT: 209.31 LBS | OXYGEN SATURATION: 96 % | TEMPERATURE: 99 F | BODY MASS INDEX: 28.39 KG/M2 | HEART RATE: 63 BPM | RESPIRATION RATE: 21 BRPM

## 2020-10-18 DIAGNOSIS — I71.019 ACUTE THORACIC AORTIC DISSECTION: ICD-10-CM

## 2020-10-18 DIAGNOSIS — I95.9 HYPOTENSION, UNSPECIFIED HYPOTENSION TYPE: Primary | ICD-10-CM

## 2020-10-18 DIAGNOSIS — I71.03 DISSECTION OF THORACOABDOMINAL AORTA: ICD-10-CM

## 2020-10-18 DIAGNOSIS — N40.1 BPH WITH URINARY OBSTRUCTION: ICD-10-CM

## 2020-10-18 DIAGNOSIS — R94.31 ST ELEVATION ON ECG: ICD-10-CM

## 2020-10-18 DIAGNOSIS — N17.9 AKI (ACUTE KIDNEY INJURY): ICD-10-CM

## 2020-10-18 DIAGNOSIS — N13.8 BPH WITH URINARY OBSTRUCTION: ICD-10-CM

## 2020-10-18 DIAGNOSIS — I71.40 AAA (ABDOMINAL AORTIC ANEURYSM) WITHOUT RUPTURE: ICD-10-CM

## 2020-10-18 DIAGNOSIS — R07.9 CHEST PAIN: ICD-10-CM

## 2020-10-18 DIAGNOSIS — J20.8 ACUTE BRONCHITIS DUE TO OTHER SPECIFIED ORGANISMS: ICD-10-CM

## 2020-10-18 LAB
ALBUMIN SERPL BCP-MCNC: 3.7 G/DL (ref 3.5–5.2)
ALP SERPL-CCNC: 76 U/L (ref 55–135)
ALT SERPL W/O P-5'-P-CCNC: 24 U/L (ref 10–44)
ANION GAP SERPL CALC-SCNC: 14 MMOL/L (ref 8–16)
APTT BLDCRRT: 30.5 SEC (ref 21–32)
AST SERPL-CCNC: 14 U/L (ref 10–40)
BASOPHILS # BLD AUTO: 0.06 K/UL (ref 0–0.2)
BASOPHILS NFR BLD: 0.5 % (ref 0–1.9)
BILIRUB SERPL-MCNC: 0.4 MG/DL (ref 0.1–1)
BNP SERPL-MCNC: 36 PG/ML (ref 0–99)
BUN SERPL-MCNC: 46 MG/DL (ref 6–20)
CALCIUM SERPL-MCNC: 9.3 MG/DL (ref 8.7–10.5)
CHLORIDE SERPL-SCNC: 102 MMOL/L (ref 95–110)
CK MB SERPL-MCNC: 0.3 NG/ML (ref 0.1–6.5)
CK MB SERPL-RTO: 1 % (ref 0–5)
CK SERPL-CCNC: 29 U/L (ref 20–200)
CK SERPL-CCNC: 29 U/L (ref 20–200)
CO2 SERPL-SCNC: 20 MMOL/L (ref 23–29)
CREAT SERPL-MCNC: 2.9 MG/DL (ref 0.5–1.4)
D DIMER PPP IA.FEU-MCNC: 1.37 MG/L FEU
DIFFERENTIAL METHOD: ABNORMAL
EOSINOPHIL # BLD AUTO: 0.9 K/UL (ref 0–0.5)
EOSINOPHIL NFR BLD: 8 % (ref 0–8)
ERYTHROCYTE [DISTWIDTH] IN BLOOD BY AUTOMATED COUNT: 13.3 % (ref 11.5–14.5)
EST. GFR  (AFRICAN AMERICAN): 26 ML/MIN/1.73 M^2
EST. GFR  (NON AFRICAN AMERICAN): 23 ML/MIN/1.73 M^2
GLUCOSE SERPL-MCNC: 108 MG/DL (ref 70–110)
HCT VFR BLD AUTO: 37.6 % (ref 40–54)
HGB BLD-MCNC: 12.3 G/DL (ref 14–18)
IMM GRANULOCYTES # BLD AUTO: 0.04 K/UL (ref 0–0.04)
IMM GRANULOCYTES NFR BLD AUTO: 0.3 % (ref 0–0.5)
INR PPP: 1 (ref 0.8–1.2)
LYMPHOCYTES # BLD AUTO: 2 K/UL (ref 1–4.8)
LYMPHOCYTES NFR BLD: 17 % (ref 18–48)
MCH RBC QN AUTO: 28.9 PG (ref 27–31)
MCHC RBC AUTO-ENTMCNC: 32.7 G/DL (ref 32–36)
MCV RBC AUTO: 88 FL (ref 82–98)
MONOCYTES # BLD AUTO: 1 K/UL (ref 0.3–1)
MONOCYTES NFR BLD: 8.4 % (ref 4–15)
NEUTROPHILS # BLD AUTO: 7.7 K/UL (ref 1.8–7.7)
NEUTROPHILS NFR BLD: 65.8 % (ref 38–73)
NRBC BLD-RTO: 0 /100 WBC
PLATELET # BLD AUTO: 279 K/UL (ref 150–350)
PMV BLD AUTO: 10.3 FL (ref 9.2–12.9)
POTASSIUM SERPL-SCNC: 4.6 MMOL/L (ref 3.5–5.1)
PROT SERPL-MCNC: 7.2 G/DL (ref 6–8.4)
PROTHROMBIN TIME: 10.9 SEC (ref 9–12.5)
RBC # BLD AUTO: 4.26 M/UL (ref 4.6–6.2)
SARS-COV-2 RDRP RESP QL NAA+PROBE: NEGATIVE
SODIUM SERPL-SCNC: 136 MMOL/L (ref 136–145)
TROPONIN I SERPL DL<=0.01 NG/ML-MCNC: <0.006 NG/ML (ref 0–0.03)
WBC # BLD AUTO: 11.68 K/UL (ref 3.9–12.7)

## 2020-10-18 PROCEDURE — 99285 EMERGENCY DEPT VISIT HI MDM: CPT | Mod: 25,27

## 2020-10-18 PROCEDURE — 99285 PR EMERGENCY DEPT VISIT,LEVEL V: ICD-10-PCS | Mod: ,,, | Performed by: EMERGENCY MEDICINE

## 2020-10-18 PROCEDURE — 85379 FIBRIN DEGRADATION QUANT: CPT

## 2020-10-18 PROCEDURE — 83880 ASSAY OF NATRIURETIC PEPTIDE: CPT

## 2020-10-18 PROCEDURE — 82553 CREATINE MB FRACTION: CPT

## 2020-10-18 PROCEDURE — 84484 ASSAY OF TROPONIN QUANT: CPT

## 2020-10-18 PROCEDURE — 93005 ELECTROCARDIOGRAM TRACING: CPT

## 2020-10-18 PROCEDURE — 93010 ELECTROCARDIOGRAM REPORT: CPT | Mod: ,,, | Performed by: INTERNAL MEDICINE

## 2020-10-18 PROCEDURE — 25000003 PHARM REV CODE 250: Performed by: SURGERY

## 2020-10-18 PROCEDURE — 36415 COLL VENOUS BLD VENIPUNCTURE: CPT

## 2020-10-18 PROCEDURE — 99285 EMERGENCY DEPT VISIT HI MDM: CPT | Mod: 25

## 2020-10-18 PROCEDURE — 80053 COMPREHEN METABOLIC PANEL: CPT

## 2020-10-18 PROCEDURE — 85025 COMPLETE CBC W/AUTO DIFF WBC: CPT

## 2020-10-18 PROCEDURE — 85730 THROMBOPLASTIN TIME PARTIAL: CPT

## 2020-10-18 PROCEDURE — 93010 EKG 12-LEAD: ICD-10-PCS | Mod: ,,, | Performed by: INTERNAL MEDICINE

## 2020-10-18 PROCEDURE — 82550 ASSAY OF CK (CPK): CPT

## 2020-10-18 PROCEDURE — 85610 PROTHROMBIN TIME: CPT

## 2020-10-18 PROCEDURE — U0002 COVID-19 LAB TEST NON-CDC: HCPCS

## 2020-10-18 PROCEDURE — 96360 HYDRATION IV INFUSION INIT: CPT

## 2020-10-18 PROCEDURE — 87502 INFLUENZA DNA AMP PROBE: CPT

## 2020-10-18 PROCEDURE — 99285 EMERGENCY DEPT VISIT HI MDM: CPT | Mod: ,,, | Performed by: EMERGENCY MEDICINE

## 2020-10-18 RX ADMIN — SODIUM CHLORIDE 500 ML: 0.9 INJECTION, SOLUTION INTRAVENOUS at 08:10

## 2020-10-19 PROBLEM — I95.9 HYPOTENSION: Status: ACTIVE | Noted: 2020-10-19

## 2020-10-19 PROBLEM — N17.9 AKI (ACUTE KIDNEY INJURY): Status: ACTIVE | Noted: 2020-10-19

## 2020-10-19 LAB
ANION GAP SERPL CALC-SCNC: 10 MMOL/L (ref 8–16)
BACTERIA #/AREA URNS AUTO: ABNORMAL /HPF
BASOPHILS # BLD AUTO: 0.04 K/UL (ref 0–0.2)
BASOPHILS NFR BLD: 0.5 % (ref 0–1.9)
BILIRUB UR QL STRIP: NEGATIVE
BUN SERPL-MCNC: 33 MG/DL (ref 6–20)
CALCIUM SERPL-MCNC: 9 MG/DL (ref 8.7–10.5)
CHLORIDE SERPL-SCNC: 108 MMOL/L (ref 95–110)
CLARITY UR REFRACT.AUTO: CLEAR
CO2 SERPL-SCNC: 22 MMOL/L (ref 23–29)
COLOR UR AUTO: YELLOW
CREAT SERPL-MCNC: 1.6 MG/DL (ref 0.5–1.4)
CTP QC/QA: YES
DIFFERENTIAL METHOD: ABNORMAL
EOSINOPHIL # BLD AUTO: 0.9 K/UL (ref 0–0.5)
EOSINOPHIL NFR BLD: 11.3 % (ref 0–8)
ERYTHROCYTE [DISTWIDTH] IN BLOOD BY AUTOMATED COUNT: 13.2 % (ref 11.5–14.5)
EST. GFR  (AFRICAN AMERICAN): 54.1 ML/MIN/1.73 M^2
EST. GFR  (NON AFRICAN AMERICAN): 46.8 ML/MIN/1.73 M^2
GLUCOSE SERPL-MCNC: 95 MG/DL (ref 70–110)
GLUCOSE UR QL STRIP: NEGATIVE
HCT VFR BLD AUTO: 36.4 % (ref 40–54)
HGB BLD-MCNC: 11.7 G/DL (ref 14–18)
HGB UR QL STRIP: ABNORMAL
IMM GRANULOCYTES # BLD AUTO: 0.03 K/UL (ref 0–0.04)
IMM GRANULOCYTES NFR BLD AUTO: 0.4 % (ref 0–0.5)
KETONES UR QL STRIP: NEGATIVE
LEUKOCYTE ESTERASE UR QL STRIP: ABNORMAL
LYMPHOCYTES # BLD AUTO: 1.9 K/UL (ref 1–4.8)
LYMPHOCYTES NFR BLD: 24.2 % (ref 18–48)
MAGNESIUM SERPL-MCNC: 2.3 MG/DL (ref 1.6–2.6)
MCH RBC QN AUTO: 29.3 PG (ref 27–31)
MCHC RBC AUTO-ENTMCNC: 32.1 G/DL (ref 32–36)
MCV RBC AUTO: 91 FL (ref 82–98)
MICROSCOPIC COMMENT: ABNORMAL
MONOCYTES # BLD AUTO: 0.8 K/UL (ref 0.3–1)
MONOCYTES NFR BLD: 9.7 % (ref 4–15)
NEUTROPHILS # BLD AUTO: 4.2 K/UL (ref 1.8–7.7)
NEUTROPHILS NFR BLD: 53.9 % (ref 38–73)
NITRITE UR QL STRIP: NEGATIVE
NRBC BLD-RTO: 0 /100 WBC
PH UR STRIP: 6 [PH] (ref 5–8)
PHOSPHATE SERPL-MCNC: 3 MG/DL (ref 2.7–4.5)
PLATELET # BLD AUTO: 237 K/UL (ref 150–350)
PMV BLD AUTO: 10.7 FL (ref 9.2–12.9)
POC MOLECULAR INFLUENZA A AGN: NEGATIVE
POC MOLECULAR INFLUENZA B AGN: NEGATIVE
POTASSIUM SERPL-SCNC: 4.5 MMOL/L (ref 3.5–5.1)
PROT UR QL STRIP: ABNORMAL
RBC # BLD AUTO: 4 M/UL (ref 4.6–6.2)
RBC #/AREA URNS AUTO: 3 /HPF (ref 0–4)
SODIUM SERPL-SCNC: 140 MMOL/L (ref 136–145)
SP GR UR STRIP: 1.01 (ref 1–1.03)
URN SPEC COLLECT METH UR: ABNORMAL
WBC # BLD AUTO: 7.8 K/UL (ref 3.9–12.7)
WBC #/AREA URNS AUTO: 5 /HPF (ref 0–5)

## 2020-10-19 PROCEDURE — 99220 PR INITIAL OBSERVATION CARE,LEVL III: ICD-10-PCS | Mod: ,,, | Performed by: HOSPITALIST

## 2020-10-19 PROCEDURE — 84100 ASSAY OF PHOSPHORUS: CPT

## 2020-10-19 PROCEDURE — 36415 COLL VENOUS BLD VENIPUNCTURE: CPT

## 2020-10-19 PROCEDURE — 20600001 HC STEP DOWN PRIVATE ROOM

## 2020-10-19 PROCEDURE — 99220 PR INITIAL OBSERVATION CARE,LEVL III: CPT | Mod: ,,, | Performed by: HOSPITALIST

## 2020-10-19 PROCEDURE — 87205 SMEAR GRAM STAIN: CPT

## 2020-10-19 PROCEDURE — 80048 BASIC METABOLIC PNL TOTAL CA: CPT

## 2020-10-19 PROCEDURE — 81001 URINALYSIS AUTO W/SCOPE: CPT

## 2020-10-19 PROCEDURE — 85025 COMPLETE CBC W/AUTO DIFF WBC: CPT

## 2020-10-19 PROCEDURE — 63700000 PHARM REV CODE 250 ALT 637 W/O HCPCS: Performed by: NURSE PRACTITIONER

## 2020-10-19 PROCEDURE — 93010 ELECTROCARDIOGRAM REPORT: CPT | Mod: ,,, | Performed by: INTERNAL MEDICINE

## 2020-10-19 PROCEDURE — 93010 EKG 12-LEAD: ICD-10-PCS | Mod: ,,, | Performed by: INTERNAL MEDICINE

## 2020-10-19 PROCEDURE — 87070 CULTURE OTHR SPECIMN AEROBIC: CPT

## 2020-10-19 PROCEDURE — 25000003 PHARM REV CODE 250: Performed by: HOSPITALIST

## 2020-10-19 PROCEDURE — 93005 ELECTROCARDIOGRAM TRACING: CPT

## 2020-10-19 PROCEDURE — 83735 ASSAY OF MAGNESIUM: CPT

## 2020-10-19 RX ORDER — AZITHROMYCIN 250 MG/1
250 TABLET, FILM COATED ORAL DAILY
Status: DISCONTINUED | OUTPATIENT
Start: 2020-10-20 | End: 2020-10-20 | Stop reason: HOSPADM

## 2020-10-19 RX ORDER — ACETAMINOPHEN 325 MG/1
650 TABLET ORAL EVERY 4 HOURS PRN
Status: DISCONTINUED | OUTPATIENT
Start: 2020-10-19 | End: 2020-10-20 | Stop reason: HOSPADM

## 2020-10-19 RX ORDER — SODIUM CHLORIDE 9 MG/ML
INJECTION, SOLUTION INTRAVENOUS CONTINUOUS
Status: ACTIVE | OUTPATIENT
Start: 2020-10-19 | End: 2020-10-19

## 2020-10-19 RX ORDER — PANTOPRAZOLE SODIUM 40 MG/1
40 TABLET, DELAYED RELEASE ORAL DAILY
Status: DISCONTINUED | OUTPATIENT
Start: 2020-10-19 | End: 2020-10-20 | Stop reason: HOSPADM

## 2020-10-19 RX ORDER — ONDANSETRON 2 MG/ML
4 INJECTION INTRAMUSCULAR; INTRAVENOUS EVERY 8 HOURS PRN
Status: DISCONTINUED | OUTPATIENT
Start: 2020-10-19 | End: 2020-10-20 | Stop reason: HOSPADM

## 2020-10-19 RX ORDER — GLUCAGON 1 MG
1 KIT INJECTION
Status: DISCONTINUED | OUTPATIENT
Start: 2020-10-19 | End: 2020-10-20 | Stop reason: HOSPADM

## 2020-10-19 RX ORDER — IBUPROFEN 200 MG
16 TABLET ORAL
Status: DISCONTINUED | OUTPATIENT
Start: 2020-10-19 | End: 2020-10-20 | Stop reason: HOSPADM

## 2020-10-19 RX ORDER — AZITHROMYCIN 250 MG/1
500 TABLET, FILM COATED ORAL ONCE
Status: COMPLETED | OUTPATIENT
Start: 2020-10-19 | End: 2020-10-19

## 2020-10-19 RX ORDER — ENOXAPARIN SODIUM 100 MG/ML
40 INJECTION SUBCUTANEOUS EVERY 24 HOURS
Status: DISCONTINUED | OUTPATIENT
Start: 2020-10-19 | End: 2020-10-19

## 2020-10-19 RX ORDER — SUCRALFATE 1 G/10ML
1 SUSPENSION ORAL EVERY 6 HOURS
Status: DISCONTINUED | OUTPATIENT
Start: 2020-10-19 | End: 2020-10-19

## 2020-10-19 RX ORDER — IBUPROFEN 200 MG
24 TABLET ORAL
Status: DISCONTINUED | OUTPATIENT
Start: 2020-10-19 | End: 2020-10-20 | Stop reason: HOSPADM

## 2020-10-19 RX ORDER — SODIUM CHLORIDE 0.9 % (FLUSH) 0.9 %
10 SYRINGE (ML) INJECTION
Status: DISCONTINUED | OUTPATIENT
Start: 2020-10-19 | End: 2020-10-20 | Stop reason: HOSPADM

## 2020-10-19 RX ORDER — MAG HYDROX/ALUMINUM HYD/SIMETH 200-200-20
30 SUSPENSION, ORAL (FINAL DOSE FORM) ORAL
Status: DISCONTINUED | OUTPATIENT
Start: 2020-10-19 | End: 2020-10-19

## 2020-10-19 RX ORDER — SODIUM CHLORIDE 0.9 % (FLUSH) 0.9 %
10 SYRINGE (ML) INJECTION
Status: CANCELLED | OUTPATIENT
Start: 2020-10-19

## 2020-10-19 RX ADMIN — SODIUM CHLORIDE: 0.9 INJECTION, SOLUTION INTRAVENOUS at 06:10

## 2020-10-19 RX ADMIN — ACETAMINOPHEN 650 MG: 325 TABLET ORAL at 07:10

## 2020-10-19 RX ADMIN — LABETALOL HYDROCHLORIDE 300 MG: 200 TABLET, FILM COATED ORAL at 08:10

## 2020-10-19 RX ADMIN — SODIUM CHLORIDE 500 ML: 0.9 INJECTION, SOLUTION INTRAVENOUS at 04:10

## 2020-10-19 RX ADMIN — PANTOPRAZOLE SODIUM 40 MG: 40 TABLET, DELAYED RELEASE ORAL at 08:10

## 2020-10-19 RX ADMIN — AZITHROMYCIN 500 MG: 250 TABLET, FILM COATED ORAL at 02:10

## 2020-10-19 NOTE — TELEPHONE ENCOUNTER
Aortic dissection being treated by Dr. Santos. Scan on 10/26/20 to determine plan of care. Pt on four different BP medications. Pt had flu shot on Wednesday. Since Wednesday patient has been weak, coughing, malaise and decreased appetite. Wife reports patient is feeling very weak today and current BP is 71/46, HR 67, pulse ox 94%. Wife advised not to give patient any more BP medication and call EMS.     Reason for Disposition   [1] Systolic BP < 90 AND [2] dizzy, lightheaded, or weak    Additional Information   Negative: Started suddenly after an allergic medicine, an allergic food, or bee sting   Negative: Shock suspected (e.g., cold/pale/clammy skin, too weak to stand, low BP, rapid pulse)   Negative: Difficult to awaken or acting confused (e.g., disoriented, slurred speech)   Negative: Fainted    Protocols used: LOW BLOOD PRESSURE-A-AH

## 2020-10-19 NOTE — H&P
[facility]  Emergency Medicine  History & Physical      Patient Name: Luis Eduardo Curry  MRN: [id number]  Admission Date: 10/18/2020  Attending Physician: Juan Carlos Perdue MD   Primary Care Provider: Kiko Connor MD    Hospital Medicine Team: Networked reference to record PCT  Kate Diamond DO     Patient information was obtained from patient and ER records.     Subjective:     Principal Problem:Hypotension    Chief Complaint:   Chief Complaint   Patient presents with    [place]     Pt arrives via EMS as tx from [place] for vascular neurology consult for aortic dissection.        HPI:     Mr. 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 He was discharged on 10/10 with new blood pressure medications (labetalol, procardia and lisinopril) with follow up in clinic on 10/26/20.     Patient presents to ED tonight for evaluation of low BP with associated generalized weakness, dizziness and lightheadedness. Patient reports he checks BP everyday which has been high 90s to low 100s since discharge until yesterday when it dropped to 60s with above symptoms. He took his morning dose but did not take evening dose of labetalol. Denies chest pain, sob, palpitation, syncope, abdominal pain/distention, back pain, fever, chills, dysuria or hematuria. In ED SBP in mid to high 90s and he remained asymptomatic. States he failed voiding trial during urology clinic visit 3 days ago and placed a new buckley with leg bag.     Labs significant for rise in Scr from recent 1.6 to 2.9.     Vascular surgery consulted by ED pending evaluation in am.         Past Medical History:   Diagnosis Date    Aortic dissection        Past Surgical History:   Procedure Laterality Date    BIOPSY WITH TRANSRECTAL  ULTRASOUND (TRUS) GUIDANCE N/A 5/1/2019    Procedure: BIOPSY, WITH TRANSRECTAL PROSTATE ULTRASOUND;  Surgeon: Chintan Mendez Jr., MD;  Location: [facility] OR;  Service: Urology;  Laterality: N/A;    COLONOSCOPY  2006    COLONOSCOPY N/A 1/24/2019    Procedure: COLONOSCOPY;  Surgeon: Reece Tarango MD;  Location: [facility];  Service: Endoscopy;  Laterality: N/A;    CYSTOSCOPY N/A 5/1/2019    Procedure: FLEXIBLE CYSTOSCOPY;  Surgeon: Chintan Mendez Jr., MD;  Location: [facility] OR;  Service: Urology;  Laterality: N/A;    UMBILICAL HERNIA REPAIR N/A 1/14/2020    Procedure: REPAIR, HERNIA, UMBILICAL;  Surgeon: Caio Mas Jr., MD;  Location: [facility];  Service: General;  Laterality: N/A;       Review of patient's allergies indicates:  No Known Allergies    Current Facility-Administered Medications on File Prior to Encounter   Medication    [COMPLETED] sodium chloride 0.9% bolus 500 mL    [COMPLETED] sodium chloride 0.9% bolus 500 mL     Current Outpatient Medications on File Prior to Encounter   Medication Sig    labetaloL (NORMODYNE) 300 MG tablet Take 1 tablet (300 mg total) by mouth every 12 (twelve) hours.    lisinopriL (PRINIVIL,ZESTRIL) 20 MG tablet Take 1 tablet (20 mg total) by mouth once daily.    NIFEdipine (PROCARDIA-XL) 30 MG (OSM) 24 hr tablet Take 1 tablet (30 mg total) by mouth once daily.    omeprazole (PRILOSEC) 40 MG capsule Take 1 capsule (40 mg total) by mouth once daily.    tamsulosin (FLOMAX) 0.4 mg Cap Take 1 capsule (0.4 mg total) by mouth once daily.     Family History     Problem Relation (Age of Onset)    Hypertension Father    No Known Problems Mother, Sister, Brother, Daughter, Son, Maternal Aunt, Maternal Uncle, Paternal Aunt, Paternal Uncle, Maternal Grandmother, Maternal Grandfather, Paternal Grandmother, Paternal Grandfather        Tobacco Use    Smoking status: Former Smoker     Packs/day: 1.00     Years: 30.00     Pack years: 30.00     Types: Cigarettes    Smokeless tobacco:  Never Used   Substance and Sexual Activity    Alcohol use: Yes     Frequency: Monthly or less     Binge frequency: Weekly     Comment: socially    Drug use: No    Sexual activity: Yes     Review of Systems   Constitutional: Positive for fatigue. Negative for activity change, appetite change, chills, diaphoresis, fever and unexpected weight change.   HENT: Negative for congestion, dental problem, drooling, ear discharge, ear pain, facial swelling, hearing loss, mouth sores, nosebleeds, postnasal drip, rhinorrhea, sinus pressure, sneezing, sore throat, tinnitus, trouble swallowing and voice change.    Eyes: Negative for photophobia, pain, discharge, redness, itching and visual disturbance.   Respiratory: Negative for apnea, cough, choking, chest tightness, shortness of breath, wheezing and stridor.    Cardiovascular: Negative for chest pain, palpitations and leg swelling.   Gastrointestinal: Negative for abdominal distention, abdominal pain, anal bleeding, blood in stool, constipation, diarrhea, nausea, rectal pain and vomiting.   Endocrine: Negative for cold intolerance, heat intolerance, polydipsia, polyphagia and polyuria.   Genitourinary: Negative for decreased urine volume, difficulty urinating, discharge, dysuria, enuresis, flank pain, frequency, genital sores, hematuria, penile pain, penile swelling, scrotal swelling, testicular pain and urgency.   Musculoskeletal: Negative for arthralgias, back pain, gait problem, joint swelling, myalgias, neck pain and neck stiffness.   Skin: Negative for color change, pallor, rash and wound.   Allergic/Immunologic: Negative for environmental allergies, food allergies and immunocompromised state.   Neurological: Positive for dizziness and light-headedness. Negative for tremors, seizures, syncope, facial asymmetry, speech difficulty, weakness, numbness and headaches.   Hematological: Negative for adenopathy. Does not bruise/bleed easily.   Psychiatric/Behavioral: Negative  for agitation, behavioral problems, confusion, decreased concentration, dysphoric mood, hallucinations, self-injury, sleep disturbance and suicidal ideas. The patient is not nervous/anxious and is not hyperactive.      Objective:     Vital Signs (Most Recent):  Temp: 98.4 °F (36.9 °C) (10/18/20 2315)  Pulse: 63 (10/19/20 0331)  Resp: 16 (10/19/20 0331)  BP: 131/62 (10/19/20 0331)  SpO2: 99 % (10/19/20 0331) Vital Signs (24h Range):  Temp:  [98.4 °F (36.9 °C)-99.2 °F (37.3 °C)] 98.4 °F (36.9 °C)  Pulse:  [62-71] 63  Resp:  [12-21] 16  SpO2:  [95 %-99 %] 99 %  BP: ()/(41-63) 131/62     Weight: 95 kg (209 lb 7 oz)  Body mass index is 28.4 kg/m².    Physical Exam  Constitutional:       General: He is not in acute distress.     Appearance: He is well-developed. He is not diaphoretic.   HENT:      Head: Normocephalic and atraumatic.      Nose: Nose normal.      Mouth/Throat:      Pharynx: No oropharyngeal exudate.   Eyes:      General: No scleral icterus.     Conjunctiva/sclera: Conjunctivae normal.      Pupils: Pupils are equal, round, and reactive to light.   Neck:      Musculoskeletal: Normal range of motion and neck supple.      Thyroid: No thyromegaly.      Vascular: No JVD.      Trachea: No tracheal deviation.   Cardiovascular:      Rate and Rhythm: Normal rate and regular rhythm.      Heart sounds: Normal heart sounds. No murmur.   Pulmonary:      Effort: Pulmonary effort is normal. No respiratory distress.      Breath sounds: Normal breath sounds. No wheezing or rales.   Chest:      Chest wall: No tenderness.   Abdominal:      General: Bowel sounds are normal. There is no distension.      Palpations: Abdomen is soft. There is no mass.      Tenderness: There is no abdominal tenderness. There is no guarding or rebound.   Genitourinary:     Comments: Pulliam in place.   Musculoskeletal: Normal range of motion.         General: No tenderness.   Lymphadenopathy:      Cervical: No cervical adenopathy.   Skin:      General: Skin is warm and dry.      Findings: No erythema or rash.   Neurological:      Mental Status: He is alert and oriented to person, place, and time.      Cranial Nerves: No cranial nerve deficit.      Motor: No abnormal muscle tone.      Coordination: Coordination normal.      Deep Tendon Reflexes: Reflexes are normal and symmetric. Reflexes normal.   Psychiatric:         Thought Content: Thought content normal.         Judgment: Judgment normal.           CRANIAL NERVES     CN III, IV, VI   Pupils are equal, round, and reactive to light.      Significant Labs:   Recent Results (from the past 24 hour(s))   CBC auto differential    Collection Time: 10/18/20  7:55 PM   Result Value Ref Range    WBC 11.68 3.90 - 12.70 K/uL    RBC 4.26 (L) 4.60 - 6.20 M/uL    Hemoglobin 12.3 (L) 14.0 - 18.0 g/dL    Hematocrit 37.6 (L) 40.0 - 54.0 %    Mean Corpuscular Volume 88 82 - 98 fL    Mean Corpuscular Hemoglobin 28.9 27.0 - 31.0 pg    Mean Corpuscular Hemoglobin Conc 32.7 32.0 - 36.0 g/dL    RDW 13.3 11.5 - 14.5 %    Platelets 279 150 - 350 K/uL    MPV 10.3 9.2 - 12.9 fL    Immature Granulocytes 0.3 0.0 - 0.5 %    Gran # (ANC) 7.7 1.8 - 7.7 K/uL    Immature Grans (Abs) 0.04 0.00 - 0.04 K/uL    Lymph # 2.0 1.0 - 4.8 K/uL    Mono # 1.0 0.3 - 1.0 K/uL    Eos # 0.9 (H) 0.0 - 0.5 K/uL    Baso # 0.06 0.00 - 0.20 K/uL    nRBC 0 0 /100 WBC    Gran% 65.8 38.0 - 73.0 %    Lymph% 17.0 (L) 18.0 - 48.0 %    Mono% 8.4 4.0 - 15.0 %    Eosinophil% 8.0 0.0 - 8.0 %    Basophil% 0.5 0.0 - 1.9 %    Differential Method Automated    Comprehensive Metabolic Panel    Collection Time: 10/18/20  7:55 PM   Result Value Ref Range    Sodium 136 136 - 145 mmol/L    Potassium 4.6 3.5 - 5.1 mmol/L    Chloride 102 95 - 110 mmol/L    CO2 20 (L) 23 - 29 mmol/L    Glucose 108 70 - 110 mg/dL    BUN, Bld 46 (H) 6 - 20 mg/dL    Creatinine 2.9 (H) 0.5 - 1.4 mg/dL    Calcium 9.3 8.7 - 10.5 mg/dL    Total Protein 7.2 6.0 - 8.4 g/dL    Albumin 3.7 3.5 - 5.2 g/dL     Total Bilirubin 0.4 0.1 - 1.0 mg/dL    Alkaline Phosphatase 76 55 - 135 U/L    AST 14 10 - 40 U/L    ALT 24 10 - 44 U/L    Anion Gap 14 8 - 16 mmol/L    eGFR if African American 26 (A) >60 mL/min/1.73 m^2    eGFR if non African American 23 (A) >60 mL/min/1.73 m^2   Troponin I    Collection Time: 10/18/20  7:55 PM   Result Value Ref Range    Troponin I <0.006 0.000 - 0.026 ng/mL   CK    Collection Time: 10/18/20  7:55 PM   Result Value Ref Range    CPK 29 20 - 200 U/L   CK-MB    Collection Time: 10/18/20  7:55 PM   Result Value Ref Range    CPK 29 20 - 200 U/L    CPK MB 0.3 0.1 - 6.5 ng/mL    MB% 1.0 0.0 - 5.0 %   BNP    Collection Time: 10/18/20  7:55 PM   Result Value Ref Range    BNP 36 0 - 99 pg/mL   Protime-INR    Collection Time: 10/18/20  7:55 PM   Result Value Ref Range    Prothrombin Time 10.9 9.0 - 12.5 sec    INR 1.0 0.8 - 1.2   APTT    Collection Time: 10/18/20  7:55 PM   Result Value Ref Range    aPTT 30.5 21.0 - 32.0 sec   D-Dimer, Quantitative    Collection Time: 10/18/20  7:55 PM   Result Value Ref Range    D-Dimer 1.37 (H) <0.50 mg/L FEU   COVID-19 Rapid Screening    Collection Time: 10/18/20  8:11 PM   Result Value Ref Range    SARS-CoV-2 RNA, Amplification, Qual Negative Negative   POCT Influenza A/B Molecular    Collection Time: 10/19/20 12:03 AM   Result Value Ref Range    POC Molecular Influenza A Ag Negative Negative, Not Reported    POC Molecular Influenza B Ag Negative Negative, Not Reported     Acceptable Yes          Significant Imaging: I have reviewed and interpreted all pertinent imaging results/findings within the past 24 hours.    Assessment/Plan:     Active Diagnoses:    Diagnosis Date Noted POA    PRINCIPAL PROBLEM:  Hypotension [I95.9] 10/19/2020 Yes    CHRISTINE (acute kidney injury) [N17.9] 10/19/2020 Yes    BPH with urinary obstruction [N40.1, N13.8] 05/01/2019 Yes      Problems Resolved During this Admission:     #Hypotension with symptoms   -reports dizziness,  lightheadedness, generalized weakness   -likely due to recently added labetalol, procardia and lisinopril   -SBP in mid 90s since came to ED and remained asymptomatic   -will give  cc bolus and 100 cc/hr x 5 hrs   -continue labetalol with holding parameters   -SBP   -hold procardia and lisinopril for now   -goal SBP < 120 and HR < 60 given recent admission for thoracoabdominal aneurysm and dissection     #Aoritic aneurysm and dissection   -recently admitted to vascular surgery service 10/4 -10/10 with chest pain and found to have thoracoabdominal aneurysm and dissection   -medically treated with tight BP control   -has follow up vascular clinic appointment on 10/26  -remained asymptomatic except dizziness, generalized weakness due to hypotension   -vascular surgery consulted by ED and will see patient in am   -follow up vascular surgery recommendations     #CHRISTINE   -Scr up to 2.9 from recent 1.6  -likely due to ATN associated with hypotension and recent addition of ACE-I  -will give 1L NS and monitor renal function closely   -hold ACE-I and avoid nephrotoxic agents  -check UA, urine lytes     #BPH with obstruction   -buckley on place with leg bag   -failed voiding trial 3 days ago during urology clinic visit and placed buckley back in place with leg bag  -hold flomax for now given hypotension     VTE Risk Mitigation (From admission, onward)    None            Kate Diamond DO  Department of Hospital Medicine   [facility]

## 2020-10-19 NOTE — SUBJECTIVE & OBJECTIVE
Medications:  Continuous Infusions:   sodium chloride 0.9% 100 mL/hr at 10/19/20 0600     Scheduled Meds:   labetalol  300 mg Oral Q12H    pantoprazole  40 mg Oral Daily     PRN Meds:acetaminophen, dextrose 50%, dextrose 50%, glucagon (human recombinant), glucose, glucose, ondansetron, sodium chloride 0.9%     Objective:     Vital Signs (Most Recent):  Temp: 97.9 °F (36.6 °C) (10/19/20 0415)  Pulse: 63 (10/19/20 0415)  Resp: 18 (10/19/20 0415)  BP: 122/64 (10/19/20 0415)  SpO2: 97 % (10/19/20 0415) Vital Signs (24h Range):  Temp:  [97.9 °F (36.6 °C)-99.2 °F (37.3 °C)] 97.9 °F (36.6 °C)  Pulse:  [62-71] 63  Resp:  [12-21] 18  SpO2:  [95 %-99 %] 97 %  BP: ()/(41-64) 122/64         Physical Exam  Vitals signs reviewed.   Constitutional:       Appearance: He is well-developed.   HENT:      Head: Normocephalic and atraumatic.   Eyes:      General:         Right eye: No discharge.         Left eye: No discharge.   Neck:      Musculoskeletal: Normal range of motion and neck supple.   Cardiovascular:      Rate and Rhythm: Normal rate and regular rhythm.   Pulmonary:      Effort: Pulmonary effort is normal. No respiratory distress.   Musculoskeletal: Normal range of motion.   Skin:     General: Skin is warm and dry.   Neurological:      Mental Status: He is alert and oriented to person, place, and time.   Psychiatric:         Behavior: Behavior normal.         Significant Labs:  CBC:   Recent Labs   Lab 10/18/20  1955   WBC 11.68   RBC 4.26*   HGB 12.3*   HCT 37.6*      MCV 88   MCH 28.9   MCHC 32.7     CMP:   Recent Labs   Lab 10/18/20  1955      CALCIUM 9.3   ALBUMIN 3.7   PROT 7.2      K 4.6   CO2 20*      BUN 46*   CREATININE 2.9*   ALKPHOS 76   ALT 24   AST 14   BILITOT 0.4       Significant Diagnostics:  I have reviewed all pertinent imaging results/findings within the past 24 hours.

## 2020-10-19 NOTE — ED NOTES
Pt transferred to [facility] ED via [facility]. Belongings with patient. Family aware of transfer. Pt AAOx4, NADN. Left in stable condition.

## 2020-10-19 NOTE — ED PROVIDER NOTES
"[facility]                                                 Chief Complaint  60 y.o. male with Hypotension   -- new blood pressure medicines.   -- pt states "i feel weak, i think it's too many meds")    History of Present Illness  Luis Eduardo Curry presents to the emergency room with low blood pressure  Recently diagnosed in early October 2020 with a thoracoabdominal dissection  Patient with low blood pressures today, on several blood pressure medication  Patient presents with a systolic blood pressure 68, weakness and fatigue now    The history is provided by the patient   device was not used during this ER visit    Past Surgical History   -- BIOPSY WITH TRANSRECTAL ULTRASOUND (TRUS) GUIDANCE       -- COLONOSCOPY       -- COLONOSCOPY       -- CYSTOSCOPY       -- UMBILICAL HERNIA REPAIR          No Known Allergies     I have reviewed all of this patient's past medical, surgical, family, and social   histories as well as active allergies and medications documented in the  electronic medical record    Review of Systems and Physical Exam      Review of Systems  -- Constitution - weakness and hypotension  -- Eyes - no tearing or redness, no visual disturbance  -- Ear, Nose - no tinnitus or earache, no nasal congestion or discharge  -- Mouth,Throat - no sore throat, no toothache, normal voice, normal swallowing  -- Respiratory - denies cough and congestion, no shortness of breath, no ROSADO  -- Cardiovascular - denies chest pain, no palpitations, denies claudication  -- Gastrointestinal - denies abdominal pain, nausea, vomiting, or diarrhea  -- Genitourinary - no dysuria, denies flank pain, no hematuria, no STD risk  -- Musculoskeletal - denies back pain, negative for trauma or injury  -- Neurological - no headache, denies weakness or seizure; no LOC  -- Skin - denies pallor, rash, or changes in skin. no hives or welts noted    Vital Signs  His oral temperature is 99.2 °F (37.3 °C). "   His blood pressure is 68/44 and his pulse is 71.   His respiration is 12 and oxygen saturation is 96%.     Physical Exam  -- Nursing note and vitals reviewed  -- Constitutional: Appears well-developed and well-nourished  -- Head: Atraumatic. Normocephalic. No obvious abnormality  -- Eyes: Pupils are equal and reactive to light. Normal conjunctiva and lids  -- Cardiac: Normal rate, regular rhythm and normal heart sounds  -- Pulmonary: Normal respiratory effort, breath sounds clear to auscultation  -- Abdominal: Soft, no tenderness. Normal bowel sounds. Normal liver edge  -- Musculoskeletal: Normal range of motion, no effusions. Joints stable   -- Neurological: No focal deficits. Showed good interaction with staff  -- Vascular: Posterior tibial, dorsalis pedis and radial pulses 2+ bilaterally      Emergency Room Course      Lab Values  Labs Reviewed   SARS-COV-2 RNA AMPLIFICATION, QUAL   CBC W/ AUTO DIFFERENTIAL   COMPREHENSIVE METABOLIC PANEL   TROPONIN I   CK   CK-MB   B-TYPE NATRIURETIC PEPTIDE   PROTIME-INR   APTT   D DIMER, QUANTITATIVE   URINALYSIS, REFLEX TO URINE CULTURE     EKG    Radiology  X-Ray Chest 1 View    (Results Pending)     Additional Work up    Medications Given  Medications   sodium chloride 0.9% bolus 500 mL (has no administration in time range)     ED Physician Management  -note from Tal:  1.  Patient transition from Dr. West at  2000 for workup and disposition  2.  I spoke to the cardiovascular surgeon on call who recommends optimization of blood pressure with mild fluid resuscitation and stat transfer ED to ED for cardiology and vascular evaluation for management of medications  3.  Patient received 500 cc of normal saline x2, 3 blood pressure measurements obtained with map above 65, COVID negative, will transfer status      Diagnosis  [I95.9] Hypotension, unspecified hypotension type (Primary)  [I71.03] Dissection of thoracoabdominal aorta    Disposition and Plan  Condition:   Stable  Disposition:  Transfer to [facility] ER to ER     Cecilia Parada MD  10/18/20 7642

## 2020-10-19 NOTE — PLAN OF CARE
A&Ox4. VVS on RA, /67, HR 67. Adequate UOP per buckley to gravity in place for urinary retention, in place prior to arrival. Low NA+ diet tolerated well. Independent ambulation in room, sleeping throughout the day. Pt currently resting comfortably, bed in lowest position, call light in reach. WCTM.    Problem: Fall Injury Risk  Goal: Absence of Fall and Fall-Related Injury  Outcome: Ongoing, Progressing     Problem: Adult Inpatient Plan of Care  Goal: Plan of Care Review  Outcome: Ongoing, Progressing  Goal: Patient-Specific Goal (Individualization)  Outcome: Ongoing, Progressing  Goal: Absence of Hospital-Acquired Illness or Injury  Outcome: Ongoing, Progressing  Goal: Optimal Comfort and Wellbeing  Outcome: Ongoing, Progressing  Goal: Readiness for Transition of Care  Outcome: Ongoing, Progressing  Goal: Rounds/Family Conference  Outcome: Ongoing, Progressing     Problem: Electrolyte Imbalance (Acute Kidney Injury/Impairment)  Goal: Serum Electrolyte Balance  Outcome: Ongoing, Progressing     Problem: Fluid Imbalance (Acute Kidney Injury/Impairment)  Goal: Optimal Fluid Balance  Outcome: Ongoing, Progressing     Problem: Hematologic Alteration (Acute Kidney Injury/Impairment)  Goal: Hemoglobin, Hematocrit and Platelets Within Normal Range  Outcome: Ongoing, Progressing     Problem: Oral Intake Inadequate (Acute Kidney Injury/Impairment)  Goal: Optimal Nutrition Intake  Outcome: Ongoing, Progressing     Problem: Renal Function Impairment (Acute Kidney Injury/Impairment)  Goal: Effective Renal Function  Outcome: Ongoing, Progressing     Problem: Infection  Goal: Infection Symptom Resolution  Outcome: Ongoing, Progressing

## 2020-10-19 NOTE — CONSULTS
[facility]  Vascular Surgery  Consult Note    Inpatient consult to Vascular Surgery  Consult performed by: Be Frank MD  Consult ordered by: Supa Ibrahim MD  Reason for consult: Type B w/ dissection        Subjective:     Chief Complaint/Reason for Admission: Hypotension    History of Present Illness: Luis Eduardo Curry is a 60 y.o. 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  His pain was controlled with BP management and he was discharged home.  He presented to ED with hypotension and stable Hgb.  Admitted to Medicine. Vascular surgery consulted for assistance in management       Medications:  Continuous Infusions:   sodium chloride 0.9% 100 mL/hr at 10/19/20 0600     Scheduled Meds:   labetalol  300 mg Oral Q12H    pantoprazole  40 mg Oral Daily     PRN Meds:acetaminophen, dextrose 50%, dextrose 50%, glucagon (human recombinant), glucose, glucose, ondansetron, sodium chloride 0.9%     Objective:     Vital Signs (Most Recent):  Temp: 97.9 °F (36.6 °C) (10/19/20 0415)  Pulse: 63 (10/19/20 0415)  Resp: 18 (10/19/20 0415)  BP: 122/64 (10/19/20 0415)  SpO2: 97 % (10/19/20 0415) Vital Signs (24h Range):  Temp:  [97.9 °F (36.6 °C)-99.2 °F (37.3 °C)] 97.9 °F (36.6 °C)  Pulse:  [62-71] 63  Resp:  [12-21] 18  SpO2:  [95 %-99 %] 97 %  BP: ()/(41-64) 122/64         Physical Exam  Vitals signs reviewed.   Constitutional:       Appearance: He is well-developed.   HENT:      Head: Normocephalic and atraumatic.   Eyes:      General:         Right eye: No discharge.         Left eye: No discharge.   Neck:      Musculoskeletal: Normal range of motion and neck supple.   Cardiovascular:      Rate and Rhythm: Normal rate and regular rhythm.   Pulmonary:      Effort: Pulmonary effort is normal. No respiratory distress.   Musculoskeletal: Normal range of motion.   Skin:     General: Skin is warm and dry.   Neurological:      Mental Status: He is alert and oriented to person,  place, and time.   Psychiatric:         Behavior: Behavior normal.         Significant Labs:  CBC:   Recent Labs   Lab 10/18/20  1955   WBC 11.68   RBC 4.26*   HGB 12.3*   HCT 37.6*      MCV 88   MCH 28.9   MCHC 32.7     CMP:   Recent Labs   Lab 10/18/20  1955      CALCIUM 9.3   ALBUMIN 3.7   PROT 7.2      K 4.6   CO2 20*      BUN 46*   CREATININE 2.9*   ALKPHOS 76   ALT 24   AST 14   BILITOT 0.4       Significant Diagnostics:  I have reviewed all pertinent imaging results/findings within the past 24 hours.    Assessment/Plan:     * Hypotension  Luis Eduardo Curry is a 60 y.o. male w/ Type B Dissection discovered on 10/6.  Here w/ hypotension.    Clinically, unlikely to have worsening of dissection as his pain has been controlled  The anti-HTN regimen given at discharge is now more than he needs  Recommend downtitrating meds as needed.  Ideally HR 50s to 60s and systolics 100-130  Hgb unchanged from d/c  W/ CHRISTINE, likely from hypotension  Will need outpatient CTA once Cr has improved            Thank you for your consult. I will follow-up with patient. Please contact us if you have any additional questions.    Be Frank MD  Vascular Surgery  [facility]

## 2020-10-19 NOTE — ED TRIAGE NOTES
"60 y.o. male presents to ER   Chief Complaint   Patient presents with    Hypotension     new blood pressure medicines. pt states "i feel weak, i think it's too many meds"   . No acute distress noted.  "

## 2020-10-19 NOTE — PLAN OF CARE
Patient is a 60 year old male re-admitted in transfer from [facility] with Hypotension.  Patient discharged from [facility] 10/10/2020 after admit for Aortic Dissection with medical management with beta blockers and hypertensive medications.  Patient with reduction of medications and is expected to discharge home with no need when medically stable +/- 10/20/2020.    Patient lives with his wife, Katherine, who will drive him home and obtain anything he needs after discharge.  Patient was and is independent in ADLs with no DME for ambulation at home prior to admit.  [facility] given to patient and/or family with understanding verbalized.  CM name and number written on white board in patient's room with request to call for any questions and concerns.  Will continue to follow for needs.    PCP  Kiko Connor MD  [address] / [facility] [id number]  [phone]      [facility] [address]  [address]  [address]  [facility] [address]  Phone: [phone] Fax: [phone]    Extended Emergency Contact Information  Primary Emergency Contact: Katherine Curry  Address: [address] [place] [address] of [facility]  Home Phone: [phone]  Mobile Phone: [phone]  Relation: Spouse       10/19/20 1421   Discharge Assessment   Assessment Type Discharge Planning Assessment   Confirmed/corrected address and phone number on facesheet? Yes   Assessment information obtained from? Patient;Medical Record   Expected Length of Stay (days) 3   Communicated expected length of stay with patient/caregiver yes   Prior to hospitilization cognitive status: Alert/Oriented   Prior to hospitalization functional status: Independent   Current Functional Status: Independent   Facility Arrived From: [facility]   Lives With spouse   Able to Return to Prior Arrangements yes   Is patient able to care for self after discharge? Yes   Who  are your caregiver(s) and their phone number(s)? wife   Patient's perception of discharge disposition home or selfcare   Readmission Within the Last 30 Days other (see comments)  (Hypotension on Medical regime for AO dissection)   Equipment Currently Used at Home CPAP   Do you have any problems affording any of your prescribed medications? No   Is the patient taking medications as prescribed? yes   Does the patient have transportation home? Yes   Transportation Anticipated family or friend will provide   Discharge Plan A Home with family   Discharge Plan B Home with family   DME Needed Upon Discharge  none   Patient/Family in Agreement with Plan yes

## 2020-10-19 NOTE — ED TRIAGE NOTES
Luis Eduardo Curry, an 60 y.o. male presents to the ED via [facility] as a transfer from [place] for vascular neurology consult.    Pt admitted with aortic dissection recently. Was discharged with blood pressure medications. Today pt states he thinks he took too many BP medications, became weak at home, checked his BP and it was in the 60s.     PTA was given 1 liter of fluids in previous ER      Chief Complaint   Patient presents with    [place] Tx     Pt arrives via EMS as tx from [place] for vascular neurology consult for aortic dissection.     Review of patient's allergies indicates:  No Known Allergies  Past Medical History:   Diagnosis Date    Aortic dissection        LOC: The patient is awake, alert and aware of environment with an appropriate affect, the patient is oriented x 3 and speaking appropriately.   APPEARANCE: Patient appears comfortable and in no acute distress, patient is clean and well groomed.  SKIN: The skin is warm and dry, color consistent with ethnicity, patient has normal skin turgor and moist mucus membranes, skin intact, no breakdown or bruising noted.   MUSCULOSKELETAL: Patient moving all extremities spontaneously, no swelling noted.  RESPIRATORY: Airway is open and patent, respirations are spontaneous, patient has a normal effort and rate, no accessory muscle use noted.  CARDIAC: Patient has a normal rate and regular rhythm, no edema noted, capillary refill < 3 seconds.   GASTRO: Soft and non tender to palpation, no distention noted.   : Pt denies any pain or frequency with urination.  NEURO: Pt opens eyes spontaneously, behavior appropriate to situation, follows commands, facial expression symmetrical, bilateral hand grasp equal and even, purposeful motor response noted, normal sensation in all extremities when touched with a finger.

## 2020-10-19 NOTE — HPI
Luis Eduardo Curry is a 60 y.o. 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  His pain was controlled with BP management and he was discharged home.  He presented to ED with hypotension and stable Hgb.  Admitted to Medicine. Vascular surgery consulted for assistance in management

## 2020-10-19 NOTE — PLAN OF CARE
Plan of care reviewed with patient questions and concerns addressed and verbalized understanding. Vital signs stable.  No complaints.  AAOx4. Telemetry= NSR 60s. Safety and fall precautions maintained.  Physically Mobilized to their highest level of functioning. Pulliam catheter in place with leg bag. Patient reports having catheter since the beginning of October 2020 due to BPH, urinary retention. He also reports Pulliam catheter was changed on 10/15/20 by the Urologist he is seeing.  Actively progressing towards goals.  See flow sheet for further information.  Will continue to monitor.

## 2020-10-19 NOTE — ASSESSMENT & PLAN NOTE
Luis Eduardo Curry is a 60 y.o. male w/ Type B Dissection discovered on 10/6.  Here w/ hypotension.    Clinically, unlikely to have worsening of dissection as his pain has been controlled  The anti-HTN regimen given at discharge is now more than he needs  Recommend downtitrating meds as needed.  Ideally HR 50s to 60s and systolics 100-130  Hgb unchanged from d/c  W/ CHRISTINE, likely from hypotension  Will need outpatient CTA once Cr has improved

## 2020-10-20 VITALS
DIASTOLIC BLOOD PRESSURE: 70 MMHG | TEMPERATURE: 97 F | RESPIRATION RATE: 14 BRPM | HEIGHT: 72 IN | HEART RATE: 63 BPM | OXYGEN SATURATION: 96 % | WEIGHT: 209.44 LBS | BODY MASS INDEX: 28.37 KG/M2 | SYSTOLIC BLOOD PRESSURE: 124 MMHG

## 2020-10-20 PROBLEM — I71.40 AAA (ABDOMINAL AORTIC ANEURYSM) WITHOUT RUPTURE: Status: ACTIVE | Noted: 2020-10-20

## 2020-10-20 LAB
ANION GAP SERPL CALC-SCNC: 10 MMOL/L (ref 8–16)
BACTERIA #/AREA URNS AUTO: ABNORMAL /HPF
BASOPHILS # BLD AUTO: 0.04 K/UL (ref 0–0.2)
BASOPHILS NFR BLD: 0.6 % (ref 0–1.9)
BILIRUB UR QL STRIP: NEGATIVE
BUN SERPL-MCNC: 23 MG/DL (ref 6–20)
CALCIUM SERPL-MCNC: 9.2 MG/DL (ref 8.7–10.5)
CHLORIDE SERPL-SCNC: 107 MMOL/L (ref 95–110)
CLARITY UR REFRACT.AUTO: ABNORMAL
CO2 SERPL-SCNC: 21 MMOL/L (ref 23–29)
COLOR UR AUTO: ABNORMAL
CREAT SERPL-MCNC: 1.3 MG/DL (ref 0.5–1.4)
DIFFERENTIAL METHOD: ABNORMAL
EOSINOPHIL # BLD AUTO: 0.8 K/UL (ref 0–0.5)
EOSINOPHIL NFR BLD: 12.5 % (ref 0–8)
ERYTHROCYTE [DISTWIDTH] IN BLOOD BY AUTOMATED COUNT: 13.1 % (ref 11.5–14.5)
EST. GFR  (AFRICAN AMERICAN): >60 ML/MIN/1.73 M^2
EST. GFR  (NON AFRICAN AMERICAN): >60 ML/MIN/1.73 M^2
GLUCOSE SERPL-MCNC: 109 MG/DL (ref 70–110)
GLUCOSE UR QL STRIP: NEGATIVE
HCT VFR BLD AUTO: 36.9 % (ref 40–54)
HGB BLD-MCNC: 11.9 G/DL (ref 14–18)
HGB UR QL STRIP: ABNORMAL
HYALINE CASTS UR QL AUTO: 0 /LPF
IMM GRANULOCYTES # BLD AUTO: 0.02 K/UL (ref 0–0.04)
IMM GRANULOCYTES NFR BLD AUTO: 0.3 % (ref 0–0.5)
KETONES UR QL STRIP: NEGATIVE
LEUKOCYTE ESTERASE UR QL STRIP: ABNORMAL
LYMPHOCYTES # BLD AUTO: 2 K/UL (ref 1–4.8)
LYMPHOCYTES NFR BLD: 29.4 % (ref 18–48)
MAGNESIUM SERPL-MCNC: 2.3 MG/DL (ref 1.6–2.6)
MCH RBC QN AUTO: 28.7 PG (ref 27–31)
MCHC RBC AUTO-ENTMCNC: 32.2 G/DL (ref 32–36)
MCV RBC AUTO: 89 FL (ref 82–98)
MICROSCOPIC COMMENT: ABNORMAL
MONOCYTES # BLD AUTO: 0.5 K/UL (ref 0.3–1)
MONOCYTES NFR BLD: 8 % (ref 4–15)
NEUTROPHILS # BLD AUTO: 3.3 K/UL (ref 1.8–7.7)
NEUTROPHILS NFR BLD: 49.2 % (ref 38–73)
NITRITE UR QL STRIP: NEGATIVE
NRBC BLD-RTO: 0 /100 WBC
PH UR STRIP: 7 [PH] (ref 5–8)
PHOSPHATE SERPL-MCNC: 3 MG/DL (ref 2.7–4.5)
PLATELET # BLD AUTO: 269 K/UL (ref 150–350)
PMV BLD AUTO: 10.4 FL (ref 9.2–12.9)
POTASSIUM SERPL-SCNC: 3.9 MMOL/L (ref 3.5–5.1)
PROT UR QL STRIP: ABNORMAL
RBC # BLD AUTO: 4.14 M/UL (ref 4.6–6.2)
RBC #/AREA URNS AUTO: >100 /HPF (ref 0–4)
SODIUM SERPL-SCNC: 138 MMOL/L (ref 136–145)
SP GR UR STRIP: 1.01 (ref 1–1.03)
SQUAMOUS #/AREA URNS AUTO: 1 /HPF
URN SPEC COLLECT METH UR: ABNORMAL
WBC # BLD AUTO: 6.73 K/UL (ref 3.9–12.7)
WBC #/AREA URNS AUTO: 10 /HPF (ref 0–5)

## 2020-10-20 PROCEDURE — 81001 URINALYSIS AUTO W/SCOPE: CPT

## 2020-10-20 PROCEDURE — 99239 HOSP IP/OBS DSCHRG MGMT >30: CPT | Mod: ,,, | Performed by: NURSE PRACTITIONER

## 2020-10-20 PROCEDURE — 84100 ASSAY OF PHOSPHORUS: CPT

## 2020-10-20 PROCEDURE — 25000003 PHARM REV CODE 250: Performed by: HOSPITALIST

## 2020-10-20 PROCEDURE — 85025 COMPLETE CBC W/AUTO DIFF WBC: CPT

## 2020-10-20 PROCEDURE — 63700000 PHARM REV CODE 250 ALT 637 W/O HCPCS: Performed by: NURSE PRACTITIONER

## 2020-10-20 PROCEDURE — 80048 BASIC METABOLIC PNL TOTAL CA: CPT

## 2020-10-20 PROCEDURE — 25000003 PHARM REV CODE 250: Performed by: NURSE PRACTITIONER

## 2020-10-20 PROCEDURE — 99239 PR HOSPITAL DISCHARGE DAY,>30 MIN: ICD-10-PCS | Mod: ,,, | Performed by: NURSE PRACTITIONER

## 2020-10-20 PROCEDURE — 36415 COLL VENOUS BLD VENIPUNCTURE: CPT

## 2020-10-20 PROCEDURE — 83735 ASSAY OF MAGNESIUM: CPT

## 2020-10-20 RX ORDER — AZITHROMYCIN 250 MG/1
250 TABLET, FILM COATED ORAL DAILY
Qty: 3 TABLET | Refills: 0 | Status: SHIPPED | OUTPATIENT
Start: 2020-10-21 | End: 2020-10-24

## 2020-10-20 RX ORDER — NIFEDIPINE 30 MG/1
30 TABLET, EXTENDED RELEASE ORAL DAILY
Qty: 30 TABLET | Refills: 0 | Status: SHIPPED | OUTPATIENT
Start: 2020-10-20 | End: 2020-12-10

## 2020-10-20 RX ORDER — LISINOPRIL 20 MG/1
20 TABLET ORAL DAILY
Status: DISCONTINUED | OUTPATIENT
Start: 2020-10-20 | End: 2020-10-20 | Stop reason: HOSPADM

## 2020-10-20 RX ADMIN — ACETAMINOPHEN 650 MG: 325 TABLET ORAL at 03:10

## 2020-10-20 RX ADMIN — PANTOPRAZOLE SODIUM 40 MG: 40 TABLET, DELAYED RELEASE ORAL at 08:10

## 2020-10-20 RX ADMIN — LABETALOL HYDROCHLORIDE 300 MG: 200 TABLET, FILM COATED ORAL at 08:10

## 2020-10-20 RX ADMIN — LISINOPRIL 20 MG: 20 TABLET ORAL at 11:10

## 2020-10-20 RX ADMIN — AZITHROMYCIN 250 MG: 250 TABLET, FILM COATED ORAL at 08:10

## 2020-10-20 NOTE — PLAN OF CARE
Problem: Fall Injury Risk  Goal: Absence of Fall and Fall-Related Injury  Outcome: Ongoing, Progressing     Problem: Adult Inpatient Plan of Care  Goal: Plan of Care Review  Outcome: Ongoing, Progressing  Goal: Patient-Specific Goal (Individualization)  Outcome: Ongoing, Progressing  Goal: Absence of Hospital-Acquired Illness or Injury  Outcome: Ongoing, Progressing  Goal: Optimal Comfort and Wellbeing  Outcome: Ongoing, Progressing  Goal: Readiness for Transition of Care  Outcome: Ongoing, Progressing  Goal: Rounds/Family Conference  Outcome: Ongoing, Progressing     Problem: Electrolyte Imbalance (Acute Kidney Injury/Impairment)  Goal: Serum Electrolyte Balance  Outcome: Ongoing, Progressing     Problem: Fluid Imbalance (Acute Kidney Injury/Impairment)  Goal: Optimal Fluid Balance  Outcome: Ongoing, Progressing     Problem: Hematologic Alteration (Acute Kidney Injury/Impairment)  Goal: Hemoglobin, Hematocrit and Platelets Within Normal Range  Outcome: Ongoing, Progressing     Problem: Oral Intake Inadequate (Acute Kidney Injury/Impairment)  Goal: Optimal Nutrition Intake  Outcome: Ongoing, Progressing     Problem: Renal Function Impairment (Acute Kidney Injury/Impairment)  Goal: Effective Renal Function  Outcome: Ongoing, Progressing     Problem: Infection  Goal: Infection Symptom Resolution  Outcome: Ongoing, Progressing

## 2020-10-20 NOTE — PLAN OF CARE
Problem: Fall Injury Risk  Goal: Absence of Fall and Fall-Related Injury  10/20/2020 1729 by Marisa Gray, LPN  Outcome: Met  10/20/2020 1410 by Marisa Gray LPN  Outcome: Ongoing, Progressing     Problem: Adult Inpatient Plan of Care  Goal: Plan of Care Review  10/20/2020 1729 by Marisa Gray LPN  Outcome: Met  10/20/2020 1410 by Marisa Gray LPN  Outcome: Ongoing, Progressing  Goal: Patient-Specific Goal (Individualization)  10/20/2020 1729 by Marisa Gray LPN  Outcome: Met  10/20/2020 1410 by Marisa Gray LPN  Outcome: Ongoing, Progressing  Goal: Absence of Hospital-Acquired Illness or Injury  10/20/2020 1729 by Marisa Gray LPN  Outcome: Met  10/20/2020 1410 by Marisa Gray LPN  Outcome: Ongoing, Progressing  Goal: Optimal Comfort and Wellbeing  10/20/2020 1729 by Marisa Gray LPN  Outcome: Met  10/20/2020 1410 by Marisa Gray LPN  Outcome: Ongoing, Progressing  Goal: Readiness for Transition of Care  10/20/2020 1729 by Marisa Gray LPN  Outcome: Met  10/20/2020 1410 by Marisa Gray LPN  Outcome: Ongoing, Progressing  Goal: Rounds/Family Conference  10/20/2020 1729 by Marisa Gray LPN  Outcome: Met  10/20/2020 1410 by Marisa Gray LPN  Outcome: Ongoing, Progressing     Problem: Electrolyte Imbalance (Acute Kidney Injury/Impairment)  Goal: Serum Electrolyte Balance  10/20/2020 1729 by Marisa Gray LPN  Outcome: Met  10/20/2020 1410 by Marisa Gray LPN  Outcome: Ongoing, Progressing     Problem: Fluid Imbalance (Acute Kidney Injury/Impairment)  Goal: Optimal Fluid Balance  10/20/2020 1729 by Marisa Gray LPN  Outcome: Met  10/20/2020 1410 by Marisa Gray LPN  Outcome: Ongoing, Progressing     Problem: Hematologic Alteration (Acute Kidney Injury/Impairment)  Goal: Hemoglobin, Hematocrit and Platelets Within Normal Range  10/20/2020 1729 by Marisa Gray LPN  Outcome: Met  10/20/2020 1410 by Marisa Gray LPN  Outcome:  Ongoing, Progressing     Problem: Oral Intake Inadequate (Acute Kidney Injury/Impairment)  Goal: Optimal Nutrition Intake  10/20/2020 1729 by Marisa Gray LPN  Outcome: Met  10/20/2020 1410 by Marisa Gray LPN  Outcome: Ongoing, Progressing     Problem: Renal Function Impairment (Acute Kidney Injury/Impairment)  Goal: Effective Renal Function  10/20/2020 1729 by Marisa Gray, LPN  Outcome: Met  10/20/2020 1410 by Marisa Gray LPN  Outcome: Ongoing, Progressing     Problem: Infection  Goal: Infection Symptom Resolution  10/20/2020 1729 by Marisa Gray LPN  Outcome: Met  10/20/2020 1410 by Marisa Gray LPN  Outcome: Ongoing, Progressing

## 2020-10-20 NOTE — ED PROVIDER NOTES
Encounter Date: 10/18/2020       History     Chief Complaint   Patient presents with    [place] Tx     Pt arrives via EMS as tx from [place] for vascular neurology consult for aortic dissection.     59 yo M with known aortic dissection presents from OSH with hypotension.  Seen recently by vascular and had BP meds increased.  Patient things this is the reason he felt presyncopal earlier today.  Given fluids at OSH with improved BP.  Sent for further evaluation.  Patient denies nausea, vomiting, diarrhea, fever, cough, shortness of breath, chest pain, abdominal pain, or dysuria.  A ten point review of systems was completed and is negative except as documented above.  Patient denies any other acute medical complaint.  The patients available PMH, PSH, Social History, medications, allergies, and triage vital signs were reviewed just prior to their medical evaluation.        Review of patient's allergies indicates:  No Known Allergies  Past Medical History:   Diagnosis Date    Aortic dissection      Past Surgical History:   Procedure Laterality Date    BIOPSY WITH TRANSRECTAL ULTRASOUND (TRUS) GUIDANCE N/A 5/1/2019    Procedure: BIOPSY, WITH TRANSRECTAL PROSTATE ULTRASOUND;  Surgeon: Chintan Mendez Jr., MD;  Location: [facility];  Service: Urology;  Laterality: N/A;    COLONOSCOPY  2006    COLONOSCOPY N/A 1/24/2019    Procedure: COLONOSCOPY;  Surgeon: Reece Tarango MD;  Location: [facility];  Service: Endoscopy;  Laterality: N/A;    CYSTOSCOPY N/A 5/1/2019    Procedure: FLEXIBLE CYSTOSCOPY;  Surgeon: Chintan Mendez Jr., MD;  Location: [facility];  Service: Urology;  Laterality: N/A;    UMBILICAL HERNIA REPAIR N/A 1/14/2020    Procedure: REPAIR, HERNIA, UMBILICAL;  Surgeon: Caio Mas Jr., MD;  Location: [facility];  Service: General;  Laterality: N/A;     Family History   Problem Relation Age of Onset    No Known Problems Mother     Hypertension Father     No Known Problems Sister     No Known Problems  Brother     No Known Problems Daughter     No Known Problems Son     No Known Problems Maternal Aunt     No Known Problems Maternal Uncle     No Known Problems Paternal Aunt     No Known Problems Paternal Uncle     No Known Problems Maternal Grandmother     No Known Problems Maternal Grandfather     No Known Problems Paternal Grandmother     No Known Problems Paternal Grandfather      Social History     Tobacco Use    Smoking status: Former Smoker     Packs/day: 1.00     Years: 30.00     Pack years: 30.00     Types: Cigarettes    Smokeless tobacco: Never Used   Substance Use Topics    Alcohol use: Yes     Frequency: Monthly or less     Binge frequency: Weekly     Comment: socially    Drug use: No     Review of Systems   Constitutional: Negative for fever.   HENT: Negative for sore throat.    Eyes: Negative for visual disturbance.   Respiratory: Negative for cough and shortness of breath.    Cardiovascular: Negative for chest pain.   Gastrointestinal: Negative for abdominal pain, diarrhea, nausea and vomiting.   Genitourinary: Negative for dysuria.   Musculoskeletal: Negative for neck pain.   Skin: Negative for rash and wound.   Allergic/Immunologic: Negative for immunocompromised state.   Neurological: Positive for light-headedness. Negative for syncope.   Psychiatric/Behavioral: Negative for confusion.       Physical Exam     Initial Vitals [10/18/20 2315]   BP Pulse Resp Temp SpO2   99/61 70 12 98.4 °F (36.9 °C) 97 %      MAP       --         Physical Exam    Nursing note and vitals reviewed.  Constitutional: He appears well-developed and well-nourished. He is not diaphoretic. No distress.   HENT:   Head: Normocephalic and atraumatic.   Nose: Nose normal.   Eyes: Conjunctivae are normal. Right eye exhibits no discharge. Left eye exhibits no discharge.   Neck: Normal range of motion. Neck supple.   Cardiovascular: Normal rate, regular rhythm and normal heart sounds. Exam reveals no gallop and no  friction rub.    No murmur heard.  Pulmonary/Chest: Breath sounds normal. No respiratory distress. He has no wheezes. He has no rhonchi. He has no rales.   Abdominal: Soft. He exhibits no distension. There is no abdominal tenderness. There is no rebound and no guarding.   No pulsatile mass   Musculoskeletal: Normal range of motion. No tenderness or edema.   Neurological: He is alert and oriented to person, place, and time. GCS score is 15. GCS eye subscore is 4. GCS verbal subscore is 5. GCS motor subscore is 6.   Skin: Skin is warm and dry. No rash noted. No erythema.   Psychiatric: He has a normal mood and affect. His behavior is normal. Judgment and thought content normal.         ED Course   Procedures  Labs Reviewed   POCT INFLUENZA A/B MOLECULAR        ECG Results    None       Imaging Results    None          Medical Decision Making:   History:   Old Medical Records: I decided to obtain old medical records.  Old Records Summarized: records from another hospital.  Clinical Tests:   Lab Tests: Reviewed and Ordered  Radiological Study: Reviewed  ED Management:  59 yo M presents from OSH with hypotension.  Vitals here improved.  PE benign.  Labs at OSH show stable H/H, but increased Cr.  Unable to get CTA to check aneurysm, but doubt leaking given exam and labs.  Flu negative.  Will admit to  for BP medication adjustments.  D/W vascular who will see in a.m.  Did bedside teaching.  All questions answered.  Patient acknowledges understanding.  Other:   I have discussed this case with another health care provider.       <> Summary of the Discussion: Vascular,  (admission)                             Clinical Impression:   Hypotension  AAA       Disposition:   Disposition: Placed in Observation  Condition: Stable     ED Disposition Condition    Observation                             Supa Ibrahim MD  10/19/20 3788

## 2020-10-20 NOTE — NURSING
Patients B/P 129/73 and HR 57. Orders are to hold if HR<60. Spoke with Ruthann NP to see if they still wanted me to administer lebetalol. NP ordered to still administer because B/P goal is 100. Will take the B/P in an hour after administration. Will continue to monitor patient.

## 2020-10-20 NOTE — PLAN OF CARE
"Pt slept throughout evening.  Pt c/o moderate right neck/shoulder pain at beginning of shift, pt reports it began hurting during day, when he "turned it wrong."  Pt reports getting similar pain at home occasionally. relieved by PRN tylenol and heat pack.      Pt's 2130 .  Pt refused PM dose of labetalol.      Pt SB/SR on telemetry.    Pulliam catheter in place.  Catheter care complete.    Pt ambulating independently  "

## 2020-10-20 NOTE — NURSING
Patient being discharged. Discharge instructions explained, verbalized understanding. IV removed, catheter tip intact. Patient going home with Pulliam. F/u with Urology ordered. Waiting on transport. Will continue to monitor patient.

## 2020-10-21 ENCOUNTER — PATIENT OUTREACH (OUTPATIENT)
Dept: ADMINISTRATIVE | Facility: CLINIC | Age: 60
End: 2020-10-21

## 2020-10-21 LAB
BACTERIA SPEC AEROBE CULT: NORMAL
BACTERIA SPEC AEROBE CULT: NORMAL
GRAM STN SPEC: NORMAL

## 2020-10-21 NOTE — PLAN OF CARE
Patient discharged home with no needs 10/20/2020 with buckley.    Future Appointments   Date Time Provider Department Center   10/26/2020  8:30 AM [facility] OIC-CT1 500 LB LIMIT [facility]   10/26/2020  9:15 AM JOSY Santos II, MD [facility] VASCSJUNIE [facility] y   11/10/2020  8:00 AM Renu Alvarado NP [facility] UROLOGC Ortiz y          10/21/20 0727   Final Note   Assessment Type Final Discharge Note   Anticipated Discharge Disposition Home   Hospital Follow Up  Appt(s) scheduled? Yes   Right Care Referral Info   Post Acute Recommendation No Care   Post-Acute Status   Post-Acute Authorization Other   Other Status No Post-Acute Service Needs

## 2020-10-21 NOTE — TELEPHONE ENCOUNTER
C3 nurse attempted to contact patient. No answer. The following message was left for the patient to return the call:  Good AFTERNOON I am a nurse calling on behalf of [facility] from the Care Coordination Center.  This is a Transitional Care Call for Luis Eduardo Curry. When you have a moment please contact us at [phone] or [phone] Monday through Friday, between the hours of 8 am to 4 pm. We look forward to speaking with you. On behalf of [facility] have a nice day.    The patient does not have a scheduled HOSFU appointment within 7-14 days post hospital discharge date 10/20/20.

## 2020-10-22 NOTE — PATIENT INSTRUCTIONS
Low Blood Pressure (All Causes)  A blood pressure reading is made up of 2 numbers There is a top number over a bottom number. The top number is the systolic pressure. The bottom number is the diastolic pressure. A normal blood pressure is a systolic pressure less than 120 over a diastolic pressure less than 80. Low blood pressure (hypotension) is a blood pressure that is less than what is normal for you. Low blood pressure can cause dizziness, lightheadedness, or fainting.  Some medicines can cause low blood pressure. They include:  · High blood pressure pills  · Water pills (diuretics)  · Some heart medicines  · Some antidepressants  · Pain, anxiety, sedative, and sleeping medicines  Other causes include:  · Dehydration, severe infection, or fever  · Blood loss, such as bleeding from the stomach or intestines.  · Heart failure  · Change in heart rate or rhythm (arrhythmia)  · A drop in blood pressure from a sudden change in body position, from lying down to standing (orthostatic hypotension)  · Alcohol or drug intoxication  · Neurological diseases that impair the autonomic nervous system  Treatment will depend on what is causing your low blood pressure.  Home care  Follow these guidelines when caring for yourself at home:  · Rest until your symptoms get better.  · Keep a record of your symptoms and what you were doing when they occurred. Bring the record with you to your next appointment.  · Be aware of how quickly your blood pressure drops when you become dehydrated, spend a lot of time in the sun, or have low blood sugar. Take measures to prevent blood pressure drops at these times.  · Follow the treatment plan described by your healthcare provider.  Follow-up care  Follow up with your healthcare provider, or as advised.  When to seek medical advice  Call your healthcare provider right away if any of these occur:  · Dizziness, lightheadedness, or fainting  · Black or red color in your stools or  vomit  · Shortness of breath or difficulty breathing  · Chest, shoulder, arm, neck, or upper back pain  · Abdominal pain  · Diarrhea or vomiting that doesnt go away  · You arent able to eat or drink  · Fever of 100.4°F (38°C) or higher, or as directed by your healthcare provider  · Burning when you urinate  · Foul-smelling urine  Date Last Reviewed: 12/1/2016  © 4742-7124 [employer]. [address] [place] [address], [place] [address]. All rights reserved. This information is not intended as a substitute for professional medical care. Always follow your healthcare professional's instructions.

## 2020-10-26 ENCOUNTER — HOSPITAL ENCOUNTER (OUTPATIENT)
Dept: RADIOLOGY | Facility: HOSPITAL | Age: 60
Discharge: HOME | End: 2020-10-26
Attending: STUDENT IN AN ORGANIZED HEALTH CARE EDUCATION/TRAINING PROGRAM
Payer: COMMERCIAL

## 2020-10-26 ENCOUNTER — OFFICE VISIT (OUTPATIENT)
Dept: VASCULAR SURGERY | Facility: CLINIC | Age: 60
End: 2020-10-26
Attending: SURGERY
Payer: COMMERCIAL

## 2020-10-26 VITALS
HEIGHT: 72 IN | BODY MASS INDEX: 28.07 KG/M2 | WEIGHT: 207.25 LBS | HEART RATE: 67 BPM | TEMPERATURE: 99 F | DIASTOLIC BLOOD PRESSURE: 55 MMHG | SYSTOLIC BLOOD PRESSURE: 91 MMHG

## 2020-10-26 DIAGNOSIS — Z01.818 PRE-OP EVALUATION: Primary | ICD-10-CM

## 2020-10-26 DIAGNOSIS — I71.019 AORTIC DISSECTION DISTAL TO LEFT SUBCLAVIAN: Primary | ICD-10-CM

## 2020-10-26 DIAGNOSIS — I71.019 ACUTE THORACIC AORTIC DISSECTION: Primary | ICD-10-CM

## 2020-10-26 DIAGNOSIS — I71.03 DISSECTION OF THORACOABDOMINAL AORTA: ICD-10-CM

## 2020-10-26 DIAGNOSIS — Z01.818 PRE-OP EVALUATION: ICD-10-CM

## 2020-10-26 PROCEDURE — 3008F PR BODY MASS INDEX (BMI) DOCUMENTED: ICD-10-PCS | Mod: CPTII,S$GLB,, | Performed by: SURGERY

## 2020-10-26 PROCEDURE — 99213 OFFICE O/P EST LOW 20 MIN: CPT | Mod: S$GLB,,, | Performed by: SURGERY

## 2020-10-26 PROCEDURE — 3008F BODY MASS INDEX DOCD: CPT | Mod: CPTII,S$GLB,, | Performed by: SURGERY

## 2020-10-26 PROCEDURE — 99213 PR OFFICE/OUTPT VISIT, EST, LEVL III, 20-29 MIN: ICD-10-PCS | Mod: S$GLB,,, | Performed by: SURGERY

## 2020-10-26 PROCEDURE — 99999 PR PBB SHADOW E&M-EST. PATIENT-LVL III: ICD-10-PCS | Mod: PBBFAC,,, | Performed by: SURGERY

## 2020-10-26 PROCEDURE — 25500020 PHARM REV CODE 255: Performed by: STUDENT IN AN ORGANIZED HEALTH CARE EDUCATION/TRAINING PROGRAM

## 2020-10-26 PROCEDURE — 71275 CT ANGIOGRAPHY CHEST: CPT | Mod: 26,,, | Performed by: RADIOLOGY

## 2020-10-26 PROCEDURE — 71275 CTA CHEST ABDOMEN PELVIS: ICD-10-PCS | Mod: 26,,, | Performed by: RADIOLOGY

## 2020-10-26 PROCEDURE — 74174 CTA ABD&PLVS W/CONTRAST: CPT | Mod: 26,,, | Performed by: RADIOLOGY

## 2020-10-26 PROCEDURE — 74174 CTA CHEST ABDOMEN PELVIS: ICD-10-PCS | Mod: 26,,, | Performed by: RADIOLOGY

## 2020-10-26 PROCEDURE — 71275 CT ANGIOGRAPHY CHEST: CPT | Mod: TC

## 2020-10-26 PROCEDURE — 99999 PR PBB SHADOW E&M-EST. PATIENT-LVL III: CPT | Mod: PBBFAC,,, | Performed by: SURGERY

## 2020-10-26 RX ADMIN — IOHEXOL 100 ML: 350 INJECTION, SOLUTION INTRAVENOUS at 08:10

## 2020-10-26 NOTE — H&P (VIEW-ONLY)
VASCULAR SURGERY NOTE    Patient ID: Luis Eduardo Curry is a 60 y.o. male.    I. HISTORY     Chief Complaint: Type B Aortic dissection    HPI: Luis Eduardo Curry is a 60 y.o. male who is here today for follow up appointment. He initially presented to the hospital on 10/4/2020 with chest pain and severe CHRISTINE and was found to have a descending aortic dissection and post-obstructive nephropathy due to BPH. He was managed medically with anti-impulse control and buckley and discharged about a week after admission. He was recently admitted with hypotension and CHRISTINE a week ago. He did not report any chest/back pain and hemoglobin was stable. His hypotension was likely due to excess antihypertensive medications as his aorta remodeled. His dosages were lowered and he was discharged home a few days after admission. He follows up in clinic with repeat CTA chest/abd/pelvis that shows no significant change in his dissection. He has occasional dizziness and he feels constantly fatigued. He believes this is secondary to his medications and I agree.    Social History: Does not smoke, not drinking, no drugs    Family history: No fam hx of aneurysm/dissection    Past Medical History:   Diagnosis Date    Aortic dissection         Past Surgical History:   Procedure Laterality Date    BIOPSY WITH TRANSRECTAL ULTRASOUND (TRUS) GUIDANCE N/A 5/1/2019    Procedure: BIOPSY, WITH TRANSRECTAL PROSTATE ULTRASOUND;  Surgeon: Chintan Mendez Jr., MD;  Location: [facility];  Service: Urology;  Laterality: N/A;    COLONOSCOPY  2006    COLONOSCOPY N/A 1/24/2019    Procedure: COLONOSCOPY;  Surgeon: Reece Tarango MD;  Location: [facility];  Service: Endoscopy;  Laterality: N/A;    CYSTOSCOPY N/A 5/1/2019    Procedure: FLEXIBLE CYSTOSCOPY;  Surgeon: Chintan Mendez Jr., MD;  Location: [facility];  Service: Urology;  Laterality: N/A;    UMBILICAL HERNIA REPAIR N/A 1/14/2020    Procedure: REPAIR, HERNIA, UMBILICAL;  Surgeon: Caio Mas Jr., MD;  Location:  [facility];  Service: General;  Laterality: N/A;       Social History     Tobacco Use   Smoking Status Former Smoker    Packs/day: 1.00    Years: 30.00    Pack years: 30.00    Types: Cigarettes   Smokeless Tobacco Never Used        Review of Systems   Constitution: Positive for weight loss. Negative for chills and fever.   HENT: Negative for congestion, hearing loss, hoarse voice and nosebleeds.    Eyes: Negative for discharge, pain, vision loss in left eye and vision loss in right eye.   Cardiovascular: Positive for chest pain. Negative for palpitations.   Respiratory: Negative for cough, hemoptysis and shortness of breath.    Endocrine: Negative for cold intolerance, heat intolerance, polydipsia and polyphagia.   Skin: Negative for dry skin and rash.   Musculoskeletal: Positive for back pain and neck pain.   Gastrointestinal: Negative for abdominal pain, diarrhea, melena, nausea and vomiting.   Genitourinary: Negative for dysuria and hematuria.   Neurological: Positive for dizziness. Negative for paresthesias.         II. PHYSICAL EXAM     Physical Exam   GEN: Alert/oriented, normal affect, normal speech  HEENT: atraumatic, normocephalic  CHEST: normal respiratory effort, symmetrical chest rise  CARD: Regular rate, regular rhythm  SKIN: no rash, no discoloration  EXT: Warm, no cyanosis/edema, buckley bag in place on right leg  VASC: 2+ radial pulses bilaterally  RLE: 2+ dP pulse  LLE: 2+ DP pulse    III. ASSESSMENT & PLAN (MEDICAL DECISION MAKING)     1. Aortic dissection distal to left subclavian        Imaging Results:  CTA CHEST/ABD/PELVIS 10/26/20:   There is 1mm growth of inner to inner wall diameter of the proximal thoracic aorta from 5.3 to 5.4mm. On sagittal cuts the proximal DTA has increased in size from 4.6 to 5.0cm. The right renal artery is still perfused via the false lumen and filling is nearly equal in delayed phase suggesting adequate renal perfusion.    Assessment/Diagnosis and Plan:  60 y.o. male  with uncomplicated type B dissection with slight interval growth in the past 2 weeks now in the subacute phase. Recommend repair with left carotid-subclavian bypass followed by TEVAR with left common carotid artery snorkel. Discussed treatment risks (paralysis, retrograde dissection, kidney injury, stroke, lymph leak, bleeding, need for future surgeries etc), benefits (aortic remodeling, prevention of aneurysmal growth, prevention of rupture), and alternatives (open repair, continued observation) to the operation. The patient and wife expressed full understanding and wished to proceed.    - Plan for staged left carotid subclavian artery bypass followed by TEVAR with left common carotid snorkel and left subclavian artery embolization  - Continue current home medications for best medical therapy with BP/HR control goal HR <80 and BP <120/80  - Consent obtained in clinic    JOSY Santos II, MD, [facility]  Vascular Surgeon  [facility]

## 2020-10-26 NOTE — PROGRESS NOTES
VASCULAR SURGERY NOTE    Patient ID: Luis Eduardo Curry is a 60 y.o. male.    I. HISTORY     Chief Complaint: Type B Aortic dissection    HPI: Luis Eduardo Curry is a 60 y.o. male who is here today for follow up appointment. He initially presented to the hospital on 10/4/2020 with chest pain and severe CHRISTINE and was found to have a descending aortic dissection and post-obstructive nephropathy due to BPH. He was managed medically with anti-impulse control and buckley and discharged about a week after admission. He was recently admitted with hypotension and CHRISTINE a week ago. He did not report any chest/back pain and hemoglobin was stable. His hypotension was likely due to excess antihypertensive medications as his aorta remodeled. His dosages were lowered and he was discharged home a few days after admission. He follows up in clinic with repeat CTA chest/abd/pelvis that shows no significant change in his dissection. He has occasional dizziness and he feels constantly fatigued. He believes this is secondary to his medications and I agree.    Social History: Does not smoke, not drinking, no drugs    Family history: No fam hx of aneurysm/dissection    Past Medical History:   Diagnosis Date    Aortic dissection         Past Surgical History:   Procedure Laterality Date    BIOPSY WITH TRANSRECTAL ULTRASOUND (TRUS) GUIDANCE N/A 5/1/2019    Procedure: BIOPSY, WITH TRANSRECTAL PROSTATE ULTRASOUND;  Surgeon: Chintan Mendez Jr., MD;  Location: [facility];  Service: Urology;  Laterality: N/A;    COLONOSCOPY  2006    COLONOSCOPY N/A 1/24/2019    Procedure: COLONOSCOPY;  Surgeon: Reece Tarango MD;  Location: [facility];  Service: Endoscopy;  Laterality: N/A;    CYSTOSCOPY N/A 5/1/2019    Procedure: FLEXIBLE CYSTOSCOPY;  Surgeon: Chintan Mendez Jr., MD;  Location: [facility];  Service: Urology;  Laterality: N/A;    UMBILICAL HERNIA REPAIR N/A 1/14/2020    Procedure: REPAIR, HERNIA, UMBILICAL;  Surgeon: Caio Mas Jr., MD;  Location:  [facility];  Service: General;  Laterality: N/A;       Social History     Tobacco Use   Smoking Status Former Smoker    Packs/day: 1.00    Years: 30.00    Pack years: 30.00    Types: Cigarettes   Smokeless Tobacco Never Used        Review of Systems   Constitution: Positive for weight loss. Negative for chills and fever.   HENT: Negative for congestion, hearing loss, hoarse voice and nosebleeds.    Eyes: Negative for discharge, pain, vision loss in left eye and vision loss in right eye.   Cardiovascular: Positive for chest pain. Negative for palpitations.   Respiratory: Negative for cough, hemoptysis and shortness of breath.    Endocrine: Negative for cold intolerance, heat intolerance, polydipsia and polyphagia.   Skin: Negative for dry skin and rash.   Musculoskeletal: Positive for back pain and neck pain.   Gastrointestinal: Negative for abdominal pain, diarrhea, melena, nausea and vomiting.   Genitourinary: Negative for dysuria and hematuria.   Neurological: Positive for dizziness. Negative for paresthesias.         II. PHYSICAL EXAM     Physical Exam   GEN: Alert/oriented, normal affect, normal speech  HEENT: atraumatic, normocephalic  CHEST: normal respiratory effort, symmetrical chest rise  CARD: Regular rate, regular rhythm  SKIN: no rash, no discoloration  EXT: Warm, no cyanosis/edema, buckley bag in place on right leg  VASC: 2+ radial pulses bilaterally  RLE: 2+ dP pulse  LLE: 2+ DP pulse    III. ASSESSMENT & PLAN (MEDICAL DECISION MAKING)     1. Aortic dissection distal to left subclavian        Imaging Results:  CTA CHEST/ABD/PELVIS 10/26/20:   There is 1mm growth of inner to inner wall diameter of the proximal thoracic aorta from 5.3 to 5.4mm. On sagittal cuts the proximal DTA has increased in size from 4.6 to 5.0cm. The right renal artery is still perfused via the false lumen and filling is nearly equal in delayed phase suggesting adequate renal perfusion.    Assessment/Diagnosis and Plan:  60 y.o. male  with uncomplicated type B dissection with slight interval growth in the past 2 weeks now in the subacute phase. Recommend repair with left carotid-subclavian bypass followed by TEVAR with left common carotid artery snorkel. Discussed treatment risks (paralysis, retrograde dissection, kidney injury, stroke, lymph leak, bleeding, need for future surgeries etc), benefits (aortic remodeling, prevention of aneurysmal growth, prevention of rupture), and alternatives (open repair, continued observation) to the operation. The patient and wife expressed full understanding and wished to proceed.    - Plan for staged left carotid subclavian artery bypass followed by TEVAR with left common carotid snorkel and left subclavian artery embolization  - Continue current home medications for best medical therapy with BP/HR control goal HR <80 and BP <120/80  - Consent obtained in clinic    JOSY Santos II, MD, [facility]  Vascular Surgeon  [facility]

## 2020-10-27 NOTE — HOSPITAL COURSE
Admitted to hospital medicine for acute hypotension post medical management of b/p and HR for Type B aortic Dissection.  CR elevated to 2.9 on admit with a preciptious decline to 1.3 with resolution of hypotension. His b/p losartan and Procardia were held initially, while his labetalol was continued on admit.  His b/p slowly laya to 120's from the 68/44, and his b/p meds were slowly readded to keep sbp well below 130. Sending home with Labetalol and Lisinopril with parameters to reintroduce Procardia as needed.     He had a notable small amount of hematuria in his buckley, no clots, after he traumatically pulled on it the night before by accident.    Dispo: home with vasc surg follow up

## 2020-10-27 NOTE — ASSESSMENT & PLAN NOTE
- reports dizziness, lightheadedness, generalized weakness   - likely due to recently addition of labetalol, procardia and lisinopril all at once  - Admit b/p 68/44 s/p  cc bolus and 100 cc/hr x 5 hrs   - continued labetalol with holding parameters   - SBP laya preciptiously  - initially held procardia and lisinopril, b/p trending up and resolution of CHRISTINE so Lisinopril resumed.  - goal SBP < 130 and HR < 55-60 given recent admission for thoracoabdominal aneurysm and dissection   - sent home with Parameters for Procardia to resume

## 2020-10-27 NOTE — ASSESSMENT & PLAN NOTE
- recently admitted to vascular surgery service 10/4 -10/10 with chest pain and found to have thoracoabdominal aneurysm and dissection   -medically treated with tight BP control   -has follow up vascular clinic appointment on 10/26  -remained asymptomatic except dizziness, generalized weakness due to hypotension   -vascular surgery consulted by ED   -follow up vascular surgery recommendations

## 2020-10-27 NOTE — ASSESSMENT & PLAN NOTE
-Scr up to 2.9 on admit  - likely due to ATN associated with hypotension and recent addition of ACE-I  - will give 1L NS and monitor renal function closely   - avoid nephrotoxic agents  - UA, urine lytes    -CR improved to 1.3

## 2020-10-27 NOTE — ASSESSMENT & PLAN NOTE
- buckley in place with leg bag, changed to measured buckley bag for better monitoring   -failed voiding trial 3 days prior during urology clinic visit and placed buckley back in place with leg bag  - hold flomax for now given hypotension, consider resumption now that bp improved.

## 2020-10-27 NOTE — DISCHARGE SUMMARY
[facility] Hospital Medicine  Discharge Summary      Patient Name: Luis Eduardo Curry  MRN: [id number]  Admission Date: 10/18/2020  Hospital Length of Stay: 1 days  Discharge Date and Time:  10/27/2020 2:33 AM  Attending Physician: Brunilda att. providers found   Discharging Provider: Kiera Beavers NP  Primary Care Provider: Kiko Connor MD  [facility] Medicine Team: [facility] HOSP MED F Kiera Beavers NP    HPI:   Mr. 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 He was discharged on 10/10 with new blood pressure medications (labetalol, procardia and lisinopril) with follow up in clinic on 10/26/20.      Patient presents to ED tonight for evaluation of low BP with associated generalized weakness, dizziness and lightheadedness. Patient reports he checks BP everyday which has been high 90s to low 100s since discharge until yesterday when it dropped to 60s with above symptoms. He took his morning dose but did not take evening dose of labetalol. Denies chest pain, sob, palpitation, syncope, abdominal pain/distention, back pain, fever, chills, dysuria or hematuria. In ED SBP in mid to high 90s and he remained asymptomatic. States he failed voiding trial during urology clinic visit 3 days ago and placed a new buckley with leg bag.      Labs significant for rise in Scr from recent 1.6 to 2.9.      Vascular surgery consulted by ED pending evaluation in am.        * No surgery found *      Hospital Course:   Admitted to hospital medicine for acute hypotension post medical management of b/p and HR for Type B aortic Dissection.  CR elevated to 2.9 on admit with a preciptious decline to 1.3 with resolution of hypotension. His b/p losartan and Procardia were held initially, while his  labetalol was continued on admit.  His b/p slowly laya to 120's from the 68/44, and his b/p meds were slowly readded to keep sbp well below 130. Sending home with Labetalol and Lisinopril with parameters to reintroduce Procardia as needed.     He had a notable small amount of hematuria in his buckley, no clots, after he traumatically pulled on it the night before by accident.    Dispo: home with vasc surg follow up         Consults:   Consults (From admission, onward)        Status Ordering Provider     Inpatient consult to Vascular Surgery  Once     Provider:  (Not yet assigned)    Completed BRAD MARIE.          * Hypotension  - reports dizziness, lightheadedness, generalized weakness   - likely due to recently addition of labetalol, procardia and lisinopril all at once  - Admit b/p 68/44 s/p  cc bolus and 100 cc/hr x 5 hrs   - continued labetalol with holding parameters   - SBP laya preciptiously  - initially held procardia and lisinopril, b/p trending up and resolution of CHRISTINE so Lisinopril resumed.  - goal SBP < 130 and HR < 55-60 given recent admission for thoracoabdominal aneurysm and dissection   - sent home with Parameters for Procardia to resume         AAA (abdominal aortic aneurysm) without rupture  - recently admitted to vascular surgery service 10/4 -10/10 with chest pain and found to have thoracoabdominal aneurysm and dissection   -medically treated with tight BP control   -has follow up vascular clinic appointment on 10/26  -remained asymptomatic except dizziness, generalized weakness due to hypotension   -vascular surgery consulted by ED   -follow up vascular surgery recommendations          CHRISTINE (acute kidney injury)  -Scr up to 2.9 on admit  - likely due to ATN associated with hypotension and recent addition of ACE-I  - will give 1L NS and monitor renal function closely   - avoid nephrotoxic agents  - UA, urine lytes    -CR improved to 1.3      BPH with urinary obstruction  - buckley in  place with leg bag, changed to measured buckley bag for better monitoring   -failed voiding trial 3 days prior during urology clinic visit and placed buckley back in place with leg bag  - hold flomax for now given hypotension, consider resumption now that bp improved.         Final Active Diagnoses:    Diagnosis Date Noted POA    PRINCIPAL PROBLEM:  Hypotension [I95.9] 10/19/2020 Yes    AAA (abdominal aortic aneurysm) without rupture [I71.4] 10/20/2020 Yes    CHRISTINE (acute kidney injury) [N17.9] 10/19/2020 Yes    BPH with urinary obstruction [N40.1, N13.8] 05/01/2019 Yes      Problems Resolved During this Admission:       Discharged Condition: good    Disposition: Home or Self Care    Follow Up:    Patient Instructions:      Diet Adult Regular     Order Specific Question Answer Comments   Na restriction, if any: 2gNa      Notify your health care provider if you experience any of the following:  temperature >100.4     Notify your health care provider if you experience any of the following:  persistent nausea and vomiting or diarrhea     Notify your health care provider if you experience any of the following:  severe uncontrolled pain     Notify your health care provider if you experience any of the following:  redness, tenderness, or signs of infection (pain, swelling, redness, odor or green/yellow discharge around incision site)     Notify your health care provider if you experience any of the following:  difficulty breathing or increased cough     Notify your health care provider if you experience any of the following:  severe persistent headache     Notify your health care provider if you experience any of the following:  worsening rash     Notify your health care provider if you experience any of the following:  persistent dizziness, light-headedness, or visual disturbances     Notify your health care provider if you experience any of the following:  increased confusion or weakness     Activity as tolerated        Significant Diagnostic Studies: Labs: All labs within the past 24 hours have been reviewed    Pending Diagnostic Studies:     None         Medications:  Reconciled Home Medications:      Medication List      CHANGE how you take these medications    NIFEdipine 30 MG (OSM) 24 hr tablet  Commonly known as: PROCARDIA-XL  Take 1 tablet (30 mg total) by mouth once daily. Only resume if Sb/p > 130  What changed: additional instructions        CONTINUE taking these medications    labetaloL 300 MG tablet  Commonly known as: NORMODYNE  Take 1 tablet (300 mg total) by mouth every 12 (twelve) hours.     lisinopriL 20 MG tablet  Commonly known as: PRINIVIL,ZESTRIL  Take 1 tablet (20 mg total) by mouth once daily.     omeprazole 40 MG capsule  Commonly known as: PRILOSEC  Take 1 capsule (40 mg total) by mouth once daily.     tamsulosin 0.4 mg Cap  Commonly known as: FLOMAX  Take 1 capsule (0.4 mg total) by mouth once daily.        ASK your doctor about these medications    azithromycin 250 MG tablet  Commonly known as: Z-YE  Take 1 tablet (250 mg total) by mouth once daily. for 3 doses  Ask about: Should I take this medication?            Indwelling Lines/Drains at time of discharge:   Lines/Drains/Airways     Drain                 Urethral Catheter 10/15/20 0103 Non-latex 16 Fr. 12 days                Time spent on the discharge of patient: 55 minutes  Patient was seen and examined on the date of discharge and determined to be suitable for discharge.         Kiera Beavers NP  Department of Hospital Medicine  [facility]

## 2020-10-27 NOTE — HPI
Mr. 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 He was discharged on 10/10 with new blood pressure medications (labetalol, procardia and lisinopril) with follow up in clinic on 10/26/20.      Patient presents to ED tonight for evaluation of low BP with associated generalized weakness, dizziness and lightheadedness. Patient reports he checks BP everyday which has been high 90s to low 100s since discharge until yesterday when it dropped to 60s with above symptoms. He took his morning dose but did not take evening dose of labetalol. Denies chest pain, sob, palpitation, syncope, abdominal pain/distention, back pain, fever, chills, dysuria or hematuria. In ED SBP in mid to high 90s and he remained asymptomatic. States he failed voiding trial during urology clinic visit 3 days ago and placed a new buckley with leg bag.      Labs significant for rise in Scr from recent 1.6 to 2.9.      Vascular surgery consulted by ED pending evaluation in am.

## 2020-10-28 DIAGNOSIS — I71.019 ACUTE THORACIC AORTIC DISSECTION: Primary | ICD-10-CM

## 2020-11-07 ENCOUNTER — HOSPITAL ENCOUNTER (OUTPATIENT)
Dept: PREADMISSION TESTING | Facility: HOSPITAL | Age: 60
Discharge: HOME | End: 2020-11-07
Attending: SURGERY
Payer: COMMERCIAL

## 2020-11-07 ENCOUNTER — HOSPITAL ENCOUNTER (EMERGENCY)
Facility: HOSPITAL | Age: 60
Discharge: HOME | End: 2020-11-07
Attending: SURGERY
Payer: COMMERCIAL

## 2020-11-07 VITALS
DIASTOLIC BLOOD PRESSURE: 60 MMHG | HEART RATE: 80 BPM | WEIGHT: 207 LBS | SYSTOLIC BLOOD PRESSURE: 104 MMHG | RESPIRATION RATE: 16 BRPM | TEMPERATURE: 98 F | OXYGEN SATURATION: 98 % | BODY MASS INDEX: 28.07 KG/M2

## 2020-11-07 DIAGNOSIS — Z01.818 PRE-OP EVALUATION: ICD-10-CM

## 2020-11-07 DIAGNOSIS — I95.9 HYPOTENSION: ICD-10-CM

## 2020-11-07 DIAGNOSIS — N30.00 ACUTE CYSTITIS WITHOUT HEMATURIA: Primary | ICD-10-CM

## 2020-11-07 LAB
ALBUMIN SERPL BCP-MCNC: 4.1 G/DL (ref 3.5–5.2)
ALBUMIN SERPL BCP-MCNC: 4.1 G/DL (ref 3.5–5.2)
ALP SERPL-CCNC: 61 U/L (ref 55–135)
ALP SERPL-CCNC: 61 U/L (ref 55–135)
ALT SERPL W/O P-5'-P-CCNC: 32 U/L (ref 10–44)
ALT SERPL W/O P-5'-P-CCNC: 32 U/L (ref 10–44)
ANION GAP SERPL CALC-SCNC: 13 MMOL/L (ref 8–16)
ANION GAP SERPL CALC-SCNC: 13 MMOL/L (ref 8–16)
APTT BLDCRRT: 29.1 SEC (ref 21–32)
AST SERPL-CCNC: 20 U/L (ref 10–40)
AST SERPL-CCNC: 20 U/L (ref 10–40)
BACTERIA #/AREA URNS HPF: ABNORMAL /HPF
BASOPHILS # BLD AUTO: 0.05 K/UL (ref 0–0.2)
BASOPHILS NFR BLD: 0.3 % (ref 0–1.9)
BILIRUB SERPL-MCNC: 0.6 MG/DL (ref 0.1–1)
BILIRUB SERPL-MCNC: 0.6 MG/DL (ref 0.1–1)
BILIRUB UR QL STRIP: ABNORMAL
BILIRUB UR QL STRIP: ABNORMAL
BNP SERPL-MCNC: 45 PG/ML (ref 0–99)
BUN SERPL-MCNC: 20 MG/DL (ref 6–20)
BUN SERPL-MCNC: 20 MG/DL (ref 6–20)
CALCIUM SERPL-MCNC: 9.7 MG/DL (ref 8.7–10.5)
CALCIUM SERPL-MCNC: 9.7 MG/DL (ref 8.7–10.5)
CHLORIDE SERPL-SCNC: 107 MMOL/L (ref 95–110)
CHLORIDE SERPL-SCNC: 107 MMOL/L (ref 95–110)
CK MB SERPL-MCNC: 0.6 NG/ML (ref 0.1–6.5)
CK MB SERPL-RTO: 1.5 % (ref 0–5)
CK SERPL-CCNC: 41 U/L (ref 20–200)
CK SERPL-CCNC: 41 U/L (ref 20–200)
CLARITY UR: ABNORMAL
CLARITY UR: ABNORMAL
CO2 SERPL-SCNC: 18 MMOL/L (ref 23–29)
CO2 SERPL-SCNC: 18 MMOL/L (ref 23–29)
COLOR UR: YELLOW
COLOR UR: YELLOW
CREAT SERPL-MCNC: 1.6 MG/DL (ref 0.5–1.4)
CREAT SERPL-MCNC: 1.6 MG/DL (ref 0.5–1.4)
D DIMER PPP IA.FEU-MCNC: 0.46 MG/L FEU
DIFFERENTIAL METHOD: ABNORMAL
EOSINOPHIL # BLD AUTO: 0.6 K/UL (ref 0–0.5)
EOSINOPHIL NFR BLD: 3.6 % (ref 0–8)
ERYTHROCYTE [DISTWIDTH] IN BLOOD BY AUTOMATED COUNT: 13 % (ref 11.5–14.5)
EST. GFR  (AFRICAN AMERICAN): 54 ML/MIN/1.73 M^2
EST. GFR  (AFRICAN AMERICAN): 54 ML/MIN/1.73 M^2
EST. GFR  (NON AFRICAN AMERICAN): 47 ML/MIN/1.73 M^2
EST. GFR  (NON AFRICAN AMERICAN): 47 ML/MIN/1.73 M^2
GLUCOSE SERPL-MCNC: 129 MG/DL (ref 70–110)
GLUCOSE SERPL-MCNC: 129 MG/DL (ref 70–110)
GLUCOSE UR QL STRIP: NEGATIVE
GLUCOSE UR QL STRIP: NEGATIVE
HCT VFR BLD AUTO: 37.1 % (ref 40–54)
HGB BLD-MCNC: 12 G/DL (ref 14–18)
HGB UR QL STRIP: NEGATIVE
HGB UR QL STRIP: NEGATIVE
HYALINE CASTS #/AREA URNS LPF: 0 /LPF
IMM GRANULOCYTES # BLD AUTO: 0.14 K/UL (ref 0–0.04)
IMM GRANULOCYTES NFR BLD AUTO: 0.8 % (ref 0–0.5)
INR PPP: 1 (ref 0.8–1.2)
KETONES UR QL STRIP: ABNORMAL
KETONES UR QL STRIP: ABNORMAL
LACTATE SERPL-SCNC: 1.3 MMOL/L (ref 0.5–2.2)
LEUKOCYTE ESTERASE UR QL STRIP: ABNORMAL
LEUKOCYTE ESTERASE UR QL STRIP: ABNORMAL
LYMPHOCYTES # BLD AUTO: 1.4 K/UL (ref 1–4.8)
LYMPHOCYTES NFR BLD: 8.5 % (ref 18–48)
MAGNESIUM SERPL-MCNC: 1.9 MG/DL (ref 1.6–2.6)
MCH RBC QN AUTO: 28.4 PG (ref 27–31)
MCHC RBC AUTO-ENTMCNC: 32.3 G/DL (ref 32–36)
MCV RBC AUTO: 88 FL (ref 82–98)
MICROSCOPIC COMMENT: ABNORMAL
MONOCYTES # BLD AUTO: 0.8 K/UL (ref 0.3–1)
MONOCYTES NFR BLD: 4.6 % (ref 4–15)
NEUTROPHILS # BLD AUTO: 13.7 K/UL (ref 1.8–7.7)
NEUTROPHILS NFR BLD: 82.2 % (ref 38–73)
NITRITE UR QL STRIP: POSITIVE
NITRITE UR QL STRIP: POSITIVE
NRBC BLD-RTO: 0 /100 WBC
PH UR STRIP: 5 [PH] (ref 5–8)
PH UR STRIP: 5 [PH] (ref 5–8)
PHOSPHATE SERPL-MCNC: 3.1 MG/DL (ref 2.7–4.5)
PLATELET # BLD AUTO: 200 K/UL (ref 150–350)
PMV BLD AUTO: 10.3 FL (ref 9.2–12.9)
POTASSIUM SERPL-SCNC: 4.8 MMOL/L (ref 3.5–5.1)
POTASSIUM SERPL-SCNC: 4.8 MMOL/L (ref 3.5–5.1)
PROT SERPL-MCNC: 7.1 G/DL (ref 6–8.4)
PROT SERPL-MCNC: 7.1 G/DL (ref 6–8.4)
PROT UR QL STRIP: ABNORMAL
PROT UR QL STRIP: ABNORMAL
PROTHROMBIN TIME: 10.9 SEC (ref 9–12.5)
RBC # BLD AUTO: 4.22 M/UL (ref 4.6–6.2)
RBC #/AREA URNS HPF: 0 /HPF (ref 0–4)
SARS-COV-2 RDRP RESP QL NAA+PROBE: NEGATIVE
SODIUM SERPL-SCNC: 138 MMOL/L (ref 136–145)
SODIUM SERPL-SCNC: 138 MMOL/L (ref 136–145)
SP GR UR STRIP: >=1.03 (ref 1–1.03)
SP GR UR STRIP: >=1.03 (ref 1–1.03)
SQUAMOUS #/AREA URNS HPF: 1 /HPF
TROPONIN I SERPL DL<=0.01 NG/ML-MCNC: 0.01 NG/ML (ref 0–0.03)
TSH SERPL DL<=0.005 MIU/L-ACNC: 1.27 UIU/ML (ref 0.4–4)
URN SPEC COLLECT METH UR: ABNORMAL
URN SPEC COLLECT METH UR: ABNORMAL
UROBILINOGEN UR STRIP-ACNC: NEGATIVE EU/DL
UROBILINOGEN UR STRIP-ACNC: NEGATIVE EU/DL
WBC # BLD AUTO: 16.6 K/UL (ref 3.9–12.7)
WBC #/AREA URNS HPF: >100 /HPF (ref 0–5)

## 2020-11-07 PROCEDURE — 84100 ASSAY OF PHOSPHORUS: CPT

## 2020-11-07 PROCEDURE — 82550 ASSAY OF CK (CPK): CPT

## 2020-11-07 PROCEDURE — 93005 ELECTROCARDIOGRAM TRACING: CPT

## 2020-11-07 PROCEDURE — 63600175 PHARM REV CODE 636 W HCPCS: Performed by: SURGERY

## 2020-11-07 PROCEDURE — 99285 EMERGENCY DEPT VISIT HI MDM: CPT | Mod: 25

## 2020-11-07 PROCEDURE — U0002 COVID-19 LAB TEST NON-CDC: HCPCS

## 2020-11-07 PROCEDURE — 85610 PROTHROMBIN TIME: CPT

## 2020-11-07 PROCEDURE — U0003 INFECTIOUS AGENT DETECTION BY NUCLEIC ACID (DNA OR RNA); SEVERE ACUTE RESPIRATORY SYNDROME CORONAVIRUS 2 (SARS-COV-2) (CORONAVIRUS DISEASE [COVID-19]), AMPLIFIED PROBE TECHNIQUE, MAKING USE OF HIGH THROUGHPUT TECHNOLOGIES AS DESCRIBED BY CMS-2020-01-R: HCPCS

## 2020-11-07 PROCEDURE — 83880 ASSAY OF NATRIURETIC PEPTIDE: CPT

## 2020-11-07 PROCEDURE — 87186 SC STD MICRODIL/AGAR DIL: CPT

## 2020-11-07 PROCEDURE — 36415 COLL VENOUS BLD VENIPUNCTURE: CPT

## 2020-11-07 PROCEDURE — 87040 BLOOD CULTURE FOR BACTERIA: CPT | Mod: 59

## 2020-11-07 PROCEDURE — 80053 COMPREHEN METABOLIC PANEL: CPT

## 2020-11-07 PROCEDURE — 96365 THER/PROPH/DIAG IV INF INIT: CPT

## 2020-11-07 PROCEDURE — 83735 ASSAY OF MAGNESIUM: CPT

## 2020-11-07 PROCEDURE — 82553 CREATINE MB FRACTION: CPT

## 2020-11-07 PROCEDURE — 93010 EKG 12-LEAD: ICD-10-PCS | Mod: ,,, | Performed by: INTERNAL MEDICINE

## 2020-11-07 PROCEDURE — 93010 ELECTROCARDIOGRAM REPORT: CPT | Mod: ,,, | Performed by: INTERNAL MEDICINE

## 2020-11-07 PROCEDURE — 85379 FIBRIN DEGRADATION QUANT: CPT

## 2020-11-07 PROCEDURE — 87086 URINE CULTURE/COLONY COUNT: CPT

## 2020-11-07 PROCEDURE — 81000 URINALYSIS NONAUTO W/SCOPE: CPT

## 2020-11-07 PROCEDURE — 87077 CULTURE AEROBIC IDENTIFY: CPT

## 2020-11-07 PROCEDURE — 83605 ASSAY OF LACTIC ACID: CPT

## 2020-11-07 PROCEDURE — 87088 URINE BACTERIA CULTURE: CPT

## 2020-11-07 PROCEDURE — 84443 ASSAY THYROID STIM HORMONE: CPT

## 2020-11-07 PROCEDURE — 84484 ASSAY OF TROPONIN QUANT: CPT

## 2020-11-07 PROCEDURE — 85025 COMPLETE CBC W/AUTO DIFF WBC: CPT

## 2020-11-07 PROCEDURE — 85730 THROMBOPLASTIN TIME PARTIAL: CPT

## 2020-11-07 RX ORDER — CIPROFLOXACIN 500 MG/1
500 TABLET ORAL 2 TIMES DAILY
Qty: 14 TABLET | Refills: 0 | Status: SHIPPED | OUTPATIENT
Start: 2020-11-07 | End: 2020-11-14

## 2020-11-07 RX ORDER — CIPROFLOXACIN 500 MG/1
500 TABLET ORAL 2 TIMES DAILY
Qty: 14 TABLET | Refills: 0 | Status: SHIPPED | OUTPATIENT
Start: 2020-11-07 | End: 2020-11-07 | Stop reason: SDUPTHER

## 2020-11-07 RX ADMIN — CEFTRIAXONE 2 G: 2 INJECTION, SOLUTION INTRAVENOUS at 01:11

## 2020-11-07 NOTE — ED TRIAGE NOTES
Had been in hospital for preop covid swab.  After that was done, pt felt weak while leaving. Felt weak during exertion but much better when resting.  Denies sob, cp, n/v or diarrhea.

## 2020-11-07 NOTE — ED PROVIDER NOTES
[facility]                                                 Chief Complaint  60 y.o. male with Hypotension      History of Present Illness  Luis Eduardo Curry presents to the emergency room with low blood pressure  Patient with low blood pressure today, history significant for aortic dissection  Patient is currently being evaluated by [facility], has surgery next week  Patient's blood pressure is purposely being kept low, came for outpatient test  Wife states he felt a little fatigued, systolic blood pressure in the ER 78 initially  Patient's systolic blood pressure now 91, afebrile, not orthostatic on evaluation    The history is provided by the patient   device was not used during this ER visit  Medical history: Aortic dissection  Surgical history: TURP, cystoscopy, colonoscopy, umbilical hernia repair  No known allergies    I have reviewed all of this patient's past medical, surgical, family, and social   histories as well as active allergies and medications documented in the  electronic medical record    Review of Systems and Physical Exam      Review of Systems  -- Constitution - weakness and fatigue with no fever chills  -- Eyes - no tearing or redness, no visual disturbance  -- Ear, Nose - no tinnitus or earache, no nasal congestion or discharge  -- Mouth,Throat - no sore throat, no toothache, normal voice, normal swallowing  -- Respiratory - denies cough and congestion, no shortness of breath, no ROSADO  -- Cardiovascular - denies chest pain, no palpitations, denies claudication  -- Gastrointestinal - denies abdominal pain, nausea, vomiting, or diarrhea  -- Genitourinary - no dysuria, denies flank pain, no hematuria, no STD risk  -- Musculoskeletal - denies back pain, negative for trauma or injury  -- Neurological - no headache, denies weakness or seizure; no LOC  -- Skin - denies pallor, rash, or changes in skin. no hives or welts noted    Vital Signs  His oral  temperature is 98 °F (36.7 °C).   His blood pressure is 91/51 and his pulse is 80.   His respiration is 16 and oxygen saturation is 97%.     Physical Exam  -- Nursing note and vitals reviewed  -- Constitutional: Appears well-developed and well-nourished  -- Head: Atraumatic. Normocephalic. No obvious abnormality  -- Eyes: Pupils are equal and reactive to light. Normal conjunctiva and lids  -- Cardiac: Normal rate, regular rhythm and normal heart sounds  -- Pulmonary: Normal respiratory effort, breath sounds clear to auscultation  -- Abdominal: Soft, no tenderness. Normal bowel sounds. Normal liver edge  -- Genitourinary: no flank pain on exam, no suprapubic pain by palpation   -- Musculoskeletal: Normal range of motion, no effusions. Joints stable   -- Neurological: No focal deficits. Showed good interaction with staff  -- Vascular: Posterior tibial, dorsalis pedis and radial pulses 2+ bilaterally      Emergency Room Course      Urinalysis  Appearance, UA Hazy (*)   Specific Gravity, UA >=1.030 (*)   Protein, UA 1+ (*)   Ketones, UA Trace (*)   Bilirubin (UA) 1+ (*)   Nitrite, UA Positive (*)   Leukocytes, UA 1+ (*)   Appearance, UA Hazy (*)   Specific Gravity, UA >=1.030 (*)   Protein, UA 1+ (*)   Ketones, UA Trace (*)   Bilirubin (UA) 1+ (*)   Nitrite, UA Positive (*)   Leukocytes, UA 1+ (*)     Lab Results        K 4.8   K 4.8         CO2 18 (L)   CO2 18 (L)   BUN 20   BUN 20   CREATININE 1.6 (H)   CREATININE 1.6 (H)    (H)    (H)   ALKPHOS 61   ALKPHOS 61   AST 20   AST 20   ALT 32   ALT 32   BILITOT 0.6   BILITOT 0.6   ALBUMIN 4.1   ALBUMIN 4.1   PROT 7.1   PROT 7.1   WBC 16.60 (H)   HGB 12.0 (L)   HCT 37.1 (L)      CPK 41   CPK 41   CPKMB 0.6   TROPONINI 0.007   INR 1.0   BNP 45   DDIMER 0.46   LACTATE 1.3   MG 1.9   TSH 1.274     EKG   -- The EKG findings today were without concerning findings from baseline     CXR  1. No new airspace disease.   2. 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  Size is grossly unchanged although this would be better characterized by CT, if indicated.   3. COPD.     Additional Work up  -- Blood cultures have also been drawn, results are pending    Medications Given  cefTRIAXone (ROCEPHIN) 2 g/50 mL D5W IVPB (2 g Intravenous New Bag 11/7/20 1322)     ED Physician Management  -- Diagnosis management comments: 60 y.o. male with low blood pressure  -- low blood pressure markedly improved on arrival to the ER this morning  -- patient does have urinary tract infection, otherwise lab work stable  -- workup was completely unremarkable in the ER outside of UTI today  -- IV Rocephin given the emergency room Cipro Rx on ER discharge  -- patient discussed at length with vascular surgeon Dr. Fererr this morning  -- careful monitoring at home, oral antibiotics and recommended today  -- return to the ER with any concerning symptoms, voiced understanding    Diagnosis  [I95.9] Hypotension  [N30.00] Acute cystitis without hematuria (Primary)    Disposition and Plan  -- Disposition: home  -- Condition: stable  -- Follow-up: Patient to follow up with MD in 1-2 days.  -- I advised the patient that we have found no life threatening condition today  -- At this time, I believe the patient is clinically stable for discharge.   -- The patient acknowledges that close follow up with a MD is required   -- Patient agrees to comply with all instruction and direction given in the ER    This note is dictated on M*Modal word recognition program.  There are word recognition mistakes that are occasionally missed on review.         Ramón West MD  11/07/20 0190

## 2020-11-08 LAB — SARS-COV-2 RNA RESP QL NAA+PROBE: NOT DETECTED

## 2020-11-09 ENCOUNTER — TELEPHONE (OUTPATIENT)
Dept: VASCULAR SURGERY | Facility: CLINIC | Age: 60
End: 2020-11-09

## 2020-11-09 ENCOUNTER — OFFICE VISIT (OUTPATIENT)
Dept: UROLOGY | Facility: CLINIC | Age: 60
End: 2020-11-09
Payer: COMMERCIAL

## 2020-11-09 ENCOUNTER — ANESTHESIA EVENT (OUTPATIENT)
Dept: SURGERY | Facility: HOSPITAL | Age: 60
DRG: 220 | End: 2020-11-09
Payer: COMMERCIAL

## 2020-11-09 VITALS
WEIGHT: 207 LBS | HEIGHT: 72 IN | HEART RATE: 63 BPM | SYSTOLIC BLOOD PRESSURE: 104 MMHG | BODY MASS INDEX: 28.04 KG/M2 | DIASTOLIC BLOOD PRESSURE: 69 MMHG

## 2020-11-09 DIAGNOSIS — N13.8 BPH WITH URINARY OBSTRUCTION: Primary | ICD-10-CM

## 2020-11-09 DIAGNOSIS — N40.1 BPH WITH URINARY OBSTRUCTION: Primary | ICD-10-CM

## 2020-11-09 DIAGNOSIS — R33.9 URINARY RETENTION: ICD-10-CM

## 2020-11-09 PROCEDURE — 99214 OFFICE O/P EST MOD 30 MIN: CPT | Mod: S$GLB,,, | Performed by: NURSE PRACTITIONER

## 2020-11-09 PROCEDURE — 99214 PR OFFICE/OUTPT VISIT, EST, LEVL IV, 30-39 MIN: ICD-10-PCS | Mod: S$GLB,,, | Performed by: NURSE PRACTITIONER

## 2020-11-09 PROCEDURE — 3008F PR BODY MASS INDEX (BMI) DOCUMENTED: ICD-10-PCS | Mod: CPTII,S$GLB,, | Performed by: NURSE PRACTITIONER

## 2020-11-09 PROCEDURE — 3008F BODY MASS INDEX DOCD: CPT | Mod: CPTII,S$GLB,, | Performed by: NURSE PRACTITIONER

## 2020-11-09 PROCEDURE — 99999 PR PBB SHADOW E&M-EST. PATIENT-LVL III: ICD-10-PCS | Mod: PBBFAC,,, | Performed by: NURSE PRACTITIONER

## 2020-11-09 PROCEDURE — 99999 PR PBB SHADOW E&M-EST. PATIENT-LVL III: CPT | Mod: PBBFAC,,, | Performed by: NURSE PRACTITIONER

## 2020-11-09 NOTE — TELEPHONE ENCOUNTER
"Spoke with Mrs Curry, time of arrival 0500am 2nd floor [facility] for Mr Curry surgery on 11/10/2020 confirmed. Mrs Curry states, " we will be there between 5:30 - 6:00am because there was noone there at 5:00am the last time he had surgery."   "

## 2020-11-09 NOTE — TELEPHONE ENCOUNTER
Per Dr JEANE Santos, I was notified, he called and spoke with Mr & Mrs Curry to inform them of Mr Curry surgery cases has been rescheduled to November 16th and 17th due to his emergency room visit results.

## 2020-11-09 NOTE — PROGRESS NOTES
CHIEF COMPLAINT:    Luis Eduardo Curry is a 60 y.o. male presents today for urinary retention.    HISTORY OF PRESENTING ILLINESS:    Luis Eduardo Curry is a 60 y.o. male with a history of BPH and elevated PSA.  He has a family history of Prostate Cancer with his father.    He is patient of Dr. Mendez.     He has history of UTI and elevated PSA.   05/01/2019:  - (-) prostate biopsy with a PSA of 4.6 & 46gm prostate.  - cysto showing trabeculated bladder     CT Urogram 4/23/19: no evidence of renal masses or hydronephrosis. There are 2 bladder diverticula     05/24/2019 Urology office visit showed a post void residual bladder scan was performed immediately after voiding. The patient's PVR was noted to be 308.     He was last seen in clinic 07/17/2019.     He is here today for a voiding trial after recent admission for a descending thoracic aortic aneurysm. When transferred to [facility] he was found to be in urinary retention and CHRISTINE with Cr. 2.3 (baseline 1.1)  He was evaluated by Urology (Dr. NITO Arce)    He is scheduled for heart surgery, Aneurysm repair on 11/07/2020         REVIEW OF SYSTEMS:  Review of Systems   Constitutional: Negative.  Negative for chills, diaphoresis and fever.   HENT: Negative for congestion and sore throat.    Eyes: Negative.  Negative for double vision.   Respiratory: Negative.  Negative for cough, shortness of breath and wheezing.    Cardiovascular: Negative.  Negative for chest pain, palpitations and leg swelling.   Gastrointestinal: Negative.  Negative for abdominal pain, blood in stool, constipation, diarrhea, nausea and vomiting.   Genitourinary: Negative.  Negative for dysuria, flank pain and hematuria.        Pulliam draining well;      Musculoskeletal: Negative.  Negative for back pain, falls and neck pain.   Skin: Negative.  Negative for rash.   Neurological: Negative.  Negative for dizziness and seizures.   Endo/Heme/Allergies: Does not bruise/bleed easily.   Psychiatric/Behavioral:  Negative.  Negative for depression. The patient is not nervous/anxious.          PATIENT HISTORY:    Past Medical History:   Diagnosis Date    Aortic dissection        Past Surgical History:   Procedure Laterality Date    BIOPSY WITH TRANSRECTAL ULTRASOUND (TRUS) GUIDANCE N/A 5/1/2019    Procedure: BIOPSY, WITH TRANSRECTAL PROSTATE ULTRASOUND;  Surgeon: Chintan Mendez Jr., MD;  Location: [facility];  Service: Urology;  Laterality: N/A;    COLONOSCOPY  2006    COLONOSCOPY N/A 1/24/2019    Procedure: COLONOSCOPY;  Surgeon: Reece Tarango MD;  Location: [facility];  Service: Endoscopy;  Laterality: N/A;    CYSTOSCOPY N/A 5/1/2019    Procedure: FLEXIBLE CYSTOSCOPY;  Surgeon: Chintan Mendez Jr., MD;  Location: [facility];  Service: Urology;  Laterality: N/A;    UMBILICAL HERNIA REPAIR N/A 1/14/2020    Procedure: REPAIR, HERNIA, UMBILICAL;  Surgeon: Caio Mas Jr., MD;  Location: [facility];  Service: General;  Laterality: N/A;       Family History   Problem Relation Age of Onset    No Known Problems Mother     Hypertension Father     No Known Problems Sister     No Known Problems Brother     No Known Problems Daughter     No Known Problems Son     No Known Problems Maternal Aunt     No Known Problems Maternal Uncle     No Known Problems Paternal Aunt     No Known Problems Paternal Uncle     No Known Problems Maternal Grandmother     No Known Problems Maternal Grandfather     No Known Problems Paternal Grandmother     No Known Problems Paternal Grandfather        Social History     Socioeconomic History    Marital status:      Spouse name: Not on file    Number of children: Not on file    Years of education: Not on file    Highest education level: Not on file   Occupational History    Not on file   Social Needs    Financial resource strain: Not on file    Food insecurity     Worry: Not on file     Inability: Not on file    Transportation needs     Medical: Not on file     Non-medical: Not  on file   Tobacco Use    Smoking status: Former Smoker     Packs/day: 1.00     Years: 30.00     Pack years: 30.00     Types: Cigarettes    Smokeless tobacco: Never Used   Substance and Sexual Activity    Alcohol use: Yes     Frequency: Monthly or less     Binge frequency: Weekly     Comment: socially    Drug use: No    Sexual activity: Yes   Lifestyle    Physical activity     Days per week: Not on file     Minutes per session: Not on file    Stress: Only a little   Relationships    Social connections     Talks on phone: Not on file     Gets together: Not on file     Attends [employer] service: Not on file     Active member of club or organization: Not on file     Attends meetings of clubs or organizations: Not on file     Relationship status: Not on file   Other Topics Concern    Not on file   Social History Narrative    Not on file       Allergies:  Patient has no known allergies.    Medications:    Current Outpatient Medications:     ciprofloxacin HCl (CIPRO) 500 MG tablet, Take 1 tablet (500 mg total) by mouth 2 (two) times daily. for 7 days, Disp: 14 tablet, Rfl: 0    labetaloL (NORMODYNE) 300 MG tablet, Take 1 tablet (300 mg total) by mouth every 12 (twelve) hours., Disp: 60 tablet, Rfl: 0    lisinopriL (PRINIVIL,ZESTRIL) 20 MG tablet, Take 1 tablet (20 mg total) by mouth once daily., Disp: 90 tablet, Rfl: 3    NIFEdipine (PROCARDIA-XL) 30 MG (OSM) 24 hr tablet, Take 1 tablet (30 mg total) by mouth once daily. Only resume if Sb/p > 130 (Patient not taking: Reported on 10/22/2020), Disp: 30 tablet, Rfl: 0    omeprazole (PRILOSEC) 40 MG capsule, Take 1 capsule (40 mg total) by mouth once daily. (Patient not taking: Reported on 10/22/2020), Disp: 30 capsule, Rfl: 5    tamsulosin (FLOMAX) 0.4 mg Cap, Take 1 capsule (0.4 mg total) by mouth once daily., Disp: 90 capsule, Rfl: 3    PHYSICAL EXAMINATION:  Physical Exam  Vitals signs and nursing note reviewed.   Constitutional:       General: He is awake.       Appearance: Normal appearance. He is normal weight.   HENT:      Head: Normocephalic.      Right Ear: External ear normal.      Left Ear: External ear normal.      Nose: Nose normal.   Eyes:      Conjunctiva/sclera: Conjunctivae normal.   Neck:      Musculoskeletal: Normal range of motion.   Cardiovascular:      Rate and Rhythm: Normal rate.   Pulmonary:      Effort: Pulmonary effort is normal. No respiratory distress.   Abdominal:      General: There is no distension.      Tenderness: There is no abdominal tenderness. There is no right CVA tenderness, left CVA tenderness or rebound.   Genitourinary:     Penis: Normal.       Scrotum/Testes: Normal.   Musculoskeletal: Normal range of motion.   Skin:     General: Skin is warm and dry.   Neurological:      General: No focal deficit present.      Mental Status: He is alert and oriented to person, place, and time.   Psychiatric:         Mood and Affect: Mood normal.         Behavior: Behavior is cooperative.           LABS:          Lab Results   Component Value Date    PSA 4.6 (H) 01/05/2019             IMPRESSION:    Encounter Diagnoses   Name Primary?    BPH with urinary obstruction Yes    Urinary retention          Assessment:       1. BPH with urinary obstruction    2. Urinary retention        Plan:         I spent 25 minutes with the patient of which more than half was spent in direct consultation with the patient in regards to our treatment and plan.    Education and recommendations of today's plan of care including home remedies.  We attempted VT today but failed  New 16fr buckley was placed using sterile technique.  We discussed next step would be surgical management since medications are not working for him.  Will have him set up to see Dr. Mendez to discuss;  Educational materials given on TURP/REZUM  RTC one month for new buckley change

## 2020-11-09 NOTE — PRE-PROCEDURE INSTRUCTIONS
PRE-OP INSTRUCTIONS:Instructed patient to have nothing by mouth past midnight.It is ok to take AM medications with a few sips of water.Patient instructed to shower the night before surgery as well as the morning of surgery with an antibacterial soap like dial or hibiclens from the neck down.Encouraged patient to wear loose fitting, comfortable clothing .Medication instructions for pm prior to and am of surgery reviewed.Instructed patient to avoid taking vitamins,supplements,aspirin or ibuprofen the am of surgery.Patient's questions and concerns addressed.Patient informed of the current visitor policy and advised patient that one visitor may accompany each patient into the hospital and wait (socially distanced) until a member of the medical team provides an update at the conclusion of the procedure.When they enter the hospital both patient and visitor will have their temperature checked.All visitors are asked to arrive with a mask and to keep their mask on throughout the visit. Patient stated an understanding.Each inpatient is allowed 1 visitor at a time per day between the hours of 8am and 6pm.This visitor must remain in the patient's room and not gather in common areas such as waiting rooms or cafeterias.The visitor must be 18 years or older and arrive with a mask and keep the mask on throughout the visit.    Patient denies any side effects or issues with anesthesia or sedation.

## 2020-11-10 ENCOUNTER — ANESTHESIA (OUTPATIENT)
Dept: SURGERY | Facility: HOSPITAL | Age: 60
DRG: 220 | End: 2020-11-10
Payer: COMMERCIAL

## 2020-11-10 LAB — BACTERIA UR CULT: ABNORMAL

## 2020-11-12 ENCOUNTER — TELEPHONE (OUTPATIENT)
Dept: VASCULAR SURGERY | Facility: CLINIC | Age: 60
End: 2020-11-12

## 2020-11-12 DIAGNOSIS — Z01.818 PRE-OP EVALUATION: Primary | ICD-10-CM

## 2020-11-12 LAB
BACTERIA BLD CULT: NORMAL
BACTERIA BLD CULT: NORMAL

## 2020-11-12 RX ORDER — LABETALOL 200 MG/1
200 TABLET, FILM COATED ORAL 2 TIMES DAILY
Qty: 60 TABLET | Refills: 11 | Status: ON HOLD | OUTPATIENT
Start: 2020-11-12 | End: 2020-11-19 | Stop reason: SDUPTHER

## 2020-11-12 NOTE — PROGRESS NOTES
Spoke to patient's wife. She says he has not been taking procardia since his ER admission last weekend. He has continued lisinopril and labetalol and SBP has been 90's-110's. He ran out of labetalol yesterday and BP is 120/70's this AM. I wrote new Rx for labetalol 200mg BID and stopped his lisinopril in light of his functional right renal stenosis and recent CHRISTINE. Instructed her to call if patient's BP was not controlled <120/80 on the labetalol and we can make further adjustments if necessary.    JOSY Santos II, MD, VI  Vascular Surgeon  [facility]

## 2020-11-12 NOTE — TELEPHONE ENCOUNTER
Spoke with Mr & Mrs MárquezThony manciniid -19 test appointment scheduled 11/14/2020 at [facility].

## 2020-11-14 ENCOUNTER — HOSPITAL ENCOUNTER (OUTPATIENT)
Dept: PREADMISSION TESTING | Facility: HOSPITAL | Age: 60
Discharge: HOME | End: 2020-11-14
Attending: SURGERY
Payer: COMMERCIAL

## 2020-11-14 DIAGNOSIS — Z01.818 PRE-OP EVALUATION: ICD-10-CM

## 2020-11-14 PROCEDURE — U0003 INFECTIOUS AGENT DETECTION BY NUCLEIC ACID (DNA OR RNA); SEVERE ACUTE RESPIRATORY SYNDROME CORONAVIRUS 2 (SARS-COV-2) (CORONAVIRUS DISEASE [COVID-19]), AMPLIFIED PROBE TECHNIQUE, MAKING USE OF HIGH THROUGHPUT TECHNOLOGIES AS DESCRIBED BY CMS-2020-01-R: HCPCS

## 2020-11-15 LAB — SARS-COV-2 RNA RESP QL NAA+PROBE: NOT DETECTED

## 2020-11-16 ENCOUNTER — HOSPITAL ENCOUNTER (INPATIENT)
Facility: HOSPITAL | Age: 60
LOS: 3 days | Discharge: HOME | DRG: 220 | End: 2020-11-19
Attending: SURGERY | Admitting: SURGERY
Payer: COMMERCIAL

## 2020-11-16 ENCOUNTER — ANESTHESIA EVENT (OUTPATIENT)
Dept: SURGERY | Facility: HOSPITAL | Age: 60
DRG: 220 | End: 2020-11-16
Payer: COMMERCIAL

## 2020-11-16 DIAGNOSIS — I77.9 DISORDER OF ARTERIES AND ARTERIOLES, UNSPECIFIED: ICD-10-CM

## 2020-11-16 DIAGNOSIS — I71.019 DISSECTION OF THORACIC AORTA: ICD-10-CM

## 2020-11-16 DIAGNOSIS — I71.00 AORTIC DISSECTION: Primary | ICD-10-CM

## 2020-11-16 DIAGNOSIS — Z01.818 PRE-OP EXAMINATION: ICD-10-CM

## 2020-11-16 LAB
ABO + RH BLD: NORMAL
ANION GAP SERPL CALC-SCNC: 7 MMOL/L (ref 8–16)
APTT BLDCRRT: 29.8 SEC (ref 21–32)
BASOPHILS # BLD AUTO: 0.02 K/UL (ref 0–0.2)
BASOPHILS NFR BLD: 0.3 % (ref 0–1.9)
BLD GP AB SCN CELLS X3 SERPL QL: NORMAL
BUN SERPL-MCNC: 15 MG/DL (ref 6–20)
CALCIUM SERPL-MCNC: 9.1 MG/DL (ref 8.7–10.5)
CHLORIDE SERPL-SCNC: 109 MMOL/L (ref 95–110)
CO2 SERPL-SCNC: 23 MMOL/L (ref 23–29)
CREAT SERPL-MCNC: 1.3 MG/DL (ref 0.5–1.4)
DIFFERENTIAL METHOD: ABNORMAL
EOSINOPHIL # BLD AUTO: 0.7 K/UL (ref 0–0.5)
EOSINOPHIL NFR BLD: 11.3 % (ref 0–8)
ERYTHROCYTE [DISTWIDTH] IN BLOOD BY AUTOMATED COUNT: 13.2 % (ref 11.5–14.5)
EST. GFR  (AFRICAN AMERICAN): >60 ML/MIN/1.73 M^2
EST. GFR  (NON AFRICAN AMERICAN): >60 ML/MIN/1.73 M^2
GLUCOSE SERPL-MCNC: 87 MG/DL (ref 70–110)
HCT VFR BLD AUTO: 35.4 % (ref 40–54)
HGB BLD-MCNC: 11.1 G/DL (ref 14–18)
IMM GRANULOCYTES # BLD AUTO: 0.04 K/UL (ref 0–0.04)
IMM GRANULOCYTES NFR BLD AUTO: 0.7 % (ref 0–0.5)
INR PPP: 0.9 (ref 0.8–1.2)
LYMPHOCYTES # BLD AUTO: 1.7 K/UL (ref 1–4.8)
LYMPHOCYTES NFR BLD: 28.4 % (ref 18–48)
MCH RBC QN AUTO: 28.5 PG (ref 27–31)
MCHC RBC AUTO-ENTMCNC: 31.4 G/DL (ref 32–36)
MCV RBC AUTO: 91 FL (ref 82–98)
MONOCYTES # BLD AUTO: 0.5 K/UL (ref 0.3–1)
MONOCYTES NFR BLD: 8.4 % (ref 4–15)
NEUTROPHILS # BLD AUTO: 3 K/UL (ref 1.8–7.7)
NEUTROPHILS NFR BLD: 50.9 % (ref 38–73)
NRBC BLD-RTO: 0 /100 WBC
PLATELET # BLD AUTO: 201 K/UL (ref 150–350)
PMV BLD AUTO: 10 FL (ref 9.2–12.9)
POC ACTIVATED CLOTTING TIME K: 191 SEC (ref 74–137)
POC ACTIVATED CLOTTING TIME K: 224 SEC (ref 74–137)
POC ACTIVATED CLOTTING TIME K: 235 SEC (ref 74–137)
POTASSIUM SERPL-SCNC: 4.5 MMOL/L (ref 3.5–5.1)
PROTHROMBIN TIME: 10.5 SEC (ref 9–12.5)
RBC # BLD AUTO: 3.9 M/UL (ref 4.6–6.2)
SAMPLE: ABNORMAL
SODIUM SERPL-SCNC: 139 MMOL/L (ref 136–145)
WBC # BLD AUTO: 5.92 K/UL (ref 3.9–12.7)

## 2020-11-16 PROCEDURE — 99291 PR CRITICAL CARE, E/M 30-74 MINUTES: ICD-10-PCS | Mod: ,,, | Performed by: ANESTHESIOLOGY

## 2020-11-16 PROCEDURE — 36620 PR INSERT CATH,ART,PERCUT,SHORTTERM: ICD-10-PCS | Mod: 59,,, | Performed by: ANESTHESIOLOGY

## 2020-11-16 PROCEDURE — C1729 CATH, DRAINAGE: HCPCS | Performed by: SURGERY

## 2020-11-16 PROCEDURE — 25000003 PHARM REV CODE 250: Performed by: SURGERY

## 2020-11-16 PROCEDURE — 86920 COMPATIBILITY TEST SPIN: CPT

## 2020-11-16 PROCEDURE — 94761 N-INVAS EAR/PLS OXIMETRY MLT: CPT

## 2020-11-16 PROCEDURE — 63600175 PHARM REV CODE 636 W HCPCS: Performed by: STUDENT IN AN ORGANIZED HEALTH CARE EDUCATION/TRAINING PROGRAM

## 2020-11-16 PROCEDURE — D9220A PRA ANESTHESIA: Mod: ANES,,, | Performed by: ANESTHESIOLOGY

## 2020-11-16 PROCEDURE — 99291 CRITICAL CARE FIRST HOUR: CPT | Mod: ,,, | Performed by: ANESTHESIOLOGY

## 2020-11-16 PROCEDURE — 63600175 PHARM REV CODE 636 W HCPCS: Performed by: SURGERY

## 2020-11-16 PROCEDURE — 36000709 HC OR TIME LEV III EA ADD 15 MIN: Performed by: SURGERY

## 2020-11-16 PROCEDURE — D9220A PRA ANESTHESIA: Mod: CRNA,,, | Performed by: STUDENT IN AN ORGANIZED HEALTH CARE EDUCATION/TRAINING PROGRAM

## 2020-11-16 PROCEDURE — 80048 BASIC METABOLIC PNL TOTAL CA: CPT

## 2020-11-16 PROCEDURE — 35606 PR BYPASS GRAFT OTHR,CAROT-SUBCL: ICD-10-PCS | Mod: LT,,, | Performed by: SURGERY

## 2020-11-16 PROCEDURE — 20000000 HC ICU ROOM

## 2020-11-16 PROCEDURE — 37000008 HC ANESTHESIA 1ST 15 MINUTES: Performed by: SURGERY

## 2020-11-16 PROCEDURE — 27201037 HC PRESSURE MONITORING SET UP

## 2020-11-16 PROCEDURE — 37000009 HC ANESTHESIA EA ADD 15 MINS: Performed by: SURGERY

## 2020-11-16 PROCEDURE — D9220A PRA ANESTHESIA: ICD-10-PCS | Mod: ANES,,, | Performed by: ANESTHESIOLOGY

## 2020-11-16 PROCEDURE — 85025 COMPLETE CBC W/AUTO DIFF WBC: CPT

## 2020-11-16 PROCEDURE — 36000708 HC OR TIME LEV III 1ST 15 MIN: Performed by: SURGERY

## 2020-11-16 PROCEDURE — L8670 VASCULAR GRAFT, SYNTHETIC: HCPCS | Performed by: SURGERY

## 2020-11-16 PROCEDURE — 86901 BLOOD TYPING SEROLOGIC RH(D): CPT

## 2020-11-16 PROCEDURE — 85730 THROMBOPLASTIN TIME PARTIAL: CPT

## 2020-11-16 PROCEDURE — 85610 PROTHROMBIN TIME: CPT

## 2020-11-16 PROCEDURE — 35606 BPG CAROTID-SUBCLAVIAN: CPT | Mod: LT,,, | Performed by: SURGERY

## 2020-11-16 PROCEDURE — D9220A PRA ANESTHESIA: ICD-10-PCS | Mod: CRNA,,, | Performed by: STUDENT IN AN ORGANIZED HEALTH CARE EDUCATION/TRAINING PROGRAM

## 2020-11-16 PROCEDURE — 36620 INSERTION CATHETER ARTERY: CPT | Mod: 59,,, | Performed by: ANESTHESIOLOGY

## 2020-11-16 PROCEDURE — 25000003 PHARM REV CODE 250: Performed by: STUDENT IN AN ORGANIZED HEALTH CARE EDUCATION/TRAINING PROGRAM

## 2020-11-16 DEVICE — IMPLANTABLE DEVICE: Type: IMPLANTABLE DEVICE | Site: ARTERIAL | Status: FUNCTIONAL

## 2020-11-16 RX ORDER — SODIUM CHLORIDE 0.9 % (FLUSH) 0.9 %
3 SYRINGE (ML) INJECTION
Status: CANCELLED | OUTPATIENT
Start: 2020-11-16

## 2020-11-16 RX ORDER — SODIUM CHLORIDE 9 MG/ML
INJECTION, SOLUTION INTRAVENOUS CONTINUOUS PRN
Status: DISCONTINUED | OUTPATIENT
Start: 2020-11-16 | End: 2020-11-16

## 2020-11-16 RX ORDER — LIDOCAINE HCL/PF 100 MG/5ML
SYRINGE (ML) INTRAVENOUS
Status: DISCONTINUED | OUTPATIENT
Start: 2020-11-16 | End: 2020-11-16

## 2020-11-16 RX ORDER — ACETAMINOPHEN 325 MG/1
650 TABLET ORAL EVERY 6 HOURS
Status: DISCONTINUED | OUTPATIENT
Start: 2020-11-16 | End: 2020-11-19 | Stop reason: HOSPADM

## 2020-11-16 RX ORDER — HYDROMORPHONE HYDROCHLORIDE 1 MG/ML
0.2 INJECTION, SOLUTION INTRAMUSCULAR; INTRAVENOUS; SUBCUTANEOUS EVERY 5 MIN PRN
Status: CANCELLED | OUTPATIENT
Start: 2020-11-16

## 2020-11-16 RX ORDER — HEPARIN SODIUM 1000 [USP'U]/ML
INJECTION INTRAVENOUS; SUBCUTANEOUS
Status: DISCONTINUED | OUTPATIENT
Start: 2020-11-16 | End: 2020-11-16

## 2020-11-16 RX ORDER — ONDANSETRON 2 MG/ML
4 INJECTION INTRAMUSCULAR; INTRAVENOUS EVERY 12 HOURS PRN
Status: DISCONTINUED | OUTPATIENT
Start: 2020-11-16 | End: 2020-11-16 | Stop reason: HOSPADM

## 2020-11-16 RX ORDER — NAPROXEN SODIUM 220 MG/1
81 TABLET, FILM COATED ORAL DAILY
Status: DISCONTINUED | OUTPATIENT
Start: 2020-11-17 | End: 2020-11-19 | Stop reason: HOSPADM

## 2020-11-16 RX ORDER — PANTOPRAZOLE SODIUM 40 MG/1
40 TABLET, DELAYED RELEASE ORAL DAILY
Status: DISCONTINUED | OUTPATIENT
Start: 2020-11-17 | End: 2020-11-19 | Stop reason: HOSPADM

## 2020-11-16 RX ORDER — TAMSULOSIN HYDROCHLORIDE 0.4 MG/1
0.4 CAPSULE ORAL DAILY
Status: DISCONTINUED | OUTPATIENT
Start: 2020-11-17 | End: 2020-11-19 | Stop reason: HOSPADM

## 2020-11-16 RX ORDER — HEPARIN SODIUM 1000 [USP'U]/ML
INJECTION, SOLUTION INTRAVENOUS; SUBCUTANEOUS
Status: DISCONTINUED | OUTPATIENT
Start: 2020-11-16 | End: 2020-11-16 | Stop reason: HOSPADM

## 2020-11-16 RX ORDER — HEPARIN SODIUM 5000 [USP'U]/ML
5000 INJECTION, SOLUTION INTRAVENOUS; SUBCUTANEOUS EVERY 8 HOURS
Status: DISCONTINUED | OUTPATIENT
Start: 2020-11-16 | End: 2020-11-17

## 2020-11-16 RX ORDER — ONDANSETRON 2 MG/ML
4 INJECTION INTRAMUSCULAR; INTRAVENOUS ONCE AS NEEDED
Status: CANCELLED | OUTPATIENT
Start: 2020-11-16 | End: 2032-04-14

## 2020-11-16 RX ORDER — OXYCODONE HYDROCHLORIDE 5 MG/1
5 TABLET ORAL EVERY 6 HOURS PRN
Status: DISCONTINUED | OUTPATIENT
Start: 2020-11-16 | End: 2020-11-19 | Stop reason: HOSPADM

## 2020-11-16 RX ORDER — BISACODYL 10 MG
10 SUPPOSITORY, RECTAL RECTAL DAILY PRN
Status: DISCONTINUED | OUTPATIENT
Start: 2020-11-16 | End: 2020-11-19 | Stop reason: HOSPADM

## 2020-11-16 RX ORDER — DEXAMETHASONE SODIUM PHOSPHATE 4 MG/ML
INJECTION, SOLUTION INTRA-ARTICULAR; INTRALESIONAL; INTRAMUSCULAR; INTRAVENOUS; SOFT TISSUE
Status: DISCONTINUED | OUTPATIENT
Start: 2020-11-16 | End: 2020-11-16

## 2020-11-16 RX ORDER — DEXTROSE MONOHYDRATE, SODIUM CHLORIDE, AND POTASSIUM CHLORIDE 50; 1.49; 4.5 G/1000ML; G/1000ML; G/1000ML
INJECTION, SOLUTION INTRAVENOUS CONTINUOUS
Status: DISCONTINUED | OUTPATIENT
Start: 2020-11-16 | End: 2020-11-18

## 2020-11-16 RX ORDER — SODIUM CHLORIDE 9 MG/ML
INJECTION, SOLUTION INTRAVENOUS CONTINUOUS
Status: DISCONTINUED | OUTPATIENT
Start: 2020-11-16 | End: 2020-11-16

## 2020-11-16 RX ORDER — POLYETHYLENE GLYCOL 3350 17 G/17G
17 POWDER, FOR SOLUTION ORAL DAILY
Status: DISCONTINUED | OUTPATIENT
Start: 2020-11-17 | End: 2020-11-19 | Stop reason: HOSPADM

## 2020-11-16 RX ORDER — FENTANYL CITRATE 50 UG/ML
25 INJECTION, SOLUTION INTRAMUSCULAR; INTRAVENOUS EVERY 5 MIN PRN
Status: CANCELLED | OUTPATIENT
Start: 2020-11-16

## 2020-11-16 RX ORDER — LABETALOL 100 MG/1
200 TABLET, FILM COATED ORAL 2 TIMES DAILY
Status: DISCONTINUED | OUTPATIENT
Start: 2020-11-16 | End: 2020-11-19

## 2020-11-16 RX ORDER — MUPIROCIN 20 MG/G
OINTMENT TOPICAL
Status: DISCONTINUED | OUTPATIENT
Start: 2020-11-16 | End: 2020-11-16 | Stop reason: HOSPADM

## 2020-11-16 RX ORDER — NIFEDIPINE 30 MG/1
30 TABLET, EXTENDED RELEASE ORAL DAILY
Status: DISCONTINUED | OUTPATIENT
Start: 2020-11-17 | End: 2020-11-19 | Stop reason: HOSPADM

## 2020-11-16 RX ORDER — CEFAZOLIN SODIUM 1 G/3ML
2 INJECTION, POWDER, FOR SOLUTION INTRAMUSCULAR; INTRAVENOUS
Status: COMPLETED | OUTPATIENT
Start: 2020-11-16 | End: 2020-11-16

## 2020-11-16 RX ORDER — SODIUM CHLORIDE 0.9 % (FLUSH) 0.9 %
10 SYRINGE (ML) INJECTION
Status: DISCONTINUED | OUTPATIENT
Start: 2020-11-16 | End: 2020-11-19 | Stop reason: HOSPADM

## 2020-11-16 RX ORDER — PROTAMINE SULFATE 10 MG/ML
INJECTION, SOLUTION INTRAVENOUS
Status: DISCONTINUED | OUTPATIENT
Start: 2020-11-16 | End: 2020-11-16

## 2020-11-16 RX ORDER — PHENYLEPHRINE HYDROCHLORIDE 10 MG/ML
INJECTION INTRAVENOUS
Status: DISCONTINUED | OUTPATIENT
Start: 2020-11-16 | End: 2020-11-16

## 2020-11-16 RX ORDER — ONDANSETRON 2 MG/ML
INJECTION INTRAMUSCULAR; INTRAVENOUS
Status: DISCONTINUED | OUTPATIENT
Start: 2020-11-16 | End: 2020-11-16

## 2020-11-16 RX ORDER — FENTANYL CITRATE 50 UG/ML
INJECTION, SOLUTION INTRAMUSCULAR; INTRAVENOUS
Status: DISCONTINUED | OUTPATIENT
Start: 2020-11-16 | End: 2020-11-16

## 2020-11-16 RX ORDER — PROPOFOL 10 MG/ML
VIAL (ML) INTRAVENOUS
Status: DISCONTINUED | OUTPATIENT
Start: 2020-11-16 | End: 2020-11-16

## 2020-11-16 RX ORDER — DIPHENHYDRAMINE HYDROCHLORIDE 50 MG/ML
25 INJECTION INTRAMUSCULAR; INTRAVENOUS EVERY 6 HOURS PRN
Status: CANCELLED | OUTPATIENT
Start: 2020-11-16

## 2020-11-16 RX ORDER — LIDOCAINE HYDROCHLORIDE 10 MG/ML
1 INJECTION, SOLUTION EPIDURAL; INFILTRATION; INTRACAUDAL; PERINEURAL ONCE
Status: DISCONTINUED | OUTPATIENT
Start: 2020-11-16 | End: 2020-11-16 | Stop reason: HOSPADM

## 2020-11-16 RX ORDER — MORPHINE SULFATE 2 MG/ML
4 INJECTION, SOLUTION INTRAMUSCULAR; INTRAVENOUS EVERY 4 HOURS PRN
Status: DISCONTINUED | OUTPATIENT
Start: 2020-11-16 | End: 2020-11-19 | Stop reason: HOSPADM

## 2020-11-16 RX ORDER — ASPIRIN 325 MG
325 TABLET ORAL ONCE
Status: COMPLETED | OUTPATIENT
Start: 2020-11-16 | End: 2020-11-16

## 2020-11-16 RX ORDER — CEFAZOLIN SODIUM 1 G/3ML
1 INJECTION, POWDER, FOR SOLUTION INTRAMUSCULAR; INTRAVENOUS
Status: COMPLETED | OUTPATIENT
Start: 2020-11-16 | End: 2020-11-18

## 2020-11-16 RX ORDER — ROCURONIUM BROMIDE 10 MG/ML
INJECTION, SOLUTION INTRAVENOUS
Status: DISCONTINUED | OUTPATIENT
Start: 2020-11-16 | End: 2020-11-16

## 2020-11-16 RX ORDER — MIDAZOLAM HYDROCHLORIDE 1 MG/ML
INJECTION INTRAMUSCULAR; INTRAVENOUS
Status: DISCONTINUED | OUTPATIENT
Start: 2020-11-16 | End: 2020-11-16

## 2020-11-16 RX ADMIN — SUGAMMADEX 200 MG: 100 INJECTION, SOLUTION INTRAVENOUS at 03:11

## 2020-11-16 RX ADMIN — HEPARIN SODIUM 8000 UNITS: 1000 INJECTION INTRAVENOUS; SUBCUTANEOUS at 02:11

## 2020-11-16 RX ADMIN — ONDANSETRON 4 MG: 2 INJECTION, SOLUTION INTRAMUSCULAR; INTRAVENOUS at 12:11

## 2020-11-16 RX ADMIN — DOCUSATE SODIUM 200 MG: 50 CAPSULE, LIQUID FILLED ORAL at 09:11

## 2020-11-16 RX ADMIN — ACETAMINOPHEN 650 MG: 325 TABLET ORAL at 11:11

## 2020-11-16 RX ADMIN — ROCURONIUM BROMIDE 10 MG: 10 INJECTION, SOLUTION INTRAVENOUS at 03:11

## 2020-11-16 RX ADMIN — DEXAMETHASONE SODIUM PHOSPHATE 4 MG: 4 INJECTION, SOLUTION INTRAMUSCULAR; INTRAVENOUS at 12:11

## 2020-11-16 RX ADMIN — PROTAMINE SULFATE 25 MG: 10 INJECTION, SOLUTION INTRAVENOUS at 03:11

## 2020-11-16 RX ADMIN — FENTANYL CITRATE 50 MCG: 50 INJECTION, SOLUTION INTRAMUSCULAR; INTRAVENOUS at 01:11

## 2020-11-16 RX ADMIN — FENTANYL CITRATE 100 MCG: 50 INJECTION, SOLUTION INTRAMUSCULAR; INTRAVENOUS at 12:11

## 2020-11-16 RX ADMIN — PHENYLEPHRINE HYDROCHLORIDE 100 MCG: 10 INJECTION INTRAVENOUS at 12:11

## 2020-11-16 RX ADMIN — HEPARIN SODIUM 5000 UNITS: 5000 INJECTION INTRAVENOUS; SUBCUTANEOUS at 09:11

## 2020-11-16 RX ADMIN — PROTAMINE SULFATE 5 MG: 10 INJECTION, SOLUTION INTRAVENOUS at 03:11

## 2020-11-16 RX ADMIN — ROCURONIUM BROMIDE 50 MG: 10 INJECTION, SOLUTION INTRAVENOUS at 12:11

## 2020-11-16 RX ADMIN — CEFAZOLIN 1 G: 1 INJECTION, POWDER, FOR SOLUTION INTRAMUSCULAR; INTRAVENOUS at 05:11

## 2020-11-16 RX ADMIN — PROPOFOL 150 MG: 10 INJECTION, EMULSION INTRAVENOUS at 12:11

## 2020-11-16 RX ADMIN — LABETALOL HYDROCHLORIDE 200 MG: 100 TABLET, FILM COATED ORAL at 09:11

## 2020-11-16 RX ADMIN — SODIUM CHLORIDE: 9 INJECTION, SOLUTION INTRAVENOUS at 12:11

## 2020-11-16 RX ADMIN — ROCURONIUM BROMIDE 10 MG: 10 INJECTION, SOLUTION INTRAVENOUS at 02:11

## 2020-11-16 RX ADMIN — CEFAZOLIN 2 G: 330 INJECTION, POWDER, FOR SOLUTION INTRAMUSCULAR; INTRAVENOUS at 01:11

## 2020-11-16 RX ADMIN — ACETAMINOPHEN 650 MG: 325 TABLET ORAL at 05:11

## 2020-11-16 RX ADMIN — SODIUM CHLORIDE, SODIUM GLUCONATE, SODIUM ACETATE, POTASSIUM CHLORIDE, MAGNESIUM CHLORIDE, SODIUM PHOSPHATE, DIBASIC, AND POTASSIUM PHOSPHATE: .53; .5; .37; .037; .03; .012; .00082 INJECTION, SOLUTION INTRAVENOUS at 12:11

## 2020-11-16 RX ADMIN — HEPARIN SODIUM 2000 UNITS: 1000 INJECTION INTRAVENOUS; SUBCUTANEOUS at 02:11

## 2020-11-16 RX ADMIN — FENTANYL CITRATE 50 MCG: 50 INJECTION, SOLUTION INTRAMUSCULAR; INTRAVENOUS at 02:11

## 2020-11-16 RX ADMIN — POTASSIUM CHLORIDE, DEXTROSE MONOHYDRATE AND SODIUM CHLORIDE: 150; 5; 450 INJECTION, SOLUTION INTRAVENOUS at 05:11

## 2020-11-16 RX ADMIN — PHENYLEPHRINE HYDROCHLORIDE 100 MCG: 10 INJECTION INTRAVENOUS at 01:11

## 2020-11-16 RX ADMIN — ASPIRIN 325 MG ORAL TABLET 325 MG: 325 PILL ORAL at 05:11

## 2020-11-16 RX ADMIN — MIDAZOLAM HYDROCHLORIDE 2 MG: 1 INJECTION, SOLUTION INTRAMUSCULAR; INTRAVENOUS at 12:11

## 2020-11-16 RX ADMIN — OXYCODONE HYDROCHLORIDE 5 MG: 5 TABLET ORAL at 05:11

## 2020-11-16 RX ADMIN — SODIUM CHLORIDE, SODIUM GLUCONATE, SODIUM ACETATE, POTASSIUM CHLORIDE, MAGNESIUM CHLORIDE, SODIUM PHOSPHATE, DIBASIC, AND POTASSIUM PHOSPHATE: .53; .5; .37; .037; .03; .012; .00082 INJECTION, SOLUTION INTRAVENOUS at 02:11

## 2020-11-16 RX ADMIN — PHENYLEPHRINE HYDROCHLORIDE 200 MCG: 10 INJECTION INTRAVENOUS at 01:11

## 2020-11-16 RX ADMIN — SODIUM CHLORIDE 0.8 MCG/KG/MIN: 9 INJECTION, SOLUTION INTRAVENOUS at 01:11

## 2020-11-16 RX ADMIN — LIDOCAINE HYDROCHLORIDE 100 MG: 20 INJECTION, SOLUTION INTRAVENOUS at 12:11

## 2020-11-16 RX ADMIN — ROCURONIUM BROMIDE 10 MG: 10 INJECTION, SOLUTION INTRAVENOUS at 01:11

## 2020-11-16 RX ADMIN — HEPARIN SODIUM 1000 UNITS: 1000 INJECTION INTRAVENOUS; SUBCUTANEOUS at 02:11

## 2020-11-16 NOTE — ANESTHESIA PREPROCEDURE EVALUATION
"                                       [facility]  Anesthesia Pre-Operative Evaluation         Patient Name: Luis Eduardo Curry  YOB: 1960  MRN: [id number]    SUBJECTIVE:     Pre-operative evaluation for Procedure(s) (LRB):  REPAIR, ANEURYSM, ENDOVASCULAR GRAFT, AORTA, THORACIC (N/A)     11/16/2020    Luis Eduardo Curry is a 60 y.o. male w/ a significant PMHx of who initially presented to the hospital on 10/4/2020 with chest pain and severe CHRISTINE and was found to have a descending aortic dissection and post-obstructive nephropathy due to BPH. He was managed medically with anti-impulse control and buckley and discharged about a week after admission. He followed up in clinic with repeat CTA chest/abd/pelvis that shows no significant change in his dissection. He underwent a left carotid-subclavian bypass on 11/16/20 with above procedure tomorrow.     Patient now presents for the above procedure(s).      LDA:        Peripheral IV - Single Lumen 11/16/20 1145 20 G Anterior;Right Wrist (Active)   Number of days: 0            Peripheral IV - Single Lumen 11/16/20 1234 18 G Right Forearm (Active)   Number of days: 0            Urethral Catheter 10/15/20 0103 Non-latex 16 Fr. (Active)   Site Assessment Clean;Intact 11/16/20 1120   Collection Container Leg bag 11/16/20 1120   Securement Method secured to top of thigh w/ leg strap 11/16/20 1120   Catheter Care Performed yes 10/20/20 0815   Reason for Continuing Urinary Catheterization Urinary retention 10/20/20 0815   CAUTI Prevention Bundle StatLock in place w 1" slack;Intact seal between catheter & drainage tubing;Drainage bag/urimeter off the floor;Green sheeting clip in use;No dependent loops or kinks;Drainage bag/urimeter not overfilled (<2/3 full);Drainage bag/urimeter below bladder 10/20/20 0815   Output (mL) 400 mL 10/20/20 0500   Number of days: 32       Prev airway: Present Prior to Hospital Arrival?: No; Placement Date: 01/14/20; Placement " Time: 1026; Method of Intubation: Direct laryngoscopy; Inserted by: CRNA; Airway Device: Endotracheal Tube; Induction type: IV - routine; Mask Ventilation: Easy; Intubated: Postinduction; Blade: Yousif #3; Airway Device Size: 7.5; Style: Cuffed; Cuff Inflation: Minimal occlusive pressure; Inflation Amount: 7; Placement Verified By: Auscultation, Capnometry, ETT Condensation; Grade: Grade II; Complicating Factors: Small mouth; Intubation Findings: Positive EtCO2, Bilateral breath sounds, Atraumatic/Condition of teeth unchanged;  Depth of Insertion: 22; Securment: Lips; Complications: None; Breath Sounds: Equal Bilateral; Insertion Attempts: 1; Removal Date: 01/14/20;  Removal Time: 1129    Drips: None documented.      Patient Active Problem List   Diagnosis    Non-thermal blister of ankle, left, initial encounter    Colon cancer screening    BPH with urinary obstruction    Urinary tract infection in male    Umbilical hernia without obstruction and without gangrene    Right inguinal hernia    Aortic dissection distal to left subclavian    Hypotension    CHRISTINE (acute kidney injury)    Aortic dissection       Review of patient's allergies indicates:  No Known Allergies    Current Inpatient Medications:      Current Facility-Administered Medications on File Prior to Encounter   Medication Dose Route Frequency Provider Last Rate Last Dose    0.9%  NaCl infusion   Intravenous Continuous Lee Barragan MD        ceFAZolin injection 2 g  2 g Intravenous On Call Procedure Lee Barragan MD        lidocaine (PF) 10 mg/ml (1%) injection 10 mg  1 mL Intradermal Once Lee Barragan MD        mupirocin 2 % ointment   Nasal On Call Procedure Lee Barragan MD        ondansetron injection 4 mg  4 mg Intravenous Q12H PRN Lee Barragan MD         Current Outpatient Medications on File Prior to Encounter   Medication Sig Dispense Refill    NIFEdipine (PROCARDIA-XL) 30 MG (OSM) 24 hr tablet Take 1 tablet (30  mg total) by mouth once daily. Only resume if Sb/p > 130 (Patient not taking: Reported on 10/22/2020) 30 tablet 0    omeprazole (PRILOSEC) 40 MG capsule Take 1 capsule (40 mg total) by mouth once daily. (Patient not taking: Reported on 10/22/2020) 30 capsule 5    tamsulosin (FLOMAX) 0.4 mg Cap Take 1 capsule (0.4 mg total) by mouth once daily. 90 capsule 3       Past Surgical History:   Procedure Laterality Date    BIOPSY WITH TRANSRECTAL ULTRASOUND (TRUS) GUIDANCE N/A 5/1/2019    Procedure: BIOPSY, WITH TRANSRECTAL PROSTATE ULTRASOUND;  Surgeon: Chintan Mendez Jr., MD;  Location: [facility] OR;  Service: Urology;  Laterality: N/A;    COLONOSCOPY  2006    COLONOSCOPY N/A 1/24/2019    Procedure: COLONOSCOPY;  Surgeon: Reece Tarango MD;  Location: [facility];  Service: Endoscopy;  Laterality: N/A;    CYSTOSCOPY N/A 5/1/2019    Procedure: FLEXIBLE CYSTOSCOPY;  Surgeon: Chintan Mendez Jr., MD;  Location: [facility] OR;  Service: Urology;  Laterality: N/A;    UMBILICAL HERNIA REPAIR N/A 1/14/2020    Procedure: REPAIR, HERNIA, UMBILICAL;  Surgeon: Caio Mas Jr., MD;  Location: [facility] OR;  Service: General;  Laterality: N/A;       Social History     Socioeconomic History    Marital status:      Spouse name: Not on file    Number of children: Not on file    Years of education: Not on file    Highest education level: Not on file   Occupational History    Not on file   Social Needs    Financial resource strain: Not on file    Food insecurity     Worry: Not on file     Inability: Not on file    Transportation needs     Medical: Not on file     Non-medical: Not on file   Tobacco Use    Smoking status: Former Smoker     Packs/day: 1.00     Years: 30.00     Pack years: 30.00     Types: Cigarettes    Smokeless tobacco: Never Used   Substance and Sexual Activity    Alcohol use: Yes     Frequency: Monthly or less     Binge frequency: Weekly     Comment: socially    Drug use: No    Sexual activity: Yes    Lifestyle    Physical activity     Days per week: Not on file     Minutes per session: Not on file    Stress: Only a little   Relationships    Social connections     Talks on phone: Not on file     Gets together: Not on file     Attends Rastafarian service: Not on file     Active member of club or organization: Not on file     Attends meetings of clubs or organizations: Not on file     Relationship status: Not on file   Other Topics Concern    Not on file   Social History Narrative    Not on file       OBJECTIVE:     Vital Signs Range (Last 24H):  Temp:  [36.9 °C (98.4 °F)]   Pulse:  [65]   Resp:  [17]   BP: (120)/(69)   SpO2:  [98 %]       Significant Labs:  Lab Results   Component Value Date    WBC 5.92 11/16/2020    HGB 11.1 (L) 11/16/2020    HCT 35.4 (L) 11/16/2020     11/16/2020    CHOL 195 01/05/2019    TRIG 96 01/05/2019    HDL 37 (L) 01/05/2019    ALT 32 11/07/2020    ALT 32 11/07/2020    AST 20 11/07/2020    AST 20 11/07/2020     11/16/2020    K 4.5 11/16/2020     11/16/2020    CREATININE 1.3 11/16/2020    BUN 15 11/16/2020    CO2 23 11/16/2020    TSH 1.274 11/07/2020    PSA 4.6 (H) 01/05/2019    INR 0.9 11/16/2020       Diagnostic Studies: No relevant studies.    EKG:   Results for orders placed or performed during the hospital encounter of 11/07/20   EKG 12-lead    Collection Time: 11/07/20 10:48 AM    Narrative    Test Reason : I95.9    Vent. Rate : 079 BPM     Atrial Rate : 079 BPM     P-R Int : 182 ms          QRS Dur : 082 ms      QT Int : 350 ms       P-R-T Axes : 050 067 055 degrees     QTc Int : 401 ms    Normal sinus rhythm  Normal ECG  When compared with ECG of 19-OCT-2020 12:36,  No significant change was found  Confirmed by Yuriy Santana MD ([id number]) on 11/8/2020 9:27:01 AM    Referred By: [email or web address]   SELF           Confirmed By:Yuriy Santana MD       2D ECHO:  TTE:  Results for orders placed or performed during the hospital encounter of 10/04/20   Echo Color Flow  Doppler? Yes   Result Value Ref Range    Ascending aorta 3.88 cm    STJ 3.56 cm    AV mean gradient 2 mmHg    Ao peak estee 1.01 m/s    Ao VTI 21.04 cm    IVS 0.98 0.6 - 1.1 cm    LA size 2.13 cm    Left Atrium Major Axis 5.65 cm    Left Atrium Minor Axis 5.25 cm    LVIDd 4.58 3.5 - 6.0 cm    LVIDs 2.61 2.1 - 4.0 cm    LVOT diameter 2.08 cm    LVOT peak VTI 24.33 cm    Posterior Wall 1.03 0.6 - 1.1 cm    RA Major Axis 3.54 cm    RA Width 2.39 cm    Sinus 4.34 cm    TAPSE 2.04 cm    TDI LATERAL 0.08 m/s    TDI SEPTAL 0.05 m/s    LA WIDTH 5.16 cm    LV Diastolic Volume 96.47 mL    LV Systolic Volume 24.96 mL    RV S' 12.96 cm/s    LVOT peak estee 1.07 m/s    FS 43 %    LA volume 50.85 cm3    LV mass 158.78 g    Left Ventricle Relative Wall Thickness 0.45 cm    AV valve area 3.93 cm2    AV Velocity Ratio 1.06     AV index (prosthetic) 1.16     Mean e' 0.07 m/s    LVOT area 3.4 cm2    LVOT stroke volume 82.63 cm3    AV peak gradient 4 mmHg    LV Systolic Volume Index 11.1 mL/m2    LV Diastolic Volume Index 42.83 mL/m2    LA Volume Index 22.6 mL/m2    LV Mass Index 70 g/m2    BSA 2.29 m2    Right Atrial Pressure (from IVC) 8 mmHg    Narrative    · There is left ventricular concentric remodeling.  · The left ventricle is normal in size with normal systolic function. The   estimated ejection fraction is 60%.  · Normal right ventricular systolic function.  · Intermediate central venous pressure (8 mmHg).          JANAE:  Results for orders placed or performed during the hospital encounter of 10/04/20   Transesophageal echo (JANAE)   Result Value Ref Range    BSA 2.29 m2    Sinus 4.00 cm    STJ 3.60 cm    Ascending aorta 3.90 cm    Narrative    · Aortic dissection from aortic arch to decending aorta. There also   appears to be a dissection flap in the ascending aorta but I cannot be   certain.  · Ascending aorta at the upper limits of normal.  · Enlarged descending thoracic aorta and arch.  · The left ventricle is normal in size  with normal systolic function. The   estimated ejection fraction is 55%.  · Normal right ventricular systolic function.  · No thrombus is present in the appendage.  · No interatrial septal defect present.  · Trivial pericardial effusion.  · Normal left ventricular diastolic function.          ASSESSMENT/PLAN:       Anesthesia Evaluation    I have reviewed the Patient Summary Reports.    I have reviewed the Nursing Notes. I have reviewed the NPO Status.   I have reviewed the Medications.     Review of Systems  Anesthesia Hx:  No problems with previous Anesthesia    Cardiovascular:   Type B dissections s/p carotid-subclavian bypass   Pulmonary:  Pulmonary Normal    Renal/:   Chronic Renal Disease, ARF, CRI    Hepatic/GI:  Hepatic/GI Normal    Neurological:  Neurology Normal    Endocrine:  Endocrine Normal        Physical Exam  General:  Well nourished    Airway/Jaw/Neck:  Airway Findings: Mouth Opening: Normal Tongue: Normal  General Airway Assessment: Adult, Good  Mallampati: II  TM Distance: Normal, at least 6 cm       Chest/Lungs:  Chest/Lungs Findings: Normal Respiratory Rate     Heart/Vascular:  Heart Findings: Rate: Normal             Anesthesia Plan  Type of Anesthesia, risks & benefits discussed:  Anesthesia Type:  general  Patient's Preference:   Intra-op Monitoring Plan: standard ASA monitors and arterial line  Intra-op Monitoring Plan Comments:   Post Op Pain Control Plan: multimodal analgesia and IV/PO Opioids PRN  Post Op Pain Control Plan Comments:   Induction:   IV  Beta Blocker:  Patient is not currently on a Beta-Blocker (No further documentation required).       Informed Consent: Patient understands risks and agrees with Anesthesia plan.  Questions answered. Anesthesia consent signed with patient.  ASA Score: 3     Day of Surgery Review of History & Physical: I have interviewed and examined the patient. I have reviewed the patient's H&P dated:  There are no significant changes.  H&P update referred  to the surgeon.         Ready For Surgery From Anesthesia Perspective.

## 2020-11-16 NOTE — INTERVAL H&P NOTE
The patient has been examined and the H&P has been reviewed:    I concur with the findings and no changes have occurred since H&P was written.    Surgery risks, benefits and alternative options discussed and understood by patient/family.          Active Hospital Problems    Diagnosis  POA    Aortic dissection [I71.00]  Yes      Resolved Hospital Problems   No resolved problems to display.

## 2020-11-16 NOTE — ANESTHESIA PROCEDURE NOTES
Intubation  Performed by: Bina Lockhart CRNA  Authorized by: Sandro Loyd MD     Intubation:     Induction:  Intravenous    Intubated:  Postinduction    Mask Ventilation:  Easy mask    Attempts:  1    Attempted By:  CRNA    Method of Intubation:  Direct    Blade:  Yousif 3    Laryngeal View Grade: Grade I - full view of chords      Difficult Airway Encountered?: No      Complications:  None    Airway Device:  Oral endotracheal tube    Airway Device Size:  7.5    Style/Cuff Inflation:  Cuffed (inflated to minimal occlusive pressure)    Tube secured:  23    Secured at:  The lips    Placement Verified By:  Capnometry    Complicating Factors:  None    Findings Post-Intubation:  BS equal bilateral and atraumatic/condition of teeth unchanged

## 2020-11-16 NOTE — ANESTHESIA PREPROCEDURE EVALUATION
11/16/2020  Pre-operative evaluation for Procedure(s) (LRB):  CREATION, BYPASS, ARTERIAL, AORTA TO CAROTID, USING GRAFT SUBCLAVIAN BYPASS (Left)    Luis Eduardo Curry is a 60 y.o. male here for above procedure for subacute type B aortic dissection. Plan for subsequent TEVAR    Patient Active Problem List   Diagnosis    Non-thermal blister of ankle, left, initial encounter    Colon cancer screening    BPH with urinary obstruction    Urinary tract infection in male    Umbilical hernia without obstruction and without gangrene    Right inguinal hernia    Aortic dissection distal to left subclavian    Hypotension    CHRISTINE (acute kidney injury)    Aortic dissection       Review of patient's allergies indicates:  No Known Allergies    No current facility-administered medications on file prior to encounter.      Current Outpatient Medications on File Prior to Encounter   Medication Sig Dispense Refill    tamsulosin (FLOMAX) 0.4 mg Cap Take 1 capsule (0.4 mg total) by mouth once daily. 90 capsule 3    NIFEdipine (PROCARDIA-XL) 30 MG (OSM) 24 hr tablet Take 1 tablet (30 mg total) by mouth once daily. Only resume if Sb/p > 130 (Patient not taking: Reported on 10/22/2020) 30 tablet 0    omeprazole (PRILOSEC) 40 MG capsule Take 1 capsule (40 mg total) by mouth once daily. (Patient not taking: Reported on 10/22/2020) 30 capsule 5       Past Surgical History:   Procedure Laterality Date    BIOPSY WITH TRANSRECTAL ULTRASOUND (TRUS) GUIDANCE N/A 5/1/2019    Procedure: BIOPSY, WITH TRANSRECTAL PROSTATE ULTRASOUND;  Surgeon: Chintan Mendez Jr., MD;  Location: [facility];  Service: Urology;  Laterality: N/A;    COLONOSCOPY  2006    COLONOSCOPY N/A 1/24/2019    Procedure: COLONOSCOPY;  Surgeon: Reece Tarango MD;  Location: [facility];  Service: Endoscopy;  Laterality: N/A;    CYSTOSCOPY N/A 5/1/2019    Procedure:  FLEXIBLE CYSTOSCOPY;  Surgeon: Chintan Mendez Jr., MD;  Location: [facility];  Service: Urology;  Laterality: N/A;    UMBILICAL HERNIA REPAIR N/A 1/14/2020    Procedure: REPAIR, HERNIA, UMBILICAL;  Surgeon: Caio Mas Jr., MD;  Location: [facility] OR;  Service: General;  Laterality: N/A;       Social History     Socioeconomic History    Marital status:      Spouse name: Not on file    Number of children: Not on file    Years of education: Not on file    Highest education level: Not on file   Occupational History    Not on file   Social Needs    Financial resource strain: Not on file    Food insecurity     Worry: Not on file     Inability: Not on file    Transportation needs     Medical: Not on file     Non-medical: Not on file   Tobacco Use    Smoking status: Former Smoker     Packs/day: 1.00     Years: 30.00     Pack years: 30.00     Types: Cigarettes    Smokeless tobacco: Never Used   Substance and Sexual Activity    Alcohol use: Yes     Frequency: Monthly or less     Binge frequency: Weekly     Comment: socially    Drug use: No    Sexual activity: Yes   Lifestyle    Physical activity     Days per week: Not on file     Minutes per session: Not on file    Stress: Only a little   Relationships    Social connections     Talks on phone: Not on file     Gets together: Not on file     Attends Scientology service: Not on file     Active member of club or organization: Not on file     Attends meetings of clubs or organizations: Not on file     Relationship status: Not on file   Other Topics Concern    Not on file   Social History Narrative    Not on file             Anesthesia Evaluation    I have reviewed the Patient Summary Reports.    I have reviewed the Nursing Notes. I have reviewed the NPO Status.      Review of Systems  Anesthesia Hx:  No problems with previous Anesthesia    Cardiovascular:  Cardiovascular Normal     Pulmonary:  Pulmonary Normal    Renal/:   Chronic Renal Disease, ARF, CRI     Neurological:  Neurology Normal    Endocrine:  Endocrine Normal        Physical Exam  General:  Well nourished    Airway/Jaw/Neck:  Airway Findings: Mouth Opening: Normal Tongue: Normal  General Airway Assessment: Adult  Mallampati: II  TM Distance: Normal, at least 6 cm       Chest/Lungs:  Chest/Lungs Findings: Normal Respiratory Rate     Heart/Vascular:  Heart Findings: Rate: Normal             Anesthesia Plan  Type of Anesthesia, risks & benefits discussed:  Anesthesia Type:  general  Patient's Preference:   Intra-op Monitoring Plan: standard ASA monitors and arterial line  Intra-op Monitoring Plan Comments:   Post Op Pain Control Plan: multimodal analgesia and IV/PO Opioids PRN  Post Op Pain Control Plan Comments:   Induction:   IV  Beta Blocker:         Informed Consent: Patient understands risks and agrees with Anesthesia plan.  Questions answered. Anesthesia consent signed with patient.  ASA Score: 3     Day of Surgery Review of History & Physical:            Ready For Surgery From Anesthesia Perspective.

## 2020-11-16 NOTE — ANESTHESIA PROCEDURE NOTES
Arterial    Diagnosis: vascular disease    Patient location during procedure: done in OR  Procedure start time: 11/16/2020 12:55 PM  Timeout: 11/16/2020 12:55 PM  Procedure end time: 11/16/2020 12:55 PM    Staffing  Authorizing Provider: Sandro Loyd MD  Performing Provider: Sandro Loyd MD    Anesthesiologist was present at the time of the procedure.    Preanesthetic Checklist  Completed: patient identified, site marked, surgical consent, pre-op evaluation, timeout performed, IV checked, risks and benefits discussed, monitors and equipment checked and anesthesia consent givenArterial  Skin Prep: chlorhexidine gluconate and isopropyl alcohol  Local Infiltration: none  Orientation: right  Location: radial  Catheter Size: 20 G  Catheter placement by Ultrasound guidance and Anatomical landmarks. Heme positive aspiration all ports.  Vessel Caliber: medium, patent, compressibility normal  Vascular Doppler:  not done  Needle advanced into vessel with real time Ultrasound guidance.  Guidewire confirmed in vessel.  Sterile sheath used.Insertion Attempts: 2  Assessment  Dressing: secured with tape and tegaderm  Patient: Tolerated well

## 2020-11-16 NOTE — BRIEF OP NOTE
VASCULAR SURGERY SERVICE  BRIEF OP NOTE    Date of Procedure: 11/16/2020    Surgeon: JOSY Santos MD    Assistant(s): Robert Smalls MD PGY-7   Lee Barragan MD PGY-2    Pre-Op Diagnosis:   Type B aortic dissection with aneurysmal degeneration    Post-Op Diagnosis:   Same    Procedure(s):    Left common carotid to left subclavian artery bypass with 8mm PTFE    Anesthesia:    GETA    Findings / Key Elements:    Deep subclavian artery   Thoracic duct ligated multiple times   Phrenic nerve identified and protected    Estimated Blood Loss: 100mL    Specimens:   Specimen (12h ago, onward)    None          Robert Smalls MD PGY-7  Vascular and Endovascular Surgery Fellow  11/16/2020

## 2020-11-16 NOTE — TRANSFER OF CARE
Anesthesia Transfer of Care Note    Patient: Luis Eduardo Curry    Procedure(s) Performed: Procedure(s) (LRB):  CREATION, BYPASS, ARTERIAL, AORTA TO CAROTID, USING GRAFT SUBCLAVIAN BYPASS (Left)    Patient location: ICU    Anesthesia Type: general    Transport from OR: Transported from OR on 6-10 L/min O2 by face mask with adequate spontaneous ventilation. Continuous ECG monitoring in transport. Continuous SpO2 monitoring in transport. Continuos invasive BP monitoring in transport    Post pain: adequate analgesia    Post assessment: no apparent anesthetic complications and tolerated procedure well    Post vital signs: stable    Level of consciousness: awake, alert and oriented    Nausea/Vomiting: no nausea/vomiting    Complications: none    Transfer of care protocol was followed      Last vitals:   Visit Vitals  /69 (BP Location: Left arm, Patient Position: Lying)   Pulse 65   Temp 36.9 °C (98.4 °F) (Oral)   Resp 17   SpO2 98%

## 2020-11-17 ENCOUNTER — ANESTHESIA (OUTPATIENT)
Dept: SURGERY | Facility: HOSPITAL | Age: 60
DRG: 220 | End: 2020-11-17
Payer: COMMERCIAL

## 2020-11-17 LAB
ALLENS TEST: ABNORMAL
ANION GAP SERPL CALC-SCNC: 11 MMOL/L (ref 8–16)
ANION GAP SERPL CALC-SCNC: 8 MMOL/L (ref 8–16)
APTT BLDCRRT: 138.1 SEC (ref 21–32)
BASOPHILS # BLD AUTO: 0.01 K/UL (ref 0–0.2)
BASOPHILS # BLD AUTO: 0.02 K/UL (ref 0–0.2)
BASOPHILS NFR BLD: 0.1 % (ref 0–1.9)
BASOPHILS NFR BLD: 0.2 % (ref 0–1.9)
BUN SERPL-MCNC: 11 MG/DL (ref 6–20)
BUN SERPL-MCNC: 14 MG/DL (ref 6–20)
CALCIUM SERPL-MCNC: 8 MG/DL (ref 8.7–10.5)
CALCIUM SERPL-MCNC: 8.4 MG/DL (ref 8.7–10.5)
CHLORIDE SERPL-SCNC: 108 MMOL/L (ref 95–110)
CHLORIDE SERPL-SCNC: 108 MMOL/L (ref 95–110)
CO2 SERPL-SCNC: 19 MMOL/L (ref 23–29)
CO2 SERPL-SCNC: 22 MMOL/L (ref 23–29)
CREAT SERPL-MCNC: 1 MG/DL (ref 0.5–1.4)
CREAT SERPL-MCNC: 1.1 MG/DL (ref 0.5–1.4)
DELSYS: ABNORMAL
DIFFERENTIAL METHOD: ABNORMAL
DIFFERENTIAL METHOD: ABNORMAL
EOSINOPHIL # BLD AUTO: 0.2 K/UL (ref 0–0.5)
EOSINOPHIL # BLD AUTO: 0.6 K/UL (ref 0–0.5)
EOSINOPHIL NFR BLD: 2.7 % (ref 0–8)
EOSINOPHIL NFR BLD: 6.3 % (ref 0–8)
ERYTHROCYTE [DISTWIDTH] IN BLOOD BY AUTOMATED COUNT: 13.2 % (ref 11.5–14.5)
ERYTHROCYTE [DISTWIDTH] IN BLOOD BY AUTOMATED COUNT: 13.4 % (ref 11.5–14.5)
EST. GFR  (AFRICAN AMERICAN): >60 ML/MIN/1.73 M^2
EST. GFR  (AFRICAN AMERICAN): >60 ML/MIN/1.73 M^2
EST. GFR  (NON AFRICAN AMERICAN): >60 ML/MIN/1.73 M^2
EST. GFR  (NON AFRICAN AMERICAN): >60 ML/MIN/1.73 M^2
GLUCOSE SERPL-MCNC: 124 MG/DL (ref 70–110)
GLUCOSE SERPL-MCNC: 132 MG/DL (ref 70–110)
HCO3 UR-SCNC: 21.5 MMOL/L (ref 24–28)
HCT VFR BLD AUTO: 30.6 % (ref 40–54)
HCT VFR BLD AUTO: 34.4 % (ref 40–54)
HGB BLD-MCNC: 11 G/DL (ref 14–18)
HGB BLD-MCNC: 9.9 G/DL (ref 14–18)
IMM GRANULOCYTES # BLD AUTO: 0.04 K/UL (ref 0–0.04)
IMM GRANULOCYTES # BLD AUTO: 0.06 K/UL (ref 0–0.04)
IMM GRANULOCYTES NFR BLD AUTO: 0.5 % (ref 0–0.5)
IMM GRANULOCYTES NFR BLD AUTO: 0.6 % (ref 0–0.5)
LACTATE SERPL-SCNC: 1.7 MMOL/L (ref 0.5–2.2)
LYMPHOCYTES # BLD AUTO: 1.1 K/UL (ref 1–4.8)
LYMPHOCYTES # BLD AUTO: 1.7 K/UL (ref 1–4.8)
LYMPHOCYTES NFR BLD: 12.8 % (ref 18–48)
LYMPHOCYTES NFR BLD: 16.6 % (ref 18–48)
MAGNESIUM SERPL-MCNC: 2.2 MG/DL (ref 1.6–2.6)
MCH RBC QN AUTO: 28.6 PG (ref 27–31)
MCH RBC QN AUTO: 28.9 PG (ref 27–31)
MCHC RBC AUTO-ENTMCNC: 32 G/DL (ref 32–36)
MCHC RBC AUTO-ENTMCNC: 32.4 G/DL (ref 32–36)
MCV RBC AUTO: 90 FL (ref 82–98)
MCV RBC AUTO: 90 FL (ref 82–98)
MODE: ABNORMAL
MONOCYTES # BLD AUTO: 0.5 K/UL (ref 0.3–1)
MONOCYTES # BLD AUTO: 0.6 K/UL (ref 0.3–1)
MONOCYTES NFR BLD: 4.8 % (ref 4–15)
MONOCYTES NFR BLD: 7.1 % (ref 4–15)
NEUTROPHILS # BLD AUTO: 6.6 K/UL (ref 1.8–7.7)
NEUTROPHILS # BLD AUTO: 7.3 K/UL (ref 1.8–7.7)
NEUTROPHILS NFR BLD: 71.6 % (ref 38–73)
NEUTROPHILS NFR BLD: 76.7 % (ref 38–73)
NRBC BLD-RTO: 0 /100 WBC
NRBC BLD-RTO: 0 /100 WBC
PCO2 BLDA: 34.6 MMHG (ref 35–45)
PH SMN: 7.4 [PH] (ref 7.35–7.45)
PHOSPHATE SERPL-MCNC: 2.7 MG/DL (ref 2.7–4.5)
PLATELET # BLD AUTO: 204 K/UL (ref 150–350)
PLATELET # BLD AUTO: 214 K/UL (ref 150–350)
PMV BLD AUTO: 10 FL (ref 9.2–12.9)
PMV BLD AUTO: 9.9 FL (ref 9.2–12.9)
PO2 BLDA: 73 MMHG (ref 80–100)
POC BE: -3 MMOL/L
POC SATURATED O2: 95 % (ref 95–100)
POC TCO2: 23 MMOL/L (ref 23–27)
POCT GLUCOSE: 125 MG/DL (ref 70–110)
POTASSIUM SERPL-SCNC: 4.5 MMOL/L (ref 3.5–5.1)
POTASSIUM SERPL-SCNC: 4.6 MMOL/L (ref 3.5–5.1)
RBC # BLD AUTO: 3.42 M/UL (ref 4.6–6.2)
RBC # BLD AUTO: 3.84 M/UL (ref 4.6–6.2)
SAMPLE: ABNORMAL
SITE: ABNORMAL
SODIUM SERPL-SCNC: 138 MMOL/L (ref 136–145)
SODIUM SERPL-SCNC: 138 MMOL/L (ref 136–145)
SP02: 99
WBC # BLD AUTO: 10.13 K/UL (ref 3.9–12.7)
WBC # BLD AUTO: 8.64 K/UL (ref 3.9–12.7)

## 2020-11-17 PROCEDURE — 84100 ASSAY OF PHOSPHORUS: CPT

## 2020-11-17 PROCEDURE — 63600175 PHARM REV CODE 636 W HCPCS: Performed by: STUDENT IN AN ORGANIZED HEALTH CARE EDUCATION/TRAINING PROGRAM

## 2020-11-17 PROCEDURE — 80048 BASIC METABOLIC PNL TOTAL CA: CPT

## 2020-11-17 PROCEDURE — 36245 INS CATH ABD/L-EXT ART 1ST: CPT | Mod: 51,,, | Performed by: SURGERY

## 2020-11-17 PROCEDURE — 37252 PR IVUS, NON-CORONARY, 1ST VESSEL: ICD-10-PCS | Mod: ,,, | Performed by: SURGERY

## 2020-11-17 PROCEDURE — 75957 PR  ENDOVASC REPAIR THOR AORTA EXCL SUBCLAVIAN: CPT | Mod: 26,,, | Performed by: SURGERY

## 2020-11-17 PROCEDURE — 99291 CRITICAL CARE FIRST HOUR: CPT | Mod: ,,, | Performed by: ANESTHESIOLOGY

## 2020-11-17 PROCEDURE — 37237 OPEN/PERQ PLACE STENT EA ADD: CPT | Mod: 59,,, | Performed by: SURGERY

## 2020-11-17 PROCEDURE — 36215 PR PLACE CATH SELECTIVE ART,NECK: ICD-10-PCS | Mod: 51,,, | Performed by: SURGERY

## 2020-11-17 PROCEDURE — 37236 PR OPEN/PERQ TRANSCATH PLACE STENT; INITIAL ARTERY: ICD-10-PCS | Mod: 58,51,59, | Performed by: SURGERY

## 2020-11-17 PROCEDURE — C1894 INTRO/SHEATH, NON-LASER: HCPCS | Performed by: SURGERY

## 2020-11-17 PROCEDURE — 36245 PR INS CATH ABD/L-EXT ART 1ST ORDER: ICD-10-PCS | Mod: 51,,, | Performed by: SURGERY

## 2020-11-17 PROCEDURE — D9220A PRA ANESTHESIA: Mod: ,,, | Performed by: ANESTHESIOLOGY

## 2020-11-17 PROCEDURE — 37242 PR VASC EMB/OCC INCL S&I/ROADMAPP/IMG GUIDE ART OTHR THAN HEMORR/TUMOR: ICD-10-PCS | Mod: 58,51,, | Performed by: SURGERY

## 2020-11-17 PROCEDURE — 37799 UNLISTED PX VASCULAR SURGERY: CPT

## 2020-11-17 PROCEDURE — 85730 THROMBOPLASTIN TIME PARTIAL: CPT

## 2020-11-17 PROCEDURE — 99291 PR CRITICAL CARE, E/M 30-74 MINUTES: ICD-10-PCS | Mod: ,,, | Performed by: ANESTHESIOLOGY

## 2020-11-17 PROCEDURE — 63600175 PHARM REV CODE 636 W HCPCS: Performed by: SURGERY

## 2020-11-17 PROCEDURE — 25000003 PHARM REV CODE 250: Performed by: SURGERY

## 2020-11-17 PROCEDURE — C1874 STENT, COATED/COV W/DEL SYS: HCPCS | Performed by: SURGERY

## 2020-11-17 PROCEDURE — 37000009 HC ANESTHESIA EA ADD 15 MINS: Performed by: SURGERY

## 2020-11-17 PROCEDURE — 37000008 HC ANESTHESIA 1ST 15 MINUTES: Performed by: SURGERY

## 2020-11-17 PROCEDURE — 25000003 PHARM REV CODE 250: Performed by: STUDENT IN AN ORGANIZED HEALTH CARE EDUCATION/TRAINING PROGRAM

## 2020-11-17 PROCEDURE — 27800903 OPTIME MED/SURG SUP & DEVICES OTHER IMPLANTS: Performed by: SURGERY

## 2020-11-17 PROCEDURE — 83605 ASSAY OF LACTIC ACID: CPT

## 2020-11-17 PROCEDURE — 27201423 OPTIME MED/SURG SUP & DEVICES STERILE SUPPLY: Performed by: SURGERY

## 2020-11-17 PROCEDURE — 99499 NO LOS: ICD-10-PCS | Mod: ,,, | Performed by: SURGERY

## 2020-11-17 PROCEDURE — 33881 PR ENDOVASC TAA REW/O SUBCL: ICD-10-PCS | Mod: 58,,, | Performed by: SURGERY

## 2020-11-17 PROCEDURE — 93005 ELECTROCARDIOGRAM TRACING: CPT

## 2020-11-17 PROCEDURE — 37236 OPEN/PERQ PLACE STENT 1ST: CPT | Mod: 58,51,59, | Performed by: SURGERY

## 2020-11-17 PROCEDURE — C1887 CATHETER, GUIDING: HCPCS | Performed by: SURGERY

## 2020-11-17 PROCEDURE — 33881 EVASC RPR TA NDGFT XCOV LSA: CPT | Mod: 58,,, | Performed by: SURGERY

## 2020-11-17 PROCEDURE — 75957 PR  ENDOVASC REPAIR THOR AORTA EXCL SUBCLAVIAN: ICD-10-PCS | Mod: 26,,, | Performed by: SURGERY

## 2020-11-17 PROCEDURE — C1753 CATH, INTRAVAS ULTRASOUND: HCPCS | Performed by: SURGERY

## 2020-11-17 PROCEDURE — C1760 CLOSURE DEV, VASC: HCPCS | Performed by: SURGERY

## 2020-11-17 PROCEDURE — 99900035 HC TECH TIME PER 15 MIN (STAT)

## 2020-11-17 PROCEDURE — 82803 BLOOD GASES ANY COMBINATION: CPT

## 2020-11-17 PROCEDURE — 25500020 PHARM REV CODE 255: Performed by: SURGERY

## 2020-11-17 PROCEDURE — C1769 GUIDE WIRE: HCPCS | Performed by: SURGERY

## 2020-11-17 PROCEDURE — C1725 CATH, TRANSLUMIN NON-LASER: HCPCS | Performed by: SURGERY

## 2020-11-17 PROCEDURE — 37242 VASC EMBOLIZE/OCCLUDE ARTERY: CPT | Mod: 58,51,, | Performed by: SURGERY

## 2020-11-17 PROCEDURE — 34713 PR ACCESS/CLOSURE, FEM ART, DLVR OF ENDOGRAFT, PERC: ICD-10-PCS | Mod: 59,,, | Performed by: SURGERY

## 2020-11-17 PROCEDURE — 36000712 HC OR TIME LEV V 1ST 15 MIN: Performed by: SURGERY

## 2020-11-17 PROCEDURE — 36215 PLACE CATHETER IN ARTERY: CPT | Mod: 51,,, | Performed by: SURGERY

## 2020-11-17 PROCEDURE — D9220A PRA ANESTHESIA: ICD-10-PCS | Mod: ,,, | Performed by: ANESTHESIOLOGY

## 2020-11-17 PROCEDURE — 20000000 HC ICU ROOM

## 2020-11-17 PROCEDURE — 36000713 HC OR TIME LEV V EA ADD 15 MIN: Performed by: SURGERY

## 2020-11-17 PROCEDURE — 94761 N-INVAS EAR/PLS OXIMETRY MLT: CPT

## 2020-11-17 PROCEDURE — 37252 INTRVASC US NONCORONARY 1ST: CPT | Mod: ,,, | Performed by: SURGERY

## 2020-11-17 PROCEDURE — 99499 UNLISTED E&M SERVICE: CPT | Mod: ,,, | Performed by: SURGERY

## 2020-11-17 PROCEDURE — 83735 ASSAY OF MAGNESIUM: CPT

## 2020-11-17 PROCEDURE — 93010 EKG 12-LEAD: ICD-10-PCS | Mod: ,,, | Performed by: INTERNAL MEDICINE

## 2020-11-17 PROCEDURE — 37237 PR OPEN/PERQ TRANSCATH PLACE STENT; EA ADD'L ARTERY: ICD-10-PCS | Mod: 59,,, | Performed by: SURGERY

## 2020-11-17 PROCEDURE — 34713 PERQ ACCESS & CLSR FEM ART: CPT | Mod: 59,,, | Performed by: SURGERY

## 2020-11-17 PROCEDURE — 93010 ELECTROCARDIOGRAM REPORT: CPT | Mod: ,,, | Performed by: INTERNAL MEDICINE

## 2020-11-17 PROCEDURE — 85025 COMPLETE CBC W/AUTO DIFF WBC: CPT

## 2020-11-17 DEVICE — IMPLANTABLE DEVICE: Type: IMPLANTABLE DEVICE | Site: SUBCLAVIAN | Status: FUNCTIONAL

## 2020-11-17 DEVICE — IMPLANTABLE DEVICE: Type: IMPLANTABLE DEVICE | Site: CAROTID | Status: FUNCTIONAL

## 2020-11-17 DEVICE — IMPLANTABLE DEVICE: Type: IMPLANTABLE DEVICE | Site: AORTA | Status: FUNCTIONAL

## 2020-11-17 DEVICE — IMPLANTABLE DEVICE: Type: IMPLANTABLE DEVICE | Site: ARTERIAL | Status: FUNCTIONAL

## 2020-11-17 RX ORDER — FENTANYL CITRATE 50 UG/ML
INJECTION, SOLUTION INTRAMUSCULAR; INTRAVENOUS
Status: DISCONTINUED | OUTPATIENT
Start: 2020-11-17 | End: 2020-11-17

## 2020-11-17 RX ORDER — POTASSIUM CHLORIDE 7.45 MG/ML
40 INJECTION INTRAVENOUS
Status: DISCONTINUED | OUTPATIENT
Start: 2020-11-17 | End: 2020-11-19 | Stop reason: HOSPADM

## 2020-11-17 RX ORDER — KETAMINE HCL IN 0.9 % NACL 50 MG/5 ML
SYRINGE (ML) INTRAVENOUS
Status: DISCONTINUED | OUTPATIENT
Start: 2020-11-17 | End: 2020-11-17

## 2020-11-17 RX ORDER — ONDANSETRON 2 MG/ML
INJECTION INTRAMUSCULAR; INTRAVENOUS
Status: DISCONTINUED | OUTPATIENT
Start: 2020-11-17 | End: 2020-11-17

## 2020-11-17 RX ORDER — MAGNESIUM SULFATE HEPTAHYDRATE 40 MG/ML
2 INJECTION, SOLUTION INTRAVENOUS
Status: DISCONTINUED | OUTPATIENT
Start: 2020-11-17 | End: 2020-11-19 | Stop reason: HOSPADM

## 2020-11-17 RX ORDER — PHENYLEPHRINE HYDROCHLORIDE 10 MG/ML
INJECTION INTRAVENOUS
Status: DISCONTINUED | OUTPATIENT
Start: 2020-11-17 | End: 2020-11-17

## 2020-11-17 RX ORDER — HEPARIN SODIUM 10000 [USP'U]/100ML
800 INJECTION, SOLUTION INTRAVENOUS CONTINUOUS
Status: DISCONTINUED | OUTPATIENT
Start: 2020-11-17 | End: 2020-11-17

## 2020-11-17 RX ORDER — HEPARIN SODIUM 1000 [USP'U]/ML
INJECTION, SOLUTION INTRAVENOUS; SUBCUTANEOUS
Status: DISCONTINUED | OUTPATIENT
Start: 2020-11-17 | End: 2020-11-17 | Stop reason: HOSPADM

## 2020-11-17 RX ORDER — CLOPIDOGREL BISULFATE 75 MG/1
150 TABLET ORAL ONCE
Status: COMPLETED | OUTPATIENT
Start: 2020-11-17 | End: 2020-11-17

## 2020-11-17 RX ORDER — POTASSIUM CHLORIDE 7.45 MG/ML
60 INJECTION INTRAVENOUS
Status: DISCONTINUED | OUTPATIENT
Start: 2020-11-17 | End: 2020-11-19 | Stop reason: HOSPADM

## 2020-11-17 RX ORDER — POTASSIUM CHLORIDE 7.45 MG/ML
80 INJECTION INTRAVENOUS
Status: DISCONTINUED | OUTPATIENT
Start: 2020-11-17 | End: 2020-11-19 | Stop reason: HOSPADM

## 2020-11-17 RX ORDER — ROCURONIUM BROMIDE 10 MG/ML
INJECTION, SOLUTION INTRAVENOUS
Status: DISCONTINUED | OUTPATIENT
Start: 2020-11-17 | End: 2020-11-17

## 2020-11-17 RX ORDER — FUROSEMIDE 10 MG/ML
40 INJECTION INTRAMUSCULAR; INTRAVENOUS ONCE
Status: DISCONTINUED | OUTPATIENT
Start: 2020-11-17 | End: 2020-11-17

## 2020-11-17 RX ORDER — PROPOFOL 10 MG/ML
VIAL (ML) INTRAVENOUS
Status: DISCONTINUED | OUTPATIENT
Start: 2020-11-17 | End: 2020-11-17

## 2020-11-17 RX ORDER — LIDOCAINE HYDROCHLORIDE 20 MG/ML
INJECTION INTRAVENOUS
Status: DISCONTINUED | OUTPATIENT
Start: 2020-11-17 | End: 2020-11-17

## 2020-11-17 RX ORDER — IODIXANOL 320 MG/ML
INJECTION, SOLUTION INTRAVASCULAR
Status: DISCONTINUED | OUTPATIENT
Start: 2020-11-17 | End: 2020-11-17 | Stop reason: HOSPADM

## 2020-11-17 RX ORDER — MAGNESIUM SULFATE HEPTAHYDRATE 40 MG/ML
4 INJECTION, SOLUTION INTRAVENOUS
Status: DISCONTINUED | OUTPATIENT
Start: 2020-11-17 | End: 2020-11-19 | Stop reason: HOSPADM

## 2020-11-17 RX ORDER — PROTAMINE SULFATE 10 MG/ML
INJECTION, SOLUTION INTRAVENOUS
Status: DISCONTINUED | OUTPATIENT
Start: 2020-11-17 | End: 2020-11-17

## 2020-11-17 RX ORDER — LABETALOL HCL 20 MG/4 ML
10 SYRINGE (ML) INTRAVENOUS EVERY 4 HOURS PRN
Status: DISCONTINUED | OUTPATIENT
Start: 2020-11-17 | End: 2020-11-19 | Stop reason: HOSPADM

## 2020-11-17 RX ORDER — CLOPIDOGREL BISULFATE 75 MG/1
75 TABLET ORAL DAILY
Status: DISCONTINUED | OUTPATIENT
Start: 2020-11-18 | End: 2020-11-19 | Stop reason: HOSPADM

## 2020-11-17 RX ORDER — HEPARIN SODIUM 1000 [USP'U]/ML
INJECTION, SOLUTION INTRAVENOUS; SUBCUTANEOUS
Status: DISCONTINUED | OUTPATIENT
Start: 2020-11-17 | End: 2020-11-17

## 2020-11-17 RX ORDER — HEPARIN SODIUM 10000 [USP'U]/100ML
500 INJECTION, SOLUTION INTRAVENOUS CONTINUOUS
Status: DISCONTINUED | OUTPATIENT
Start: 2020-11-17 | End: 2020-11-17

## 2020-11-17 RX ADMIN — Medication 20 MG: at 08:11

## 2020-11-17 RX ADMIN — HEPARIN SODIUM 5000 UNITS: 5000 INJECTION INTRAVENOUS; SUBCUTANEOUS at 05:11

## 2020-11-17 RX ADMIN — CEFAZOLIN 2 G: 1 INJECTION, POWDER, FOR SOLUTION INTRAMUSCULAR; INTRAVENOUS at 07:11

## 2020-11-17 RX ADMIN — ROCURONIUM BROMIDE 25 MG: 10 INJECTION, SOLUTION INTRAVENOUS at 09:11

## 2020-11-17 RX ADMIN — ROCURONIUM BROMIDE 50 MG: 10 INJECTION, SOLUTION INTRAVENOUS at 08:11

## 2020-11-17 RX ADMIN — PHENYLEPHRINE HYDROCHLORIDE 100 MCG: 10 INJECTION INTRAVENOUS at 07:11

## 2020-11-17 RX ADMIN — HEPARIN SODIUM 9000 UNITS: 1000 INJECTION, SOLUTION INTRAVENOUS; SUBCUTANEOUS at 08:11

## 2020-11-17 RX ADMIN — ONDANSETRON 4 MG: 2 INJECTION, SOLUTION INTRAMUSCULAR; INTRAVENOUS at 11:11

## 2020-11-17 RX ADMIN — SUGAMMADEX 200 MG: 100 INJECTION, SOLUTION INTRAVENOUS at 11:11

## 2020-11-17 RX ADMIN — PHENYLEPHRINE HYDROCHLORIDE 100 MCG: 10 INJECTION INTRAVENOUS at 08:11

## 2020-11-17 RX ADMIN — DOCUSATE SODIUM 200 MG: 50 CAPSULE, LIQUID FILLED ORAL at 09:11

## 2020-11-17 RX ADMIN — MIDAZOLAM HYDROCHLORIDE 2 MG: 1 INJECTION, SOLUTION INTRAMUSCULAR; INTRAVENOUS at 07:11

## 2020-11-17 RX ADMIN — FENTANYL CITRATE 100 MCG: 50 INJECTION, SOLUTION INTRAMUSCULAR; INTRAVENOUS at 08:11

## 2020-11-17 RX ADMIN — FENTANYL CITRATE 100 MCG: 50 INJECTION, SOLUTION INTRAMUSCULAR; INTRAVENOUS at 07:11

## 2020-11-17 RX ADMIN — HEPARIN SODIUM 1000 UNITS: 1000 INJECTION, SOLUTION INTRAVENOUS; SUBCUTANEOUS at 08:11

## 2020-11-17 RX ADMIN — LABETALOL HYDROCHLORIDE 200 MG: 100 TABLET, FILM COATED ORAL at 09:11

## 2020-11-17 RX ADMIN — HEPARIN SODIUM 2000 UNITS: 1000 INJECTION, SOLUTION INTRAVENOUS; SUBCUTANEOUS at 09:11

## 2020-11-17 RX ADMIN — OXYCODONE HYDROCHLORIDE 5 MG: 5 TABLET ORAL at 10:11

## 2020-11-17 RX ADMIN — POTASSIUM CHLORIDE, DEXTROSE MONOHYDRATE AND SODIUM CHLORIDE: 150; 5; 450 INJECTION, SOLUTION INTRAVENOUS at 01:11

## 2020-11-17 RX ADMIN — SODIUM PHOSPHATE, MONOBASIC, MONOHYDRATE 15 MMOL: 276; 142 INJECTION, SOLUTION INTRAVENOUS at 06:11

## 2020-11-17 RX ADMIN — LIDOCAINE HYDROCHLORIDE 100 MG: 20 INJECTION, SOLUTION INTRAVENOUS at 07:11

## 2020-11-17 RX ADMIN — CLOPIDOGREL 150 MG: 75 TABLET, FILM COATED ORAL at 10:11

## 2020-11-17 RX ADMIN — MORPHINE SULFATE 4 MG: 2 INJECTION, SOLUTION INTRAMUSCULAR; INTRAVENOUS at 01:11

## 2020-11-17 RX ADMIN — PROTAMINE SULFATE 20 MG: 10 INJECTION, SOLUTION INTRAVENOUS at 11:11

## 2020-11-17 RX ADMIN — CEFAZOLIN 1 G: 1 INJECTION, POWDER, FOR SOLUTION INTRAMUSCULAR; INTRAVENOUS at 12:11

## 2020-11-17 RX ADMIN — PHENYLEPHRINE HYDROCHLORIDE 150 MCG: 10 INJECTION INTRAVENOUS at 08:11

## 2020-11-17 RX ADMIN — Medication 30 MG: at 08:11

## 2020-11-17 RX ADMIN — HEPARIN SODIUM 4000 UNITS: 1000 INJECTION, SOLUTION INTRAVENOUS; SUBCUTANEOUS at 09:11

## 2020-11-17 RX ADMIN — ROCURONIUM BROMIDE 50 MG: 10 INJECTION, SOLUTION INTRAVENOUS at 07:11

## 2020-11-17 RX ADMIN — POTASSIUM CHLORIDE, DEXTROSE MONOHYDRATE AND SODIUM CHLORIDE: 150; 5; 450 INJECTION, SOLUTION INTRAVENOUS at 07:11

## 2020-11-17 RX ADMIN — HEPARIN SODIUM AND DEXTROSE 500 UNITS/HR: 10000; 5 INJECTION INTRAVENOUS at 12:11

## 2020-11-17 RX ADMIN — PROPOFOL 150 MG: 10 INJECTION, EMULSION INTRAVENOUS at 07:11

## 2020-11-17 RX ADMIN — CEFAZOLIN 1 G: 1 INJECTION, POWDER, FOR SOLUTION INTRAMUSCULAR; INTRAVENOUS at 06:11

## 2020-11-17 NOTE — ANESTHESIA POSTPROCEDURE EVALUATION
Anesthesia Post Evaluation    Patient: Luis Eduardo Curry    Procedure(s) Performed: Procedure(s) (LRB):  REPAIR, ANEURYSM, ENDOVASCULAR GRAFT, AORTA, THORACIC Left brachial cutdown (N/A)  STENT, RENAL (Right)  PLACEMENT-STENT Right common carotid artery  (N/A)  IVUS (INTRAVASCULAR ULTRASOUND) (N/A)    Final Anesthesia Type: general    Patient location during evaluation: ICU  Patient participation: Yes- Able to Participate  Level of consciousness: awake  Post-procedure vital signs: reviewed and stable  Pain management: adequate  Airway patency: patent    PONV status at discharge: No PONV  Anesthetic complications: no      Cardiovascular status: hemodynamically stable  Respiratory status: spontaneous ventilation  Follow-up not needed.          Vitals Value Taken Time   /70 11/17/20 1502   Temp 98.4 11/17/20 1502   Pulse 83 11/17/20 1501   Resp 23 11/17/20 1501   SpO2 98 % 11/17/20 1501   Vitals shown include unvalidated device data.      No case tracking events are documented in the log.      Pain/Chon Score: Pain Rating Prior to Med Admin: 8 (11/17/2020  1:54 PM)

## 2020-11-17 NOTE — PLAN OF CARE
SICU PLAN OF CARE NOTE    Dx: S/P L carotid subclavian Bypass, planned for TEVAR 11/17    Goals of Care: SBP <170, MAP >65    Vital Signs: /87   Pulse 68   Temp 97.5 °F (36.4 °C)   Resp 12   Ht 6' (1.829 m)   Wt 93.8 kg (206 lb 12.7 oz)   SpO2 95%   BMI 28.05 kg/m²     Cardiac: NSR    Resp: Room air    Neuro: AAO x4, Follows Commands, and Moves All Extremities    Gtts: MIVF @ 100    Urine Output: Urinary Catheter 530 cc/Shift    Drains: AMRITA Drain, total output 100 cc / shift; sanguinous drainage, OR dressing in place    Diet: Regular Diet, NPO @ midnight     Labs/Accuchecks: daily labs    Skin: all skin assessed on arrival to SICU, sacrum and heels CDI with new foam dressings in place, no redness or breakdown noted, pt moves all extremities independently, AMRITA drain site from OR CDI, A-Line site CDI, waffle inflated, bed plugged into wall, no new injuries or breakdown during shift.

## 2020-11-17 NOTE — ASSESSMENT & PLAN NOTE
Mr. Curry is our 61yo male with an aortic dissection.  Now s/p carotid to subclavian bypass on 11/16/2020.    - Will plan for OR today for TEVAR, left common carotid artery stenting, and left subclavian embolization  - Consent obtained this morning  - COVID test negative 11/14  - ASA daily 81mg  - Q2 neurovascular checks  - Patient without signs of a chylus leak

## 2020-11-17 NOTE — PROGRESS NOTES
[facility]  Critical Care - Surgery  Progress Note    Patient Name: Luis Eduardo Curry  MRN: [id number]  Admission Date: 11/16/2020  Hospital Length of Stay: 1 days  Code Status: Full Code  Attending Provider: JOSY Santos II, MD  Primary Care Provider: Kiko Connor MD   Principal Problem: <principal problem not specified>    Subjective:     Hospital/ICU Course:  - s/p left carotid to subclavian bypass on 11/16/2020. Going for TEVAR on 11/17    Interval History/Significant Events: No acute events overnight. Pt has been stable going for OR today     Follow-up For: Procedure(s) (LRB):  CREATION, BYPASS, ARTERIAL, AORTA TO CAROTID, USING GRAFT SUBCLAVIAN BYPASS (Left)    Post-Operative Day: 1 Day Post-Op    Objective:     Vital Signs (Most Recent):  Temp: 97.7 °F (36.5 °C) (11/17/20 0300)  Pulse: 61 (11/17/20 0600)  Resp: 18 (11/17/20 0600)  BP: 111/70 (11/17/20 0600)  SpO2: 97 % (11/17/20 0600) Vital Signs (24h Range):  Temp:  [97.5 °F (36.4 °C)-98.4 °F (36.9 °C)] 97.7 °F (36.5 °C)  Pulse:  [55-87] 61  Resp:  [9-30] 18  SpO2:  [93 %-100 %] 97 %  BP: (111-135)/(69-88) 111/70  Arterial Line BP: ()/(50-86) 131/69     Weight: 97.9 kg (215 lb 13.3 oz)  Body mass index is 29.27 kg/m².      Intake/Output Summary (Last 24 hours) at 11/17/2020 0624  Last data filed at 11/17/2020 0600  Gross per 24 hour   Intake 3169 ml   Output 2002 ml   Net 1167 ml       Physical Exam  Constitutional:       Appearance: Normal appearance.   Cardiovascular:      Rate and Rhythm: Normal rate and regular rhythm.      Pulses: Normal pulses.      Heart sounds: Normal heart sounds. No friction rub.   Pulmonary:      Breath sounds: Normal breath sounds.   Abdominal:      General: Abdomen is flat.      Palpations: Abdomen is soft.   Neurological:      General: No focal deficit present.      Mental Status: He is alert and oriented to person, place, and time.         Vents:       Lines/Drains/Airways     Drain                  Closed/Suction Drain 11/16/20 1633 Left Neck Bulb less than 1 day         Urethral Catheter 11/16/20 1700 16 Fr. less than 1 day          Arterial Line            Arterial Line 11/16/20 1255 Right Radial less than 1 day          Peripheral Intravenous Line                 Peripheral IV - Single Lumen 11/16/20 1145 20 G Anterior;Right Wrist less than 1 day         Peripheral IV - Single Lumen 11/16/20 1234 18 G Right Forearm less than 1 day                Significant Labs:    CBC/Anemia Profile:  Recent Labs   Lab 11/16/20  1150 11/17/20  0333   WBC 5.92 8.64   HGB 11.1* 11.0*   HCT 35.4* 34.4*    214   MCV 91 90   RDW 13.2 13.2        Chemistries:  Recent Labs   Lab 11/16/20  1150 11/17/20  0333    138   K 4.5 4.6    108   CO2 23 19*   BUN 15 14   CREATININE 1.3 1.1   CALCIUM 9.1 8.4*   MG  --  2.2   PHOS  --  2.7       All pertinent labs within the past 24 hours have been reviewed.    Significant Imaging:  I have reviewed all pertinent imaging results/findings within the past 24 hours.    Assessment/Plan:     Aortic dissection  - S/p left carotid to subclavian bypass on 11/16/2020.      Neuro/Psych:   -- Sedation: none  -- Pain: Oxycodone PRN             Cards:   -- HDS        Pulm:   -- Goal O2 sat > 90%  -- Wean as able        Renal:  -- Keep buckley for strict I/O  -- BUN/Cr         FEN / GI:   -- Replace lytes as needed  -- Nutrition: NPO      ID:   -- Tm: afebrile; WBC   -- Abx Cefazolin       Heme/Onc:   -- H/H stable   -- Daily CBC      Endo:   -- Gluc goal 140-180        PPx:   Feeding: NPO  Thromboembolic prevention: heparin   HOB >30:  Stress Ulcer ppx: none  Glucose control: Critical care goal 140-180 g/dl, ISS       Dispo/Code Status/Palliative:   -- SICU / Full Code  -- Going to OR today for TEVAR            Critical Care Daily Checklist:    A: Awake: RASS Goal/Actual Goal:    Actual: Duron Agitation Sedation Scale (RASS): Alert and calm   B: Spontaneous Breathing Trial  Performed?     C: SAT & SBT Coordinated?  NA                      D: Delirium: CAM-ICU     E: Early Mobility Performed? Yes   F: Feeding Goal:    Status:     Current Diet Order   Procedures    Diet NPO      AS: Analgesia/Sedation NA   T: Thromboembolic Prophylaxis Yes   H: HOB > 300 Yes   U: Stress Ulcer Prophylaxis (if needed) Yes   G: Glucose Control NA   B: Bowel Function     I: Indwelling Catheter (Lines & Pulliam) Necessity NA   D: De-escalation of Antimicrobials/Pharmacotherapies NA    Plan for the day/ETD NA    Code Status:  Family/Goals of Care: Full Code  NA          Critical care was time spent personally by me on the following activities: development of treatment plan with patient or surrogate and bedside caregivers, discussions with consultants, evaluation of patient's response to treatment, examination of patient, ordering and performing treatments and interventions, ordering and review of laboratory studies, ordering and review of radiographic studies, pulse oximetry, re-evaluation of patient's condition.  This critical care time did not overlap with that of any other provider or involve time for any procedures.     Alexy Parker MD  Critical Care - Surgery  [facility]

## 2020-11-17 NOTE — BRIEF OP NOTE
VASCULAR SURGERY SERVICE  BRIEF OP NOTE    Date of Procedure: 11/17/2020    Surgeon: MD Devan Lynn II, MD     Assistant(s): Robert Smalls MD PGY-7     Pre-Op Diagnosis:   TBAD with aneurysmal degeneration    Post-Op Diagnosis:   Same    Procedure(s):    Left brachial artery exposure   US guided bilateral common femoral artery access   Thoracic Endovascular Aortic Repair (cTAG [id number])   Left common carotid artery stenting (7x39mm VBX)   Left subclavian artery embolization (Amplatzer 8mm)   Right renal artery stent (6x29mm VBX)   Perclose closure bilateral femoral access    Anesthesia:    GETA    Findings / Key Elements:    GETA    Estimated Blood Loss: 800 mL    Specimens:   Specimen (12h ago, onward)    None          Robert Smalls MD PGY-7  Vascular and Endovascular Surgery Fellow  11/17/2020

## 2020-11-17 NOTE — ANESTHESIA PROCEDURE NOTES
Intubation  Performed by: Eleuterio Mar MD  Authorized by: Violeta Guo MD     Intubation:     Induction:  Intravenous    Intubated:  Postinduction    Mask Ventilation:  Easy with oral airway    Attempts:  1    Performed by: fellow.    Method of Intubation:  Direct    Blade:  Yousif 3    Laryngeal View Grade: Grade IIA - cords partially seen      Difficult Airway Encountered?: No      Complications:  None    Airway Device:  Oral endotracheal tube    Airway Device Size:  7.5    Style/Cuff Inflation:  Cuffed    Inflation Amount (mL):  7    Tube secured:  21    Secured at:  The teeth    Placement Verified By:  Capnometry    Complicating Factors:  None    Findings Post-Intubation:  BS equal bilateral

## 2020-11-17 NOTE — PLAN OF CARE
NAEON. VSS. Afebrile. NSR. Maintaining MAP > 65 and SBP < 170. Sats 96% on room air.     Neuro: AAO x 4; PERRLA; follows commands; moves all extremities.    Gtts:   D5% 0.45% NaCl 20 mEq Kcl @ 100 cc/hr     Pulliam in place with adequate urine output.     Left neck AMRITA drain to bulb suction with serosanguineous drainage 112 cc total throughout shift.     Skin: no breakdown noted to sacrum, heels, or elbows. Foams in place. SCDs in use. Patient positions/repositions independently.     Patient NPO since midnight.

## 2020-11-17 NOTE — SUBJECTIVE & OBJECTIVE
Follow-up For: Procedure(s) (LRB):  CREATION, BYPASS, ARTERIAL, AORTA TO CAROTID, USING GRAFT SUBCLAVIAN BYPASS (Left)    Post-Operative Day: 1 Day Post-Op     Past Medical History:   Diagnosis Date    Aortic dissection        Past Surgical History:   Procedure Laterality Date    BIOPSY WITH TRANSRECTAL ULTRASOUND (TRUS) GUIDANCE N/A 5/1/2019    Procedure: BIOPSY, WITH TRANSRECTAL PROSTATE ULTRASOUND;  Surgeon: Chintan Mendez Jr., MD;  [facility];  Service: Urology;  Laterality: N/A;    COLONOSCOPY  2006    COLONOSCOPY N/A 1/24/2019    Procedure: COLONOSCOPY;  Surgeon: Reece Tarango MD;  [facility];  Service: Endoscopy;  Laterality: N/A;    CYSTOSCOPY N/A 5/1/2019    Procedure: FLEXIBLE CYSTOSCOPY;  Surgeon: Chintan Mendez Jr., MD;  [facility];  Service: Urology;  Laterality: N/A;    UMBILICAL HERNIA REPAIR N/A 1/14/2020    Procedure: REPAIR, HERNIA, UMBILICAL;  Surgeon: Caio Mas Jr., MD;  [facility];  Service: General;  Laterality: N/A;       Review of patient's allergies indicates:  No Known Allergies    Family History     Problem Relation (Age of Onset)    Hypertension Father    No Known Problems Mother, Sister, Brother, Daughter, Son, Maternal Aunt, Maternal Uncle, Paternal Aunt, Paternal Uncle, Maternal Grandmother, Maternal Grandfather, Paternal Grandmother, Paternal Grandfather        Tobacco Use    Smoking status: Former Smoker     Packs/day: 1.00     Years: 30.00     Pack years: 30.00     Types: Cigarettes    Smokeless tobacco: Never Used   Substance and Sexual Activity    Alcohol use: Yes     Frequency: Monthly or less     Binge frequency: Weekly     Comment: socially    Drug use: No    Sexual activity: Yes      Review of Systems   Constitutional: Negative.    HENT: Negative.    Eyes: Negative.    Respiratory: Negative.    Cardiovascular: Negative.    Gastrointestinal: Negative.    Endocrine: Negative.    Genitourinary: Negative.    Musculoskeletal:  Negative.    Allergic/Immunologic: Negative.    Neurological: Negative.    Psychiatric/Behavioral: Negative.      Objective:     Vital Signs (Most Recent):  Temp: 97.7 °F (36.5 °C) (11/17/20 0300)  Pulse: 61 (11/17/20 0400)  Resp: 13 (11/17/20 0400)  BP: 135/87 (11/16/20 1737)  SpO2: 96 % (11/17/20 0400) Vital Signs (24h Range):  Temp:  [97.5 °F (36.4 °C)-98.4 °F (36.9 °C)] 97.7 °F (36.5 °C)  Pulse:  [58-87] 61  Resp:  [9-30] 13  SpO2:  [93 %-100 %] 96 %  BP: (120-135)/(69-88) 135/87  Arterial Line BP: ()/(50-86) 116/61     Weight: 97.9 kg (215 lb 13.3 oz)  Body mass index is 29.27 kg/m².      Intake/Output Summary (Last 24 hours) at 11/17/2020 0412  Last data filed at 11/17/2020 0400  Gross per 24 hour   Intake 2071 ml   Output 1722 ml   Net 349 ml       Physical Exam  Constitutional:       Appearance: Normal appearance.   Cardiovascular:      Rate and Rhythm: Normal rate and regular rhythm.      Pulses: Normal pulses.      Heart sounds: Normal heart sounds. No friction rub.   Pulmonary:      Breath sounds: Normal breath sounds.   Abdominal:      General: Abdomen is flat.      Palpations: Abdomen is soft.   Neurological:      General: No focal deficit present.      Mental Status: He is alert and oriented to person, place, and time.         Vents:       Lines/Drains/Airways     Drain                 Closed/Suction Drain 11/16/20 1633 Left Neck Bulb less than 1 day         Urethral Catheter 11/16/20 1700 16 Fr. less than 1 day          Arterial Line            Arterial Line 11/16/20 1255 Right Radial less than 1 day          Peripheral Intravenous Line                 Peripheral IV - Single Lumen 11/16/20 1145 20 G Anterior;Right Wrist less than 1 day         Peripheral IV - Single Lumen 11/16/20 1234 18 G Right Forearm less than 1 day                Significant Labs:    CBC/Anemia Profile:  Recent Labs   Lab 11/16/20  1150 11/17/20  0333   WBC 5.92 8.64   HGB 11.1* 11.0*   HCT 35.4* 34.4*    214   MCV  91 90   RDW 13.2 13.2        Chemistries:  Recent Labs   Lab 11/16/20  1150      K 4.5      CO2 23   BUN 15   CREATININE 1.3   CALCIUM 9.1       All pertinent labs within the past 24 hours have been reviewed.    Significant Imaging: I have reviewed all pertinent imaging results/findings within the past 24 hours.

## 2020-11-17 NOTE — OR NURSING
0810 spoke via phone with patient's wife, informed her that surgery would be starting soon  0818 informed with via txt that surgery started

## 2020-11-17 NOTE — PLAN OF CARE
SW is following this Pt for DC planning needs. There are no identified needs at this time. Discharge Disposition: TBD - pending patient progress; patient to OR for TEVAR this date.     SW will continue to coordinate with patient, family, team and insurance to complete patient's discharge plan.    Aura Vines LMSW   - Case Management

## 2020-11-17 NOTE — PT/OT/SLP PROGRESS
Occupational Therapy      Patient Name:  Luis Eduardo Curry   MRN:  [id number]    Patient not seen today secondary to OR prior to OT eval. New orders needed to initiate tx.  EVE Gray  11/17/2020

## 2020-11-17 NOTE — HPI
Mr. Curry is a 61 y/o gent[place]man with a PMHx of descending aortic dissection, and post obstructive nephropathy due to BPH   - s/p left carotid to subclavian bypass on 11/16/2020.

## 2020-11-17 NOTE — ASSESSMENT & PLAN NOTE
- S/p left carotid to subclavian bypass on 11/16/2020.      Neuro/Psych:   -- Sedation: none  -- Pain: Oxycodone PRN               Cards:   -- HDS        Pulm:   -- Goal O2 sat > 90%  -- Wean as able        Renal:  -- Keep buckley for strict I/O  -- BUN/Cr         FEN / GI:   -- Replace lytes as needed  -- Nutrition: NPO      ID:   -- Tm: afebrile; WBC   -- Abx Cefazolin       Heme/Onc:   -- H/H stable   -- Daily CBC      Endo:   -- Gluc goal 140-180        PPx:   Feeding: NPO  Thromboembolic prevention: heparin   HOB >30:  Stress Ulcer ppx: none  Glucose control: Critical care goal 140-180 g/dl, ISS       Dispo/Code Status/Palliative:   -- SICU / Full Code  -- Going to OR today for TEVAR

## 2020-11-17 NOTE — PROGRESS NOTES
[facility]  Vascular Surgery  Progress Note    Patient Name: Luis Eduardo Curry  MRN: [id number]  Admission Date: 11/16/2020  Primary Care Provider: Kiko Connor MD    Subjective:     Interval History: NAEON.  Tolerating oral intake last night without issues.  Moving all extremities.  Npo since midnight.  Plan for OR today.  Consent obtained this morning.  All questions and concerns addressed at this time.    Post-Op Info:  Procedure(s) (LRB):  REPAIR, ANEURYSM, ENDOVASCULAR GRAFT, AORTA, THORACIC (N/A)   Day of Surgery       Medications:  Continuous Infusions:   dextrose 5 % and 0.45 % NaCl with KCl 20 mEq 100 mL/hr at 11/17/20 0600     Scheduled Meds:   acetaminophen  650 mg Oral Q6H    aspirin  81 mg Oral Daily    ceFAZolin (ANCEF) IVPB  1 g Intravenous Q8H    docusate sodium  200 mg Oral BID    heparin (porcine)  5,000 Units Subcutaneous Q8H    labetaloL  200 mg Oral BID    NIFEdipine  30 mg Oral Daily    pantoprazole  40 mg Oral Daily    polyethylene glycol  17 g Oral Daily    tamsulosin  0.4 mg Oral Daily     PRN Meds:bisacodyL, calcium gluconate IVPB, calcium gluconate IVPB, calcium gluconate IVPB, magnesium sulfate IVPB, magnesium sulfate IVPB, morphine, oxyCODONE, potassium chloride in water **AND** potassium chloride in water **AND** potassium chloride in water, sodium chloride 0.9%, sodium phosphate IVPB, sodium phosphate IVPB, sodium phosphate IVPB     Objective:     Vital Signs (Most Recent):  Temp: 97.7 °F (36.5 °C) (11/17/20 0300)  Pulse: 61 (11/17/20 0600)  Resp: 18 (11/17/20 0600)  BP: 111/70 (11/17/20 0600)  SpO2: 97 % (11/17/20 0600) Vital Signs (24h Range):  Temp:  [97.5 °F (36.4 °C)-98.4 °F (36.9 °C)] 97.7 °F (36.5 °C)  Pulse:  [55-87] 61  Resp:  [9-30] 18  SpO2:  [93 %-100 %] 97 %  BP: (111-135)/(69-88) 111/70  Arterial Line BP: ()/(50-86) 131/69         Physical Exam  Constitutional:       Appearance: Normal appearance.   HENT:      Head: Normocephalic.    Eyes:      Extraocular Movements: Extraocular movements intact.   Neck:      Musculoskeletal: Normal range of motion.      Comments: Incision on left neck with dressing in place that is clean and dry  AMRITA drain with serosanguinous ouput  Cardiovascular:      Rate and Rhythm: Normal rate and regular rhythm.   Pulmonary:      Effort: Pulmonary effort is normal. No respiratory distress.   Abdominal:      General: There is no distension.      Palpations: Abdomen is soft.   Musculoskeletal: Normal range of motion.   Skin:     General: Skin is warm and dry.   Neurological:      General: No focal deficit present.      Mental Status: He is alert and oriented to person, place, and time.   Psychiatric:         Mood and Affect: Mood normal.         Behavior: Behavior normal.         Significant Labs:  Recent Lab Results       11/17/20  0333   11/16/20  1452   11/16/20  1424   11/16/20  1413   11/16/20  1150        Unit Blood Type Code         5100[P]              5100[P]     Unit Expiration         [id number][P]              [id number][P]     Unit Blood Type         O POS[P]              O POS[P]     Anion Gap 11       7     aPTT         29.8  Comment:  aPTT therapeutic range = 39-69 seconds     Baso # 0.02       0.02     Basophil % 0.2       0.3     BUN 14       15     Calcium 8.4       9.1     Chloride 108       109     CO2 19       23     CODING SYSTEM         [id number][P]              [id number][P]     Creatinine 1.1       1.3     Differential Method Automated       Automated     DISPENSE STATUS         CROSSMATCHED[P]              CROSSMATCHED[P]     eGFR if  >60.0       >60.0     eGFR if non  >60.0  Comment:  Calculation used to obtain the estimated glomerular filtration  rate (eGFR) is the CKD-EPI equation.          >60.0  Comment:  Calculation used to obtain the estimated glomerular filtration  rate (eGFR) is the CKD-EPI equation.        Eos # 0.2       0.7     Eosinophil % 2.7        11.3     Glucose 132       87     Gran # (ANC) 6.6       3.0     Gran % 76.7       50.9     Group & Rh         O POS     Hematocrit 34.4       35.4     Hemoglobin 11.0       11.1     Immature Grans (Abs) 0.04  Comment:  Mild elevation in immature granulocytes is non specific and   can be seen in a variety of conditions including stress response,   acute inflammation, trauma and pregnancy. Correlation with other   laboratory and clinical findings is essential.         0.04  Comment:  Mild elevation in immature granulocytes is non specific and   can be seen in a variety of conditions including stress response,   acute inflammation, trauma and pregnancy. Correlation with other   laboratory and clinical findings is essential.       Immature Granulocytes 0.5       0.7     INDIRECT CHERIE         NEG     INR         0.9  Comment:  Coumadin Therapy:  2.0 - 3.0 for INR for all indicators except mechanical heart valves  and antiphospholipid syndromes which should use 2.5 - 3.5.       Lymph # 1.1       1.7     Lymph % 12.8       28.4     Magnesium 2.2             MCH 28.6       28.5     MCHC 32.0       31.4     MCV 90       91     Mono # 0.6       0.5     Mono % 7.1       8.4     MPV 9.9       10.0     nRBC 0       0     Phosphorus 2.7             Platelets 214       201     POC ACTIVATED CLOTTING TIME K   235 224 191       Potassium 4.6       4.5     Product Code         T3642O70[P]              [id number][P]     Protime         10.5     RBC 3.84       3.90     RDW 13.2       13.2     Sample   ARTERIAL ARTERIAL ARTERIAL       Sodium 138       139     UNIT NUMBER         [id number][P]              [id number][P]     WBC 8.64       5.92                          Significant Diagnostics:  I have reviewed all pertinent imaging results/findings within the past 24 hours.    Assessment/Plan:     Aortic dissection  Mr. Curry is our 59yo male with an aortic dissection.  Now s/p carotid to subclavian bypass on 11/16/2020.    -  Will plan for OR today for TEVAR, left common carotid artery stenting, and left subclavian embolization  - Consent obtained this morning  - COVID test negative 11/14  - ASA daily 81mg  - Q2 neurovascular checks  - Patient without signs of a chylus leak        Lee Barragan MD  Vascular Surgery  [facility]

## 2020-11-17 NOTE — ASSESSMENT & PLAN NOTE
- S/p left carotid to subclavian bypass on 11/16/2020.  - Going for TEVAR on 11/17      Neuro/Psych:   -- Sedation: none  -- Pain: Oxycodone PRN             Cards:   -- HDS        Pulm:   -- Goal O2 sat > 90%  -- Wean as able      Renal:  -- Keep buckley for strict I/O  -- BUN/Cr       FEN / GI:   -- Replace lytes as needed  -- Nutrition: NPO      ID:   -- Tm: afebrile; WBC   -- Abx Cefazolin       Heme/Onc:   -- H/H stable   -- Daily CBC      Endo:   -- Gluc goal 140-180        PPx:   Feeding: NPO  Thromboembolic prevention: heparin   HOB >30:  Stress Ulcer ppx: none  Glucose control: Critical care goal 140-180 g/dl, ISS     Dispo/Code Status/Palliative:   -- SICU / Full Code

## 2020-11-17 NOTE — PLAN OF CARE
CM obtained discharge planning assessment from telephone call to patient's spouse.  Patient is off the floor having a procedure.  No discharge needs are noted at this time.      Kiko Connor MD  [address] / [facility] [address]      [facility], [place] - [place] - [address]  [address]  [facility] [address]  Phone: [phone] Fax: [phone]    [id number] - [place] - [address]  [address]  [facility] [address]  Phone: [phone] Fax: [phone]    Payor: BLUE CROSS BLUE SHIELD / Plan: BCBS ALL OUT OF STATE / Product Type: PPO /     Extended Emergency Contact Information  Primary Emergency Contact: Katherine Curry  Address: [address] [place] [address] [id number] [place] [address]  Home Phone: [phone]  Mobile Phone: [phone]  Relation: Spouse    No future appointments.       11/17/20 1225   Discharge Assessment   Assessment Type Discharge Planning Assessment   Confirmed/corrected address and phone number on facesheet? Yes   Assessment information obtained from? Medical Record;Caregiver   Communicated expected length of stay with patient/caregiver yes   Prior to hospitilization cognitive status: Alert/Oriented   Prior to hospitalization functional status: Independent   Current cognitive status: Unable to Assess  (patient off floor for procedure)   Current Functional Status:   (unable to assess)   Lives With spouse   Able to Return to Prior Arrangements yes   Is patient able to care for self after discharge? Unable to determine at this time (comments)   Who are your caregiver(s) and their phone number(s)? Katherine Curry (spouse)- [phone]   Patient currently being followed by outpatient case management? No   Patient currently receives any other outside agency services? No   Equipment Currently Used at Home none   Do you have any problems affording any of your prescribed medications? No   Is the patient taking  medications as prescribed? yes   Does the patient have transportation home? Yes   Transportation Anticipated family or friend will provide   Dialysis Name and Scheduled days N/A   Does the patient receive services at the [facility]madin Clinic? No   Discharge Plan A Home with family   Discharge Plan B Home Health   DME Needed Upon Discharge  other (see comments)  (TBD)   Patient/Family in Agreement with Plan yes       Casie Abreu MPH, RN, CM  Ext. [phone]

## 2020-11-17 NOTE — PROGRESS NOTES
VASCULAR SURGERY  Afternoon Progress Note    Assessment/Plan:  60 y.o. male with a descending aortic dissection and post obstructive nephropathy due to BPH s/p left carotid to subclavian bypass on 11/16/2020.  - Will plan for OR tomorrow for TEVAR, left common carotid artery stenting, and left subclavian embolization  - Will obtain consent in the morning  -  today and daily 81mg  - Q2 neurovascular checks  - Patient to get high fat drinks to monitor for chylus leak with neck AMRITA drain    Subjective:  Patient tolerated procedure well without issues.  Pain controlled on exam.  Patient somnolent from anesthesia but responding to questions appropriately.    Objective:  /87   Pulse 72   Temp 97.5 °F (36.4 °C)   Resp 18   Ht 6' (1.829 m)   Wt 93.8 kg (206 lb 12.7 oz)   SpO2 96%   BMI 28.05 kg/m²   Left neck incision with clean dressing in place  AMRITA drain with sanguinous output  Cranial nerves grossly intact  Moving all extremities  Following commands    Lee Barragan, PGY 2  General Surgery  Pager: [phone]

## 2020-11-17 NOTE — H&P
[facility]  Critical Care - Surgery  History & Physical    Patient Name: Luis Eduardo Curry  MRN: [id number]  Admission Date: 11/16/2020  Code Status: Full Code  Attending Physician: JOSY Santos II, MD   Primary Care Provider: Kiko Connor MD   Principal Problem: <principal problem not specified>    Subjective:     HPI:  Mr. Curry is a 61 y/o genteleman with a PMHx of descending aortic dissection, and post obstructive nephropathy due to BPH   - s/p left carotid to subclavian bypass on 11/16/2020.    Hospital/ICU Course:  - s/p left carotid to subclavian bypass on 11/16/2020. Going for TEVAR on 11/17    Follow-up For: Procedure(s) (LRB):  CREATION, BYPASS, ARTERIAL, AORTA TO CAROTID, USING GRAFT SUBCLAVIAN BYPASS (Left)    Post-Operative Day: 1 Day Post-Op     Past Medical History:   Diagnosis Date    Aortic dissection        Past Surgical History:   Procedure Laterality Date    BIOPSY WITH TRANSRECTAL ULTRASOUND (TRUS) GUIDANCE N/A 5/1/2019    Procedure: BIOPSY, WITH TRANSRECTAL PROSTATE ULTRASOUND;  Surgeon: Chintan Mendez Jr., MD;  Location: [facility];  Service: Urology;  Laterality: N/A;    COLONOSCOPY  2006    COLONOSCOPY N/A 1/24/2019    Procedure: COLONOSCOPY;  Surgeon: Reece Tarango MD;  Location: [facility];  Service: Endoscopy;  Laterality: N/A;    CYSTOSCOPY N/A 5/1/2019    Procedure: FLEXIBLE CYSTOSCOPY;  Surgeon: Chintan Mendez Jr., MD;  Location: [facility];  Service: Urology;  Laterality: N/A;    UMBILICAL HERNIA REPAIR N/A 1/14/2020    Procedure: REPAIR, HERNIA, UMBILICAL;  Surgeon: Caio Mas Jr., MD;  Location: [facility];  Service: General;  Laterality: N/A;       Review of patient's allergies indicates:  No Known Allergies    Family History     Problem Relation (Age of Onset)    Hypertension Father    No Known Problems Mother, Sister, Brother, Daughter, Son, Maternal Aunt, Maternal Uncle, Paternal Aunt, Paternal Uncle, Maternal Grandmother, Maternal Grandfather,  Paternal Grandmother, Paternal Grandfather        Tobacco Use    Smoking status: Former Smoker     Packs/day: 1.00     Years: 30.00     Pack years: 30.00     Types: Cigarettes    Smokeless tobacco: Never Used   Substance and Sexual Activity    Alcohol use: Yes     Frequency: Monthly or less     Binge frequency: Weekly     Comment: socially    Drug use: No    Sexual activity: Yes      Review of Systems   Constitutional: Negative.    HENT: Negative.    Eyes: Negative.    Respiratory: Negative.    Cardiovascular: Negative.    Gastrointestinal: Negative.    Endocrine: Negative.    Genitourinary: Negative.    Musculoskeletal: Negative.    Allergic/Immunologic: Negative.    Neurological: Negative.    Psychiatric/Behavioral: Negative.      Objective:     Vital Signs (Most Recent):  Temp: 97.7 °F (36.5 °C) (11/17/20 0300)  Pulse: 61 (11/17/20 0400)  Resp: 13 (11/17/20 0400)  BP: 135/87 (11/16/20 1737)  SpO2: 96 % (11/17/20 0400) Vital Signs (24h Range):  Temp:  [97.5 °F (36.4 °C)-98.4 °F (36.9 °C)] 97.7 °F (36.5 °C)  Pulse:  [58-87] 61  Resp:  [9-30] 13  SpO2:  [93 %-100 %] 96 %  BP: (120-135)/(69-88) 135/87  Arterial Line BP: ()/(50-86) 116/61     Weight: 97.9 kg (215 lb 13.3 oz)  Body mass index is 29.27 kg/m².      Intake/Output Summary (Last 24 hours) at 11/17/2020 0412  Last data filed at 11/17/2020 0400  Gross per 24 hour   Intake 2071 ml   Output 1722 ml   Net 349 ml       Physical Exam  Constitutional:       Appearance: Normal appearance.   Cardiovascular:      Rate and Rhythm: Normal rate and regular rhythm.      Pulses: Normal pulses.      Heart sounds: Normal heart sounds. No friction rub.   Pulmonary:      Breath sounds: Normal breath sounds.   Abdominal:      General: Abdomen is flat.      Palpations: Abdomen is soft.   Neurological:      General: No focal deficit present.      Mental Status: He is alert and oriented to person, place, and time.         Vents:       Lines/Drains/Airways     Drain                  Closed/Suction Drain 11/16/20 1633 Left Neck Bulb less than 1 day         Urethral Catheter 11/16/20 1700 16 Fr. less than 1 day          Arterial Line            Arterial Line 11/16/20 1255 Right Radial less than 1 day          Peripheral Intravenous Line                 Peripheral IV - Single Lumen 11/16/20 1145 20 G Anterior;Right Wrist less than 1 day         Peripheral IV - Single Lumen 11/16/20 1234 18 G Right Forearm less than 1 day                Significant Labs:    CBC/Anemia Profile:  Recent Labs   Lab 11/16/20  1150 11/17/20  0333   WBC 5.92 8.64   HGB 11.1* 11.0*   HCT 35.4* 34.4*    214   MCV 91 90   RDW 13.2 13.2        Chemistries:  Recent Labs   Lab 11/16/20  1150      K 4.5      CO2 23   BUN 15   CREATININE 1.3   CALCIUM 9.1       All pertinent labs within the past 24 hours have been reviewed.    Significant Imaging: I have reviewed all pertinent imaging results/findings within the past 24 hours.    Assessment/Plan:     Aortic dissection  - S/p left carotid to subclavian bypass on 11/16/2020.  - Going for TEVAR on 11/17      Neuro/Psych:   -- Sedation: none  -- Pain: Oxycodone PRN             Cards:   -- HDS        Pulm:   -- Goal O2 sat > 90%  -- Wean as able      Renal:  -- Keep buckley for strict I/O  -- BUN/Cr       FEN / GI:   -- Replace lytes as needed  -- Nutrition: NPO      ID:   -- Tm: afebrile; WBC   -- Abx Cefazolin       Heme/Onc:   -- H/H stable   -- Daily CBC      Endo:   -- Gluc goal 140-180        PPx:   Feeding: NPO  Thromboembolic prevention: heparin   HOB >30:  Stress Ulcer ppx: none  Glucose control: Critical care goal 140-180 g/dl, ISS     Dispo/Code Status/Palliative:   -- SICU / Full Code            Critical Care Daily Checklist:    A: Awake: RASS Goal/Actual Goal:    Actual: Duron Agitation Sedation Scale (RASS): Alert and calm   B: Spontaneous Breathing Trial Performed?     C: SAT & SBT Coordinated?  NA                      D:  Delirium: CAM-ICU     E: Early Mobility Performed? Yes   F: Feeding Goal:    Status:     Current Diet Order   Procedures    Diet NPO      AS: Analgesia/Sedation NA   T: Thromboembolic Prophylaxis Yes   H: HOB > 300 Yes   U: Stress Ulcer Prophylaxis (if needed) NA   G: Glucose Control NA   B: Bowel Function     I: Indwelling Catheter (Lines & Pulliam) Necessity NA   D: De-escalation of Antimicrobials/Pharmacotherapies NA    Plan for the day/ETD NA    Code Status:  Family/Goals of Care: Full Code  NA        Critical care was time spent personally by me on the following activities: development of treatment plan with patient or surrogate and bedside caregivers, discussions with consultants, evaluation of patient's response to treatment, examination of patient, ordering and performing treatments and interventions, ordering and review of laboratory studies, ordering and review of radiographic studies, pulse oximetry, re-evaluation of patient's condition.  This critical care time did not overlap with that of any other provider or involve time for any procedures.     Alexy Parker MD  Critical Care - Surgery  [facility]

## 2020-11-17 NOTE — PT/OT/SLP PROGRESS
Physical Therapy      Patient Name:  Luis Eduardo Curry   MRN:  [id number]    Patient not seen today secondary to (pt not seen due to being in surgery. New PT orders will be needed for services.). Will follow-up with new orders when pt is medically stable. .    Yesika Matias, PT   11/17/2020

## 2020-11-17 NOTE — ANESTHESIA POSTPROCEDURE EVALUATION
Anesthesia Post Evaluation    Patient: Luis Eduardo Curry    Procedure(s) Performed: Procedure(s) (LRB):  CREATION, BYPASS, ARTERIAL, AORTA TO CAROTID, USING GRAFT SUBCLAVIAN BYPASS (Left)    Final Anesthesia Type: general    Patient location during evaluation: ICU  Patient participation: Yes- Able to Participate  Level of consciousness: awake  Post-procedure vital signs: reviewed and stable  Pain management: adequate  Airway patency: patent    PONV status at discharge: No PONV  Anesthetic complications: no      Cardiovascular status: blood pressure returned to baseline  Respiratory status: unassisted  Hydration status: euvolemic  Follow-up not needed.          Vitals Value Taken Time   /87 11/16/20 1737   Temp 36.4 °C (97.5 °F) 11/16/20 1737   Pulse 68 11/16/20 1826   Resp 12 11/16/20 1826   SpO2 94 % 11/16/20 1826   Vitals shown include unvalidated device data.      No case tracking events are documented in the log.      Pain/Chon Score: Pain Rating Prior to Med Admin: 6 (11/16/2020  5:51 PM)

## 2020-11-17 NOTE — HOSPITAL COURSE
- 11/16 /2020 s/p left carotid to subclavian bypass on 11/16/2020. Going for TEVAR on 11/17  - 11/18/2020: S/P TEVAR. Pt is hemodynamically stable, started on clear diet. A line removed.   - 11/18/2020 Pt is ready for discharge today.

## 2020-11-17 NOTE — SUBJECTIVE & OBJECTIVE
Interval History/Significant Events: No acute events overnight. Pt has been stable going for OR today     Follow-up For: Procedure(s) (LRB):  CREATION, BYPASS, ARTERIAL, AORTA TO CAROTID, USING GRAFT SUBCLAVIAN BYPASS (Left)    Post-Operative Day: 1 Day Post-Op    Objective:     Vital Signs (Most Recent):  Temp: 97.7 °F (36.5 °C) (11/17/20 0300)  Pulse: 61 (11/17/20 0600)  Resp: 18 (11/17/20 0600)  BP: 111/70 (11/17/20 0600)  SpO2: 97 % (11/17/20 0600) Vital Signs (24h Range):  Temp:  [97.5 °F (36.4 °C)-98.4 °F (36.9 °C)] 97.7 °F (36.5 °C)  Pulse:  [55-87] 61  Resp:  [9-30] 18  SpO2:  [93 %-100 %] 97 %  BP: (111-135)/(69-88) 111/70  Arterial Line BP: ()/(50-86) 131/69     Weight: 97.9 kg (215 lb 13.3 oz)  Body mass index is 29.27 kg/m².      Intake/Output Summary (Last 24 hours) at 11/17/2020 0624  Last data filed at 11/17/2020 0600  Gross per 24 hour   Intake 3169 ml   Output 2002 ml   Net 1167 ml       Physical Exam  Constitutional:       Appearance: Normal appearance.   Cardiovascular:      Rate and Rhythm: Normal rate and regular rhythm.      Pulses: Normal pulses.      Heart sounds: Normal heart sounds. No friction rub.   Pulmonary:      Breath sounds: Normal breath sounds.   Abdominal:      General: Abdomen is flat.      Palpations: Abdomen is soft.   Neurological:      General: No focal deficit present.      Mental Status: He is alert and oriented to person, place, and time.         Vents:       Lines/Drains/Airways     Drain                 Closed/Suction Drain 11/16/20 1633 Left Neck Bulb less than 1 day         Urethral Catheter 11/16/20 1700 16 Fr. less than 1 day          Arterial Line            Arterial Line 11/16/20 1255 Right Radial less than 1 day          Peripheral Intravenous Line                 Peripheral IV - Single Lumen 11/16/20 1145 20 G Anterior;Right Wrist less than 1 day         Peripheral IV - Single Lumen 11/16/20 1234 18 G Right Forearm less than 1 day                Significant  Labs:    CBC/Anemia Profile:  Recent Labs   Lab 11/16/20  1150 11/17/20  0333   WBC 5.92 8.64   HGB 11.1* 11.0*   HCT 35.4* 34.4*    214   MCV 91 90   RDW 13.2 13.2        Chemistries:  Recent Labs   Lab 11/16/20  1150 11/17/20  0333    138   K 4.5 4.6    108   CO2 23 19*   BUN 15 14   CREATININE 1.3 1.1   CALCIUM 9.1 8.4*   MG  --  2.2   PHOS  --  2.7       All pertinent labs within the past 24 hours have been reviewed.    Significant Imaging:  I have reviewed all pertinent imaging results/findings within the past 24 hours.

## 2020-11-17 NOTE — SUBJECTIVE & OBJECTIVE
Medications:  Continuous Infusions:   dextrose 5 % and 0.45 % NaCl with KCl 20 mEq 100 mL/hr at 11/17/20 0600     Scheduled Meds:   acetaminophen  650 mg Oral Q6H    aspirin  81 mg Oral Daily    ceFAZolin (ANCEF) IVPB  1 g Intravenous Q8H    docusate sodium  200 mg Oral BID    heparin (porcine)  5,000 Units Subcutaneous Q8H    labetaloL  200 mg Oral BID    NIFEdipine  30 mg Oral Daily    pantoprazole  40 mg Oral Daily    polyethylene glycol  17 g Oral Daily    tamsulosin  0.4 mg Oral Daily     PRN Meds:bisacodyL, calcium gluconate IVPB, calcium gluconate IVPB, calcium gluconate IVPB, magnesium sulfate IVPB, magnesium sulfate IVPB, morphine, oxyCODONE, potassium chloride in water **AND** potassium chloride in water **AND** potassium chloride in water, sodium chloride 0.9%, sodium phosphate IVPB, sodium phosphate IVPB, sodium phosphate IVPB     Objective:     Vital Signs (Most Recent):  Temp: 97.7 °F (36.5 °C) (11/17/20 0300)  Pulse: 61 (11/17/20 0600)  Resp: 18 (11/17/20 0600)  BP: 111/70 (11/17/20 0600)  SpO2: 97 % (11/17/20 0600) Vital Signs (24h Range):  Temp:  [97.5 °F (36.4 °C)-98.4 °F (36.9 °C)] 97.7 °F (36.5 °C)  Pulse:  [55-87] 61  Resp:  [9-30] 18  SpO2:  [93 %-100 %] 97 %  BP: (111-135)/(69-88) 111/70  Arterial Line BP: ()/(50-86) 131/69         Physical Exam  Constitutional:       Appearance: Normal appearance.   HENT:      Head: Normocephalic.   Eyes:      Extraocular Movements: Extraocular movements intact.   Neck:      Musculoskeletal: Normal range of motion.      Comments: Incision on left neck with dressing in place that is clean and dry  AMRITA drain with serosanguinous ouput  Cardiovascular:      Rate and Rhythm: Normal rate and regular rhythm.   Pulmonary:      Effort: Pulmonary effort is normal. No respiratory distress.   Abdominal:      General: There is no distension.      Palpations: Abdomen is soft.   Musculoskeletal: Normal range of motion.   Skin:     General: Skin is warm and  dry.   Neurological:      General: No focal deficit present.      Mental Status: He is alert and oriented to person, place, and time.   Psychiatric:         Mood and Affect: Mood normal.         Behavior: Behavior normal.         Significant Labs:  Recent Lab Results       11/17/20  0333   11/16/20  1452   11/16/20  1424   11/16/20  1413   11/16/20  1150        Unit Blood Type Code         5100[P]              5100[P]     Unit Expiration         [id number][P]              [id number][P]     Unit Blood Type         O POS[P]              O POS[P]     Anion Gap 11       7     aPTT         29.8  Comment:  aPTT therapeutic range = 39-69 seconds     Baso # 0.02       0.02     Basophil % 0.2       0.3     BUN 14       15     Calcium 8.4       9.1     Chloride 108       109     CO2 19       23     CODING SYSTEM         ZNTH199[P]              [id number][P]     Creatinine 1.1       1.3     Differential Method Automated       Automated     DISPENSE STATUS         CROSSMATCHED[P]              CROSSMATCHED[P]     eGFR if  >60.0       >60.0     eGFR if non  >60.0  Comment:  Calculation used to obtain the estimated glomerular filtration  rate (eGFR) is the CKD-EPI equation.          >60.0  Comment:  Calculation used to obtain the estimated glomerular filtration  rate (eGFR) is the CKD-EPI equation.        Eos # 0.2       0.7     Eosinophil % 2.7       11.3     Glucose 132       87     Gran # (ANC) 6.6       3.0     Gran % 76.7       50.9     Group & Rh         O POS     Hematocrit 34.4       35.4     Hemoglobin 11.0       11.1     Immature Grans (Abs) 0.04  Comment:  Mild elevation in immature granulocytes is non specific and   can be seen in a variety of conditions including stress response,   acute inflammation, trauma and pregnancy. Correlation with other   laboratory and clinical findings is essential.         0.04  Comment:  Mild elevation in immature granulocytes is non specific and   can be  seen in a variety of conditions including stress response,   acute inflammation, trauma and pregnancy. Correlation with other   laboratory and clinical findings is essential.       Immature Granulocytes 0.5       0.7     INDIRECT CHERIE         NEG     INR         0.9  Comment:  Coumadin Therapy:  2.0 - 3.0 for INR for all indicators except mechanical heart valves  and antiphospholipid syndromes which should use 2.5 - 3.5.       Lymph # 1.1       1.7     Lymph % 12.8       28.4     Magnesium 2.2             MCH 28.6       28.5     MCHC 32.0       31.4     MCV 90       91     Mono # 0.6       0.5     Mono % 7.1       8.4     MPV 9.9       10.0     nRBC 0       0     Phosphorus 2.7             Platelets 214       201     POC ACTIVATED CLOTTING TIME K   235 224 191       Potassium 4.6       4.5     Product Code         A6183K45[P]              [id number][P]     Protime         10.5     RBC 3.84       3.90     RDW 13.2       13.2     Sample   ARTERIAL ARTERIAL ARTERIAL       Sodium 138       139     UNIT NUMBER         [id number][P]              [id number][P]     WBC 8.64       5.92                          Significant Diagnostics:  I have reviewed all pertinent imaging results/findings within the past 24 hours.

## 2020-11-18 LAB
ALBUMIN SERPL BCP-MCNC: 3 G/DL (ref 3.5–5.2)
ALP SERPL-CCNC: 44 U/L (ref 55–135)
ALT SERPL W/O P-5'-P-CCNC: 15 U/L (ref 10–44)
ANION GAP SERPL CALC-SCNC: 4 MMOL/L (ref 8–16)
ANION GAP SERPL CALC-SCNC: 6 MMOL/L (ref 8–16)
APTT BLDCRRT: 26.5 SEC (ref 21–32)
AST SERPL-CCNC: 10 U/L (ref 10–40)
BASOPHILS # BLD AUTO: 0.02 K/UL (ref 0–0.2)
BASOPHILS NFR BLD: 0.2 % (ref 0–1.9)
BILIRUB SERPL-MCNC: 0.4 MG/DL (ref 0.1–1)
BUN SERPL-MCNC: 11 MG/DL (ref 6–20)
BUN SERPL-MCNC: 12 MG/DL (ref 6–20)
CALCIUM SERPL-MCNC: 7.6 MG/DL (ref 8.7–10.5)
CALCIUM SERPL-MCNC: 7.9 MG/DL (ref 8.7–10.5)
CHLORIDE SERPL-SCNC: 107 MMOL/L (ref 95–110)
CHLORIDE SERPL-SCNC: 109 MMOL/L (ref 95–110)
CO2 SERPL-SCNC: 22 MMOL/L (ref 23–29)
CO2 SERPL-SCNC: 23 MMOL/L (ref 23–29)
CREAT SERPL-MCNC: 1 MG/DL (ref 0.5–1.4)
CREAT SERPL-MCNC: 1.1 MG/DL (ref 0.5–1.4)
DIFFERENTIAL METHOD: ABNORMAL
EOSINOPHIL # BLD AUTO: 0.8 K/UL (ref 0–0.5)
EOSINOPHIL NFR BLD: 9.1 % (ref 0–8)
ERYTHROCYTE [DISTWIDTH] IN BLOOD BY AUTOMATED COUNT: 13.3 % (ref 11.5–14.5)
EST. GFR  (AFRICAN AMERICAN): >60 ML/MIN/1.73 M^2
EST. GFR  (AFRICAN AMERICAN): >60 ML/MIN/1.73 M^2
EST. GFR  (NON AFRICAN AMERICAN): >60 ML/MIN/1.73 M^2
EST. GFR  (NON AFRICAN AMERICAN): >60 ML/MIN/1.73 M^2
GLUCOSE SERPL-MCNC: 102 MG/DL (ref 70–110)
GLUCOSE SERPL-MCNC: 103 MG/DL (ref 70–110)
GLUCOSE SERPL-MCNC: 109 MG/DL (ref 70–110)
GLUCOSE SERPL-MCNC: 117 MG/DL (ref 70–110)
GLUCOSE SERPL-MCNC: 120 MG/DL (ref 70–110)
HCO3 UR-SCNC: 20.8 MMOL/L (ref 24–28)
HCO3 UR-SCNC: 21.3 MMOL/L (ref 24–28)
HCO3 UR-SCNC: 22.7 MMOL/L (ref 24–28)
HCT VFR BLD AUTO: 27 % (ref 40–54)
HCT VFR BLD CALC: 23 %PCV (ref 36–54)
HCT VFR BLD CALC: 26 %PCV (ref 36–54)
HCT VFR BLD CALC: 30 %PCV (ref 36–54)
HGB BLD-MCNC: 8.5 G/DL (ref 14–18)
IMM GRANULOCYTES # BLD AUTO: 0.04 K/UL (ref 0–0.04)
IMM GRANULOCYTES NFR BLD AUTO: 0.5 % (ref 0–0.5)
LYMPHOCYTES # BLD AUTO: 1.6 K/UL (ref 1–4.8)
LYMPHOCYTES NFR BLD: 18.6 % (ref 18–48)
MAGNESIUM SERPL-MCNC: 2.1 MG/DL (ref 1.6–2.6)
MCH RBC QN AUTO: 28.6 PG (ref 27–31)
MCHC RBC AUTO-ENTMCNC: 31.5 G/DL (ref 32–36)
MCV RBC AUTO: 91 FL (ref 82–98)
MONOCYTES # BLD AUTO: 0.5 K/UL (ref 0.3–1)
MONOCYTES NFR BLD: 6.2 % (ref 4–15)
NEUTROPHILS # BLD AUTO: 5.6 K/UL (ref 1.8–7.7)
NEUTROPHILS NFR BLD: 65.4 % (ref 38–73)
NRBC BLD-RTO: 0 /100 WBC
PCO2 BLDA: 37.3 MMHG (ref 35–45)
PCO2 BLDA: 40 MMHG (ref 35–45)
PCO2 BLDA: 40.9 MMHG (ref 35–45)
PH SMN: 7.32 [PH] (ref 7.35–7.45)
PH SMN: 7.36 [PH] (ref 7.35–7.45)
PH SMN: 7.37 [PH] (ref 7.35–7.45)
PHOSPHATE SERPL-MCNC: 2.6 MG/DL (ref 2.7–4.5)
PHOSPHATE SERPL-MCNC: 2.7 MG/DL (ref 2.7–4.5)
PLATELET # BLD AUTO: 148 K/UL (ref 150–350)
PMV BLD AUTO: 9.9 FL (ref 9.2–12.9)
PO2 BLDA: 172 MMHG (ref 80–100)
PO2 BLDA: 193 MMHG (ref 80–100)
PO2 BLDA: 319 MMHG (ref 80–100)
POC ACTIVATED CLOTTING TIME K: 169 SEC (ref 74–137)
POC ACTIVATED CLOTTING TIME K: 213 SEC (ref 74–137)
POC ACTIVATED CLOTTING TIME K: 224 SEC (ref 74–137)
POC ACTIVATED CLOTTING TIME K: 230 SEC (ref 74–137)
POC ACTIVATED CLOTTING TIME K: 235 SEC (ref 74–137)
POC ACTIVATED CLOTTING TIME K: 257 SEC (ref 74–137)
POC ACTIVATED CLOTTING TIME K: 279 SEC (ref 74–137)
POC BE: -3 MMOL/L
POC BE: -4 MMOL/L
POC BE: -5 MMOL/L
POC IONIZED CALCIUM: 1.06 MMOL/L (ref 1.06–1.42)
POC IONIZED CALCIUM: 1.07 MMOL/L (ref 1.06–1.42)
POC IONIZED CALCIUM: 1.17 MMOL/L (ref 1.06–1.42)
POC SATURATED O2: 100 % (ref 95–100)
POC SATURATED O2: 100 % (ref 95–100)
POC SATURATED O2: 99 % (ref 95–100)
POC TCO2: 22 MMOL/L (ref 23–27)
POC TCO2: 22 MMOL/L (ref 23–27)
POC TCO2: 24 MMOL/L (ref 23–27)
POTASSIUM BLD-SCNC: 4.1 MMOL/L (ref 3.5–5.1)
POTASSIUM BLD-SCNC: 4.2 MMOL/L (ref 3.5–5.1)
POTASSIUM BLD-SCNC: 4.5 MMOL/L (ref 3.5–5.1)
POTASSIUM SERPL-SCNC: 4 MMOL/L (ref 3.5–5.1)
POTASSIUM SERPL-SCNC: 4.1 MMOL/L (ref 3.5–5.1)
PROT SERPL-MCNC: 5.2 G/DL (ref 6–8.4)
RBC # BLD AUTO: 2.97 M/UL (ref 4.6–6.2)
SAMPLE: ABNORMAL
SODIUM BLD-SCNC: 141 MMOL/L (ref 136–145)
SODIUM BLD-SCNC: 142 MMOL/L (ref 136–145)
SODIUM BLD-SCNC: 142 MMOL/L (ref 136–145)
SODIUM SERPL-SCNC: 135 MMOL/L (ref 136–145)
SODIUM SERPL-SCNC: 136 MMOL/L (ref 136–145)
WBC # BLD AUTO: 8.61 K/UL (ref 3.9–12.7)

## 2020-11-18 PROCEDURE — 97116 GAIT TRAINING THERAPY: CPT

## 2020-11-18 PROCEDURE — 99291 PR CRITICAL CARE, E/M 30-74 MINUTES: ICD-10-PCS | Mod: ,,, | Performed by: ANESTHESIOLOGY

## 2020-11-18 PROCEDURE — 85730 THROMBOPLASTIN TIME PARTIAL: CPT

## 2020-11-18 PROCEDURE — 25000003 PHARM REV CODE 250: Performed by: STUDENT IN AN ORGANIZED HEALTH CARE EDUCATION/TRAINING PROGRAM

## 2020-11-18 PROCEDURE — 97161 PT EVAL LOW COMPLEX 20 MIN: CPT

## 2020-11-18 PROCEDURE — 83735 ASSAY OF MAGNESIUM: CPT

## 2020-11-18 PROCEDURE — 63600175 PHARM REV CODE 636 W HCPCS: Performed by: STUDENT IN AN ORGANIZED HEALTH CARE EDUCATION/TRAINING PROGRAM

## 2020-11-18 PROCEDURE — 84100 ASSAY OF PHOSPHORUS: CPT | Mod: 91

## 2020-11-18 PROCEDURE — 97535 SELF CARE MNGMENT TRAINING: CPT

## 2020-11-18 PROCEDURE — 99900035 HC TECH TIME PER 15 MIN (STAT)

## 2020-11-18 PROCEDURE — 85025 COMPLETE CBC W/AUTO DIFF WBC: CPT

## 2020-11-18 PROCEDURE — 97165 OT EVAL LOW COMPLEX 30 MIN: CPT

## 2020-11-18 PROCEDURE — 80048 BASIC METABOLIC PNL TOTAL CA: CPT

## 2020-11-18 PROCEDURE — 20000000 HC ICU ROOM

## 2020-11-18 PROCEDURE — 84100 ASSAY OF PHOSPHORUS: CPT

## 2020-11-18 PROCEDURE — 80053 COMPREHEN METABOLIC PANEL: CPT

## 2020-11-18 PROCEDURE — 99291 CRITICAL CARE FIRST HOUR: CPT | Mod: ,,, | Performed by: ANESTHESIOLOGY

## 2020-11-18 PROCEDURE — 94761 N-INVAS EAR/PLS OXIMETRY MLT: CPT

## 2020-11-18 RX ORDER — HEPARIN SODIUM 5000 [USP'U]/ML
5000 INJECTION, SOLUTION INTRAVENOUS; SUBCUTANEOUS EVERY 8 HOURS
Status: DISCONTINUED | OUTPATIENT
Start: 2020-11-18 | End: 2020-11-19 | Stop reason: HOSPADM

## 2020-11-18 RX ORDER — MIDAZOLAM HYDROCHLORIDE 1 MG/ML
INJECTION, SOLUTION INTRAMUSCULAR; INTRAVENOUS
Status: DISCONTINUED | OUTPATIENT
Start: 2020-11-17 | End: 2020-11-18

## 2020-11-18 RX ADMIN — HEPARIN SODIUM 5000 UNITS: 5000 INJECTION INTRAVENOUS; SUBCUTANEOUS at 02:11

## 2020-11-18 RX ADMIN — ACETAMINOPHEN 650 MG: 325 TABLET ORAL at 11:11

## 2020-11-18 RX ADMIN — SODIUM PHOSPHATE, MONOBASIC, MONOHYDRATE 15 MMOL: 276; 142 INJECTION, SOLUTION INTRAVENOUS at 04:11

## 2020-11-18 RX ADMIN — HEPARIN SODIUM 5000 UNITS: 5000 INJECTION INTRAVENOUS; SUBCUTANEOUS at 09:11

## 2020-11-18 RX ADMIN — LABETALOL HYDROCHLORIDE 200 MG: 100 TABLET, FILM COATED ORAL at 09:11

## 2020-11-18 RX ADMIN — POLYETHYLENE GLYCOL 3350 17 G: 17 POWDER, FOR SOLUTION ORAL at 09:11

## 2020-11-18 RX ADMIN — CLOPIDOGREL 75 MG: 75 TABLET, FILM COATED ORAL at 09:11

## 2020-11-18 RX ADMIN — POTASSIUM CHLORIDE, DEXTROSE MONOHYDRATE AND SODIUM CHLORIDE: 150; 5; 450 INJECTION, SOLUTION INTRAVENOUS at 04:11

## 2020-11-18 RX ADMIN — CEFAZOLIN 1 G: 1 INJECTION, POWDER, FOR SOLUTION INTRAMUSCULAR; INTRAVENOUS at 12:11

## 2020-11-18 RX ADMIN — ACETAMINOPHEN 650 MG: 325 TABLET ORAL at 05:11

## 2020-11-18 RX ADMIN — CEFAZOLIN 1 G: 1 INJECTION, POWDER, FOR SOLUTION INTRAMUSCULAR; INTRAVENOUS at 09:11

## 2020-11-18 RX ADMIN — PANTOPRAZOLE SODIUM 40 MG: 40 TABLET, DELAYED RELEASE ORAL at 09:11

## 2020-11-18 RX ADMIN — LABETALOL HYDROCHLORIDE 200 MG: 100 TABLET, FILM COATED ORAL at 08:11

## 2020-11-18 RX ADMIN — DOCUSATE SODIUM 200 MG: 50 CAPSULE, LIQUID FILLED ORAL at 09:11

## 2020-11-18 RX ADMIN — ASPIRIN 81 MG: 81 TABLET, CHEWABLE ORAL at 09:11

## 2020-11-18 RX ADMIN — ACETAMINOPHEN 650 MG: 325 TABLET ORAL at 12:11

## 2020-11-18 RX ADMIN — TAMSULOSIN HYDROCHLORIDE 0.4 MG: 0.4 CAPSULE ORAL at 09:11

## 2020-11-18 NOTE — NURSING
Pt presents to SICU [address] via bed, escorted by Anesthesia service post-procedure.  Upon arrival, pt alert, oriented to person, place, time and situation.  Warming blanket applied and room temperature elevated secondary to oral temperature 93.4F.  Room temperature elevated. Frequent checks performed of pulses, with assessment data as noted in flowsheets. Dr. Santos presents to bedside, aware of all patient data.  Plan of care reviewed with pt and pt's family, and verbal of understanding obtained.  Emotional support offered.

## 2020-11-18 NOTE — PLAN OF CARE
Problem: Occupational Therapy Goal  Goal: Occupational Therapy Goal  Outcome: Met   OT eval completed; no goals established as pt is performing ADL safely and without A. EVE Gray  11/18/2020

## 2020-11-18 NOTE — PLAN OF CARE
Problem: Physical Therapy Goal  Goal: Physical Therapy Goal  Description: Goals to be met by: 20    Patient will increase functional independence with mobility by performin. Supine to sit with Modified [place] -not met  2. Sit to stand transfer with Modified [place] -not met  3. Gait  x 250 feet with Supervision -not met    Outcome: Ongoing, Progressing   Evaluation completed and goals appropriate. Yesika Matias, PT  2020

## 2020-11-18 NOTE — ASSESSMENT & PLAN NOTE
- S/p left carotid to subclavian bypass on 11/16/2020. S/P TEVAR on 11/17/2020      Neuro/Psych:   -- Sedation: none  -- Pain: Morphine and Oxycodone PRN  -- Alert, oriented X 3. Moving all extremities with no issues                Cards:   -- HDS  -- SBP goals < 160 mmhg        Pulm:   -- Goal O2 sat > 90%        Renal:  -- Kidney functions has been stable since admission to ICU  -- Strict I/O  -- BUN/Cr F/U         FEN / GI:   -- Replace lytes as needed  -- Nutrition: NPO        ID:   -- Tm: afebrile; WBC   -- Abx Cefazolin course completed         Heme/Onc:   -- H/H stable   -- Daily CBC        Endo:   -- Gluc goal 140-180        PPx:   Feeding: NPO  Thromboembolic prevention: heparin   HOB >30:  Stress Ulcer ppx: none  Glucose control: Critical care goal 140-180 g/dl, ISS       Dispo/Code Status/Palliative:   -- SICU / Full Code  -- Stepdown today

## 2020-11-18 NOTE — PROGRESS NOTES
VASCULAR SURGERY  Afternoon Progress Note    Assessment/Plan:  60 y.o. male with hx aortic aneurysm s/p thoracic endovascular aortic repair    - Q4 neurovascular checks w/ heel raise check.  - BP goal Systolic less than 120. HR less than 80  - PRN labetalol  - regular diet  - heparin gtt @ 800 u/hr  - Daily Aspirin  - Will start Plavix in the morning    Subjective:  Patient doing well post-operatively. Palpable lower extremity pulses bilaterally. AFVSS.     Objective:  /70 (BP Location: Right arm, Patient Position: Lying)   Pulse 68   Temp 97.9 °F (36.6 °C) (Oral)   Resp 13   Ht 6' (1.829 m)   Wt 97.9 kg (215 lb 13.3 oz)   SpO2 96%   BMI 29.27 kg/m²      Cranial nerves II - XII intact.   Bilateral pedal pulses palpable  Left radial pulse palpable  Moving all extremities  Groin incisions CDI w/ no swelling noted        Caio Parada MD  PGY - 1  [facility]

## 2020-11-18 NOTE — ASSESSMENT & PLAN NOTE
Mr. Curry is our 61yo male with an aortic dissection.  Now s/p carotid to subclavian bypass on 11/16/2020 and TEVAR with left common carotid stent and left subclavian embolization on 11/17/2020.    Neuro:   - Alert and oriented  - CN II-XII grossly intact  - Moving all extremities and able to lift heels off of bed  - PRN pain medications    Cardiac:  - MAP goal > 65  - Systolic < 120  - continue colt  - ASA/Plavix therapy  - Home BP meds: labetalol and nifedipine  - Avoid ACE inhibitors or ARBs  - PRN labetalol for elevated blood pressures    Pulmonary:   - Extubated breathing comfortably  - PRN breathing treatments  - IS    Renal:   - monitor Is and Os  - trend BMP daily  - continue buckley with history of BPH    - BUN/Cr   BUN   Date Value Ref Range Status   11/18/2020 12 6 - 20 mg/dL Final   11/17/2020 11 6 - 20 mg/dL Final     Creatinine   Date Value Ref Range Status   11/18/2020 1.1 0.5 - 1.4 mg/dL Final   11/17/2020 1.0 0.5 - 1.4 mg/dL Final       Infectious Disease:   - WBC trending, trend daily  - Post op ancef    Lab Results   Component Value Date    WBC 8.61 11/18/2020       Hematology/Oncology:  - H/H trending, monitor daily  - No indication for transfusion at this time  - ASA/Plavix therapy for graft    Lab Results   Component Value Date    WBC 8.61 11/18/2020    HGB 8.5 (L) 11/18/2020    HCT 27.0 (L) 11/18/2020    MCV 91 11/18/2020     (L) 11/18/2020       Endocrine:  - insulin gtt vs SSI as needed  - endocrinology following    F:   Fluids/Electrolytes (From admission, onward)    Start     Stop Route Frequency Ordered    11/17/20 0638  potassium chloride 10 mEq in 100 mL IVPB  (IV Electrolyte replacement via PERIPHERAL LINE)      -- IV As needed (PRN) 11/17/20 0538    11/17/20 0638  potassium chloride 10 mEq in 100 mL IVPB  (IV Electrolyte replacement via PERIPHERAL LINE)      -- IV As needed (PRN) 11/17/20 0538    11/17/20 0638  potassium chloride 10 mEq in 100 mL IVPB  (IV Electrolyte  replacement via PERIPHERAL LINE)      -- IV As needed (PRN) 11/17/20 0538    11/17/20 0638  magnesium sulfate 2g in water 50mL IVPB (premix)  (IV Electrolyte replacement via PERIPHERAL LINE)      -- IV As needed (PRN) 11/17/20 0538    11/17/20 0638  magnesium sulfate 2g in water 50mL IVPB (premix)  (IV Electrolyte replacement via PERIPHERAL LINE)      -- IV As needed (PRN) 11/17/20 0538    11/16/20 1700  dextrose 5 % and 0.45 % NaCl with KCl 20 mEq infusion      -- IV Continuous 11/16/20 1623    11/16/20 1652  sodium chloride 0.9% flush 10 mL      -- IV As needed (PRN) 11/16/20 1623         E:   Recent Labs   Lab 11/18/20  0301      K 4.1      CO2 23   BUN 12   CREATININE 1.1   MG 2.1      N: Regular diet    GI:   - replace electrolytes as needed to keep K>4, Mg>2  - bowel reg - daily miralax, docusate  - famotidine for GI prophylaxis    Dispo:  - continue care in the SICU, consider stepdown this evening if continues to look good

## 2020-11-18 NOTE — OP NOTE
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 Intravascular Ultrasound  2. Left brachial artery open exposure for delivery of endovascular stent graft  3. Thoracic Endovascular Aortic aneurysm repair (TEVAR) (31mm x 31mm x 20cm GORE CTAG)  4. Left common carotid artery snorkel (7x39mm VBX stent)  5. Embolization of left subclavian artery  6. Right renal artery stenting (6x29mm VBX stent)  7. Percutaneous large bore closure of right common femoral artery (20F sheath)    Attending Surgeon: JOSY Santos II, MD    Resident/Fellow: Robert Smalls MD PGY 7; Derik Barragan MD PGY2    Indications: 60-year-old male with subacute type B aortic dissection extending up to the origin of the left subclavian artery.  He had high risk features for aneurysmal degeneration and so he was consented for surgical repair with staged carotid subclavian bypass followed by return to the OR for thoracic endovascular aortic repair with common carotid stenting (snorkel) and subclavian artery embolization. He tolerated his left carotid subclavian artery bypass well yesterday and returns today for TEVAR with carotid snorkel and subclavian artery embolization.    Procedure in Detail:   The patient is brought to the OR in supine position.  Appropriate hemodynamic monitors in place by the anesthesia team.  General anesthesia was administered by the anesthesia team.  The left arm and bilateral groins were prepped and draped in normal sterile fashion.  A time-out was performed to confirm appropriate patient identification, procedure, laterality.    We began by making a longitudinal incision over the brachial artery just above the AC fossa on the left arm.  This was deepened with combination of electrocautery, blunt dissection, sharp dissection.  The  artery was dissected circumferentially using Metzenbaum scissors and encircled with a silastic vessel loop.  The artery was exposed for length of 2 cm to allow for cannulation and repair.    We began by obtaining ultrasound-guided percutaneous access to the bilateral common femoral arteries using a micropuncture access kit. We confirmed appropriate access over the femoral heads using fluoroscopy.  1cm skin incisions were made at the entry points using an 11 blade. Bentson were advanced through the bilateral common femoral arteries into the distal abdominal aorta. On the right side the micropuncture sheaths were exchanged for 8 Azerbaijani dilators.  The dilators were advanced over the wires into the artery.  Pressure was held over the artery and the dilators were removed while the tracts were dilated with hemostats with the wires in place.  ProGlide devices were then advanced over the wire. Two proglides were deployed sequentially at the 10 and 2 oclock positions. Sutures were tagged with hemostats on the lateral and medial sides. The Bentson or glide wires were replaced in each artery as the proglides were removed. A 9F sheath was placed in the artery. On the left side the micropuncture sheath was removed over the bentson and 9F sheath was placed over the wire. The sheath sideports withdrew blood and flushed easily with heparinized saline. There was no bleeding from around the sheaths. Wires were confirmed in the proximal abdominal aorta. 9000 units of IV heparin were then administered for systemic anticoagulation.  Heparin was readministered throughout the case to achieve a goal ACT of greater than 250.    We advanced a stiff Glidewire with IVUS catheter through the sheath in the left groin into the aortic arch.  The path of the wire was confirmed to be within the true lumen IVUS.  This wire was left in place and a body Glidewire was placed in the 9 Azerbaijani sheath on the left groin followed by a Omni Flush catheter.   The wire and catheter were advanced to the mid abdominal aorta and we performed an aortogram to visualize the right renal artery which was noted to be perfused via the false lumen.  In order to protect the right renal artery we then cannulated it with a Glidewire and a Carson catheter.  The Glidewire was exchanged for a Duffy wire which was parked in the distal right renal artery and left there for the remainder of the case. There was continuous slow bleeding from the diaphragm of the sheath in the left groin due to the two wires being in place. This was slowed with bone wax on the back of the sheath.    In the right groin we then advanced a stiff angled Glidewire through the 9 Comoran sheath followed by a IVUS catheter.  IVUS confirmed the path of the wire to be within the true lumen from the aortic bifurcation up into the aortic arch.  We then exchanged the stiff Glidewire for a double curved Lunderquist wire.  The IVUS catheter was then withdrawn over the Lunderquist wire while visualizing to again confirm that the path of the wire remained within the true lumen throughout its course. We then exchanged the 9F sheath for a 20F dryseal sheath which was hubbed at the skin and advanced under direct fluoroscopic visualization.    We then cannulated the left brachial artery using a micropuncture access kit followed by a micropuncture sheath and Glidewire.  The micropuncture sheath was exchanged over the Glidewire for a 7 Comoran 55 cm Meng sheath.  An angiogram was performed through the sheath to visualize the carotid subclavian artery bypass.  We cannulated the bypass using the glide wire and a glide catheter and advanced the wire and catheter into the proximal left common carotid artery and into the aorta followed by the Meng sheath.     We performed an aortogram through the pigtail catheter in the arch and marked the origin of the innominate artery. We then advanced a 7 x 39 mm VBX stent over the wire from the left  arm and parked it in the origin of the left common carotid artery. We then advanced the [id number] graft up to the innominate artery and deployed it across the origins of the left carotid and subclavian arteries.  We then deployed the VBX stent.  The pigtail was withdrawn over the wire and readvanced within the TEVAR graft and a repeat aortogram was performed from the arch.  This showed brisk flow through the TEVAR graft with minimal filling of the false lumen.  There was brisk flow through the carotid snorkel as well.  We then backed out the sheath from the left arm and  readvanced over a Glidewire into the proximal left subclavian artery.  We performed an angiogram through the sheath to confirm the location of the left vertebral artery origin.  We deployed a 8 mm Amplatzer plug within the left subclavian artery proximal to the origin of the left vertebral artery.    We then brought the pigtail down into the abdominal aorta and performed an aortogram through it.  This showed patent SMA and celiac and left renal arteries but no filling of the right renal artery.  Fortunately we still had a Duffy wire parked in the right renal artery.  We advanced a 6 x 29 mm VBX stent over the Duffy wire and deployed it extending from the true lumen into the origin of the right renal artery.  We performed an additional aortogram after deployment of the stent which showed brisk flow through the stent and filling of the right kidney.     We then exchanged our catheters out over Bentson wires.  We removed the sheath from the right groin over the Bentson wire and tied the pre closure sutures in standard fashion over the wire.  There was no apparent bleeding over the wire ends of the wire was removed.  The sutures were read tightened and there was no apparent bleeding from the access site.  On the left side the 9 Telugu sheath was removed over a wire and a single ProGlide closure device was used to close the arteriotomy.  There was  no bleeding from the left groin access site after plug guide was deployed successfully.  Manual pressure was held over the bilateral groins while the left brachial artery was repaired.  Left brachial artery was repaired with interrupted 6 0 Prolene sutures after the sheath and wires were removed.  The artery was unclamped and there was minimal bleeding from the arteriotomy.  Thrombin Gelfoam was added to the wound in heparin was reversed with 30 mg of protamine.  After protamine infusion completed there was no apparent bleeding from the arteriotomy over the wound itself.  The wound was thoroughly irrigated with saline.  The wound was closed with interrupted 3 0 Vicryl sutures in the deep dermis followed by a running 4 Monocryl suture in the skin.    The small incisions in the groin were closed with 4 Monocryl suture and Dermabond glue.  The incisions were all dressed with 4 x 4 and Tegaderm.      Estimated Blood loss 1000ml    Complications: none

## 2020-11-18 NOTE — OP NOTE
Vascular Surgery Op Note    Date of Operation/Procedure: 11/16/20    Pre-operative Diagnosis: aortic dissection extending to left subclavian artery    Post-operative Diagnosis: same    Anesthesia: general    Operation/Procedure Performed: left carotid to subclavian artery bypass    Attending Surgeon: JOSY Santos II, MD    Resident/Fellow: Robert Smalls MD PGY7    Indications:  60-year-old male with subacute type B aortic dissection extending up to the origin of the left subclavian artery.  He had high risk features for aneurysmal degeneration and so he was consented for surgical repair with staged carotid subclavian bypass followed by return to the OR for thoracic endovascular aortic repair with common carotid stenting (snorkel) and subclavian artery embolization.    Procedure in Detail:   The patient is brought to the OR in supine position.  Appropriate hemodynamic monitors in place by the anesthesia team.  General anesthesia was administered by the anesthesia team.  The left neck was prepped and draped in normal sterile fashion.  A time-out was performed to confirm appropriate patient identification, procedure, laterality.    A transverse incision was made over the patient's left clavicle.  This was deepened with a combination of electrocautery, blunt dissection, sharp dissection.  Crossing veins were ligated with 2 and 3 0 silk ties and divided.  The scalene fat pad was retracted superiorly/laterally.  The common carotid artery was exposed behind the internal jugular vein on the medial aspect of the incision.  During exposure of the common carotid artery we did encounter the thoracic duct and made a rent in the thoracic duct.  This was repaired with medium hemoclips.  We took care to identify the vagus nerve.  The common carotid artery was encircled with vessel loops and exposed for a length of 3 cm.    In the inferior aspect of the incision we divided the anterior scalene muscle after identifying the  phrenic nerve.  We protected the phrenic nerve.  After the anterior scalene was divided we were able to palpate the subclavian artery.  The artery was exposed circumferentially using Metzenbaum scissors and encircled with a silastic vessel loop.    Nine thousand units of heparin were administered for systemic anticoagulation.  Heparin was readministered throughout the case to achieve a goal ACT of greater than 250.  We applied a Satinsky clamp on the subclavian artery and made a longitudinal arteriotomy.  We sewed a 8 mm Otway PTFE graft onto the subclavian artery using running 5-0 Prolene suture.  Two pledgeted repair sutures were placed for leaks on the proximal aspect of the anastomosis after was completed.  A Maria Luisa Hydragrip clamp was placed on the graft and the Satinsky clamp was removed.  There was some bleeding from the needle holes but otherwise no significant bleeding from the anastomosis.  We then cut/beveled the graft length to anastomose to the left common carotid artery.  The carotid artery was clamped with a Satinsky clamp.  A longitudinal arteriotomy was made with an 11 blade extended with Dumont scissors.  The graft was sewn to the common carotid artery with a running 5 0 Prolene suture.  Prior to completion of the anastomosis the Maria Luisa Hydragrip clamp was released from the graft and there was no apparent thrombus.  The lumen was filled with heparinized saline and the anastomosis was completed.  A DeBakey forcep was used to occlude the common carotid artery distal to the Satinsky clamp while the Satinsky clamp was removed in the carotid was flushed to the subclavian artery.  After several cardiac cycles the DeBakey forceps were released from the distal common carotid artery to allow antegrade distal carotid flow.  Thrombin Gelfoam was added to the wound for hemostasis.  The wound was thoroughly irrigated with saline solution.  A 7 Kinyarwanda flat AMRITA drain was placed in the wound and brought out through  the skin lateral to the incision.  The incision was then irrigated again and there was good hemostasis.  Doppler insonation on the carotid and subclavian arteries distal to the anastomosis was normal triphasic waveform.  The platysma was closed with running 3 0 Vicryl suture.  The skin was closed with running 4 Monocryl suture.  The incision was dressed with 4 x 4 gauze and Tegaderm.    He awake in stable condition with intact neurological exam and was transported to the ICU for recovery with intent to return the following day for repair of his aortic dissection.    Estimated Blood loss: 100ml    Complications: none    JOSY Santos II, MD, VI  Vascular Surgeon  [facility]

## 2020-11-18 NOTE — PT/OT/SLP EVAL
Physical Therapy Co- Evaluation and co-treatment with OT    Patient Name:  Luis Eduardo Curry   MRN:  [id number]    Recommendations:     Discharge Recommendations:  (home no needs)   Discharge Equipment Recommendations: none   Barriers to discharge: None    Assessment:     Luis Eduardo Curry is a 60 y.o. male admitted with a medical diagnosis of aortic dissection.  He presents with the following impairments/functional limitations:  impaired functional mobilty, gait instability, impaired balance. pt tolerated treatment well and will benefit from a few more sessions of PT to increase distance ambulated and progress pt to previous level of function. Pt will benefit from skilled PT 3x/wk and will be able to discharge home with no needs. Pt is s/p TEVAR 11/17/20.    Rehab Prognosis: Good; patient would benefit from acute skilled PT services to address these deficits and reach maximum level of function.    Recent Surgery: Procedure(s) (LRB):  REPAIR, ANEURYSM, ENDOVASCULAR GRAFT, AORTA, THORACIC Left brachial cutdown (N/A)  STENT, RENAL (Right)  PLACEMENT-STENT Right common carotid artery  (N/A)  IVUS (INTRAVASCULAR ULTRASOUND) (N/A) 1 Day Post-Op    Plan:     During this hospitalization, patient to be seen 3 x/week to address the identified rehab impairments via gait training, therapeutic activities and progress toward the following goals:    · Plan of Care Expires:  12/16/20    Subjective     Chief Complaint: pt had no complaints during treatment.   Patient/Family Comments/goals:  To get better and go home  Pain/Comfort:  · Pain Rating 1: 0/10  · Pain Rating Post-Intervention 1: 0/10    Patients cultural, spiritual, Tenriism conflicts given the current situation: no    Living Environment:  Pt works full-time at [employer] and lives with his wife who works full-time. They live in [address] with slab entrance.   Prior to admission, patients level of function was Independent .  Equipment used at home: none.  DME owned (not currently  used): none.  Upon discharge, patient will have assistance from wife who will take time off of work.    Objective:     Communicated with nurse prior to session.  Patient found supine with telemetry, arterial line, pulse ox (continuous), blood pressure cuff, AMRITA drain, buckley catheter, SCD, peripheral IV  upon PT entry to room.    General Precautions: Standard, fall   Orthopedic Precautions:    Braces:       Exams:  · Cognitive Exam:  Patient is oriented to Person, Place, Time and Situation  · RLE ROM: WFL  · RLE Strength: WFL  · LLE ROM: WFL  · LLE Strength: WFL    Functional Mobility:  · Bed Mobility:     · Rolling Right: stand by assistance  · Supine to Sit: stand by assistance  ·   · Transfers:     · Sit to Stand:  stand by assistance with hand-held assist  ·   · Gait: pt received gait training ~ 46 ft  In room with SBA.  Pt was SBA standing balance at sink performing ADLS with OT.     PT concentrated on BLE MMT, ROM and gait training during evaluation and treatment.     Therapeutic Activities and Exercises:   pt received verbal instruction in role of PT and POC. Pt verbally expressed understanding of such.     AM-PAC 6 CLICK MOBILITY  Total Score:18     Patient left up in chair with all lines intact and call button in reach.    GOALS:   Multidisciplinary Problems     Physical Therapy Goals        Problem: Physical Therapy Goal    Goal Priority Disciplines Outcome Goal Variances Interventions   Physical Therapy Goal     PT, PT/OT Ongoing, Progressing     Description: Goals to be met by: 20    Patient will increase functional independence with mobility by performin. Supine to sit with Modified [place] -not met  2. Sit to stand transfer with Modified [place] -not met  3. Gait  x 250 feet with Supervision -not met                     History:     Past Medical History:   Diagnosis Date    Aortic dissection        Past Surgical History:   Procedure Laterality Date    BIOPSY WITH TRANSRECTAL  ULTRASOUND (TRUS) GUIDANCE N/A 5/1/2019    Procedure: BIOPSY, WITH TRANSRECTAL PROSTATE ULTRASOUND;  Surgeon: Chintan Mendez Jr., MD;  Location: [facility];  Service: Urology;  Laterality: N/A;    COLONOSCOPY  2006    COLONOSCOPY N/A 1/24/2019    Procedure: COLONOSCOPY;  Surgeon: Reece Tarango MD;  Location: [facility];  Service: Endoscopy;  Laterality: N/A;    CREATION OF AORTO-CAROTID BYPASS WITH GRAFT Left 11/16/2020    Procedure: CREATION, BYPASS, ARTERIAL, AORTA TO CAROTID, USING GRAFT SUBCLAVIAN BYPASS;  Surgeon: JOSY Santos II, MD;  Location: [address];  Service: Cardiovascular;  Laterality: Left;  left common carotid to subclavian bypass    CYSTOSCOPY N/A 5/1/2019    Procedure: FLEXIBLE CYSTOSCOPY;  Surgeon: Chintan Mendez Jr., MD;  Location: [facility];  Service: Urology;  Laterality: N/A;    UMBILICAL HERNIA REPAIR N/A 1/14/2020    Procedure: REPAIR, HERNIA, UMBILICAL;  Surgeon: Caio Mas Jr., MD;  Location: [facility];  Service: General;  Laterality: N/A;       Time Tracking:     PT Received On: 11/18/20  PT Start Time: 0901     PT Stop Time: 0918  PT Total Time (min): 17 min     Billable Minutes: Evaluation 8 min and Gait Training 9 min      Yesika Matias, PT  11/18/2020

## 2020-11-18 NOTE — PROGRESS NOTES
[facility]  Critical Care - Surgery  Progress Note    Patient Name: Luis Eduardo Curry  MRN: [id number]  Admission Date: 11/16/2020  Hospital Length of Stay: 2 days  Code Status: Full Code  Attending Provider: JOSY Santos II, MD  Primary Care Provider: Kiko Connor MD   Principal Problem: <principal problem not specified>    Subjective:     Hospital/ICU Course:  - 11/16 /2020 s/p left carotid to subclavian bypass on 11/16/2020. Going for TEVAR on 11/17  - 11/18/2020: S/P TEVAR. Pt is hemodynamically stable, started on clear diet. A line removed. Will be stepdown today.     Interval History/Significant Events: No acute events overnight. Pt is vitally stable. Started on diet, A line D/C. He will be stepdown today.     Follow-up For: Procedure(s) (LRB):  REPAIR, ANEURYSM, ENDOVASCULAR GRAFT, AORTA, THORACIC Left brachial cutdown (N/A)  STENT, RENAL (Right)  PLACEMENT-STENT Right common carotid artery  (N/A)  IVUS (INTRAVASCULAR ULTRASOUND) (N/A)    Post-Operative Day: 1 Day Post-Op    Objective:     Vital Signs (Most Recent):  Temp: 98.4 °F (36.9 °C) (11/18/20 1100)  Pulse: 80 (11/18/20 1230)  Resp: (!) 32 (11/18/20 1230)  BP: (!) 93/52 (11/18/20 1200)  SpO2: 98 % (11/18/20 1230) Vital Signs (24h Range):  Temp:  [97.8 °F (36.6 °C)-98.6 °F (37 °C)] 98.4 °F (36.9 °C)  Pulse:  [59-84] 80  Resp:  [10-32] 32  SpO2:  [93 %-100 %] 98 %  BP: ()/(52-81) 93/52  Arterial Line BP: ()/() 119/55     Weight: 103.4 kg (227 lb 15.3 oz)  Body mass index is 30.92 kg/m².      Intake/Output Summary (Last 24 hours) at 11/18/2020 1319  Last data filed at 11/18/2020 1200  Gross per 24 hour   Intake 2975 ml   Output 2849 ml   Net 126 ml       Physical Exam  Constitutional:       Appearance: Normal appearance.   HENT:      Head: Normocephalic.   Eyes:      Extraocular Movements: Extraocular movements intact.   Neck:      Musculoskeletal: Normal range of motion.      Comments: Incision on left neck  with dressing in place that is clean and dry  AMRITA drain with serosanguinous ouput  Cardiovascular:      Rate and Rhythm: Normal rate and regular rhythm.   Pulmonary:      Effort: Pulmonary effort is normal. No respiratory distress.   Abdominal:      General: There is no distension.      Palpations: Abdomen is soft.   Musculoskeletal: Normal range of motion.   Skin:     General: Skin is warm and dry.      Comments: Groin sites c/d/i with soft skin overtop   Neurological:      General: No focal deficit present.      Mental Status: He is alert and oriented to person, place, and time.   Psychiatric:         Mood and Affect: Mood normal.         Behavior: Behavior normal.         Vents:       Lines/Drains/Airways     Drain                 Closed/Suction Drain 11/16/20 1633 Left Neck Bulb 1 day         Urethral Catheter 11/16/20 1700 16 Fr. 1 day          Arterial Line            Arterial Line 11/16/20 1255 Right Radial 2 days          Peripheral Intravenous Line                 Peripheral IV - Single Lumen 11/16/20 1145 20 G Anterior;Right Wrist 2 days         Peripheral IV - Single Lumen 11/16/20 1234 18 G Right Forearm 2 days         Peripheral IV - Single Lumen 11/17/20 1200 16 G Right Antecubital 1 day                Significant Labs:    CBC/Anemia Profile:  Recent Labs   Lab 11/17/20  0333  11/17/20  1037 11/17/20  1210 11/18/20  0301   WBC 8.64  --   --  10.13 8.61   HGB 11.0*  --   --  9.9* 8.5*   HCT 34.4*   < > 26* 30.6* 27.0*     --   --  204 148*   MCV 90  --   --  90 91   RDW 13.2  --   --  13.4 13.3    < > = values in this interval not displayed.        Chemistries:  Recent Labs   Lab 11/17/20  0333 11/17/20  1210 11/18/20  0301 11/18/20  1112    138 136 135*   K 4.6 4.5 4.1 4.0    108 107 109   CO2 19* 22* 23 22*   BUN 14 11 12 11   CREATININE 1.1 1.0 1.1 1.0   CALCIUM 8.4* 8.0* 7.9* 7.6*   ALBUMIN  --   --   --  3.0*   PROT  --   --   --  5.2*   BILITOT  --   --   --  0.4   ALKPHOS  --    --   --  44*   ALT  --   --   --  15   AST  --   --   --  10   MG 2.2  --  2.1  --    PHOS 2.7  --  2.6* 2.7       All pertinent labs within the past 24 hours have been reviewed.    Significant Imaging:  I have reviewed and interpreted all pertinent imaging results/findings within the past 24 hours.    Assessment/Plan:     Aortic dissection  - S/p left carotid to subclavian bypass on 11/16/2020. S/P TEVAR on 11/17/2020      Neuro/Psych:   -- Sedation: none  -- Pain: Morphine and Oxycodone PRN  -- Alert, oriented X 3. Moving all extremities with no issues                Cards:   -- HDS  -- SBP goals < 160 mmhg        Pulm:   -- Goal O2 sat > 90%        Renal:  -- Kidney functions has been stable since admission to ICU  -- Strict I/O  -- BUN/Cr F/U         FEN / GI:   -- Replace lytes as needed  -- Nutrition: NPO        ID:   -- Tm: afebrile; WBC   -- Abx Cefazolin course completed         Heme/Onc:   -- H/H stable   -- Daily CBC        Endo:   -- Gluc goal 140-180        PPx:   Feeding: NPO  Thromboembolic prevention: heparin   HOB >30:  Stress Ulcer ppx: none  Glucose control: Critical care goal 140-180 g/dl, ISS       Dispo/Code Status/Palliative:   -- SICU / Full Code  -- Stepdown today            Critical Care Daily Checklist:    A: Awake: RASS Goal/Actual Goal:    Actual: [place]on Agitation Sedation Scale (RASS): Alert and calm   B: Spontaneous Breathing Trial Performed?     C: SAT & SBT Coordinated?  NA                      D: Delirium: CAM-ICU Overall CAM-ICU: Negative   E: Early Mobility Performed? Yes   F: Feeding Goal:    Status:     Current Diet Order   Procedures    Diet Adult Regular (IDDSI Level 7)      AS: Analgesia/Sedation Yes   T: Thromboembolic Prophylaxis Yes   H: HOB > 300 Yes   U: Stress Ulcer Prophylaxis (if needed) NA   G: Glucose Control NA   B: Bowel Function     I: Indwelling Catheter (Lines & Pulliam) Necessity NA   D: De-escalation of Antimicrobials/Pharmacotherapies NA    Plan  for the day/ETD NA    Code Status:  Family/Goals of Care: Full Code  NA          Critical care was time spent personally by me on the following activities: development of treatment plan with patient or surrogate and bedside caregivers, discussions with consultants, evaluation of patient's response to treatment, examination of patient, ordering and performing treatments and interventions, ordering and review of laboratory studies, ordering and review of radiographic studies, pulse oximetry, re-evaluation of patient's condition.  This critical care time did not overlap with that of any other provider or involve time for any procedures.     Alexy Parker MD  Critical Care - Surgery  [facility]

## 2020-11-18 NOTE — PLAN OF CARE
SW is following this Pt for DC planning needs. There are no identified needs at this time. Discharge Disposition: home    Aura Vines LMSW   - Case Management

## 2020-11-18 NOTE — PROGRESS NOTES
VASCULAR SURGERY POST-OP NOTE    POD#0 TEVAR with L carotid snorkel, L subclavian embolization, and L renal stenting  POD#1 left carotid-subclavian artery bypass    S: Patient's pain is controlled.  He would like some water to drink.  He denies any weakness or numbness.    O:  Vitals:    11/17/20 1500 11/17/20 1600 11/17/20 1700 11/17/20 1800   BP:       BP Location:       Patient Position:       Pulse: 78 75 74 68   Resp: 15 19 17 13   Temp: 97.9 °F (36.6 °C)      TempSrc: Oral      SpO2: 98% 96% 97% 96%   Weight:       Height:           's    GEN: Alert/oriented, normal affect, normal speech  NEURO: 5/5 strength in bilateral upper and lower extremities, able to lift both heels off the bed with ease, CN II-XII motor function intact  NECK:  Left neck incision dressing in place, dry, drain in place with SS drainage  HEENT: atraumatic, normocephalic  CHEST: normal respiratory effort, symmetrical chest rise  CARD: Regular rate, regular rhythm  EXT: Warm, no cyanosis/edema, bilateral groin incision sites with dressing in place, dry, no apparent swelling/hematoma, L arm incision dressing in place, dry, no apparent swelling/hematoma  VASC: LUE: 2+ radial pulse   RLE: 2+ PT pulse   LLE: 2+ DP pulse    A/P:  60-year-old male status post above doing well    -Complete 6 hours total bed rest  -continue HR/blood pressure control goal SBP less than 120, goal heart rate control less than 80  -Continue ASA 325mg  -Continue frequent lower extremity motor neuro monitoring after TEVAR  -Continue frequent neuro checks after carotid stenting  -Continue heparin drip 800units/hr for carotid/renal stent patency until tomorrow AM.  -Will hold plavix until tomorrow AM incase patient requires lumbar drain placement for any acute lower extremity weakness   -continue care in ICU overnight    JOSY Santos II, MD, [facility]  Vascular Surgeon  [facility]

## 2020-11-18 NOTE — SUBJECTIVE & OBJECTIVE
Interval History/Significant Events: No acute events overnight. Pt is vitally stable. Started on diet, A line D/C. He will be stepdown today.     Follow-up For: Procedure(s) (LRB):  REPAIR, ANEURYSM, ENDOVASCULAR GRAFT, AORTA, THORACIC Left brachial cutdown (N/A)  STENT, RENAL (Right)  PLACEMENT-STENT Right common carotid artery  (N/A)  IVUS (INTRAVASCULAR ULTRASOUND) (N/A)    Post-Operative Day: 1 Day Post-Op    Objective:     Vital Signs (Most Recent):  Temp: 98.4 °F (36.9 °C) (11/18/20 1100)  Pulse: 80 (11/18/20 1230)  Resp: (!) 32 (11/18/20 1230)  BP: (!) 93/52 (11/18/20 1200)  SpO2: 98 % (11/18/20 1230) Vital Signs (24h Range):  Temp:  [97.8 °F (36.6 °C)-98.6 °F (37 °C)] 98.4 °F (36.9 °C)  Pulse:  [59-84] 80  Resp:  [10-32] 32  SpO2:  [93 %-100 %] 98 %  BP: ()/(52-81) 93/52  Arterial Line BP: ()/() 119/55     Weight: 103.4 kg (227 lb 15.3 oz)  Body mass index is 30.92 kg/m².      Intake/Output Summary (Last 24 hours) at 11/18/2020 1319  Last data filed at 11/18/2020 1200  Gross per 24 hour   Intake 2975 ml   Output 2849 ml   Net 126 ml       Physical Exam  Constitutional:       Appearance: Normal appearance.   HENT:      Head: Normocephalic.   Eyes:      Extraocular Movements: Extraocular movements intact.   Neck:      Musculoskeletal: Normal range of motion.      Comments: Incision on left neck with dressing in place that is clean and dry  AMRITA drain with serosanguinous ouput  Cardiovascular:      Rate and Rhythm: Normal rate and regular rhythm.   Pulmonary:      Effort: Pulmonary effort is normal. No respiratory distress.   Abdominal:      General: There is no distension.      Palpations: Abdomen is soft.   Musculoskeletal: Normal range of motion.   Skin:     General: Skin is warm and dry.      Comments: Groin sites c/d/i with soft skin overtop   Neurological:      General: No focal deficit present.      Mental Status: He is alert and oriented to person, place, and time.   Psychiatric:          Mood and Affect: Mood normal.         Behavior: Behavior normal.         Vents:       Lines/Drains/Airways     Drain                 Closed/Suction Drain 11/16/20 1633 Left Neck Bulb 1 day         Urethral Catheter 11/16/20 1700 16 Fr. 1 day          Arterial Line            Arterial Line 11/16/20 1255 Right Radial 2 days          Peripheral Intravenous Line                 Peripheral IV - Single Lumen 11/16/20 1145 20 G Anterior;Right Wrist 2 days         Peripheral IV - Single Lumen 11/16/20 1234 18 G Right Forearm 2 days         Peripheral IV - Single Lumen 11/17/20 1200 16 G Right Antecubital 1 day                Significant Labs:    CBC/Anemia Profile:  Recent Labs   Lab 11/17/20  0333  11/17/20  1037 11/17/20  1210 11/18/20  0301   WBC 8.64  --   --  10.13 8.61   HGB 11.0*  --   --  9.9* 8.5*   HCT 34.4*   < > 26* 30.6* 27.0*     --   --  204 148*   MCV 90  --   --  90 91   RDW 13.2  --   --  13.4 13.3    < > = values in this interval not displayed.        Chemistries:  Recent Labs   Lab 11/17/20  0333 11/17/20  1210 11/18/20  0301 11/18/20  1112    138 136 135*   K 4.6 4.5 4.1 4.0    108 107 109   CO2 19* 22* 23 22*   BUN 14 11 12 11   CREATININE 1.1 1.0 1.1 1.0   CALCIUM 8.4* 8.0* 7.9* 7.6*   ALBUMIN  --   --   --  3.0*   PROT  --   --   --  5.2*   BILITOT  --   --   --  0.4   ALKPHOS  --   --   --  44*   ALT  --   --   --  15   AST  --   --   --  10   MG 2.2  --  2.1  --    PHOS 2.7  --  2.6* 2.7       All pertinent labs within the past 24 hours have been reviewed.    Significant Imaging:  I have reviewed and interpreted all pertinent imaging results/findings within the past 24 hours.

## 2020-11-18 NOTE — PLAN OF CARE
SICU PLAN OF CARE NOTE    Goals of Care: MAP >65 SBP <170    Neuro: AAO x4, Arouses to Voice, Follows Commands, and Moves All Extremities    Vital Signs: /64 (BP Location: Right arm, Patient Position: Lying)   Pulse (!) 59   Temp 98.6 °F (37 °C) (Oral)   Resp 11   Ht 6' (1.829 m)   Wt 103.4 kg (227 lb 15.3 oz)   SpO2 96%   BMI 30.92 kg/m²     Respiratory: Room Air    Diet: Regular Diet    Gtts: MIVF    Urine Output: Urinary Catheter  cc/hour    Drains: AMRITA Drain, total output 45 cc / shift     Labs/Accuchecks: Daily Labs.    Skin: Skin free of new breakdown, pt repositioned q2h, waffle inflated, bed plugged in and working properly

## 2020-11-18 NOTE — PT/OT/SLP EVAL
Occupational Therapy   Evaluation and Discharge Note    Name: Luis Eduardo Curry  MRN: [id number]  Admitting Diagnosis:  L carotid subclavian bypass 11/16; TEVAR 11/17    Recommendations:     Discharge Recommendations: home  Discharge Equipment Recommendations:  none  Barriers to discharge:  None    Assessment:     Luis Eduardo Curry is a 60 y.o. male with a medical diagnosis of as above. At this time, patient is functioning at their prior level of function and does not require further acute OT services.     Plan:     During this hospitalization, patient does not require further acute OT services.  Please re-consult if situation changes.    · Plan of Care Reviewed with: patient    Subjective     Chief Complaint: Neck stiffness  Patient/Family Comments/goals: to get better and go home    Occupational Profile:  Living Environment: lives with spouse in [facility] with no LEATHA  Previous level of function: (I) with ADL and ambulation  Roles and Routines: works for [employer]  Equipment Used at home:  none  Assistance upon Discharge: spouse    Pain/Comfort:  · Pain Rating 1: 0/10  · Pain Rating Post-Intervention 1: 0/10    Patients cultural, spiritual, Advent conflicts given the current situation: no    Objective:     Communicated with: RN prior to session.  Patient found supine with blood pressure cuff, pulse ox (continuous), telemetry, AMRITA drain, arterial line, peripheral IV upon OT entry to room.    General Precautions: Standard, fall   Orthopedic Precautions:    Braces:       Occupational Performance:    Bed Mobility:    · SBA 2* lines    Functional Mobility/Transfers:  · Patient completed Sit <> Stand Transfer with stand by assistance  with  no assistive device   · Patient completed Bed <> Chair Transfer using Step Transfer technique with stand by assistance with no assistive device  · Functional Mobility: SBA to/from bathroom and around room    Activities of Daily Living:  · Feeding:  independence    · Grooming: independence     · Upper Body Dressing: modified independence    · Lower Body Dressing: modified independence    · Toileting: modified independence      Cognitive/Visual Perceptual:  Oriented to: Person, Place, Time and Situation  Follows Commands/attention: Follows multistep  commands  Communication: clear/fluent  Memory:  No Deficits noted  Safety awareness/insight to disability: intact  Coping skills/emotional control: Appropriate to situation    Physical Exam:  Postural examination/scapula alignment:    -       No postural abnormalities identified  Sensation:    -       Intact  Upper Extremity Range of Motion:     -       Right Upper Extremity: WFL  -       Left Upper Extremity: WFL  Upper Extremity Strength:    -       Right Upper Extremity: WFL  -       Left Upper Extremity: WFL   Strength:    -       Right Upper Extremity: WFL  -       Left Upper Extremity: WFL  Fine Motor Coordination:    -       Intact  Gross motor coordination:   WFL    AMPAC 6 Click ADL:  AMPAC Total Score: 24    Treatment & Education:  Pt ed on OT POC  Pt ed on OT discharge and continued self-care performance with nurse supervision while in ICU  Education:    Patient left up in chair with all lines intact, call button in reach and RN notified    GOALS:   Multidisciplinary Problems     Occupational Therapy Goals     Not on file          Multidisciplinary Problems (Resolved)        Problem: Occupational Therapy Goal    Goal Priority Disciplines Outcome Interventions   Occupational Therapy Goal   (Resolved)     OT, PT/OT Met                    History:     Past Medical History:   Diagnosis Date    Aortic dissection        Past Surgical History:   Procedure Laterality Date    BIOPSY WITH TRANSRECTAL ULTRASOUND (TRUS) GUIDANCE N/A 5/1/2019    Procedure: BIOPSY, WITH TRANSRECTAL PROSTATE ULTRASOUND;  Surgeon: Chintan Mendez Jr., MD;  Location: [facility];  Service: Urology;  Laterality: N/A;    COLONOSCOPY  2006    COLONOSCOPY N/A 1/24/2019     Procedure: COLONOSCOPY;  Surgeon: Reece Tarango MD;  [facility];  Service: Endoscopy;  Laterality: N/A;    CREATION OF AORTO-CAROTID BYPASS WITH GRAFT Left 11/16/2020    Procedure: CREATION, BYPASS, ARTERIAL, AORTA TO CAROTID, USING GRAFT SUBCLAVIAN BYPASS;  Surgeon: JOSY Santos II, MD;  Location: [address];  Service: Cardiovascular;  Laterality: Left;  left common carotid to subclavian bypass    CYSTOSCOPY N/A 5/1/2019    Procedure: FLEXIBLE CYSTOSCOPY;  Surgeon: Chintan Mendez Jr., MD;  [facility];  Service: Urology;  Laterality: N/A;    UMBILICAL HERNIA REPAIR N/A 1/14/2020    Procedure: REPAIR, HERNIA, UMBILICAL;  Surgeon: Caio Mas Jr., MD;  Location[facility];  Service: General;  Laterality: N/A;       Time Tracking:     OT Date of Treatment: 11/18/20  OT Start Time: 0901  OT Stop Time: 0919  OT Total Time (min): 18 min    Billable Minutes:Evaluation 8  Self Care/Home Management 10    EVE Gray  11/18/2020

## 2020-11-18 NOTE — PLAN OF CARE
Plan of Care Notation:    As noted, pt presents to SICU after surgical procedure.  Neurovascular checks performed q15min x 1 hour, then hourly per Dr. Santos's order.  Incision sites without hematoma or visibile site complications today. Upon arrival, pt hypothermic, with warming blanket applied and room temperature elevated (which provides full effective relief).  Heparin IV at 500 units/hr (non-titrating) per orders.  D5 1/2 NS w/ 20 mEq KCl at 100 ml/hr IV per orders.  Pt producing clear yellow urine via Pulliam catheter.  Plan of care reviewed with pt and pt's family, and verbalization of understanding obtained.  Emotional support offered.

## 2020-11-18 NOTE — SUBJECTIVE & OBJECTIVE
Medications:  Continuous Infusions:   dextrose 5 % and 0.45 % NaCl with KCl 20 mEq 100 mL/hr at 11/18/20 0900     Scheduled Meds:   acetaminophen  650 mg Oral Q6H    aspirin  81 mg Oral Daily    clopidogreL  75 mg Oral Daily    docusate sodium  200 mg Oral BID    labetaloL  200 mg Oral BID    NIFEdipine  30 mg Oral Daily    pantoprazole  40 mg Oral Daily    polyethylene glycol  17 g Oral Daily    tamsulosin  0.4 mg Oral Daily     PRN Meds:bisacodyL, calcium gluconate IVPB, calcium gluconate IVPB, calcium gluconate IVPB, labetalol, magnesium sulfate IVPB, magnesium sulfate IVPB, morphine, oxyCODONE, potassium chloride in water **AND** potassium chloride in water **AND** potassium chloride in water, sodium chloride 0.9%, sodium phosphate IVPB, sodium phosphate IVPB, sodium phosphate IVPB     Objective:     Vital Signs (Most Recent):  Temp: 98.3 °F (36.8 °C) (11/18/20 0700)  Pulse: 70 (11/18/20 0900)  Resp: 16 (11/18/20 0900)  BP: 108/64 (11/18/20 0900)  SpO2: 97 % (11/18/20 0900) Vital Signs (24h Range):  Temp:  [93.4 °F (34.1 °C)-98.6 °F (37 °C)] 98.3 °F (36.8 °C)  Pulse:  [59-78] 70  Resp:  [10-28] 16  SpO2:  [93 %-100 %] 97 %  BP: (107-144)/(55-81) 108/64  Arterial Line BP: ()/() 103/54     Date 11/18/20 0700 - 11/19/20 0659   Shift 0415-9408 7767-3245 1226-9279 24 Hour Total   INTAKE   P.O. 300   300   Shift Total(mL/kg) 300(2.9)   300(2.9)   OUTPUT   Urine(mL/kg/hr) 505   505   Drains 6   6   Shift Total(mL/kg) 511(4.9)   511(4.9)   Weight (kg) 103.4 103.4 103.4 103.4       Physical Exam  Constitutional:       Appearance: Normal appearance.   HENT:      Head: Normocephalic.   Eyes:      Extraocular Movements: Extraocular movements intact.   Neck:      Musculoskeletal: Normal range of motion.      Comments: Incision on left neck with dressing in place that is clean and dry  AMRITA drain with serosanguinous ouput  Cardiovascular:      Rate and Rhythm: Normal rate and regular rhythm.   Pulmonary:       Effort: Pulmonary effort is normal. No respiratory distress.   Abdominal:      General: There is no distension.      Palpations: Abdomen is soft.   Musculoskeletal: Normal range of motion.   Skin:     General: Skin is warm and dry.      Comments: Groin sites c/d/i with soft skin overtop   Neurological:      General: No focal deficit present.      Mental Status: He is alert and oriented to person, place, and time.   Psychiatric:         Mood and Affect: Mood normal.         Behavior: Behavior normal.         Significant Labs:  Recent Lab Results       11/18/20  0301   11/17/20  1320   11/17/20  1210   11/17/20  1122   11/17/20  1037        Allens Test   N/A           Anion Gap 6   8         aPTT 26.5  Comment:  aPTT therapeutic range = 39-69 seconds   138.1  Comment:  aPTT therapeutic range = 39-69 seconds         Baso # 0.02   0.01         Basophil % 0.2   0.1         Site   Gelacio/UAC           BUN 12   11         Calcium 7.9   8.0         Chloride 107   108         CO2 23   22         Creatinine 1.1   1.0         Dels   Room Air           Differential Method Automated   Automated         eGFR if  >60.0   >60.0         eGFR if non  >60.0  Comment:  Calculation used to obtain the estimated glomerular filtration  rate (eGFR) is the CKD-EPI equation.      >60.0  Comment:  Calculation used to obtain the estimated glomerular filtration  rate (eGFR) is the CKD-EPI equation.            Eos # 0.8   0.6         Eosinophil % 9.1   6.3         Glucose 120   124         Gran # (ANC) 5.6   7.3         Gran % 65.4   71.6         Hematocrit 27.0   30.6         Hemoglobin 8.5   9.9         Immature Grans (Abs) 0.04  Comment:  Mild elevation in immature granulocytes is non specific and   can be seen in a variety of conditions including stress response,   acute inflammation, trauma and pregnancy. Correlation with other   laboratory and clinical findings is essential.     0.06  Comment:  Mild  elevation in immature granulocytes is non specific and   can be seen in a variety of conditions including stress response,   acute inflammation, trauma and pregnancy. Correlation with other   laboratory and clinical findings is essential.           Immature Granulocytes 0.5   0.6         Lactate, Chace     1.7  Comment:  Falsely low lactic acid results can be found in samples   containing >=13.0 mg/dL total bilirubin and/or >=3.5 mg/dL   direct bilirubin.           Lymph # 1.6   1.7         Lymph % 18.6   16.6         Magnesium 2.1             MCH 28.6   28.9         MCHC 31.5   32.4         MCV 91   90         Mode   SPONT           Mono # 0.5   0.5         Mono % 6.2   4.8         MPV 9.9   10.0         nRBC 0   0         Phosphorus 2.6             Platelets 148   204         POC ACTIVATED CLOTTING TIME K       169       POC BE   -3     -5     POC Glucose         102     POC HCO3   21.5     20.8     POC Hematocrit         26     POC Ionized Calcium         1.07     POC PCO2   34.6     40.9     POC PH   7.401     7.315     POC PO2   73     319     POC Potassium         4.1     POC SATURATED O2   95     100     POC Sodium         142     POC TCO2   23     22     Potassium 4.1   4.5         RBC 2.97   3.42         RDW 13.3   13.4         Sample   ARTERIAL   ARTERIAL ARTERIAL     Sodium 136   138         Sp02   99           WBC 8.61   10.13                          11/17/20  1031   11/17/20  1003   11/17/20  0938   11/17/20  0932   11/17/20  0927        Allens Test               Anion Gap               aPTT               Baso #               Basophil %               Site               BUN               Calcium               Chloride               CO2               Creatinine               DelSys               Differential Method               eGFR if                eGFR if non                Eos #               Eosinophil %               Glucose               Gran # (ANC)                Gran %               Hematocrit               Hemoglobin               Immature Grans (Abs)               Immature Granulocytes               Lactate, Chace               Lymph #               Lymph %               Magnesium               MCH               MCHC               MCV               Mode               Mono #               Mono %               MPV               nRBC               Phosphorus               Platelets               POC ACTIVATED CLOTTING TIME K 235 257 279 230       POC BE         -4     POC Glucose         103     POC HCO3         21.3     POC Hematocrit         23     POC Ionized Calcium         1.06     POC PCO2         37.3     POC PH         7.366     POC PO2         193     POC Potassium         4.2     POC SATURATED O2         100     POC Sodium         142     POC TCO2         22     Potassium               RBC               RDW               Sample ARTERIAL ARTERIAL ARTERIAL ARTERIAL ARTERIAL     Sodium               Sp02               WBC                                    Significant Diagnostics:  I have reviewed all pertinent imaging results/findings within the past 24 hours.

## 2020-11-18 NOTE — PROGRESS NOTES
[facility]  Vascular Surgery  Progress Note    Patient Name: Luis Eduardo Curry  MRN: [id number]  Admission Date: 11/16/2020  Primary Care Provider: Kiko Connor MD    Subjective:     Interval History: NAEON.  Alert and oriented moving all extremities without issue.  Adequate UOP.  Breathing comfortably on room air.  Hemodynamically stable.      Post-Op Info:  Procedure(s) (LRB):  REPAIR, ANEURYSM, ENDOVASCULAR GRAFT, AORTA, THORACIC Left brachial cutdown (N/A)  STENT, RENAL (Right)  PLACEMENT-STENT Right common carotid artery  (N/A)  IVUS (INTRAVASCULAR ULTRASOUND) (N/A)   1 Day Post-Op       Medications:  Continuous Infusions:   dextrose 5 % and 0.45 % NaCl with KCl 20 mEq 100 mL/hr at 11/18/20 0900     Scheduled Meds:   acetaminophen  650 mg Oral Q6H    aspirin  81 mg Oral Daily    clopidogreL  75 mg Oral Daily    docusate sodium  200 mg Oral BID    labetaloL  200 mg Oral BID    NIFEdipine  30 mg Oral Daily    pantoprazole  40 mg Oral Daily    polyethylene glycol  17 g Oral Daily    tamsulosin  0.4 mg Oral Daily     PRN Meds:bisacodyL, calcium gluconate IVPB, calcium gluconate IVPB, calcium gluconate IVPB, labetalol, magnesium sulfate IVPB, magnesium sulfate IVPB, morphine, oxyCODONE, potassium chloride in water **AND** potassium chloride in water **AND** potassium chloride in water, sodium chloride 0.9%, sodium phosphate IVPB, sodium phosphate IVPB, sodium phosphate IVPB     Objective:     Vital Signs (Most Recent):  Temp: 98.3 °F (36.8 °C) (11/18/20 0700)  Pulse: 70 (11/18/20 0900)  Resp: 16 (11/18/20 0900)  BP: 108/64 (11/18/20 0900)  SpO2: 97 % (11/18/20 0900) Vital Signs (24h Range):  Temp:  [93.4 °F (34.1 °C)-98.6 °F (37 °C)] 98.3 °F (36.8 °C)  Pulse:  [59-78] 70  Resp:  [10-28] 16  SpO2:  [93 %-100 %] 97 %  BP: (107-144)/(55-81) 108/64  Arterial Line BP: ()/() 103/54     Date 11/18/20 0700 - 11/19/20 0659   Shift 8169-3717 0430-1510 5921-1127 24 Hour Total   INTAKE    P.O. 300   300   Shift Total(mL/kg) 300(2.9)   300(2.9)   OUTPUT   Urine(mL/kg/hr) 505   505   Drains 6   6   Shift Total(mL/kg) 511(4.9)   511(4.9)   Weight (kg) 103.4 103.4 103.4 103.4       Physical Exam  Constitutional:       Appearance: Normal appearance.   HENT:      Head: Normocephalic.   Eyes:      Extraocular Movements: Extraocular movements intact.   Neck:      Musculoskeletal: Normal range of motion.      Comments: Incision on left neck with dressing in place that is clean and dry  AMRITA drain with serosanguinous ouput  Cardiovascular:      Rate and Rhythm: Normal rate and regular rhythm.   Pulmonary:      Effort: Pulmonary effort is normal. No respiratory distress.   Abdominal:      General: There is no distension.      Palpations: Abdomen is soft.   Musculoskeletal: Normal range of motion.   Skin:     General: Skin is warm and dry.      Comments: Groin sites c/d/i with soft skin overtop   Neurological:      General: No focal deficit present.      Mental Status: He is alert and oriented to person, place, and time.   Psychiatric:         Mood and Affect: Mood normal.         Behavior: Behavior normal.         Significant Labs:  Recent Lab Results       11/18/20  0301   11/17/20  1320   11/17/20  1210   11/17/20  1122   11/17/20  1037        Allens Test   N/A           Anion Gap 6   8         aPTT 26.5  Comment:  aPTT therapeutic range = 39-69 seconds   138.1  Comment:  aPTT therapeutic range = 39-69 seconds         Baso # 0.02   0.01         Basophil % 0.2   0.1         Site   Gelacio/UAC           BUN 12   11         Calcium 7.9   8.0         Chloride 107   108         CO2 23   22         Creatinine 1.1   1.0         [facility]   Room Air           Differential Method Automated   Automated         eGFR if  >60.0   >60.0         eGFR if non  >60.0  Comment:  Calculation used to obtain the estimated glomerular filtration  rate (eGFR) is the CKD-EPI equation.       >60.0  Comment:  Calculation used to obtain the estimated glomerular filtration  rate (eGFR) is the CKD-EPI equation.            Eos # 0.8   0.6         Eosinophil % 9.1   6.3         Glucose 120   124         Gran # (ANC) 5.6   7.3         Gran % 65.4   71.6         Hematocrit 27.0   30.6         Hemoglobin 8.5   9.9         Immature Grans (Abs) 0.04  Comment:  Mild elevation in immature granulocytes is non specific and   can be seen in a variety of conditions including stress response,   acute inflammation, trauma and pregnancy. Correlation with other   laboratory and clinical findings is essential.     0.06  Comment:  Mild elevation in immature granulocytes is non specific and   can be seen in a variety of conditions including stress response,   acute inflammation, trauma and pregnancy. Correlation with other   laboratory and clinical findings is essential.           Immature Granulocytes 0.5   0.6         Lactate, Chace     1.7  Comment:  Falsely low lactic acid results can be found in samples   containing >=13.0 mg/dL total bilirubin and/or >=3.5 mg/dL   direct bilirubin.           Lymph # 1.6   1.7         Lymph % 18.6   16.6         Magnesium 2.1             MCH 28.6   28.9         MCHC 31.5   32.4         MCV 91   90         Mode   SPONT           Mono # 0.5   0.5         Mono % 6.2   4.8         MPV 9.9   10.0         nRBC 0   0         Phosphorus 2.6             Platelets 148   204         POC ACTIVATED CLOTTING TIME K       169       POC BE   -3     -5     POC Glucose         102     POC HCO3   21.5     20.8     POC Hematocrit         26     POC Ionized Calcium         1.07     POC PCO2   34.6     40.9     POC PH   7.401     7.315     POC PO2   73     319     POC Potassium         4.1     POC SATURATED O2   95     100     POC Sodium         142     POC TCO2   23     22     Potassium 4.1   4.5         RBC 2.97   3.42         RDW 13.3   13.4         Sample   ARTERIAL   ARTERIAL ARTERIAL     Sodium 136   138          Sp02   99           WBC 8.61   10.13                          11/17/20  1031   11/17/20  1003   11/17/20  0938   11/17/20  0932   11/17/20  0927        Allens Test               Anion Gap               aPTT               Baso #               Basophil %               Site               BUN               Calcium               Chloride               CO2               Creatinine               DelSys               Differential Method               eGFR if                eGFR if non                Eos #               Eosinophil %               Glucose               Gran # (ANC)               Gran %               Hematocrit               Hemoglobin               Immature Grans (Abs)               Immature Granulocytes               Lactate, Chace               Lymph #               Lymph %               Magnesium               MCH               MCHC               MCV               Mode               Mono #               Mono %               MPV               nRBC               Phosphorus               Platelets               POC ACTIVATED CLOTTING TIME K 235 257 279 230       POC BE         -4     POC Glucose         103     POC HCO3         21.3     POC Hematocrit         23     POC Ionized Calcium         1.06     POC PCO2         37.3     POC PH         7.366     POC PO2         193     POC Potassium         4.2     POC SATURATED O2         100     POC Sodium         142     POC TCO2         22     Potassium               RBC               RDW               Sample ARTERIAL ARTERIAL ARTERIAL ARTERIAL ARTERIAL     Sodium               Sp02               WBC                                    Significant Diagnostics:  I have reviewed all pertinent imaging results/findings within the past 24 hours.    Assessment/Plan:     Aortic dissection  Mr. Curry is our 59yo male with an aortic dissection.  Now s/p carotid to subclavian bypass on 11/16/2020 and TEVAR with left common carotid stent  and left subclavian embolization on 11/17/2020.    Neuro:   - Alert and oriented  - CN II-XII grossly intact  - Moving all extremities and able to lift heels off of bed  - PRN pain medications    Cardiac:  - MAP goal > 65  - Systolic < 120  - continue colt  - ASA/Plavix therapy  - Home BP meds: labetalol and nifedipine  - Avoid ACE inhibitors or ARBs  - PRN labetalol for elevated blood pressures    Pulmonary:   - Extubated breathing comfortably  - PRN breathing treatments  - IS    Renal:   - monitor Is and Os  - trend BMP daily  - continue buckley with history of BPH    - BUN/Cr   BUN   Date Value Ref Range Status   11/18/2020 12 6 - 20 mg/dL Final   11/17/2020 11 6 - 20 mg/dL Final     Creatinine   Date Value Ref Range Status   11/18/2020 1.1 0.5 - 1.4 mg/dL Final   11/17/2020 1.0 0.5 - 1.4 mg/dL Final       Infectious Disease:   - WBC trending, trend daily  - Post op ancef    Lab Results   Component Value Date    WBC 8.61 11/18/2020       Hematology/Oncology:  - H/H trending, monitor daily  - No indication for transfusion at this time  - ASA/Plavix therapy for graft    Lab Results   Component Value Date    WBC 8.61 11/18/2020    HGB 8.5 (L) 11/18/2020    HCT 27.0 (L) 11/18/2020    MCV 91 11/18/2020     (L) 11/18/2020       Endocrine:  - insulin gtt vs SSI as needed  - endocrinology following    F:   Fluids/Electrolytes (From admission, onward)    Start     Stop Route Frequency Ordered    11/17/20 0638  potassium chloride 10 mEq in 100 mL IVPB  (IV Electrolyte replacement via PERIPHERAL LINE)      -- IV As needed (PRN) 11/17/20 0538    11/17/20 0638  potassium chloride 10 mEq in 100 mL IVPB  (IV Electrolyte replacement via PERIPHERAL LINE)      -- IV As needed (PRN) 11/17/20 0538    11/17/20 0638  potassium chloride 10 mEq in 100 mL IVPB  (IV Electrolyte replacement via PERIPHERAL LINE)      -- IV As needed (PRN) 11/17/20 0538    11/17/20 0638  magnesium sulfate 2g in water 50mL IVPB (premix)  (IV Electrolyte  replacement via PERIPHERAL LINE)      -- IV As needed (PRN) 11/17/20 0538    11/17/20 0638  magnesium sulfate 2g in water 50mL IVPB (premix)  (IV Electrolyte replacement via PERIPHERAL LINE)      -- IV As needed (PRN) 11/17/20 0538    11/16/20 1700  dextrose 5 % and 0.45 % NaCl with KCl 20 mEq infusion      -- IV Continuous 11/16/20 1623    11/16/20 1652  sodium chloride 0.9% flush 10 mL      -- IV As needed (PRN) 11/16/20 1623         E:   Recent Labs   Lab 11/18/20  0301      K 4.1      CO2 23   BUN 12   CREATININE 1.1   MG 2.1      N: Regular diet    GI:   - replace electrolytes as needed to keep K>4, Mg>2  - bowel reg - daily miralax, docusate  - famotidine for GI prophylaxis    Dispo:  - continue care in the SICU, consider stepdown this evening if continues to look good        Lee Barragan MD  Vascular Surgery  [facility]

## 2020-11-19 VITALS
HEART RATE: 82 BPM | BODY MASS INDEX: 30.87 KG/M2 | DIASTOLIC BLOOD PRESSURE: 66 MMHG | SYSTOLIC BLOOD PRESSURE: 121 MMHG | RESPIRATION RATE: 16 BRPM | TEMPERATURE: 99 F | WEIGHT: 227.94 LBS | OXYGEN SATURATION: 99 % | HEIGHT: 72 IN

## 2020-11-19 LAB
ALBUMIN SERPL BCP-MCNC: 3.1 G/DL (ref 3.5–5.2)
ALP SERPL-CCNC: 51 U/L (ref 55–135)
ALT SERPL W/O P-5'-P-CCNC: 12 U/L (ref 10–44)
ANION GAP SERPL CALC-SCNC: 7 MMOL/L (ref 8–16)
APTT BLDCRRT: 28.1 SEC (ref 21–32)
AST SERPL-CCNC: 11 U/L (ref 10–40)
BASOPHILS # BLD AUTO: 0.03 K/UL (ref 0–0.2)
BASOPHILS NFR BLD: 0.3 % (ref 0–1.9)
BILIRUB SERPL-MCNC: 0.6 MG/DL (ref 0.1–1)
BUN SERPL-MCNC: 11 MG/DL (ref 6–20)
CALCIUM SERPL-MCNC: 8.5 MG/DL (ref 8.7–10.5)
CHLORIDE SERPL-SCNC: 106 MMOL/L (ref 95–110)
CO2 SERPL-SCNC: 23 MMOL/L (ref 23–29)
CREAT SERPL-MCNC: 1 MG/DL (ref 0.5–1.4)
DIFFERENTIAL METHOD: ABNORMAL
EOSINOPHIL # BLD AUTO: 0.8 K/UL (ref 0–0.5)
EOSINOPHIL NFR BLD: 8.8 % (ref 0–8)
ERYTHROCYTE [DISTWIDTH] IN BLOOD BY AUTOMATED COUNT: 13.3 % (ref 11.5–14.5)
EST. GFR  (AFRICAN AMERICAN): >60 ML/MIN/1.73 M^2
EST. GFR  (NON AFRICAN AMERICAN): >60 ML/MIN/1.73 M^2
GLUCOSE SERPL-MCNC: 102 MG/DL (ref 70–110)
HCT VFR BLD AUTO: 24.9 % (ref 40–54)
HGB BLD-MCNC: 8.3 G/DL (ref 14–18)
IMM GRANULOCYTES # BLD AUTO: 0.06 K/UL (ref 0–0.04)
IMM GRANULOCYTES NFR BLD AUTO: 0.7 % (ref 0–0.5)
LYMPHOCYTES # BLD AUTO: 1.5 K/UL (ref 1–4.8)
LYMPHOCYTES NFR BLD: 17.1 % (ref 18–48)
MAGNESIUM SERPL-MCNC: 1.7 MG/DL (ref 1.6–2.6)
MCH RBC QN AUTO: 29 PG (ref 27–31)
MCHC RBC AUTO-ENTMCNC: 33.3 G/DL (ref 32–36)
MCV RBC AUTO: 87 FL (ref 82–98)
MONOCYTES # BLD AUTO: 0.6 K/UL (ref 0.3–1)
MONOCYTES NFR BLD: 6.2 % (ref 4–15)
NEUTROPHILS # BLD AUTO: 6 K/UL (ref 1.8–7.7)
NEUTROPHILS NFR BLD: 66.9 % (ref 38–73)
NRBC BLD-RTO: 0 /100 WBC
PHOSPHATE SERPL-MCNC: 3 MG/DL (ref 2.7–4.5)
PLATELET # BLD AUTO: 121 K/UL (ref 150–350)
PMV BLD AUTO: 10.2 FL (ref 9.2–12.9)
POTASSIUM SERPL-SCNC: 4.2 MMOL/L (ref 3.5–5.1)
PROT SERPL-MCNC: 5.7 G/DL (ref 6–8.4)
RBC # BLD AUTO: 2.86 M/UL (ref 4.6–6.2)
SODIUM SERPL-SCNC: 136 MMOL/L (ref 136–145)
WBC # BLD AUTO: 8.98 K/UL (ref 3.9–12.7)

## 2020-11-19 PROCEDURE — 94761 N-INVAS EAR/PLS OXIMETRY MLT: CPT

## 2020-11-19 PROCEDURE — 84100 ASSAY OF PHOSPHORUS: CPT

## 2020-11-19 PROCEDURE — 63600175 PHARM REV CODE 636 W HCPCS: Performed by: STUDENT IN AN ORGANIZED HEALTH CARE EDUCATION/TRAINING PROGRAM

## 2020-11-19 PROCEDURE — 25000003 PHARM REV CODE 250: Performed by: STUDENT IN AN ORGANIZED HEALTH CARE EDUCATION/TRAINING PROGRAM

## 2020-11-19 PROCEDURE — 80053 COMPREHEN METABOLIC PANEL: CPT

## 2020-11-19 PROCEDURE — 85730 THROMBOPLASTIN TIME PARTIAL: CPT

## 2020-11-19 PROCEDURE — 99291 PR CRITICAL CARE, E/M 30-74 MINUTES: ICD-10-PCS | Mod: ,,, | Performed by: ANESTHESIOLOGY

## 2020-11-19 PROCEDURE — 99291 CRITICAL CARE FIRST HOUR: CPT | Mod: ,,, | Performed by: ANESTHESIOLOGY

## 2020-11-19 PROCEDURE — 83735 ASSAY OF MAGNESIUM: CPT

## 2020-11-19 PROCEDURE — 85025 COMPLETE CBC W/AUTO DIFF WBC: CPT

## 2020-11-19 RX ORDER — LABETALOL 100 MG/1
200 TABLET, FILM COATED ORAL 3 TIMES DAILY
Status: DISCONTINUED | OUTPATIENT
Start: 2020-11-19 | End: 2020-11-19 | Stop reason: HOSPADM

## 2020-11-19 RX ORDER — CLOPIDOGREL BISULFATE 75 MG/1
75 TABLET ORAL DAILY
Qty: 90 TABLET | Refills: 3 | Status: SHIPPED | OUTPATIENT
Start: 2020-11-19 | End: 2020-11-19

## 2020-11-19 RX ORDER — LABETALOL 200 MG/1
200 TABLET, FILM COATED ORAL 3 TIMES DAILY
Qty: 90 TABLET | Refills: 11 | Status: SHIPPED | OUTPATIENT
Start: 2020-11-19 | End: 2021-12-15 | Stop reason: SDUPTHER

## 2020-11-19 RX ORDER — OXYCODONE HYDROCHLORIDE 5 MG/1
5 TABLET ORAL EVERY 6 HOURS PRN
Qty: 16 TABLET | Refills: 0 | Status: SHIPPED | OUTPATIENT
Start: 2020-11-19 | End: 2020-12-29

## 2020-11-19 RX ORDER — NAPROXEN SODIUM 220 MG/1
81 TABLET, FILM COATED ORAL DAILY
Qty: 360 TABLET | Refills: 0 | Status: SHIPPED | OUTPATIENT
Start: 2020-11-19 | End: 2020-11-19

## 2020-11-19 RX ORDER — CLOPIDOGREL BISULFATE 75 MG/1
75 TABLET ORAL DAILY
Qty: 90 TABLET | Refills: 3 | Status: SHIPPED | OUTPATIENT
Start: 2020-11-19 | End: 2021-12-15 | Stop reason: SDUPTHER

## 2020-11-19 RX ORDER — NAPROXEN SODIUM 220 MG/1
81 TABLET, FILM COATED ORAL DAILY
Qty: 360 TABLET | Refills: 0 | Status: SHIPPED | OUTPATIENT
Start: 2020-11-19 | End: 2021-12-15 | Stop reason: SDUPTHER

## 2020-11-19 RX ORDER — LABETALOL 200 MG/1
200 TABLET, FILM COATED ORAL 3 TIMES DAILY
Qty: 90 TABLET | Refills: 11 | Status: SHIPPED | OUTPATIENT
Start: 2020-11-19 | End: 2020-11-23 | Stop reason: SDUPTHER

## 2020-11-19 RX ADMIN — PANTOPRAZOLE SODIUM 40 MG: 40 TABLET, DELAYED RELEASE ORAL at 09:11

## 2020-11-19 RX ADMIN — MAGNESIUM SULFATE IN WATER 2 G: 40 INJECTION, SOLUTION INTRAVENOUS at 05:11

## 2020-11-19 RX ADMIN — CLOPIDOGREL 75 MG: 75 TABLET, FILM COATED ORAL at 09:11

## 2020-11-19 RX ADMIN — ACETAMINOPHEN 650 MG: 325 TABLET ORAL at 12:11

## 2020-11-19 RX ADMIN — ASPIRIN 81 MG: 81 TABLET, CHEWABLE ORAL at 09:11

## 2020-11-19 RX ADMIN — LABETALOL HYDROCHLORIDE 200 MG: 100 TABLET, FILM COATED ORAL at 09:11

## 2020-11-19 RX ADMIN — HEPARIN SODIUM 5000 UNITS: 5000 INJECTION INTRAVENOUS; SUBCUTANEOUS at 05:11

## 2020-11-19 RX ADMIN — NIFEDIPINE 30 MG: 30 TABLET, FILM COATED, EXTENDED RELEASE ORAL at 09:11

## 2020-11-19 RX ADMIN — DOCUSATE SODIUM 200 MG: 50 CAPSULE, LIQUID FILLED ORAL at 09:11

## 2020-11-19 RX ADMIN — TAMSULOSIN HYDROCHLORIDE 0.4 MG: 0.4 CAPSULE ORAL at 09:11

## 2020-11-19 RX ADMIN — POLYETHYLENE GLYCOL 3350 17 G: 17 POWDER, FOR SOLUTION ORAL at 09:11

## 2020-11-19 RX ADMIN — ACETAMINOPHEN 650 MG: 325 TABLET ORAL at 05:11

## 2020-11-19 NOTE — DISCHARGE INSTRUCTIONS
Patient given discharge paperwork and instructions. Plavix and other prescriptions delivered to bedside. All questions answered.

## 2020-11-19 NOTE — PROGRESS NOTES
VASCULAR SURGERY  Afternoon Progress Note    Assessment/Plan:  60 y.o. male with Hx AAA s/p TEVAR 11/17/2020  - Transfer to floor  - Begin plavix  - Continue PT/OT    Subjective:  - Patient doing well  - Tolerating regular diet  - Pain well controlled    Objective:  BP (!) 99/58 (BP Location: Right arm, Patient Position: Lying)   Pulse 77   Temp 98.4 °F (36.9 °C) (Oral)   Resp (!) 24   Ht 6' (1.829 m)   Wt 103.4 kg (227 lb 15.3 oz)   SpO2 99%   BMI 30.92 kg/m²     - Incisions CDI  - AFVSS    Caio Parada MD  PGY - 1  [facility]

## 2020-11-19 NOTE — ASSESSMENT & PLAN NOTE
- S/p left carotid to subclavian bypass on 11/16/2020. S/P TEVAR on 11/17/2020      Neuro/Psych:   -- Sedation: none  -- Pain:Oxycodone PRN  -- Alert, oriented X 3. Moving all extremities with no issues                Cards:   -- HDS  -- SBP goals < 160 mmhg        Pulm:   -- Goal O2 sat > 90%        Renal:  -- Kidney functions has been stable since admission to ICU  -- Strict I/O  -- BUN/Cr F/U         FEN / GI:   -- Replace lytes as needed  -- Nutrition: NPO        ID:   -- Tm: afebrile; WBC   -- Abx Cefazolin course completed         Heme/Onc:   -- H/H stable   -- Daily CBC        Endo:   -- Gluc goal 140-180        PPx:   Feeding: Regular diet   Thromboembolic prevention: heparin   HOB >30:  Stress Ulcer ppx: none  Glucose control: Critical care goal 140-180 g/dl, ISS       Dispo/Code Status/Palliative:   -- SICU / Full Code  -- Discharge today

## 2020-11-19 NOTE — PLAN OF CARE
SICU PLAN OF CARE NOTE    Dx: Type B Aortic Dissection     Vital Signs: BP (!) 111/59 (BP Location: Right arm, Patient Position: Lying)   Pulse 75   Temp 97.9 °F (36.6 °C) (Oral)   Resp 16   Ht 6' (1.829 m)   Wt 103.4 kg (227 lb 15.3 oz)   SpO2 98%   BMI 30.92 kg/m²     Goals of Care: MAP     Neuro: AAO x4, Follows Commands, and Moves All Extremities; PERRLA    Respiratory: Room Air     Cardiac: NSR    Diet: Regular Diet; tolerating well    Urine Output: Urinary Catheter 1560 cc/shift      SKIN NOTE:  Skin: No skin breakdown noted. Incisional dressing CDI.     Skin precautions maintained including:  Sacrum and heels with foam dressing in place for pressure protection. Frequent weight shift encouraged; Patient turned Q2 hr to prevent breakdown. Bed plugged in and mattress inflated. Adhesive use limited. Heels elevated off bed. Pressure points protected and positioning supports utilized.  Skin-to-device areas padded. Skin-to-skin areas padded    SHIFT EVENTS:  No significant events during shift. VSS. Pt. Pulses remain palpable; doppler used to confirm LUE pulse. Plans for pt to stepdown today. POC reviewed with pt. All questions and concerns answered.

## 2020-11-19 NOTE — PLAN OF CARE
No SW needs identified at this time.        11/19/20 1255   Post-Acute Status   Post-Acute Authorization Other   Other Status No Post-Acute Service Needs   Discharge Plan   Discharge Plan A Home     Aura Vines LMSW   - Case Management

## 2020-11-19 NOTE — SUBJECTIVE & OBJECTIVE
Interval History/Significant Events: No acute events overnight. Pt is being discharged today.     Follow-up For: Procedure(s) (LRB):  REPAIR, ANEURYSM, ENDOVASCULAR GRAFT, AORTA, THORACIC Left brachial cutdown (N/A)  STENT, RENAL (Right)  PLACEMENT-STENT Right common carotid artery  (N/A)  IVUS (INTRAVASCULAR ULTRASOUND) (N/A)    Post-Operative Day: 2 Days Post-Op    Objective:     Vital Signs (Most Recent):  Temp: 98.9 °F (37.2 °C) (11/19/20 0700)  Pulse: 82 (11/19/20 0713)  Resp: 16 (11/19/20 0713)  BP: 121/66 (11/19/20 0700)  SpO2: 99 % (11/19/20 0713) Vital Signs (24h Range):  Temp:  [97.9 °F (36.6 °C)-98.9 °F (37.2 °C)] 98.9 °F (37.2 °C)  Pulse:  [66-87] 82  Resp:  [12-28] 16  SpO2:  [97 %-100 %] 99 %  BP: ()/(52-70) 121/66  Arterial Line BP: (110-140)/(46-80) 140/66     Weight: 103.4 kg (227 lb 15.3 oz)  Body mass index is 30.92 kg/m².      Intake/Output Summary (Last 24 hours) at 11/19/2020 1348  Last data filed at 11/19/2020 0700  Gross per 24 hour   Intake 410.2 ml   Output 2240 ml   Net -1829.8 ml       Physical Exam  Constitutional:       Appearance: Normal appearance.   HENT:      Head: Normocephalic.   Eyes:      Extraocular Movements: Extraocular movements intact.   Neck:      Musculoskeletal: Normal range of motion.      Comments: Incision on left neck with dressing in place that is clean and dry  AMRITA drain with serosanguinous ouput  Cardiovascular:      Rate and Rhythm: Normal rate and regular rhythm.   Pulmonary:      Effort: Pulmonary effort is normal. No respiratory distress.   Abdominal:      General: There is no distension.      Palpations: Abdomen is soft.   Musculoskeletal: Normal range of motion.   Skin:     General: Skin is warm and dry.      Comments: Groin sites c/d/i with soft skin overtop   Neurological:      General: No focal deficit present.      Mental Status: He is alert and oriented to person, place, and time.   Psychiatric:         Mood and Affect: Mood normal.          Behavior: Behavior normal.         Vents:       Lines/Drains/Airways     Drain                 Urethral Catheter 11/16/20 1700 16 Fr. 2 days          Peripheral Intravenous Line                 Peripheral IV - Single Lumen 11/16/20 1145 20 G Anterior;Right Wrist 3 days         Peripheral IV - Single Lumen 11/16/20 1234 18 G Right Forearm 3 days         Peripheral IV - Single Lumen 11/17/20 1200 16 G Right Antecubital 2 days                Significant Labs:    CBC/Anemia Profile:  Recent Labs   Lab 11/18/20  0301 11/19/20  0344   WBC 8.61 8.98   HGB 8.5* 8.3*   HCT 27.0* 24.9*   * 121*   MCV 91 87   RDW 13.3 13.3        Chemistries:  Recent Labs   Lab 11/18/20  0301 11/18/20  1112 11/19/20  0344    135* 136   K 4.1 4.0 4.2    109 106   CO2 23 22* 23   BUN 12 11 11   CREATININE 1.1 1.0 1.0   CALCIUM 7.9* 7.6* 8.5*   ALBUMIN  --  3.0* 3.1*   PROT  --  5.2* 5.7*   BILITOT  --  0.4 0.6   ALKPHOS  --  44* 51*   ALT  --  15 12   AST  --  10 11   MG 2.1  --  1.7   PHOS 2.6* 2.7 3.0       All pertinent labs within the past 24 hours have been reviewed.    Significant Imaging:  I have reviewed and interpreted all pertinent imaging results/findings within the past 24 hours.

## 2020-11-19 NOTE — DISCHARGE SUMMARY
[facility]  General Surgery  Discharge Summary      Patient Name: Luis Eduardo Curry  MRN: [id number]  Admission Date: 11/16/2020  Hospital Length of Stay: 3 days  Discharge Date and Time:  11/19/2020   Attending Physician: JOSY Santos II, MD   Discharging Provider: Lee Barragan MD  Primary Care Provider: Kiko Connor MD     HPI: Luis Eduardo Curry is a 60 y.o. male who is here today for follow up appointment. He initially presented to the hospital on 10/4/2020 with chest pain and severe CHRISTINE and was found to have a descending aortic dissection and post-obstructive nephropathy due to BPH. He was managed medically with anti-impulse control and buckley and discharged about a week after admission. He was recently admitted with hypotension and CHRISTINE a week ago. He did not report any chest/back pain and hemoglobin was stable. His hypotension was likely due to excess antihypertensive medications as his aorta remodeled. His dosages were lowered and he was discharged home a few days after admission. He follows up in clinic with repeat CTA chest/abd/pelvis that shows no significant change in his dissection. He has occasional dizziness and he feels constantly fatigued. He believes this is secondary to his medications and I agree.     Social History: Does not smoke, not drinking, no drugs     Family history: No fam hx of aneurysm/dissection    Procedure(s) (LRB):  REPAIR, ANEURYSM, ENDOVASCULAR GRAFT, AORTA, THORACIC Left brachial cutdown (N/A)  STENT, RENAL (Right)  PLACEMENT-STENT Right common carotid artery  (N/A)  IVUS (INTRAVASCULAR ULTRASOUND) (N/A)     Hospital Course: Patient went to the OR on 11/16 for a carotid to subclavian bypass and then subsequently taken back on 11/17 for TEVAR, left common carotid artery snorkel, and embolization of the left subclavian secondary to an aortic dissection.  The patient tolerated the procedure well and was transferred to the SICU in stable condition.  Their  post op course was uncomplicated.  The patients pain was controlled with PO medications.  Ambulating without issue.  Adequate UOP with buckley still in place because of BPH. Passing gas and stool.  Tolerating diet without nausea or vomiting.  Incision site is clean, dry, and intact.  Discharged on 11/19 in good condition.    Physical Exam  Constitutional:       Appearance: Normal appearance.   HENT:      Head: Atraumatic.   Eyes:      Extraocular Movements: Extraocular movements intact.   Neck:      Musculoskeletal: Normal range of motion.   Cardiovascular:      Rate and Rhythm: Normal rate and regular rhythm.      Pulses: Normal pulses.      Comments: Palpable DP pulses bilaterally  Pulmonary:      Effort: Pulmonary effort is normal. No respiratory distress.   Abdominal:      General: There is no distension.      Palpations: Abdomen is soft.   Musculoskeletal: Normal range of motion.   Skin:     General: Skin is warm and dry.      Comments: Incision and puncture sites c/d/i.  No hematoma.  Skin soft around sites.   Neurological:      General: No focal deficit present.      Mental Status: He is alert and oriented to person, place, and time.   Psychiatric:         Mood and Affect: Mood normal.         Behavior: Behavior normal.           Consults:     Significant Diagnostic Studies: Labs:   BMP:   Recent Labs   Lab 11/18/20  0301 11/18/20  1112 11/19/20  0344   * 117* 102    135* 136   K 4.1 4.0 4.2    109 106   CO2 23 22* 23   BUN 12 11 11   CREATININE 1.1 1.0 1.0   CALCIUM 7.9* 7.6* 8.5*   MG 2.1  --  1.7   , CMP   Recent Labs   Lab 11/18/20  0301 11/18/20  1112 11/19/20  0344    135* 136   K 4.1 4.0 4.2    109 106   CO2 23 22* 23   * 117* 102   BUN 12 11 11   CREATININE 1.1 1.0 1.0   CALCIUM 7.9* 7.6* 8.5*   PROT  --  5.2* 5.7*   ALBUMIN  --  3.0* 3.1*   BILITOT  --  0.4 0.6   ALKPHOS  --  44* 51*   AST  --  10 11   ALT  --  15 12   ANIONGAP 6* 4* 7*   ESTGFRAFRICA >60.0 >60.0  >60.0   EGFRNONAA >60.0 >60.0 >60.0    and CBC   Recent Labs   Lab 11/17/20  1210 11/18/20  0301 11/19/20  0344   WBC 10.13 8.61 8.98   HGB 9.9* 8.5* 8.3*   HCT 30.6* 27.0* 24.9*    148* 121*       Pending Diagnostic Studies:     None        Final Active Diagnoses:    Diagnosis Date Noted POA    PRINCIPAL PROBLEM:  Aortic dissection [I71.00] 11/16/2020 Yes      Problems Resolved During this Admission:      Discharged Condition: good    Disposition: Home or Self Care    Follow Up:  Follow-up Information     JOSY Santos II, MD In 1 month.    Specialty: Vascular Surgery  Why: For wound re-check, post op follow up.  Contact information:  [address] [place] [address] [phone]                 Patient Instructions:      CTA Chest Abdomen Pelvis   Standing Status: Future Standing Exp. Date: 11/19/21     Order Specific Question Answer Comments   Is the patient allergic to iodine or contrast? No    Is the patient on ANY Metformin drug such as Glucophage/Glucovance?           Should be off drug 48 hours after contrast. Check renal function before restart. No    History of Kidney Disease - including: decreased kidney function, dialysis, kidney transplay, single kidney, kidney cancer, kidney surgery? None    Diabetes? No    May the Radiologist modify the order per protocol to meet the clinical needs of the patient? Yes      Diet Adult Regular     Lifting restrictions   Order Comments: Don't lift more than 10lbs for a week.     No driving until:   Order Comments: No driving while using narcotics.     Notify your health care provider if you experience any of the following:  temperature >100.4     Notify your health care provider if you experience any of the following:  severe uncontrolled pain     Notify your health care provider if you experience any of the following:  redness, tenderness, or signs of infection (pain, swelling, redness, odor or green/yellow discharge around incision site)      Notify your health care provider if you experience any of the following:  difficulty breathing or increased cough     Notify your health care provider if you experience any of the following:  persistent dizziness, light-headedness, or visual disturbances     Notify your health care provider if you experience any of the following:  increased confusion or weakness     Change dressing (specify)   Order Comments: Place dry gauze dressing to incisions daily.     Activity as tolerated     Medications:  Reconciled Home Medications:      Medication List      START taking these medications    aspirin 81 MG Chew  Chew and swallow 1 tablet (81 mg total) by mouth once daily.     clopidogreL 75 mg tablet  Commonly known as: PLAVIX  Take 1 tablet (75 mg total) by mouth once daily.     oxyCODONE 5 MG immediate release tablet  Commonly known as: ROXICODONE  Take 1 tablet (5 mg total) by mouth every 6 (six) hours as needed.        CHANGE how you take these medications    labetaloL 200 MG tablet  Commonly known as: NORMODYNE  Take 1 tablet (200 mg total) by mouth 3 (three) times daily.  What changed: when to take this        CONTINUE taking these medications    NIFEdipine 30 MG (OSM) 24 hr tablet  Commonly known as: PROCARDIA-XL  Take 1 tablet (30 mg total) by mouth once daily. Only resume if Sb/p > 130     omeprazole 40 MG capsule  Commonly known as: PRILOSEC  Take 1 capsule (40 mg total) by mouth once daily.     tamsulosin 0.4 mg Cap  Commonly known as: FLOMAX  Take 1 capsule (0.4 mg total) by mouth once daily.            Lee Barragan MD  General Surgery  [facility]

## 2020-11-19 NOTE — PROGRESS NOTES
[facility]  Critical Care - Surgery  Progress Note    Patient Name: Luis Eduardo Curry  MRN: [id number]  Admission Date: 11/16/2020  Hospital Length of Stay: 3 days  Code Status: Full Code  Attending Provider: No att. providers found  Primary Care Provider: Kiko Connor MD   Principal Problem: Aortic dissection    Subjective:     Hospital/ICU Course:  - 11/16 /2020 s/p left carotid to subclavian bypass on 11/16/2020. Going for TEVAR on 11/17  - 11/18/2020: S/P TEVAR. Pt is hemodynamically stable, started on clear diet. A line removed.   - 11/18/2020 Pt is ready for discharge today.       Interval History/Significant Events: No acute events overnight. Pt is being discharged today.     Follow-up For: Procedure(s) (LRB):  REPAIR, ANEURYSM, ENDOVASCULAR GRAFT, AORTA, THORACIC Left brachial cutdown (N/A)  STENT, RENAL (Right)  PLACEMENT-STENT Right common carotid artery  (N/A)  IVUS (INTRAVASCULAR ULTRASOUND) (N/A)    Post-Operative Day: 2 Days Post-Op    Objective:     Vital Signs (Most Recent):  Temp: 98.9 °F (37.2 °C) (11/19/20 0700)  Pulse: 82 (11/19/20 0713)  Resp: 16 (11/19/20 0713)  BP: 121/66 (11/19/20 0700)  SpO2: 99 % (11/19/20 0713) Vital Signs (24h Range):  Temp:  [97.9 °F (36.6 °C)-98.9 °F (37.2 °C)] 98.9 °F (37.2 °C)  Pulse:  [66-87] 82  Resp:  [12-28] 16  SpO2:  [97 %-100 %] 99 %  BP: ()/(52-70) 121/66  Arterial Line BP: (110-140)/(46-80) 140/66     Weight: 103.4 kg (227 lb 15.3 oz)  Body mass index is 30.92 kg/m².      Intake/Output Summary (Last 24 hours) at 11/19/2020 1348  Last data filed at 11/19/2020 0700  Gross per 24 hour   Intake 410.2 ml   Output 2240 ml   Net -1829.8 ml       Physical Exam  Constitutional:       Appearance: Normal appearance.   HENT:      Head: Normocephalic.   Eyes:      Extraocular Movements: Extraocular movements intact.   Neck:      Musculoskeletal: Normal range of motion.      Comments: Incision on left neck with dressing in place that is clean and  dry  AMRITA drain with serosanguinous ouput  Cardiovascular:      Rate and Rhythm: Normal rate and regular rhythm.   Pulmonary:      Effort: Pulmonary effort is normal. No respiratory distress.   Abdominal:      General: There is no distension.      Palpations: Abdomen is soft.   Musculoskeletal: Normal range of motion.   Skin:     General: Skin is warm and dry.      Comments: Groin sites c/d/i with soft skin overtop   Neurological:      General: No focal deficit present.      Mental Status: He is alert and oriented to person, place, and time.   Psychiatric:         Mood and Affect: Mood normal.         Behavior: Behavior normal.         Vents:       Lines/Drains/Airways     Drain                 Urethral Catheter 11/16/20 1700 16 Fr. 2 days          Peripheral Intravenous Line                 Peripheral IV - Single Lumen 11/16/20 1145 20 G Anterior;Right Wrist 3 days         Peripheral IV - Single Lumen 11/16/20 1234 18 G Right Forearm 3 days         Peripheral IV - Single Lumen 11/17/20 1200 16 G Right Antecubital 2 days                Significant Labs:    CBC/Anemia Profile:  Recent Labs   Lab 11/18/20  0301 11/19/20  0344   WBC 8.61 8.98   HGB 8.5* 8.3*   HCT 27.0* 24.9*   * 121*   MCV 91 87   RDW 13.3 13.3        Chemistries:  Recent Labs   Lab 11/18/20  0301 11/18/20  1112 11/19/20  0344    135* 136   K 4.1 4.0 4.2    109 106   CO2 23 22* 23   BUN 12 11 11   CREATININE 1.1 1.0 1.0   CALCIUM 7.9* 7.6* 8.5*   ALBUMIN  --  3.0* 3.1*   PROT  --  5.2* 5.7*   BILITOT  --  0.4 0.6   ALKPHOS  --  44* 51*   ALT  --  15 12   AST  --  10 11   MG 2.1  --  1.7   PHOS 2.6* 2.7 3.0       All pertinent labs within the past 24 hours have been reviewed.    Significant Imaging:  I have reviewed and interpreted all pertinent imaging results/findings within the past 24 hours.    Assessment/Plan:     * Aortic dissection  - S/p left carotid to subclavian bypass on 11/16/2020. S/P TEVAR on 11/17/2020      Neuro/Psych:    -- Sedation: none  -- Pain:Oxycodone PRN  -- Alert, oriented X 3. Moving all extremities with no issues                Cards:   -- HDS  -- SBP goals < 160 mmhg        Pulm:   -- Goal O2 sat > 90%        Renal:  -- Kidney functions has been stable since admission to ICU  -- Strict I/O  -- BUN/Cr F/U         FEN / GI:   -- Replace lytes as needed  -- Nutrition: NPO        ID:   -- Tm: afebrile; WBC   -- Abx Cefazolin course completed         Heme/Onc:   -- H/H stable   -- Daily CBC        Endo:   -- Gluc goal 140-180        PPx:   Feeding: Regular diet   Thromboembolic prevention: heparin   HOB >30:  Stress Ulcer ppx: none  Glucose control: Critical care goal 140-180 g/dl, ISS       Dispo/Code Status/Palliative:   -- SICU / Full Code  -- Discharge today            Critical Care Daily Checklist:    A: Awake: RASS Goal/Actual Goal: RASS Goal: 0-->alert and calm  Actual: Duron Agitation Sedation Scale (RASS): Alert and calm   B: Spontaneous Breathing Trial Performed?     C: SAT & SBT Coordinated?  NA                 D: Delirium: CAM-ICU Overall CAM-ICU: Negative   E: Early Mobility Performed? Yes   F: Feeding Goal:    Status:     Current Diet Order   Procedures    Diet Adult Regular (IDDSI Level 7)    Diet Adult Regular      AS: Analgesia/Sedation NA   T: Thromboembolic Prophylaxis Yes   H: HOB > 300 Yes   U: Stress Ulcer Prophylaxis (if needed) Yes   G: Glucose Control NA   B: Bowel Function     I: Indwelling Catheter (Lines & Pulliam) Necessity NA   D: De-escalation of Antimicrobials/Pharmacotherapies NA    Plan for the day/ETD NA    Code Status:  Family/Goals of Care: Full Code  NA        Critical care was time spent personally by me on the following activities: development of treatment plan with patient or surrogate and bedside caregivers, discussions with consultants, evaluation of patient's response to treatment, examination of patient, ordering and performing treatments and interventions, ordering  and review of laboratory studies, ordering and review of radiographic studies, pulse oximetry, re-evaluation of patient's condition.  This critical care time did not overlap with that of any other provider or involve time for any procedures.     Alexy Parker MD  Critical Care - Surgery  [facility]

## 2020-11-20 ENCOUNTER — PATIENT OUTREACH (OUTPATIENT)
Dept: ADMINISTRATIVE | Facility: CLINIC | Age: 60
End: 2020-11-20

## 2020-11-20 LAB
BLD PROD TYP BPU: NORMAL
BLD PROD TYP BPU: NORMAL
BLOOD UNIT EXPIRATION DATE: NORMAL
BLOOD UNIT EXPIRATION DATE: NORMAL
BLOOD UNIT TYPE CODE: 5100
BLOOD UNIT TYPE CODE: 5100
BLOOD UNIT TYPE: NORMAL
BLOOD UNIT TYPE: NORMAL
CODING SYSTEM: NORMAL
CODING SYSTEM: NORMAL
DISPENSE STATUS: NORMAL
DISPENSE STATUS: NORMAL
TRANS ERYTHROCYTES VOL PATIENT: NORMAL ML
TRANS ERYTHROCYTES VOL PATIENT: NORMAL ML

## 2020-11-20 NOTE — PATIENT INSTRUCTIONS
After Endovascular Abdominal Aortic Aneurysm Repair  You have had a procedure to repair an abdominal aortic aneurysm (AAA), which happened when a weakened part of a blood vessel in your abdominal area expanded like a balloon. During an endovascular repair, your healthcare provider created two small incisions near your groin. A thin, flexible tube (catheter) was threaded into the artery at the incision. A graft was placed inside the catheter and guided toward the damaged part of your aorta to prevent more problems.  Home care  Recommendations for taking care of yourself at home include:   · Avoid strenuous activity for 7 to 10 days after your surgery.  · Ask your healthcare provider when you can expect to return to work.  · Gradually increase your activity. It may take some time for you to return to your normal activities.  · Dont drive for 2 weeks after surgery, especially if you are still taking opioid pain medicines. Ask someone to take you to any appointments.  · Check your incision every day for signs of infection. These include swelling, redness, drainage, or warmth.  · Keep your incision clean. Wash it gently with soap and water while you shower.  · Dont swim or use a hot tub until your healthcare provider says it is OK.  · Dont lift anything heavier than 5 pounds for 4 weeks after surgery.  · Avoid sitting or standing for long periods without moving your legs and feet.  · Keep your feet up when you sit in a chair.  · Take your medicines exactly as directed. Dont skip doses.  When to call your healthcare provider  Call your healthcare provider right away if you have any of the following:  · Redness, pain, swelling, or drainage from your incision  · Fever of 100.4°F (38°C) or higher, or as directed by your healthcare provider  · Sudden coldness, pain, or paleness in your leg  · Loss of feeling in your legs  · Severe or sudden stomach pain  · Nausea or vomiting  · Trouble breathing  · Pain or heaviness in  your chest or arms  · Any unusual bleeding  · Unable to urinate  · Bloody bowel movements or bloody diarrhea  Follow-up  · Make a follow-up appointment to have your incisions checked and staples removed within 7 to 10 days.  · Make follow-up appointments as directed.  Date Last Reviewed: 5/1/2016  © 0413-8177 [employer]. [address] [place] [address], [place] [address]. All rights reserved. This information is not intended as a substitute for professional medical care. Always follow your healthcare professional's instructions.

## 2020-11-23 ENCOUNTER — OFFICE VISIT (OUTPATIENT)
Dept: INTERNAL MEDICINE | Facility: CLINIC | Age: 60
End: 2020-11-23
Payer: COMMERCIAL

## 2020-11-23 ENCOUNTER — TELEPHONE (OUTPATIENT)
Dept: INTERNAL MEDICINE | Facility: CLINIC | Age: 60
End: 2020-11-23

## 2020-11-23 ENCOUNTER — TELEPHONE (OUTPATIENT)
Dept: VASCULAR SURGERY | Facility: CLINIC | Age: 60
End: 2020-11-23

## 2020-11-23 VITALS
DIASTOLIC BLOOD PRESSURE: 56 MMHG | BODY MASS INDEX: 27.74 KG/M2 | SYSTOLIC BLOOD PRESSURE: 110 MMHG | HEART RATE: 77 BPM | OXYGEN SATURATION: 96 % | HEIGHT: 72 IN | RESPIRATION RATE: 16 BRPM | WEIGHT: 204.81 LBS

## 2020-11-23 DIAGNOSIS — N30.01 ACUTE CYSTITIS WITH HEMATURIA: Primary | ICD-10-CM

## 2020-11-23 DIAGNOSIS — D62 ACUTE POST-HEMORRHAGIC ANEMIA: ICD-10-CM

## 2020-11-23 PROCEDURE — 99999 PR PBB SHADOW E&M-EST. PATIENT-LVL IV: ICD-10-PCS | Mod: PBBFAC,,, | Performed by: INTERNAL MEDICINE

## 2020-11-23 PROCEDURE — 3008F BODY MASS INDEX DOCD: CPT | Mod: CPTII,S$GLB,, | Performed by: INTERNAL MEDICINE

## 2020-11-23 PROCEDURE — 99213 OFFICE O/P EST LOW 20 MIN: CPT | Mod: S$GLB,,, | Performed by: INTERNAL MEDICINE

## 2020-11-23 PROCEDURE — 99213 PR OFFICE/OUTPT VISIT, EST, LEVL III, 20-29 MIN: ICD-10-PCS | Mod: S$GLB,,, | Performed by: INTERNAL MEDICINE

## 2020-11-23 PROCEDURE — 1125F AMNT PAIN NOTED PAIN PRSNT: CPT | Mod: S$GLB,,, | Performed by: INTERNAL MEDICINE

## 2020-11-23 PROCEDURE — 3008F PR BODY MASS INDEX (BMI) DOCUMENTED: ICD-10-PCS | Mod: CPTII,S$GLB,, | Performed by: INTERNAL MEDICINE

## 2020-11-23 PROCEDURE — 99999 PR PBB SHADOW E&M-EST. PATIENT-LVL IV: CPT | Mod: PBBFAC,,, | Performed by: INTERNAL MEDICINE

## 2020-11-23 PROCEDURE — 1125F PR PAIN SEVERITY QUANTIFIED, PAIN PRESENT: ICD-10-PCS | Mod: S$GLB,,, | Performed by: INTERNAL MEDICINE

## 2020-11-23 RX ORDER — FERROUS SULFATE 325(65) MG
325 TABLET ORAL DAILY
Qty: 30 TABLET | Refills: 1 | Status: SHIPPED | OUTPATIENT
Start: 2020-11-23 | End: 2020-12-23

## 2020-11-23 RX ORDER — NITROFURANTOIN (MACROCRYSTALS) 100 MG/1
100 CAPSULE ORAL NIGHTLY
Qty: 10 CAPSULE | Refills: 0 | Status: SHIPPED | OUTPATIENT
Start: 2020-11-23 | End: 2020-12-10

## 2020-11-23 NOTE — TELEPHONE ENCOUNTER
----- Message from Verena Macario sent at 2020  1:25 PM CST -----  Contact: Katherine/Wife  Luis Eduardo Curry  MRN: [id number]  : 1960  PCP: Kiko Connor  Home Phone      [phone]  Work Phone      Not on file.  Mobile          [phone]    MESSAGE:     States she spoke to nurse earlier today and would like to speak back to her concerning what they spoke of this morning.  Please call to discuss.      Phone: [phone]

## 2020-11-23 NOTE — TELEPHONE ENCOUNTER
----- Message from Tracey Kenyon sent at 2020  9:07 AM CST -----  Regarding: Request to speak to a nurse  Contact: Katherine (spouse)  Luis Eduardo Curry  MRN: [id number]  : 1960  PCP: Kiko Connor  Home Phone      [phone]  Work Phone      Not on file.  Mobile          [phone]      MESSAGE:    Request to speak to a nurse regarding symptoms c/o UTI, cathter administered post surgery, weak, with no fever noted. She would like a same day appointment.     Phone # [phone]    Pharmacy - [facility], [place], [place] - [address]

## 2020-11-23 NOTE — PROGRESS NOTES
Subjective:       Patient ID: Luis Eduardo Curry is a 60 y.o. male.    Chief Complaint: Hospital Follow Up    Luis Eduardo Curry is a 60 y.o. male  Here with buckley s cathter .   Recent surgery for aneurysm .  10/4/20 was placed  buckley's catheter .  C/o weakness .    Lab Results      Component                Value               Date                      WBC                      8.98                11/19/2020                HGB                      8.3 (L)             11/19/2020                HCT                      24.9 (L)            11/19/2020                MCV                      87                  11/19/2020                PLT                      121 (L)             11/19/2020                    Review of Systems   Constitutional: Negative for chills and fever.   HENT: Negative for congestion, postnasal drip and sore throat.    Eyes: Negative for photophobia.   Respiratory: Negative for chest tightness and shortness of breath.    Cardiovascular: Negative for chest pain.   Gastrointestinal: Negative for abdominal distention, abdominal pain, blood in stool and vomiting.   Genitourinary: Negative for dysuria, flank pain and hematuria.   Musculoskeletal: Negative for back pain.   Skin: Negative for pallor.   Neurological: Positive for weakness. Negative for dizziness, seizures, facial asymmetry, speech difficulty and numbness.   Hematological: Does not bruise/bleed easily.   Psychiatric/Behavioral: Negative for agitation and suicidal ideas. The patient is not nervous/anxious.        Objective:      Physical Exam  Vitals signs and nursing note reviewed.   Constitutional:       Appearance: He is well-developed.   HENT:      Head: Normocephalic and atraumatic.   Neck:      Vascular: No JVD.   Cardiovascular:      Rate and Rhythm: Normal rate and regular rhythm.      Heart sounds: Normal heart sounds.   Pulmonary:      Effort: Pulmonary effort is normal.      Breath sounds: Normal breath sounds.   Abdominal:       General: Bowel sounds are normal.      Palpations: Abdomen is soft.      Tenderness: There is no abdominal tenderness.   Skin:     General: Skin is warm and dry.   Neurological:      Mental Status: He is alert and oriented to person, place, and time.   Psychiatric:         Behavior: Behavior normal.         Thought Content: Thought content normal.         Judgment: Judgment normal.         Assessment:       1. Acute cystitis with hematuria    2. Acute post-hemorrhagic anemia        Plan:   Luis Eduardo was seen today for hospital follow up.    Diagnoses and all orders for this visit:    Acute cystitis with hematuria  -     nitrofurantoin (MACRODANTIN) 100 MG capsule; Take 1 capsule (100 mg total) by mouth nightly.    Acute post-hemorrhagic anemia  -     ferrous sulfate (FEOSOL) 325 mg (65 mg iron) Tab tablet; Take 1 tablet (325 mg total) by mouth once daily.  -     CBC Auto Differential; Future      Problem List Items Addressed This Visit     None

## 2020-11-23 NOTE — TELEPHONE ENCOUNTER
Spoke with Katherine and she states that the pt had his aorta repaired and bypass and that his urine is now darker than usual. States she thinks the pt might be getting an infection. Spoke with BOO Mckenna concerning it and the nurse stated the pt needs to contact his surgeon to try to get in with them first since he did have such a big surgery and that if they can't get in with the surgeon then we will see them. Informed the pt's wife Katherine of this. Katherine verbalized understanding and will contact the pt's surgeon. Will call back if they cannot get him in.

## 2020-11-23 NOTE — TELEPHONE ENCOUNTER
Received call from patient's wife stating patient recently had surgery with Dr. Santos and was sent home with a Pulliam catheter. States patient has been very weak and that his urine is aleshia colored and cloudy. States she is concerned that he has a UTI, so she contacted his PCP. PCP stated he agrees that patient probably has a UTI but that he should discuss with Dr. Santos as this could be related to the surgery. Notified patient that nurse would discuss with Dr. Santos's nurse Mackenzie. Mackenzie notified.

## 2020-11-24 ENCOUNTER — PATIENT OUTREACH (OUTPATIENT)
Dept: ADMINISTRATIVE | Facility: OTHER | Age: 60
End: 2020-11-24

## 2020-11-25 NOTE — PROGRESS NOTES
LINKS immunization registry not responding  Care Everywhere updated  Health Maintenance updated  Chart reviewed for overdue Proactive [facility] health maintenance testing (CRS, Breast Ca, Diabetic Eye Exam)   Orders entered:N/A

## 2020-11-30 ENCOUNTER — OFFICE VISIT (OUTPATIENT)
Dept: UROLOGY | Facility: CLINIC | Age: 60
End: 2020-11-30
Payer: COMMERCIAL

## 2020-11-30 VITALS
DIASTOLIC BLOOD PRESSURE: 67 MMHG | BODY MASS INDEX: 27.74 KG/M2 | HEIGHT: 72 IN | WEIGHT: 204.81 LBS | SYSTOLIC BLOOD PRESSURE: 112 MMHG | HEART RATE: 74 BPM

## 2020-11-30 DIAGNOSIS — N13.30 HYDRONEPHROSIS OF RIGHT KIDNEY: ICD-10-CM

## 2020-11-30 DIAGNOSIS — R33.9 URINARY RETENTION: Primary | ICD-10-CM

## 2020-11-30 DIAGNOSIS — R97.20 ELEVATED PSA: ICD-10-CM

## 2020-11-30 PROCEDURE — 3008F PR BODY MASS INDEX (BMI) DOCUMENTED: ICD-10-PCS | Mod: CPTII,S$GLB,, | Performed by: UROLOGY

## 2020-11-30 PROCEDURE — 3008F BODY MASS INDEX DOCD: CPT | Mod: CPTII,S$GLB,, | Performed by: UROLOGY

## 2020-11-30 PROCEDURE — 1125F AMNT PAIN NOTED PAIN PRSNT: CPT | Mod: S$GLB,,, | Performed by: UROLOGY

## 2020-11-30 PROCEDURE — 99999 PR PBB SHADOW E&M-EST. PATIENT-LVL III: CPT | Mod: PBBFAC,,, | Performed by: UROLOGY

## 2020-11-30 PROCEDURE — 99214 OFFICE O/P EST MOD 30 MIN: CPT | Mod: S$GLB,,, | Performed by: UROLOGY

## 2020-11-30 PROCEDURE — 99214 PR OFFICE/OUTPT VISIT, EST, LEVL IV, 30-39 MIN: ICD-10-PCS | Mod: S$GLB,,, | Performed by: UROLOGY

## 2020-11-30 PROCEDURE — 1125F PR PAIN SEVERITY QUANTIFIED, PAIN PRESENT: ICD-10-PCS | Mod: S$GLB,,, | Performed by: UROLOGY

## 2020-11-30 PROCEDURE — 99999 PR PBB SHADOW E&M-EST. PATIENT-LVL III: ICD-10-PCS | Mod: PBBFAC,,, | Performed by: UROLOGY

## 2020-12-10 ENCOUNTER — TELEPHONE (OUTPATIENT)
Dept: UROLOGY | Facility: CLINIC | Age: 60
End: 2020-12-10

## 2020-12-10 ENCOUNTER — OFFICE VISIT (OUTPATIENT)
Dept: INTERNAL MEDICINE | Facility: CLINIC | Age: 60
End: 2020-12-10
Payer: COMMERCIAL

## 2020-12-10 ENCOUNTER — PATIENT MESSAGE (OUTPATIENT)
Dept: INTERNAL MEDICINE | Facility: CLINIC | Age: 60
End: 2020-12-10

## 2020-12-10 VITALS
TEMPERATURE: 98 F | DIASTOLIC BLOOD PRESSURE: 70 MMHG | SYSTOLIC BLOOD PRESSURE: 112 MMHG | BODY MASS INDEX: 27.68 KG/M2 | HEART RATE: 85 BPM | OXYGEN SATURATION: 94 % | WEIGHT: 204.38 LBS | RESPIRATION RATE: 16 BRPM | HEIGHT: 72 IN

## 2020-12-10 DIAGNOSIS — N13.8 BPH WITH URINARY OBSTRUCTION: Primary | ICD-10-CM

## 2020-12-10 DIAGNOSIS — R39.9 UTI SYMPTOMS: Primary | ICD-10-CM

## 2020-12-10 DIAGNOSIS — R33.9 URINARY RETENTION: ICD-10-CM

## 2020-12-10 DIAGNOSIS — N40.1 BPH WITH URINARY OBSTRUCTION: Primary | ICD-10-CM

## 2020-12-10 PROCEDURE — 99214 PR OFFICE/OUTPT VISIT, EST, LEVL IV, 30-39 MIN: ICD-10-PCS | Mod: S$GLB,,, | Performed by: NURSE PRACTITIONER

## 2020-12-10 PROCEDURE — 3008F BODY MASS INDEX DOCD: CPT | Mod: CPTII,S$GLB,, | Performed by: NURSE PRACTITIONER

## 2020-12-10 PROCEDURE — 3008F PR BODY MASS INDEX (BMI) DOCUMENTED: ICD-10-PCS | Mod: CPTII,S$GLB,, | Performed by: NURSE PRACTITIONER

## 2020-12-10 PROCEDURE — 99999 PR PBB SHADOW E&M-EST. PATIENT-LVL III: CPT | Mod: PBBFAC,,, | Performed by: NURSE PRACTITIONER

## 2020-12-10 PROCEDURE — 99214 OFFICE O/P EST MOD 30 MIN: CPT | Mod: S$GLB,,, | Performed by: NURSE PRACTITIONER

## 2020-12-10 PROCEDURE — 87077 CULTURE AEROBIC IDENTIFY: CPT | Mod: 59

## 2020-12-10 PROCEDURE — 87088 URINE BACTERIA CULTURE: CPT

## 2020-12-10 PROCEDURE — 1126F PR PAIN SEVERITY QUANTIFIED, NO PAIN PRESENT: ICD-10-PCS | Mod: S$GLB,,, | Performed by: NURSE PRACTITIONER

## 2020-12-10 PROCEDURE — 99999 PR PBB SHADOW E&M-EST. PATIENT-LVL III: ICD-10-PCS | Mod: PBBFAC,,, | Performed by: NURSE PRACTITIONER

## 2020-12-10 PROCEDURE — 87086 URINE CULTURE/COLONY COUNT: CPT

## 2020-12-10 PROCEDURE — 1126F AMNT PAIN NOTED NONE PRSNT: CPT | Mod: S$GLB,,, | Performed by: NURSE PRACTITIONER

## 2020-12-10 PROCEDURE — 87186 SC STD MICRODIL/AGAR DIL: CPT | Mod: 59

## 2020-12-10 RX ORDER — SULFAMETHOXAZOLE AND TRIMETHOPRIM 800; 160 MG/1; MG/1
1 TABLET ORAL 2 TIMES DAILY
Qty: 14 TABLET | Refills: 0 | Status: SHIPPED | OUTPATIENT
Start: 2020-12-10 | End: 2020-12-29

## 2020-12-10 NOTE — PROGRESS NOTES
Subjective:       Patient ID: Luis Eduardo Curry is a 60 y.o. male.    Chief Complaint: Urinary Tract Infection (pt c/o odor to urine bag. currently catheterized)    HPI: Pt presents to clinic today new to me with c/o needing check up. Believes he has a bladder infection. He had a dissecting aneurysm. Was treated at [facility] and had complications that ended up with urinary re tension. Now had a catheter but has TURP planned in Feb once cards agrees to take him off blood thinners.last treated with cipro then macrobid 2 weeks ago. Started with foul smell yesterday and low grade fever today. No other s.s    BMP  Lab Results   Component Value Date     11/19/2020    K 4.2 11/19/2020     11/19/2020    CO2 23 11/19/2020    BUN 11 11/19/2020    CREATININE 1.0 11/19/2020    CALCIUM 8.5 (L) 11/19/2020    ANIONGAP 7 (L) 11/19/2020    ESTGFRAFRICA >60.0 11/19/2020    EGFRNONAA >60.0 11/19/2020        Review of Systems   Constitutional: Positive for fatigue and fever. Negative for chills.   HENT: Negative for congestion, postnasal drip and sore throat.    Eyes: Negative for photophobia.   Respiratory: Negative for chest tightness and shortness of breath.    Cardiovascular: Negative for chest pain.   Gastrointestinal: Negative for abdominal distention, abdominal pain, blood in stool and vomiting.   Genitourinary: Negative for dysuria, flank pain, hematuria and urgency.        Has catheter  Foul smell   Musculoskeletal: Negative for back pain.   Skin: Negative for pallor.   Neurological: Negative for dizziness, seizures, facial asymmetry, speech difficulty and numbness.   Hematological: Does not bruise/bleed easily.   Psychiatric/Behavioral: Negative for agitation and suicidal ideas. The patient is not nervous/anxious.        Objective:      Physical Exam  Vitals signs and nursing note reviewed.   Constitutional:       Appearance: Normal appearance. He is well-developed.   HENT:      Head: Normocephalic and atraumatic.       Nose: Nose normal.   Eyes:      Conjunctiva/sclera: Conjunctivae normal.      Pupils: Pupils are equal, round, and reactive to light.   Neck:      Musculoskeletal: Normal range of motion and neck supple.      Thyroid: No thyromegaly.      Vascular: No JVD.   Cardiovascular:      Rate and Rhythm: Normal rate and regular rhythm.      Heart sounds: Normal heart sounds. No murmur.   Pulmonary:      Effort: Pulmonary effort is normal. No respiratory distress.      Breath sounds: Normal breath sounds. No wheezing or rales.   Abdominal:      General: Bowel sounds are normal. There is no distension.      Palpations: Abdomen is soft. There is no mass.      Tenderness: There is no abdominal tenderness. There is no guarding.   Genitourinary:     Comments: Leg bag with clear yellow urine  Musculoskeletal: Normal range of motion.   Lymphadenopathy:      Cervical: No cervical adenopathy.   Skin:     General: Skin is warm and dry.      Coloration: Skin is not pale.      Findings: No rash.   Neurological:      Mental Status: He is alert and oriented to person, place, and time.      Cranial Nerves: No cranial nerve deficit.      Deep Tendon Reflexes: Reflexes are normal and symmetric.         Assessment:       1. BPH with urinary obstruction    2. Urinary retention        Plan:     Problem List Items Addressed This Visit     BPH with urinary obstruction - Primary    Urinary retention          Clamped bag in office and given 2 glasses of water. Urine collected from buckley, not bad. Reconnected the bag prior to leaving. Will start with bactrim according to last culture

## 2020-12-11 NOTE — PROGRESS NOTES
Called pt who stated that he already had his urine collected earlier today. He thanked me for checking with him.

## 2020-12-13 LAB — BACTERIA UR CULT: ABNORMAL

## 2020-12-14 ENCOUNTER — HOSPITAL ENCOUNTER (OUTPATIENT)
Dept: RADIOLOGY | Facility: HOSPITAL | Age: 60
Discharge: HOME | End: 2020-12-14
Attending: STUDENT IN AN ORGANIZED HEALTH CARE EDUCATION/TRAINING PROGRAM
Payer: COMMERCIAL

## 2020-12-14 ENCOUNTER — HOSPITAL ENCOUNTER (EMERGENCY)
Facility: HOSPITAL | Age: 60
Discharge: HOME | End: 2020-12-14
Attending: EMERGENCY MEDICINE
Payer: COMMERCIAL

## 2020-12-14 ENCOUNTER — OFFICE VISIT (OUTPATIENT)
Dept: UROLOGY | Facility: CLINIC | Age: 60
End: 2020-12-14
Payer: COMMERCIAL

## 2020-12-14 ENCOUNTER — OFFICE VISIT (OUTPATIENT)
Dept: VASCULAR SURGERY | Facility: CLINIC | Age: 60
End: 2020-12-14
Attending: SURGERY
Payer: COMMERCIAL

## 2020-12-14 VITALS
RESPIRATION RATE: 16 BRPM | WEIGHT: 206 LBS | HEIGHT: 72 IN | BODY MASS INDEX: 27.9 KG/M2 | HEART RATE: 88 BPM | DIASTOLIC BLOOD PRESSURE: 56 MMHG | TEMPERATURE: 98 F | SYSTOLIC BLOOD PRESSURE: 115 MMHG | OXYGEN SATURATION: 99 %

## 2020-12-14 VITALS
SYSTOLIC BLOOD PRESSURE: 121 MMHG | DIASTOLIC BLOOD PRESSURE: 69 MMHG | BODY MASS INDEX: 27.77 KG/M2 | WEIGHT: 205 LBS | TEMPERATURE: 98 F | HEART RATE: 73 BPM | HEIGHT: 72 IN

## 2020-12-14 VITALS
HEIGHT: 72 IN | WEIGHT: 205 LBS | DIASTOLIC BLOOD PRESSURE: 82 MMHG | SYSTOLIC BLOOD PRESSURE: 131 MMHG | BODY MASS INDEX: 27.77 KG/M2 | HEART RATE: 84 BPM

## 2020-12-14 DIAGNOSIS — R33.9 URINARY RETENTION: Primary | ICD-10-CM

## 2020-12-14 DIAGNOSIS — Z46.6 CATHETER (URINE) CHANGE REQUIRED: Primary | ICD-10-CM

## 2020-12-14 DIAGNOSIS — I71.019 CHRONIC THORACIC AORTIC DISSECTION: Primary | ICD-10-CM

## 2020-12-14 DIAGNOSIS — R39.9 UTI SYMPTOMS: ICD-10-CM

## 2020-12-14 DIAGNOSIS — I77.9 DISORDER OF ARTERIES AND ARTERIOLES, UNSPECIFIED: ICD-10-CM

## 2020-12-14 PROCEDURE — 99999 PR PBB SHADOW E&M-EST. PATIENT-LVL III: CPT | Mod: PBBFAC,,, | Performed by: UROLOGY

## 2020-12-14 PROCEDURE — 99283 EMERGENCY DEPT VISIT LOW MDM: CPT | Mod: 25

## 2020-12-14 PROCEDURE — 71275 CT ANGIOGRAPHY CHEST: CPT | Mod: TC

## 2020-12-14 PROCEDURE — 99213 PR OFFICE/OUTPT VISIT, EST, LEVL III, 20-29 MIN: ICD-10-PCS | Mod: S$GLB,,, | Performed by: UROLOGY

## 2020-12-14 PROCEDURE — 1126F PR PAIN SEVERITY QUANTIFIED, NO PAIN PRESENT: ICD-10-PCS | Mod: S$GLB,,, | Performed by: SURGERY

## 2020-12-14 PROCEDURE — 1126F AMNT PAIN NOTED NONE PRSNT: CPT | Mod: S$GLB,,, | Performed by: UROLOGY

## 2020-12-14 PROCEDURE — 99213 OFFICE O/P EST LOW 20 MIN: CPT | Mod: S$GLB,,, | Performed by: UROLOGY

## 2020-12-14 PROCEDURE — 71275 CT ANGIOGRAPHY CHEST: CPT | Mod: 26,,, | Performed by: RADIOLOGY

## 2020-12-14 PROCEDURE — 99999 PR PBB SHADOW E&M-EST. PATIENT-LVL III: ICD-10-PCS | Mod: PBBFAC,,, | Performed by: SURGERY

## 2020-12-14 PROCEDURE — 74174 CTA CHEST ABDOMEN PELVIS: ICD-10-PCS | Mod: 26,,, | Performed by: RADIOLOGY

## 2020-12-14 PROCEDURE — 99024 PR POST-OP FOLLOW-UP VISIT: ICD-10-PCS | Mod: S$GLB,,, | Performed by: SURGERY

## 2020-12-14 PROCEDURE — 99999 PR PBB SHADOW E&M-EST. PATIENT-LVL III: CPT | Mod: PBBFAC,,, | Performed by: SURGERY

## 2020-12-14 PROCEDURE — 74174 CTA ABD&PLVS W/CONTRAST: CPT | Mod: 26,,, | Performed by: RADIOLOGY

## 2020-12-14 PROCEDURE — 99024 POSTOP FOLLOW-UP VISIT: CPT | Mod: S$GLB,,, | Performed by: SURGERY

## 2020-12-14 PROCEDURE — 3008F BODY MASS INDEX DOCD: CPT | Mod: CPTII,S$GLB,, | Performed by: UROLOGY

## 2020-12-14 PROCEDURE — 3008F PR BODY MASS INDEX (BMI) DOCUMENTED: ICD-10-PCS | Mod: CPTII,S$GLB,, | Performed by: UROLOGY

## 2020-12-14 PROCEDURE — 71275 CTA CHEST ABDOMEN PELVIS: ICD-10-PCS | Mod: 26,,, | Performed by: RADIOLOGY

## 2020-12-14 PROCEDURE — 51702 INSERT TEMP BLADDER CATH: CPT

## 2020-12-14 PROCEDURE — 3008F BODY MASS INDEX DOCD: CPT | Mod: CPTII,S$GLB,, | Performed by: SURGERY

## 2020-12-14 PROCEDURE — 3008F PR BODY MASS INDEX (BMI) DOCUMENTED: ICD-10-PCS | Mod: CPTII,S$GLB,, | Performed by: SURGERY

## 2020-12-14 PROCEDURE — 1126F AMNT PAIN NOTED NONE PRSNT: CPT | Mod: S$GLB,,, | Performed by: SURGERY

## 2020-12-14 PROCEDURE — 1126F PR PAIN SEVERITY QUANTIFIED, NO PAIN PRESENT: ICD-10-PCS | Mod: S$GLB,,, | Performed by: UROLOGY

## 2020-12-14 PROCEDURE — 99999 PR PBB SHADOW E&M-EST. PATIENT-LVL III: ICD-10-PCS | Mod: PBBFAC,,, | Performed by: UROLOGY

## 2020-12-14 PROCEDURE — 25500020 PHARM REV CODE 255: Performed by: STUDENT IN AN ORGANIZED HEALTH CARE EDUCATION/TRAINING PROGRAM

## 2020-12-14 RX ADMIN — IOHEXOL 100 ML: 350 INJECTION, SOLUTION INTRAVENOUS at 08:12

## 2020-12-14 NOTE — PROGRESS NOTES
Urology - [facility]    SUBJECTIVE:     Chief Complaint: Urinary retention    History of Present Illness:  Luis Eduardo Curry is a 60 y.o. male who presents to clinic for urinary retention. He is Established  to our clinic.    Patient had type B aortic dissection extending past renal arteries w/ false lumen supplying L kidney. S/p carotid to subclavian bypass on 11/16 followed by TEVAR on 11/17. Went into urinary retention during that admission necessitating Buckley with 2 L output on initial buckley placement. CHRISTINE w/ Cr 2.3 (b/l 1.1) which has improved to 1.0. Failed outpatient VT 10/16/20 and 11/9/20. Patient had baseline LUTS prior to this admission for which he was on Flomax. He had to strain to void, weakened stream, frequency, and nocturia x2-3.    PSA 4.6 in 1/2019. TRUS bx w/ Dr. Mendez in 5/2019 was negative for malignancy. Volume 46 g. Cysto revealed 4 cm prostatic length with lateral lobe hyperplasia. Trabeculated bladder.    He is on aspirin and plavix. Has a history of HTN.       Review of patient's allergies indicates:  No Known Allergies    Past Medical History:   Diagnosis Date    Aortic dissection      Past Surgical History:   Procedure Laterality Date    BIOPSY WITH TRANSRECTAL ULTRASOUND (TRUS) GUIDANCE N/A 5/1/2019    Procedure: BIOPSY, WITH TRANSRECTAL PROSTATE ULTRASOUND;  Surgeon: Chintan Mendez Jr., MD;  Location: [facility];  Service: Urology;  Laterality: N/A;    COLONOSCOPY  2006    COLONOSCOPY N/A 1/24/2019    Procedure: COLONOSCOPY;  Surgeon: Reece Tarango MD;  Location[facility];  Service: Endoscopy;  Laterality: N/A;    CREATION OF AORTO-CAROTID BYPASS WITH GRAFT Left 11/16/2020    Procedure: CREATION, BYPASS, ARTERIAL, AORTA TO CAROTID, USING GRAFT SUBCLAVIAN BYPASS;  Surgeon: JOSY Santos II, MD;  Location: [address];  Service: Cardiovascular;  Laterality: Left;  left common carotid to subclavian bypass    CYSTOSCOPY N/A 5/1/2019    Procedure: FLEXIBLE  CYSTOSCOPY;  Surgeon: Chintan Mendez Jr., MD;  Location: [facility];  Service: Urology;  Laterality: N/A;    ENDOVASCULAR GRAFT REPAIR OF ANEURYSM OF THORACIC AORTA N/A 11/17/2020    Procedure: REPAIR, ANEURYSM, ENDOVASCULAR GRAFT, AORTA, THORACIC Left brachial cutdown;  Surgeon: JOSY Santos II, MD;  [facility];  Service: Cardiovascular;  Laterality: N/A;  Fluoro time 34.7 min  2002.64 mGy  432.18 Gycm2    UMBILICAL HERNIA REPAIR N/A 1/14/2020    Procedure: REPAIR, HERNIA, UMBILICAL;  Surgeon: Caio Mas Jr., MD;  Location: [facility];  Service: General;  Laterality: N/A;     Family History   Problem Relation Age of Onset    No Known Problems Mother     Hypertension Father     No Known Problems Sister     No Known Problems Brother     No Known Problems Daughter     No Known Problems Son     No Known Problems Maternal Aunt     No Known Problems Maternal Uncle     No Known Problems Paternal Aunt     No Known Problems Paternal Uncle     No Known Problems Maternal Grandmother     No Known Problems Maternal Grandfather     No Known Problems Paternal Grandmother     No Known Problems Paternal Grandfather      Social History     Tobacco Use    Smoking status: Former Smoker     Packs/day: 1.00     Years: 30.00     Pack years: 30.00     Types: Cigarettes    Smokeless tobacco: Never Used   Substance Use Topics    Alcohol use: Yes     Frequency: Monthly or less     Binge frequency: Weekly     Comment: socially    Drug use: No        Review of Systems   All other systems reviewed and negative except pertinent positives noted in HPI.     OBJECTIVE:     Anticoagulation:  Yes - ASA 81 / Plavix    Estimated body mass index is 27.81 kg/m² as calculated from the following:    Height as of this encounter: 6' (1.829 m).    Weight as of this encounter: 93 kg (205 lb 0.4 oz).    Vital Signs (Most Recent)  Pulse: 84 (12/14/20 1331)  BP: 131/82 (12/14/20 1331)    Physical Exam  Vitals signs and  nursing note reviewed.   Constitutional:       Appearance: Normal appearance.   HENT:      Head: Normocephalic and atraumatic.      Mouth/Throat:      Mouth: Mucous membranes are moist.   Eyes:      Pupils: Pupils are equal, round, and reactive to light.   Cardiovascular:      Rate and Rhythm: Normal rate.   Pulmonary:      Effort: Pulmonary effort is normal. No respiratory distress.   Abdominal:      General: Abdomen is flat. There is no distension.      Tenderness: There is no abdominal tenderness.   Skin:     General: Skin is warm and dry.   Neurological:      General: No focal deficit present.      Mental Status: He is alert and oriented to person, place, and time.   Psychiatric:         Mood and Affect: Mood normal.         Behavior: Behavior normal.         Thought Content: Thought content normal.         Judgment: Judgment normal.         BMP  Lab Results   Component Value Date     11/19/2020    K 4.2 11/19/2020     11/19/2020    CO2 23 11/19/2020    BUN 11 11/19/2020    CREATININE 1.0 11/19/2020    CALCIUM 8.5 (L) 11/19/2020    ANIONGAP 7 (L) 11/19/2020    ESTGFRAFRICA >60.0 11/19/2020    EGFRNONAA >60.0 11/19/2020       Lab Results   Component Value Date    WBC 8.98 11/19/2020    HGB 8.3 (L) 11/19/2020    HCT 24.9 (L) 11/19/2020    MCV 87 11/19/2020     (L) 11/19/2020       Lab Results   Component Value Date    PSA 4.6 (H) 01/05/2019      Imaging:    CTA c/a/p (10/26/20):  - Mild R pelviectasis, thickened R hydronephrotic ureter  - No left hydroureteronephrosis  - Bladder decompressed around Buckley  - Bladder wall thickening and diverticula  - Prostamegaly    ASSESSMENT     1. Urinary retention    2. UTI symptoms        PLAN:     Urinary retention  - Will discuss with vascular surgery regarding coming off Plavix for possible Rezum.  This is elective and can happen at any point in the future, but he has had UTI associated with a catheter so there is some incentive to get the buckley out as  soon as reasonably possible.   - Maintain Pulliam for now.  Continue monthly catheter change.  Likely plan for TURP vs Rezum procedure 90 days after vascular surgical intervention.  Will need to be off blood thinners perioperatively.    Right ureteral wall thickening and hydronephrosis  - Plan for cystoscopy, right retrograde pyelogram, possible right diagnostic ureteroscopy---likely at the time of his bladder outlet procedure.  His CT a was reviewed and appears less worrisome from a ureteral thickening perspective.    Elevated PSA  - Will withhold from rechecking PSA at this time given indwelling Pulliam.    Yuriy Woo MD

## 2020-12-15 NOTE — ED PROVIDER NOTES
Encounter Date: 12/14/2020       History     Chief Complaint   Patient presents with    Cather issue     indwelling placed today accidentally removed     States indwelling catheter was removed by accident.   States was placed today by Urology.  Currently on antibiotic for UTI    The history is provided by the patient.   Male  Problem  Primary symptoms include no dysuria, no genital itching, no genital lesions, no genital rash, no penile discharge, no penile pain, no priapism, and no scrotal pain. This is a new problem. The current episode started just prior to arrival. Pertinent negatives include no anorexia, no diaphoresis, no nausea, no vomiting, no abdominal pain, no abdominal swelling, no frequency, no constipation and no diarrhea. He has tried nothing for the symptoms. Associated medical issues do not include chlamydia.     Review of patient's allergies indicates:  No Known Allergies  Past Medical History:   Diagnosis Date    Aortic dissection      Past Surgical History:   Procedure Laterality Date    BIOPSY WITH TRANSRECTAL ULTRASOUND (TRUS) GUIDANCE N/A 5/1/2019    Procedure: BIOPSY, WITH TRANSRECTAL PROSTATE ULTRASOUND;  Surgeon: Chintan Mendez Jr., MD;  Location: [facility];  Service: Urology;  Laterality: N/A;    COLONOSCOPY  2006    COLONOSCOPY N/A 1/24/2019    Procedure: COLONOSCOPY;  Surgeon: Reece Tarango MD;  Location: [facility];  Service: Endoscopy;  Laterality: N/A;    CREATION OF AORTO-CAROTID BYPASS WITH GRAFT Left 11/16/2020    Procedure: CREATION, BYPASS, ARTERIAL, AORTA TO CAROTID, USING GRAFT SUBCLAVIAN BYPASS;  Surgeon: JOSY Santos II, MD;  Location: [address];  Service: Cardiovascular;  Laterality: Left;  left common carotid to subclavian bypass    CYSTOSCOPY N/A 5/1/2019    Procedure: FLEXIBLE CYSTOSCOPY;  Surgeon: Chintan Mendez Jr., MD;  Location: [facility];  Service: Urology;  Laterality: N/A;    ENDOVASCULAR GRAFT REPAIR OF ANEURYSM OF THORACIC AORTA N/A 11/17/2020     Procedure: REPAIR, ANEURYSM, ENDOVASCULAR GRAFT, AORTA, THORACIC Left brachial cutdown;  Surgeon: JOSY Santos II, MD;  Location: [facility] OR [address] [place] [address];  Service: Cardiovascular;  Laterality: N/A;  Fluoro time 34.7 min  2002.64 mGy  432.18 Gycm2    UMBILICAL HERNIA REPAIR N/A 1/14/2020    Procedure: REPAIR, HERNIA, UMBILICAL;  Surgeon: Caio Mas Jr., MD;  [facility];  Service: General;  Laterality: N/A;     Family History   Problem Relation Age of Onset    No Known Problems Mother     Hypertension Father     No Known Problems Sister     No Known Problems Brother     No Known Problems Daughter     No Known Problems Son     No Known Problems Maternal Aunt     No Known Problems Maternal Uncle     No Known Problems Paternal Aunt     No Known Problems Paternal Uncle     No Known Problems Maternal Grandmother     No Known Problems Maternal Grandfather     No Known Problems Paternal Grandmother     No Known Problems Paternal Grandfather      Social History     Tobacco Use    Smoking status: Former Smoker     Packs/day: 1.00     Years: 30.00     Pack years: 30.00     Types: Cigarettes    Smokeless tobacco: Never Used   Substance Use Topics    Alcohol use: Yes     Frequency: Monthly or less     Binge frequency: Weekly     Comment: socially    Drug use: No     Review of Systems   Constitutional: Negative for diaphoresis.   HENT: Negative for ear discharge and ear pain.    Eyes: Negative for pain and redness.   Respiratory: Negative for cough.    Cardiovascular: Negative for chest pain.   Gastrointestinal: Negative for abdominal pain, anorexia, constipation, diarrhea, nausea and vomiting.   Genitourinary: Positive for difficulty urinating. Negative for dysuria, frequency, penile discharge and penile pain.   Musculoskeletal: Negative for back pain, gait problem, neck pain and neck stiffness.   Skin: Negative for rash.   Neurological: Negative for weakness, light-headedness and headaches.        Physical Exam     Initial Vitals [12/14/20 1805]   BP Pulse Resp Temp SpO2   (!) 115/56 88 16 97.8 °F (36.6 °C) 99 %      MAP       --         Physical Exam    Nursing note and vitals reviewed.  Constitutional: He appears well-developed and well-nourished. He is not diaphoretic. No distress.   HENT:   Head: Normocephalic and atraumatic.   Mouth/Throat: No oropharyngeal exudate.   Eyes: EOM are normal. Pupils are equal, round, and reactive to light.   Neck: Normal range of motion. Neck supple. No JVD present.   Pulmonary/Chest: Breath sounds normal. No stridor. No respiratory distress. He has no wheezes. He has no rhonchi. He has no rales.   Abdominal: Soft. Bowel sounds are normal. He exhibits no distension. There is no abdominal tenderness. There is no rebound and no guarding.   Musculoskeletal: Normal range of motion. No tenderness or edema.   Neurological: He is alert and oriented to person, place, and time. No sensory deficit.   Skin: No rash noted.         ED Course   Procedures  Labs Reviewed - No data to display       Imaging Results    None                                      Clinical Impression:     ICD-10-CM ICD-9-CM   1. Catheter (urine) change required  Z46.6 V53.6                      Disposition:   Disposition: Discharged  Condition: Stable     ED Disposition Condition    Discharge Stable        ED Prescriptions     None        Follow-up Information     Follow up With Specialties Details Why Contact Info    Kiko Connor MD Internal Medicine Schedule an appointment as soon as possible for a visit  Follow up with Urology for continual catheter care [address]  [facility] [address]  [phone]                                         Mark Haque MD  12/14/20 5652

## 2020-12-17 NOTE — PROGRESS NOTES
VASCULAR SURGERY NOTE    Patient ID: Luis Eduardo Curry is a 60 y.o. male.    I. HISTORY     Chief Complaint: post-op    HPI: Luis Eduardo Curry is a 60 y.o. male who is here today for post-op appointment. He had staged L carotid subclavian bypass followed by TEVAR with left subclavian artery embolization and right renal artery stenting with me for subacute Type B dissection with aneurysmal degeneration on 11/16/2020 and 11/17/2020.  Since that time he has recovered well.  He initially had very low energy likely secondary to anemia due to acute blood loss but his energy has improved significantly since his last visit.  He is now able to walk and do most with his regular daily activities without any issues.  He denies any issues at the sites of his surgical incisions.     Social History: Does not smoke, not drinking, no drugs    Family history: No fam hx of aneurysm/dissection    Past Medical History:   Diagnosis Date    Aortic dissection         Past Surgical History:   Procedure Laterality Date    BIOPSY WITH TRANSRECTAL ULTRASOUND (TRUS) GUIDANCE N/A 5/1/2019    Procedure: BIOPSY, WITH TRANSRECTAL PROSTATE ULTRASOUND;  Surgeon: Chintan Mendez Jr., MD;  Location: [facility];  Service: Urology;  Laterality: N/A;    COLONOSCOPY  2006    COLONOSCOPY N/A 1/24/2019    Procedure: COLONOSCOPY;  Surgeon: Reece Tarango MD;  Location: [facility];  Service: Endoscopy;  Laterality: N/A;    CREATION OF AORTO-CAROTID BYPASS WITH GRAFT Left 11/16/2020    Procedure: CREATION, BYPASS, ARTERIAL, AORTA TO CAROTID, USING GRAFT SUBCLAVIAN BYPASS;  Surgeon: JOSY Santos II, MD;  Location: [address];  Service: Cardiovascular;  Laterality: Left;  left common carotid to subclavian bypass    CYSTOSCOPY N/A 5/1/2019    Procedure: FLEXIBLE CYSTOSCOPY;  Surgeon: Chintan Mendez Jr., MD;  Location: [facility];  Service: Urology;  Laterality: N/A;    ENDOVASCULAR GRAFT REPAIR OF ANEURYSM OF THORACIC AORTA N/A 11/17/2020    Procedure:  REPAIR, ANEURYSM, ENDOVASCULAR GRAFT, AORTA, THORACIC Left brachial cutdown;  Surgeon: JOSY Santos II, MD;  [facility];  Service: Cardiovascular;  Laterality: N/A;  Fluoro time 34.7 min  2002.64 mGy  432.18 Gycm2    UMBILICAL HERNIA REPAIR N/A 1/14/2020    Procedure: REPAIR, HERNIA, UMBILICAL;  Surgeon: Caio Mas Jr., MD;  Location: [facility];  Service: General;  Laterality: N/A;       Social History     Tobacco Use   Smoking Status Former Smoker    Packs/day: 1.00    Years: 30.00    Pack years: 30.00    Types: Cigarettes   Smokeless Tobacco Never Used        ROS      II. PHYSICAL EXAM     RUE /69mmHg    LUE BP 97/66mmHg    Physical Exam   Constitutional: He appears well-developed and well-nourished. No distress.   Neck: Normal range of motion. No tracheal deviation present.   L neck incision CDI, no erythema, no swelling, no drainage   Cardiovascular: Normal rate, regular rhythm and intact distal pulses.   Pulmonary/Chest: Effort normal. No respiratory distress.   Musculoskeletal: Normal range of motion.         General: No edema.   Skin: Skin is warm and dry. He is not diaphoretic.      VASC: 2+ radial pulses bilaterally  RLE: 2+ dP pulse  LLE: 2+ DP pulse    III. ASSESSMENT & PLAN (MEDICAL DECISION MAKING)     1. Chronic thoracic aortic dissection        Imaging Results:  CTA CHEST/ABD/PELVIS 12/14/20:  L carotid-subclavian bypass is patent with no evidence of stenosis. There may be a focal dissection flap in the subclavian artery at the anastomosis. TEVAR is in place with patent L carotid snorkel without stenosis. No filling of the false lumen in the proximal descending thoracic aorta. There is filling of the distal descending thoracic aorta and abdominal aorta likely due to fenestrations vs intercostals/lumbars. The right renal artery stent is patent. There is good expansion of the true lumen throughout the aorta.    Assessment/Diagnosis and Plan:  60 y.o. male s/p above  doing well.    -Follow up for repeat CT scan 6 months post-op (May 2021)  -continue goal SBP <120 and goal HR <80-- continue home labetalol  -Continue ASA indefinitely  -Continue plavix for 90 days post-op for carotid stent patency-- ok to hold plavix for urology procedure beginning 2/16/20. Recommend resuming plavix after urology procedure as soon as possible  -24mmHg difference in left vs right brachial systolic BP may be secondary to outflow stenosis of carotid-subclavian bypass. Will get carotid duplex at next follow up 6 months post-op  -Ok to return to work full duty    JOSY Santos II, MD, [facility]  Vascular Surgeon  [facility]

## 2020-12-18 ENCOUNTER — HOSPITAL ENCOUNTER (EMERGENCY)
Facility: HOSPITAL | Age: 60
Discharge: HOME | End: 2020-12-18
Attending: SURGERY
Payer: COMMERCIAL

## 2020-12-18 VITALS
TEMPERATURE: 97 F | WEIGHT: 203.69 LBS | BODY MASS INDEX: 27.63 KG/M2 | HEART RATE: 90 BPM | DIASTOLIC BLOOD PRESSURE: 73 MMHG | SYSTOLIC BLOOD PRESSURE: 112 MMHG | OXYGEN SATURATION: 99 % | RESPIRATION RATE: 18 BRPM

## 2020-12-18 DIAGNOSIS — T83.9XXA PROBLEM WITH FOLEY CATHETER, INITIAL ENCOUNTER: Primary | ICD-10-CM

## 2020-12-18 PROCEDURE — 99283 EMERGENCY DEPT VISIT LOW MDM: CPT | Mod: 25

## 2020-12-18 PROCEDURE — 51702 INSERT TEMP BLADDER CATH: CPT

## 2020-12-18 NOTE — ED TRIAGE NOTES
"60 y.o. male presents to ER ED 02/ED 02B   Chief Complaint   Patient presents with    Urinary Catheter Change     Pt states  " I need my catheter changed, I have to keep a catheter in for 90 days" per Dr Woo    . No acute distress noted.  Pt states the Catheter is leaking from the tube  "

## 2020-12-18 NOTE — ED PROVIDER NOTES
"[facility]                                                 I reviewed the ER triage nurse's note before evaluating the patient    Chief Complaint  60 y.o. male with Urinary Catheter Change   -- " I need my catheter changed, I have to keep a catheter in for 90 days"    History of Present Illness  Luis Eduardo Curry presents to the emergency room with Pulliam catheter issues  Patient with Pulliam catheter issues today, patient with chronic Pulliam per history  Patient was seen Neurology at [facility] for prostate issues this last month  Has a Pulliam catheter placed for 90 days, accidentally pulled out this afternoon  No obvious penile urethral trauma, would like Pulliam catheter placed in ER now    The history is provided by the patient   device was not used during this ER visit  Medical history significant for aortic dissection  Surgical hx: endovascular repair, umbilical hernia, colonoscopy, prostate  No Known Allergies     I have reviewed all of this patient's past medical, surgical, family, and social   histories as well as active allergies and medications documented in the  electronic medical record    Review of Systems and Physical Exam      Review of Systems  -- Constitution - no fever, denies fatigue, no weakness, no chills  -- Eyes - no tearing or redness, no visual disturbance  -- Ear, Nose - no tinnitus or earache, no nasal congestion or discharge  -- Mouth,Throat - no sore throat, no toothache, normal voice, normal swallowing  -- Respiratory - denies cough and congestion, no shortness of breath, no ROSADO  -- Cardiovascular - denies chest pain, no palpitations, denies claudication  -- Gastrointestinal - denies abdominal pain, nausea, vomiting, or diarrhea  -- Genitourinary - no dysuria, denies flank pain, no hematuria, no STD risk  -- Musculoskeletal - denies back pain, negative for trauma or injury  -- Neurological - no headache, denies weakness or seizure; no LOC  -- Skin - " denies pallor, rash, or changes in skin. no hives or welts noted    Vital Signs  His temperature is 96.8 °F (36 °C).   His blood pressure is 112/73 and his pulse is 90.   His respiration is 18 and oxygen saturation is 99%.     Physical Exam  -- Nursing note and vitals reviewed  -- Constitutional: Appears well-developed and well-nourished  -- Head: Atraumatic. Normocephalic. No obvious abnormality  -- Eyes: Pupils are equal and reactive to light. Normal conjunctiva and lids  -- Cardiac: Normal rate, regular rhythm and normal heart sounds  -- Pulmonary: Normal respiratory effort, breath sounds clear to auscultation  -- Abdominal: Soft, no tenderness. Normal bowel sounds. Normal liver edge  -- Genitourinary: no flank pain on exam, no suprapubic pain by palpation   -- Musculoskeletal: Normal range of motion, no effusions. Joints stable   -- Neurological: No focal deficits. Showed good interaction with staff  -- Vascular: Posterior tibial, dorsalis pedis and radial pulses 2+ bilaterally      Emergency Room Course      Treatment and Evaluation  --  Pulliam was placed in the ER by the RN    Diagnosis  [T83.9XXA] Problem with Pulliam catheter, initial encounter (Primary)    Disposition and Plan  -- Disposition: home  -- Condition: stable  -- Follow-up: Patient to follow up with Kiko Connor MD in 1-2 days.  -- I advised the patient that we have found no life threatening condition today  -- At this time, I believe the patient is clinically stable for discharge.   -- The patient acknowledges that close follow up with a MD is required   -- Patient agrees to comply with all instruction and direction given in the ER    This note is dictated on M*Modal word recognition program.  There are word recognition mistakes that are occasionally missed on review.         Ramón West MD  12/18/20 [id number]

## 2020-12-21 ENCOUNTER — PATIENT OUTREACH (OUTPATIENT)
Dept: EMERGENCY MEDICINE | Facility: HOSPITAL | Age: 60
End: 2020-12-21

## 2020-12-22 ENCOUNTER — PATIENT OUTREACH (OUTPATIENT)
Dept: ADMINISTRATIVE | Facility: HOSPITAL | Age: 60
End: 2020-12-22

## 2020-12-28 ENCOUNTER — HOSPITAL ENCOUNTER (EMERGENCY)
Facility: HOSPITAL | Age: 60
Discharge: HOME | End: 2020-12-28
Attending: SURGERY
Payer: COMMERCIAL

## 2020-12-28 ENCOUNTER — LAB VISIT (OUTPATIENT)
Dept: INTERNAL MEDICINE | Facility: CLINIC | Age: 60
End: 2020-12-28
Payer: COMMERCIAL

## 2020-12-28 VITALS
HEART RATE: 88 BPM | DIASTOLIC BLOOD PRESSURE: 70 MMHG | BODY MASS INDEX: 27.81 KG/M2 | WEIGHT: 205 LBS | RESPIRATION RATE: 16 BRPM | SYSTOLIC BLOOD PRESSURE: 112 MMHG | TEMPERATURE: 99 F | OXYGEN SATURATION: 100 %

## 2020-12-28 DIAGNOSIS — D62 ACUTE POST-HEMORRHAGIC ANEMIA: ICD-10-CM

## 2020-12-28 DIAGNOSIS — T83.9XXA COMPLICATION OF FOLEY CATHETER, INITIAL ENCOUNTER: Primary | ICD-10-CM

## 2020-12-28 LAB
BASOPHILS # BLD AUTO: 0.06 K/UL (ref 0–0.2)
BASOPHILS NFR BLD: 0.9 % (ref 0–1.9)
DIFFERENTIAL METHOD: ABNORMAL
EOSINOPHIL # BLD AUTO: 0.5 K/UL (ref 0–0.5)
EOSINOPHIL NFR BLD: 7.4 % (ref 0–8)
ERYTHROCYTE [DISTWIDTH] IN BLOOD BY AUTOMATED COUNT: 15 % (ref 11.5–14.5)
HCT VFR BLD AUTO: 32.6 % (ref 40–54)
HGB BLD-MCNC: 9.8 G/DL (ref 14–18)
IMM GRANULOCYTES # BLD AUTO: 0.02 K/UL (ref 0–0.04)
IMM GRANULOCYTES NFR BLD AUTO: 0.3 % (ref 0–0.5)
LYMPHOCYTES # BLD AUTO: 1.9 K/UL (ref 1–4.8)
LYMPHOCYTES NFR BLD: 27.4 % (ref 18–48)
MCH RBC QN AUTO: 25.6 PG (ref 27–31)
MCHC RBC AUTO-ENTMCNC: 30.1 G/DL (ref 32–36)
MCV RBC AUTO: 85 FL (ref 82–98)
MONOCYTES # BLD AUTO: 0.6 K/UL (ref 0.3–1)
MONOCYTES NFR BLD: 8.8 % (ref 4–15)
NEUTROPHILS # BLD AUTO: 3.8 K/UL (ref 1.8–7.7)
NEUTROPHILS NFR BLD: 55.2 % (ref 38–73)
NRBC BLD-RTO: 0 /100 WBC
PLATELET # BLD AUTO: 437 K/UL (ref 150–350)
PMV BLD AUTO: 9.1 FL (ref 9.2–12.9)
RBC # BLD AUTO: 3.83 M/UL (ref 4.6–6.2)
WBC # BLD AUTO: 6.9 K/UL (ref 3.9–12.7)

## 2020-12-28 PROCEDURE — 51702 INSERT TEMP BLADDER CATH: CPT

## 2020-12-28 PROCEDURE — 99283 EMERGENCY DEPT VISIT LOW MDM: CPT | Mod: 25

## 2020-12-28 PROCEDURE — 85025 COMPLETE CBC W/AUTO DIFF WBC: CPT

## 2020-12-28 PROCEDURE — 36415 PR COLLECTION VENOUS BLOOD,VENIPUNCTURE: ICD-10-PCS | Mod: S$GLB,,, | Performed by: INTERNAL MEDICINE

## 2020-12-28 PROCEDURE — 36415 COLL VENOUS BLD VENIPUNCTURE: CPT | Mod: S$GLB,,, | Performed by: INTERNAL MEDICINE

## 2020-12-29 ENCOUNTER — OFFICE VISIT (OUTPATIENT)
Dept: INTERNAL MEDICINE | Facility: CLINIC | Age: 60
End: 2020-12-29
Payer: COMMERCIAL

## 2020-12-29 VITALS
HEART RATE: 69 BPM | BODY MASS INDEX: 27.92 KG/M2 | OXYGEN SATURATION: 94 % | HEIGHT: 72 IN | TEMPERATURE: 97 F | WEIGHT: 206.13 LBS | RESPIRATION RATE: 19 BRPM | DIASTOLIC BLOOD PRESSURE: 71 MMHG | SYSTOLIC BLOOD PRESSURE: 112 MMHG

## 2020-12-29 DIAGNOSIS — Z23 IMMUNIZATION DUE: ICD-10-CM

## 2020-12-29 DIAGNOSIS — N13.8 BPH WITH URINARY OBSTRUCTION: ICD-10-CM

## 2020-12-29 DIAGNOSIS — N40.1 BPH WITH URINARY OBSTRUCTION: ICD-10-CM

## 2020-12-29 DIAGNOSIS — D64.9 POSTOPERATIVE ANEMIA: ICD-10-CM

## 2020-12-29 DIAGNOSIS — R33.9 URINARY RETENTION: Primary | ICD-10-CM

## 2020-12-29 PROCEDURE — 3008F BODY MASS INDEX DOCD: CPT | Mod: CPTII,S$GLB,, | Performed by: INTERNAL MEDICINE

## 2020-12-29 PROCEDURE — 99213 OFFICE O/P EST LOW 20 MIN: CPT | Mod: 25,S$GLB,, | Performed by: INTERNAL MEDICINE

## 2020-12-29 PROCEDURE — 1126F PR PAIN SEVERITY QUANTIFIED, NO PAIN PRESENT: ICD-10-PCS | Mod: S$GLB,,, | Performed by: INTERNAL MEDICINE

## 2020-12-29 PROCEDURE — 90471 IMMUNIZATION ADMIN: CPT | Mod: S$GLB,,, | Performed by: INTERNAL MEDICINE

## 2020-12-29 PROCEDURE — 99999 PR PBB SHADOW E&M-EST. PATIENT-LVL IV: ICD-10-PCS | Mod: PBBFAC,,, | Performed by: INTERNAL MEDICINE

## 2020-12-29 PROCEDURE — 99213 PR OFFICE/OUTPT VISIT, EST, LEVL III, 20-29 MIN: ICD-10-PCS | Mod: 25,S$GLB,, | Performed by: INTERNAL MEDICINE

## 2020-12-29 PROCEDURE — 99999 PR PBB SHADOW E&M-EST. PATIENT-LVL IV: CPT | Mod: PBBFAC,,, | Performed by: INTERNAL MEDICINE

## 2020-12-29 PROCEDURE — 90715 TDAP VACCINE 7 YRS/> IM: CPT | Mod: S$GLB,,, | Performed by: INTERNAL MEDICINE

## 2020-12-29 PROCEDURE — 90471 TDAP VACCINE GREATER THAN OR EQUAL TO 7YO IM: ICD-10-PCS | Mod: S$GLB,,, | Performed by: INTERNAL MEDICINE

## 2020-12-29 PROCEDURE — 3008F PR BODY MASS INDEX (BMI) DOCUMENTED: ICD-10-PCS | Mod: CPTII,S$GLB,, | Performed by: INTERNAL MEDICINE

## 2020-12-29 PROCEDURE — 1126F AMNT PAIN NOTED NONE PRSNT: CPT | Mod: S$GLB,,, | Performed by: INTERNAL MEDICINE

## 2020-12-29 PROCEDURE — 90715 TDAP VACCINE GREATER THAN OR EQUAL TO 7YO IM: ICD-10-PCS | Mod: S$GLB,,, | Performed by: INTERNAL MEDICINE

## 2020-12-29 RX ORDER — NITROFURANTOIN (MACROCRYSTALS) 100 MG/1
100 CAPSULE ORAL NIGHTLY
Qty: 10 CAPSULE | Refills: 0 | Status: SHIPPED | OUTPATIENT
Start: 2020-12-29 | End: 2021-01-08

## 2020-12-29 NOTE — ED PROVIDER NOTES
[facility]                                                 I reviewed the ER triage nurse's note before evaluating the patient    Chief Complaint  60 y.o. male with Urinary Catheter Problem (PT states his catheter came out and he needs it replaced)      History of Present Illness  Luis Eduardo Curry presents to the emergency room with Pulliam catheter issues  Patient with Pulliam catheter issues today, patient with chronic Pulliam per history  Patient states that he accidentally pulled out his Pulliam catheter earlier today  No obvious penile urethral trauma, would like Pulliam catheter placed in ER now     The history is provided by the patient   device was not used during this ER visit  Medical history significant for aortic dissection  Surgical hx: endovascular repair, umbilical hernia, colonoscopy, prostate  No Known Allergies     I have reviewed all of this patient's past medical, surgical, family, and social   histories as well as active allergies and medications documented in the  electronic medical record    Review of Systems and Physical Exam      Review of Systems  -- Constitution - no fever, denies fatigue, no weakness, no chills  -- Eyes - no tearing or redness, no visual disturbance  -- Ear, Nose - no tinnitus or earache, no nasal congestion or discharge  -- Mouth,Throat - no sore throat, no toothache, normal voice, normal swallowing  -- Respiratory - denies cough and congestion, no shortness of breath, no ROSADO  -- Cardiovascular - denies chest pain, no palpitations, denies claudication  -- Gastrointestinal - denies abdominal pain, nausea, vomiting, or diarrhea  -- Genitourinary - Pulliam catheter issue this afternoon  -- Musculoskeletal - denies back pain, negative for trauma or injury  -- Neurological - no headache, denies weakness or seizure; no LOC  -- Skin - denies pallor, rash, or changes in skin. no hives or welts noted    Vital Signs  His oral temperature is 98.9 °F  (37.2 °C).   His blood pressure is 112/70 and his pulse is 88.   His respiration is 16 and oxygen saturation is 100%.     Physical Exam  -- Nursing note and vitals reviewed  -- Constitutional: Appears well-developed and well-nourished  -- Head: Atraumatic. Normocephalic. No obvious abnormality  -- Eyes: Pupils are equal and reactive to light. Normal conjunctiva and lids  -- Cardiac: Normal rate, regular rhythm and normal heart sounds  -- Pulmonary: Normal respiratory effort, breath sounds clear to auscultation  -- Abdominal: Soft, no tenderness. Normal bowel sounds. Normal liver edge  -- Musculoskeletal: Normal range of motion, no effusions. Joints stable   -- Neurological: No focal deficits. Showed good interaction with staff  -- Skin: Warm and dry. No evidence of rash or cellulitis    Emergency Room Course      Treatment and Evaluation  -- Pulliam placed by the ER nurse without difficulty today    Diagnosis  [T83.9XXA] Complication of Pulliam catheter, initial encounter (Primary)    Disposition and Plan  -- Disposition: home  -- Condition: stable  -- Follow-up: Patient to follow up with Kiko Connor MD in 1-2 days.  -- I advised the patient that we have found no life threatening condition today  -- At this time, I believe the patient is clinically stable for discharge.   -- The patient acknowledges that close follow up with a MD is required   -- Patient agrees to comply with all instruction and direction given in the ER    This note is dictated on M*Modal word recognition program.  There are word recognition mistakes that are occasionally missed on review.          Ramón West MD  12/28/20 [id number]

## 2020-12-29 NOTE — PROGRESS NOTES
Subjective:       Patient ID: Luis Eduardo Curry is a 60 y.o. male.    Chief Complaint: Follow-up (4 wk)    Luis Eduardo Curry is a 60 y.o. male  Here with buckley s cathter .   Recent surgery for aneurysm .  10/4/20 was placed  buckley's catheter .  He was placed on iron pills  His labs show CBC much improved    again last night went to ER with foleys catheter .  Finished antibiotics     Review of Systems   Constitutional: Negative for chills.   HENT: Negative for congestion, postnasal drip and sore throat.    Eyes: Negative for photophobia.   Respiratory: Negative for chest tightness and shortness of breath.    Cardiovascular: Negative for chest pain.   Gastrointestinal: Negative for abdominal distention, abdominal pain, blood in stool and vomiting.   Genitourinary: Negative for dysuria, flank pain, hematuria and urgency.        Has catheter  Foul smell   Musculoskeletal: Negative for back pain.   Skin: Negative for pallor.   Neurological: Negative for dizziness, seizures, facial asymmetry, speech difficulty and numbness.   Hematological: Does not bruise/bleed easily.   Psychiatric/Behavioral: Negative for agitation and suicidal ideas. The patient is not nervous/anxious.        Objective:      Physical Exam  Vitals signs and nursing note reviewed.   Constitutional:       Appearance: He is well-developed.   HENT:      Head: Normocephalic and atraumatic.   Neck:      Vascular: No JVD.   Cardiovascular:      Rate and Rhythm: Normal rate and regular rhythm.      Heart sounds: Normal heart sounds.   Pulmonary:      Effort: Pulmonary effort is normal.      Breath sounds: Normal breath sounds.   Abdominal:      General: Bowel sounds are normal.      Palpations: Abdomen is soft.      Tenderness: There is no abdominal tenderness.   Skin:     General: Skin is warm and dry.   Neurological:      Mental Status: He is alert and oriented to person, place, and time.   Psychiatric:         Behavior: Behavior normal.         Thought  Content: Thought content normal.         Judgment: Judgment normal.         Assessment:       1. Urinary retention    2. BPH with urinary obstruction    3. Postoperative anemia        Plan:   Luis Eduardo was seen today for follow-up.    Diagnoses and all orders for this visit:    Urinary retention  BPH with urinary obstruction  Postoperative anemia  His anemia is better   Continue iron supplements   He needs foleys cathter  Till 2-3 months before he can be off of blood thinners and can get surgery .    Will send him macrodantin for possible uti in future to avoid ER visits .    Postoperative anemia  -     CBC Auto Differential; Future    Other orders  -     nitrofurantoin (MACRODANTIN) 100 MG capsule; Take 1 capsule (100 mg total) by mouth nightly. for 10 days      Problem List Items Addressed This Visit     None

## 2021-01-12 ENCOUNTER — OFFICE VISIT (OUTPATIENT)
Dept: UROLOGY | Facility: CLINIC | Age: 61
End: 2021-01-12
Payer: COMMERCIAL

## 2021-01-12 VITALS
SYSTOLIC BLOOD PRESSURE: 104 MMHG | HEIGHT: 72 IN | BODY MASS INDEX: 27.92 KG/M2 | HEART RATE: 74 BPM | WEIGHT: 206.13 LBS | DIASTOLIC BLOOD PRESSURE: 77 MMHG

## 2021-01-12 DIAGNOSIS — N13.30 HYDRONEPHROSIS OF RIGHT KIDNEY: ICD-10-CM

## 2021-01-12 DIAGNOSIS — N40.1 BENIGN NON-NODULAR PROSTATIC HYPERPLASIA WITH LOWER URINARY TRACT SYMPTOMS: Primary | ICD-10-CM

## 2021-01-12 DIAGNOSIS — R33.9 URINARY RETENTION: ICD-10-CM

## 2021-01-12 PROCEDURE — 3078F DIAST BP <80 MM HG: CPT | Mod: CPTII,S$GLB,, | Performed by: UROLOGY

## 2021-01-12 PROCEDURE — 1125F PR PAIN SEVERITY QUANTIFIED, PAIN PRESENT: ICD-10-PCS | Mod: S$GLB,,, | Performed by: UROLOGY

## 2021-01-12 PROCEDURE — 1125F AMNT PAIN NOTED PAIN PRSNT: CPT | Mod: S$GLB,,, | Performed by: UROLOGY

## 2021-01-12 PROCEDURE — 3008F BODY MASS INDEX DOCD: CPT | Mod: CPTII,S$GLB,, | Performed by: UROLOGY

## 2021-01-12 PROCEDURE — 99214 PR OFFICE/OUTPT VISIT, EST, LEVL IV, 30-39 MIN: ICD-10-PCS | Mod: S$GLB,,, | Performed by: UROLOGY

## 2021-01-12 PROCEDURE — 3074F PR MOST RECENT SYSTOLIC BLOOD PRESSURE < 130 MM HG: ICD-10-PCS | Mod: CPTII,S$GLB,, | Performed by: UROLOGY

## 2021-01-12 PROCEDURE — 3078F PR MOST RECENT DIASTOLIC BLOOD PRESSURE < 80 MM HG: ICD-10-PCS | Mod: CPTII,S$GLB,, | Performed by: UROLOGY

## 2021-01-12 PROCEDURE — 3074F SYST BP LT 130 MM HG: CPT | Mod: CPTII,S$GLB,, | Performed by: UROLOGY

## 2021-01-12 PROCEDURE — 99999 PR PBB SHADOW E&M-EST. PATIENT-LVL III: CPT | Mod: PBBFAC,,, | Performed by: UROLOGY

## 2021-01-12 PROCEDURE — 3008F PR BODY MASS INDEX (BMI) DOCUMENTED: ICD-10-PCS | Mod: CPTII,S$GLB,, | Performed by: UROLOGY

## 2021-01-12 PROCEDURE — 99214 OFFICE O/P EST MOD 30 MIN: CPT | Mod: S$GLB,,, | Performed by: UROLOGY

## 2021-01-12 PROCEDURE — 99999 PR PBB SHADOW E&M-EST. PATIENT-LVL III: ICD-10-PCS | Mod: PBBFAC,,, | Performed by: UROLOGY

## 2021-01-25 ENCOUNTER — TELEPHONE (OUTPATIENT)
Dept: INTERNAL MEDICINE | Facility: CLINIC | Age: 61
End: 2021-01-25

## 2021-01-25 RX ORDER — NITROFURANTOIN (MACROCRYSTALS) 100 MG/1
100 CAPSULE ORAL NIGHTLY
Qty: 10 CAPSULE | Refills: 0 | Status: SHIPPED | OUTPATIENT
Start: 2021-01-25 | End: 2021-02-04

## 2021-01-28 ENCOUNTER — LAB VISIT (OUTPATIENT)
Dept: LAB | Facility: HOSPITAL | Age: 61
End: 2021-01-28
Attending: INTERNAL MEDICINE
Payer: COMMERCIAL

## 2021-01-28 DIAGNOSIS — D64.9 POSTOPERATIVE ANEMIA: ICD-10-CM

## 2021-01-28 LAB
BASOPHILS # BLD AUTO: 0.04 K/UL (ref 0–0.2)
BASOPHILS NFR BLD: 0.6 % (ref 0–1.9)
DIFFERENTIAL METHOD: ABNORMAL
EOSINOPHIL # BLD AUTO: 0.4 K/UL (ref 0–0.5)
EOSINOPHIL NFR BLD: 5.8 % (ref 0–8)
ERYTHROCYTE [DISTWIDTH] IN BLOOD BY AUTOMATED COUNT: 15.8 % (ref 11.5–14.5)
HCT VFR BLD AUTO: 38.8 % (ref 40–54)
HGB BLD-MCNC: 11.6 G/DL (ref 14–18)
IMM GRANULOCYTES # BLD AUTO: 0.01 K/UL (ref 0–0.04)
IMM GRANULOCYTES NFR BLD AUTO: 0.1 % (ref 0–0.5)
LYMPHOCYTES # BLD AUTO: 1.6 K/UL (ref 1–4.8)
LYMPHOCYTES NFR BLD: 23.2 % (ref 18–48)
MCH RBC QN AUTO: 24.5 PG (ref 27–31)
MCHC RBC AUTO-ENTMCNC: 29.9 G/DL (ref 32–36)
MCV RBC AUTO: 82 FL (ref 82–98)
MONOCYTES # BLD AUTO: 0.5 K/UL (ref 0.3–1)
MONOCYTES NFR BLD: 7.7 % (ref 4–15)
NEUTROPHILS # BLD AUTO: 4.3 K/UL (ref 1.8–7.7)
NEUTROPHILS NFR BLD: 62.6 % (ref 38–73)
NRBC BLD-RTO: 0 /100 WBC
PLATELET # BLD AUTO: 344 K/UL (ref 150–350)
PMV BLD AUTO: 9.5 FL (ref 9.2–12.9)
RBC # BLD AUTO: 4.74 M/UL (ref 4.6–6.2)
WBC # BLD AUTO: 6.78 K/UL (ref 3.9–12.7)

## 2021-01-28 PROCEDURE — 36415 COLL VENOUS BLD VENIPUNCTURE: CPT

## 2021-01-28 PROCEDURE — 85025 COMPLETE CBC W/AUTO DIFF WBC: CPT

## 2021-02-03 ENCOUNTER — PATIENT MESSAGE (OUTPATIENT)
Dept: INTERNAL MEDICINE | Facility: CLINIC | Age: 61
End: 2021-02-03

## 2021-02-06 ENCOUNTER — HOSPITAL ENCOUNTER (EMERGENCY)
Facility: HOSPITAL | Age: 61
Discharge: HOME | End: 2021-02-06
Attending: SURGERY
Payer: COMMERCIAL

## 2021-02-06 VITALS
SYSTOLIC BLOOD PRESSURE: 115 MMHG | DIASTOLIC BLOOD PRESSURE: 78 MMHG | RESPIRATION RATE: 16 BRPM | OXYGEN SATURATION: 97 % | TEMPERATURE: 98 F | HEART RATE: 71 BPM

## 2021-02-06 DIAGNOSIS — N39.0 URINARY TRACT INFECTION ASSOCIATED WITH CATHETERIZATION OF URINARY TRACT, UNSPECIFIED INDWELLING URINARY CATHETER TYPE, INITIAL ENCOUNTER: Primary | ICD-10-CM

## 2021-02-06 DIAGNOSIS — T83.511A URINARY TRACT INFECTION ASSOCIATED WITH CATHETERIZATION OF URINARY TRACT, UNSPECIFIED INDWELLING URINARY CATHETER TYPE, INITIAL ENCOUNTER: Primary | ICD-10-CM

## 2021-02-06 LAB
ALBUMIN SERPL BCP-MCNC: 4.3 G/DL (ref 3.5–5.2)
ALP SERPL-CCNC: 60 U/L (ref 55–135)
ALT SERPL W/O P-5'-P-CCNC: 16 U/L (ref 10–44)
ANION GAP SERPL CALC-SCNC: 11 MMOL/L (ref 8–16)
AST SERPL-CCNC: 13 U/L (ref 10–40)
BACTERIA #/AREA URNS HPF: ABNORMAL /HPF
BASOPHILS # BLD AUTO: 0.04 K/UL (ref 0–0.2)
BASOPHILS NFR BLD: 0.4 % (ref 0–1.9)
BILIRUB SERPL-MCNC: 0.4 MG/DL (ref 0.1–1)
BILIRUB UR QL STRIP: NEGATIVE
BUN SERPL-MCNC: 15 MG/DL (ref 6–20)
CALCIUM SERPL-MCNC: 9.9 MG/DL (ref 8.7–10.5)
CHLORIDE SERPL-SCNC: 105 MMOL/L (ref 95–110)
CLARITY UR: ABNORMAL
CO2 SERPL-SCNC: 24 MMOL/L (ref 23–29)
COLOR UR: YELLOW
CREAT SERPL-MCNC: 1.1 MG/DL (ref 0.5–1.4)
DIFFERENTIAL METHOD: ABNORMAL
EOSINOPHIL # BLD AUTO: 0.4 K/UL (ref 0–0.5)
EOSINOPHIL NFR BLD: 3.9 % (ref 0–8)
ERYTHROCYTE [DISTWIDTH] IN BLOOD BY AUTOMATED COUNT: 15.8 % (ref 11.5–14.5)
EST. GFR  (AFRICAN AMERICAN): >60 ML/MIN/1.73 M^2
EST. GFR  (NON AFRICAN AMERICAN): >60 ML/MIN/1.73 M^2
GLUCOSE SERPL-MCNC: 127 MG/DL (ref 70–110)
GLUCOSE UR QL STRIP: NEGATIVE
HCT VFR BLD AUTO: 42.8 % (ref 40–54)
HGB BLD-MCNC: 12.9 G/DL (ref 14–18)
HGB UR QL STRIP: ABNORMAL
HYALINE CASTS #/AREA URNS LPF: 0 /LPF
IMM GRANULOCYTES # BLD AUTO: 0.03 K/UL (ref 0–0.04)
IMM GRANULOCYTES NFR BLD AUTO: 0.3 % (ref 0–0.5)
KETONES UR QL STRIP: NEGATIVE
LEUKOCYTE ESTERASE UR QL STRIP: ABNORMAL
LYMPHOCYTES # BLD AUTO: 1.9 K/UL (ref 1–4.8)
LYMPHOCYTES NFR BLD: 20.1 % (ref 18–48)
MCH RBC QN AUTO: 24.3 PG (ref 27–31)
MCHC RBC AUTO-ENTMCNC: 30.1 G/DL (ref 32–36)
MCV RBC AUTO: 81 FL (ref 82–98)
MICROSCOPIC COMMENT: ABNORMAL
MONOCYTES # BLD AUTO: 0.5 K/UL (ref 0.3–1)
MONOCYTES NFR BLD: 5.3 % (ref 4–15)
NEUTROPHILS # BLD AUTO: 6.5 K/UL (ref 1.8–7.7)
NEUTROPHILS NFR BLD: 70 % (ref 38–73)
NITRITE UR QL STRIP: NEGATIVE
NRBC BLD-RTO: 0 /100 WBC
PH UR STRIP: 6 [PH] (ref 5–8)
PLATELET # BLD AUTO: 416 K/UL (ref 150–350)
PMV BLD AUTO: 9.5 FL (ref 9.2–12.9)
POTASSIUM SERPL-SCNC: 4.5 MMOL/L (ref 3.5–5.1)
PROT SERPL-MCNC: 8 G/DL (ref 6–8.4)
PROT UR QL STRIP: ABNORMAL
RBC # BLD AUTO: 5.3 M/UL (ref 4.6–6.2)
RBC #/AREA URNS HPF: >100 /HPF (ref 0–4)
SODIUM SERPL-SCNC: 140 MMOL/L (ref 136–145)
SP GR UR STRIP: >=1.03 (ref 1–1.03)
URN SPEC COLLECT METH UR: ABNORMAL
UROBILINOGEN UR STRIP-ACNC: NEGATIVE EU/DL
WBC # BLD AUTO: 9.3 K/UL (ref 3.9–12.7)
WBC #/AREA URNS HPF: >100 /HPF (ref 0–5)

## 2021-02-06 PROCEDURE — 99284 EMERGENCY DEPT VISIT MOD MDM: CPT | Mod: 25

## 2021-02-06 PROCEDURE — 51702 INSERT TEMP BLADDER CATH: CPT

## 2021-02-06 PROCEDURE — 85025 COMPLETE CBC W/AUTO DIFF WBC: CPT

## 2021-02-06 PROCEDURE — 87077 CULTURE AEROBIC IDENTIFY: CPT

## 2021-02-06 PROCEDURE — 87088 URINE BACTERIA CULTURE: CPT

## 2021-02-06 PROCEDURE — 87186 SC STD MICRODIL/AGAR DIL: CPT

## 2021-02-06 PROCEDURE — 87086 URINE CULTURE/COLONY COUNT: CPT

## 2021-02-06 PROCEDURE — 25000003 PHARM REV CODE 250: Performed by: SURGERY

## 2021-02-06 PROCEDURE — 63600175 PHARM REV CODE 636 W HCPCS: Performed by: SURGERY

## 2021-02-06 PROCEDURE — 80053 COMPREHEN METABOLIC PANEL: CPT

## 2021-02-06 PROCEDURE — 81000 URINALYSIS NONAUTO W/SCOPE: CPT

## 2021-02-06 PROCEDURE — 96372 THER/PROPH/DIAG INJ SC/IM: CPT | Mod: 59

## 2021-02-06 PROCEDURE — 36415 COLL VENOUS BLD VENIPUNCTURE: CPT

## 2021-02-06 RX ORDER — SULFAMETHOXAZOLE AND TRIMETHOPRIM 800; 160 MG/1; MG/1
1 TABLET ORAL 2 TIMES DAILY
Qty: 14 TABLET | Refills: 0 | Status: SHIPPED | OUTPATIENT
Start: 2021-02-06 | End: 2021-02-06 | Stop reason: SDUPTHER

## 2021-02-06 RX ORDER — LIDOCAINE AND PRILOCAINE 25; 25 MG/G; MG/G
CREAM TOPICAL
Status: COMPLETED | OUTPATIENT
Start: 2021-02-06 | End: 2021-02-06

## 2021-02-06 RX ORDER — SULFAMETHOXAZOLE AND TRIMETHOPRIM 800; 160 MG/1; MG/1
1 TABLET ORAL 2 TIMES DAILY
Qty: 14 TABLET | Refills: 0 | Status: SHIPPED | OUTPATIENT
Start: 2021-02-06 | End: 2021-02-09 | Stop reason: SDUPTHER

## 2021-02-06 RX ORDER — CEFTRIAXONE 1 G/1
1 INJECTION, POWDER, FOR SOLUTION INTRAMUSCULAR; INTRAVENOUS
Status: COMPLETED | OUTPATIENT
Start: 2021-02-06 | End: 2021-02-06

## 2021-02-06 RX ADMIN — CEFTRIAXONE SODIUM 1 G: 1 INJECTION, POWDER, FOR SOLUTION INTRAMUSCULAR; INTRAVENOUS at 01:02

## 2021-02-06 RX ADMIN — LIDOCAINE AND PRILOCAINE: 25; 25 CREAM TOPICAL at 01:02

## 2021-02-09 ENCOUNTER — OFFICE VISIT (OUTPATIENT)
Dept: UROLOGY | Facility: CLINIC | Age: 61
End: 2021-02-09
Payer: COMMERCIAL

## 2021-02-09 ENCOUNTER — PATIENT MESSAGE (OUTPATIENT)
Dept: SURGERY | Facility: HOSPITAL | Age: 61
End: 2021-02-09

## 2021-02-09 ENCOUNTER — TELEPHONE (OUTPATIENT)
Dept: UROLOGY | Facility: CLINIC | Age: 61
End: 2021-02-09

## 2021-02-09 VITALS
WEIGHT: 201.5 LBS | SYSTOLIC BLOOD PRESSURE: 118 MMHG | DIASTOLIC BLOOD PRESSURE: 76 MMHG | HEIGHT: 72 IN | BODY MASS INDEX: 27.29 KG/M2 | HEART RATE: 68 BPM

## 2021-02-09 DIAGNOSIS — N40.0 BENIGN PROSTATIC HYPERPLASIA, UNSPECIFIED WHETHER LOWER URINARY TRACT SYMPTOMS PRESENT: Primary | ICD-10-CM

## 2021-02-09 DIAGNOSIS — N13.30 HYDRONEPHROSIS OF RIGHT KIDNEY: ICD-10-CM

## 2021-02-09 DIAGNOSIS — R33.9 URINARY RETENTION: Primary | ICD-10-CM

## 2021-02-09 DIAGNOSIS — N13.8 BPH WITH URINARY OBSTRUCTION: ICD-10-CM

## 2021-02-09 DIAGNOSIS — N40.1 BPH WITH URINARY OBSTRUCTION: ICD-10-CM

## 2021-02-09 LAB — BACTERIA UR CULT: ABNORMAL

## 2021-02-09 PROCEDURE — 99214 OFFICE O/P EST MOD 30 MIN: CPT | Mod: S$GLB,,, | Performed by: UROLOGY

## 2021-02-09 PROCEDURE — 3074F PR MOST RECENT SYSTOLIC BLOOD PRESSURE < 130 MM HG: ICD-10-PCS | Mod: CPTII,S$GLB,, | Performed by: UROLOGY

## 2021-02-09 PROCEDURE — 3008F PR BODY MASS INDEX (BMI) DOCUMENTED: ICD-10-PCS | Mod: CPTII,S$GLB,, | Performed by: UROLOGY

## 2021-02-09 PROCEDURE — 3078F DIAST BP <80 MM HG: CPT | Mod: CPTII,S$GLB,, | Performed by: UROLOGY

## 2021-02-09 PROCEDURE — 3074F SYST BP LT 130 MM HG: CPT | Mod: CPTII,S$GLB,, | Performed by: UROLOGY

## 2021-02-09 PROCEDURE — 3008F BODY MASS INDEX DOCD: CPT | Mod: CPTII,S$GLB,, | Performed by: UROLOGY

## 2021-02-09 PROCEDURE — 3078F PR MOST RECENT DIASTOLIC BLOOD PRESSURE < 80 MM HG: ICD-10-PCS | Mod: CPTII,S$GLB,, | Performed by: UROLOGY

## 2021-02-09 PROCEDURE — 1126F PR PAIN SEVERITY QUANTIFIED, NO PAIN PRESENT: ICD-10-PCS | Mod: S$GLB,,, | Performed by: UROLOGY

## 2021-02-09 PROCEDURE — 1126F AMNT PAIN NOTED NONE PRSNT: CPT | Mod: S$GLB,,, | Performed by: UROLOGY

## 2021-02-09 PROCEDURE — 99999 PR PBB SHADOW E&M-EST. PATIENT-LVL III: ICD-10-PCS | Mod: PBBFAC,,, | Performed by: UROLOGY

## 2021-02-09 PROCEDURE — 99999 PR PBB SHADOW E&M-EST. PATIENT-LVL III: CPT | Mod: PBBFAC,,, | Performed by: UROLOGY

## 2021-02-09 PROCEDURE — 99214 PR OFFICE/OUTPT VISIT, EST, LEVL IV, 30-39 MIN: ICD-10-PCS | Mod: S$GLB,,, | Performed by: UROLOGY

## 2021-02-09 RX ORDER — SULFAMETHOXAZOLE AND TRIMETHOPRIM 800; 160 MG/1; MG/1
1 TABLET ORAL 2 TIMES DAILY
Qty: 14 TABLET | Refills: 0 | Status: SHIPPED | OUTPATIENT
Start: 2021-02-18 | End: 2021-02-18 | Stop reason: ALTCHOICE

## 2021-02-18 ENCOUNTER — TELEPHONE (OUTPATIENT)
Dept: PREADMISSION TESTING | Facility: HOSPITAL | Age: 61
End: 2021-02-18

## 2021-02-18 ENCOUNTER — TELEPHONE (OUTPATIENT)
Dept: UROLOGY | Facility: CLINIC | Age: 61
End: 2021-02-18

## 2021-02-18 RX ORDER — SULFAMETHOXAZOLE AND TRIMETHOPRIM 800; 160 MG/1; MG/1
1 TABLET ORAL 2 TIMES DAILY
Qty: 14 TABLET | Refills: 0 | Status: SHIPPED | OUTPATIENT
Start: 2021-02-18 | End: 2021-02-25

## 2021-02-22 ENCOUNTER — HOSPITAL ENCOUNTER (OUTPATIENT)
Dept: PREADMISSION TESTING | Facility: HOSPITAL | Age: 61
Discharge: HOME | End: 2021-02-22
Attending: NURSE PRACTITIONER
Payer: COMMERCIAL

## 2021-02-22 DIAGNOSIS — N40.0 BENIGN PROSTATIC HYPERPLASIA, UNSPECIFIED WHETHER LOWER URINARY TRACT SYMPTOMS PRESENT: ICD-10-CM

## 2021-02-22 PROCEDURE — U0003 INFECTIOUS AGENT DETECTION BY NUCLEIC ACID (DNA OR RNA); SEVERE ACUTE RESPIRATORY SYNDROME CORONAVIRUS 2 (SARS-COV-2) (CORONAVIRUS DISEASE [COVID-19]), AMPLIFIED PROBE TECHNIQUE, MAKING USE OF HIGH THROUGHPUT TECHNOLOGIES AS DESCRIBED BY CMS-2020-01-R: HCPCS

## 2021-02-22 PROCEDURE — U0005 INFEC AGEN DETEC AMPLI PROBE: HCPCS

## 2021-02-23 LAB — SARS-COV-2 RNA RESP QL NAA+PROBE: NOT DETECTED

## 2021-02-24 ENCOUNTER — TELEPHONE (OUTPATIENT)
Dept: UROLOGY | Facility: CLINIC | Age: 61
End: 2021-02-24

## 2021-02-25 ENCOUNTER — TELEPHONE (OUTPATIENT)
Dept: VASCULAR SURGERY | Facility: CLINIC | Age: 61
End: 2021-02-25

## 2021-02-25 RX ORDER — AMOXICILLIN AND CLAVULANATE POTASSIUM 875; 125 MG/1; MG/1
1 TABLET, FILM COATED ORAL 2 TIMES DAILY
Qty: 28 TABLET | Refills: 0 | Status: SHIPPED | OUTPATIENT
Start: 2021-02-25 | End: 2021-03-11

## 2021-03-02 ENCOUNTER — TELEPHONE (OUTPATIENT)
Dept: UROLOGY | Facility: CLINIC | Age: 61
End: 2021-03-02

## 2021-03-02 ENCOUNTER — HOSPITAL ENCOUNTER (OUTPATIENT)
Dept: PREADMISSION TESTING | Facility: HOSPITAL | Age: 61
Discharge: HOME | End: 2021-03-02
Attending: UROLOGY
Payer: COMMERCIAL

## 2021-03-02 ENCOUNTER — PATIENT MESSAGE (OUTPATIENT)
Dept: UROLOGY | Facility: CLINIC | Age: 61
End: 2021-03-02

## 2021-03-02 DIAGNOSIS — N40.1 BENIGN NON-NODULAR PROSTATIC HYPERPLASIA WITH LOWER URINARY TRACT SYMPTOMS: ICD-10-CM

## 2021-03-02 DIAGNOSIS — N40.1 BENIGN NON-NODULAR PROSTATIC HYPERPLASIA WITH LOWER URINARY TRACT SYMPTOMS: Primary | ICD-10-CM

## 2021-03-02 PROCEDURE — U0003 INFECTIOUS AGENT DETECTION BY NUCLEIC ACID (DNA OR RNA); SEVERE ACUTE RESPIRATORY SYNDROME CORONAVIRUS 2 (SARS-COV-2) (CORONAVIRUS DISEASE [COVID-19]), AMPLIFIED PROBE TECHNIQUE, MAKING USE OF HIGH THROUGHPUT TECHNOLOGIES AS DESCRIBED BY CMS-2020-01-R: HCPCS

## 2021-03-02 PROCEDURE — U0005 INFEC AGEN DETEC AMPLI PROBE: HCPCS

## 2021-03-03 LAB — SARS-COV-2 RNA RESP QL NAA+PROBE: NOT DETECTED

## 2021-03-04 ENCOUNTER — TELEPHONE (OUTPATIENT)
Dept: UROLOGY | Facility: CLINIC | Age: 61
End: 2021-03-04

## 2021-03-05 ENCOUNTER — HOSPITAL ENCOUNTER (OUTPATIENT)
Facility: HOSPITAL | Age: 61
Discharge: HOME | End: 2021-03-05
Attending: UROLOGY | Admitting: UROLOGY
Payer: COMMERCIAL

## 2021-03-05 ENCOUNTER — ANESTHESIA (OUTPATIENT)
Dept: SURGERY | Facility: HOSPITAL | Age: 61
End: 2021-03-05
Payer: COMMERCIAL

## 2021-03-05 ENCOUNTER — PATIENT MESSAGE (OUTPATIENT)
Dept: UROLOGY | Facility: CLINIC | Age: 61
End: 2021-03-05

## 2021-03-05 ENCOUNTER — ANESTHESIA EVENT (OUTPATIENT)
Dept: SURGERY | Facility: HOSPITAL | Age: 61
End: 2021-03-05
Payer: COMMERCIAL

## 2021-03-05 VITALS
OXYGEN SATURATION: 96 % | HEIGHT: 72 IN | HEART RATE: 52 BPM | WEIGHT: 200 LBS | SYSTOLIC BLOOD PRESSURE: 120 MMHG | BODY MASS INDEX: 27.09 KG/M2 | DIASTOLIC BLOOD PRESSURE: 74 MMHG | RESPIRATION RATE: 14 BRPM | TEMPERATURE: 98 F

## 2021-03-05 DIAGNOSIS — N40.1 BPH WITH OBSTRUCTION/LOWER URINARY TRACT SYMPTOMS: ICD-10-CM

## 2021-03-05 DIAGNOSIS — N40.1 BPH WITH URINARY OBSTRUCTION: ICD-10-CM

## 2021-03-05 DIAGNOSIS — R97.20 ELEVATED PSA: Primary | ICD-10-CM

## 2021-03-05 DIAGNOSIS — N13.8 BPH WITH URINARY OBSTRUCTION: ICD-10-CM

## 2021-03-05 DIAGNOSIS — N13.8 BPH WITH OBSTRUCTION/LOWER URINARY TRACT SYMPTOMS: ICD-10-CM

## 2021-03-05 DIAGNOSIS — N40.0 ENLARGED PROSTATE: ICD-10-CM

## 2021-03-05 LAB
ABO + RH BLD: NORMAL
BLD GP AB SCN CELLS X3 SERPL QL: NORMAL

## 2021-03-05 PROCEDURE — 88342 IMHCHEM/IMCYTCHM 1ST ANTB: CPT | Mod: 26,,, | Performed by: PATHOLOGY

## 2021-03-05 PROCEDURE — 52601 PROSTATECTOMY (TURP): CPT | Mod: ,,, | Performed by: UROLOGY

## 2021-03-05 PROCEDURE — 36000707: Performed by: UROLOGY

## 2021-03-05 PROCEDURE — 63600175 PHARM REV CODE 636 W HCPCS: Performed by: NURSE ANESTHETIST, CERTIFIED REGISTERED

## 2021-03-05 PROCEDURE — 88342 CHG IMMUNOCYTOCHEMISTRY: ICD-10-PCS | Mod: 26,,, | Performed by: PATHOLOGY

## 2021-03-05 PROCEDURE — 88341 PR IHC OR ICC EACH ADD'L SINGLE ANTIBODY  STAINPR: ICD-10-PCS | Mod: 26,,, | Performed by: PATHOLOGY

## 2021-03-05 PROCEDURE — 37000009 HC ANESTHESIA EA ADD 15 MINS: Performed by: UROLOGY

## 2021-03-05 PROCEDURE — 74420 PR  X-RAY RETROGRADE PYELOGRAM: ICD-10-PCS | Mod: 26,,, | Performed by: UROLOGY

## 2021-03-05 PROCEDURE — 71000015 HC POSTOP RECOV 1ST HR: Performed by: UROLOGY

## 2021-03-05 PROCEDURE — 37000008 HC ANESTHESIA 1ST 15 MINUTES: Performed by: UROLOGY

## 2021-03-05 PROCEDURE — D9220A PRA ANESTHESIA: Mod: ,,, | Performed by: ANESTHESIOLOGY

## 2021-03-05 PROCEDURE — 27201423 OPTIME MED/SURG SUP & DEVICES STERILE SUPPLY: Performed by: UROLOGY

## 2021-03-05 PROCEDURE — 52005 CYSTO W/URTRL CATHJ: CPT | Mod: 59,,, | Performed by: UROLOGY

## 2021-03-05 PROCEDURE — 63600175 PHARM REV CODE 636 W HCPCS: Performed by: STUDENT IN AN ORGANIZED HEALTH CARE EDUCATION/TRAINING PROGRAM

## 2021-03-05 PROCEDURE — 52005 PR CYSTOURETHROSCOPY,URETER CATHETER: ICD-10-PCS | Mod: 59,,, | Performed by: UROLOGY

## 2021-03-05 PROCEDURE — 25000003 PHARM REV CODE 250: Performed by: NURSE ANESTHETIST, CERTIFIED REGISTERED

## 2021-03-05 PROCEDURE — 88341 IMHCHEM/IMCYTCHM EA ADD ANTB: CPT | Mod: 26,,, | Performed by: PATHOLOGY

## 2021-03-05 PROCEDURE — 88305 TISSUE EXAM BY PATHOLOGIST: CPT | Performed by: PATHOLOGY

## 2021-03-05 PROCEDURE — 71000016 HC POSTOP RECOV ADDL HR: Performed by: UROLOGY

## 2021-03-05 PROCEDURE — 36000706: Performed by: UROLOGY

## 2021-03-05 PROCEDURE — 88305 TISSUE EXAM BY PATHOLOGIST: CPT | Mod: 26,,, | Performed by: PATHOLOGY

## 2021-03-05 PROCEDURE — 86900 BLOOD TYPING SEROLOGIC ABO: CPT | Performed by: STUDENT IN AN ORGANIZED HEALTH CARE EDUCATION/TRAINING PROGRAM

## 2021-03-05 PROCEDURE — 88341 IMHCHEM/IMCYTCHM EA ADD ANTB: CPT | Mod: 59 | Performed by: PATHOLOGY

## 2021-03-05 PROCEDURE — 88305 TISSUE EXAM BY PATHOLOGIST: ICD-10-PCS | Mod: 26,,, | Performed by: PATHOLOGY

## 2021-03-05 PROCEDURE — D9220A PRA ANESTHESIA: ICD-10-PCS | Mod: ,,, | Performed by: NURSE ANESTHETIST, CERTIFIED REGISTERED

## 2021-03-05 PROCEDURE — D9220A PRA ANESTHESIA: ICD-10-PCS | Mod: ,,, | Performed by: ANESTHESIOLOGY

## 2021-03-05 PROCEDURE — 25500020 PHARM REV CODE 255: Performed by: UROLOGY

## 2021-03-05 PROCEDURE — 74420 UROGRAPHY RTRGR +-KUB: CPT | Mod: 26,,, | Performed by: UROLOGY

## 2021-03-05 PROCEDURE — 71000044 HC DOSC ROUTINE RECOVERY FIRST HOUR: Performed by: UROLOGY

## 2021-03-05 PROCEDURE — 88342 IMHCHEM/IMCYTCHM 1ST ANTB: CPT | Performed by: PATHOLOGY

## 2021-03-05 PROCEDURE — D9220A PRA ANESTHESIA: Mod: ,,, | Performed by: NURSE ANESTHETIST, CERTIFIED REGISTERED

## 2021-03-05 PROCEDURE — 52601 PR TRANSURETHRAL ELEC-SURG PROSTATECTOM: ICD-10-PCS | Mod: ,,, | Performed by: UROLOGY

## 2021-03-05 PROCEDURE — C1758 CATHETER, URETERAL: HCPCS | Performed by: UROLOGY

## 2021-03-05 RX ORDER — HYDROMORPHONE HYDROCHLORIDE 1 MG/ML
0.2 INJECTION, SOLUTION INTRAMUSCULAR; INTRAVENOUS; SUBCUTANEOUS EVERY 5 MIN PRN
Status: DISCONTINUED | OUTPATIENT
Start: 2021-03-05 | End: 2021-03-05 | Stop reason: HOSPADM

## 2021-03-05 RX ORDER — FENTANYL CITRATE 50 UG/ML
INJECTION, SOLUTION INTRAMUSCULAR; INTRAVENOUS
Status: DISCONTINUED | OUTPATIENT
Start: 2021-03-05 | End: 2021-03-05

## 2021-03-05 RX ORDER — NEOSTIGMINE METHYLSULFATE 0.5 MG/ML
INJECTION, SOLUTION INTRAVENOUS
Status: DISCONTINUED | OUTPATIENT
Start: 2021-03-05 | End: 2021-03-05

## 2021-03-05 RX ORDER — OXYCODONE HYDROCHLORIDE 5 MG/1
5 TABLET ORAL EVERY 4 HOURS PRN
Qty: 5 TABLET | Refills: 0 | Status: SHIPPED | OUTPATIENT
Start: 2021-03-05 | End: 2021-04-13 | Stop reason: ALTCHOICE

## 2021-03-05 RX ORDER — ONDANSETRON 2 MG/ML
INJECTION INTRAMUSCULAR; INTRAVENOUS
Status: DISCONTINUED | OUTPATIENT
Start: 2021-03-05 | End: 2021-03-05

## 2021-03-05 RX ORDER — LIDOCAINE HYDROCHLORIDE 20 MG/ML
INJECTION, SOLUTION EPIDURAL; INFILTRATION; INTRACAUDAL; PERINEURAL
Status: DISCONTINUED | OUTPATIENT
Start: 2021-03-05 | End: 2021-03-05

## 2021-03-05 RX ORDER — ROCURONIUM BROMIDE 10 MG/ML
INJECTION, SOLUTION INTRAVENOUS
Status: DISCONTINUED | OUTPATIENT
Start: 2021-03-05 | End: 2021-03-05

## 2021-03-05 RX ORDER — PROPOFOL 10 MG/ML
VIAL (ML) INTRAVENOUS
Status: DISCONTINUED | OUTPATIENT
Start: 2021-03-05 | End: 2021-03-05

## 2021-03-05 RX ORDER — LIDOCAINE HYDROCHLORIDE 10 MG/ML
INJECTION INFILTRATION; PERINEURAL
Status: DISCONTINUED
Start: 2021-03-05 | End: 2021-03-05 | Stop reason: HOSPADM

## 2021-03-05 RX ORDER — PHENYLEPHRINE HCL IN 0.9% NACL 1 MG/10 ML
SYRINGE (ML) INTRAVENOUS
Status: DISCONTINUED | OUTPATIENT
Start: 2021-03-05 | End: 2021-03-05

## 2021-03-05 RX ORDER — OXYBUTYNIN CHLORIDE 5 MG/1
5 TABLET ORAL 3 TIMES DAILY PRN
Qty: 30 TABLET | Refills: 0 | Status: SHIPPED | OUTPATIENT
Start: 2021-03-05 | End: 2021-12-03

## 2021-03-05 RX ORDER — CEFTRIAXONE 1 G/1
1 INJECTION, POWDER, FOR SOLUTION INTRAMUSCULAR; INTRAVENOUS
Status: COMPLETED | OUTPATIENT
Start: 2021-03-05 | End: 2021-03-05

## 2021-03-05 RX ORDER — ONDANSETRON 2 MG/ML
4 INJECTION INTRAMUSCULAR; INTRAVENOUS DAILY PRN
Status: DISCONTINUED | OUTPATIENT
Start: 2021-03-05 | End: 2021-03-05 | Stop reason: HOSPADM

## 2021-03-05 RX ORDER — FENTANYL CITRATE 50 UG/ML
25 INJECTION, SOLUTION INTRAMUSCULAR; INTRAVENOUS EVERY 5 MIN PRN
Status: DISCONTINUED | OUTPATIENT
Start: 2021-03-05 | End: 2021-03-05 | Stop reason: HOSPADM

## 2021-03-05 RX ORDER — MIDAZOLAM HYDROCHLORIDE 1 MG/ML
INJECTION, SOLUTION INTRAMUSCULAR; INTRAVENOUS
Status: DISCONTINUED | OUTPATIENT
Start: 2021-03-05 | End: 2021-03-05

## 2021-03-05 RX ADMIN — CEFTRIAXONE 1 G: 1 INJECTION, POWDER, FOR SOLUTION INTRAMUSCULAR; INTRAVENOUS at 12:03

## 2021-03-05 RX ADMIN — FENTANYL CITRATE 50 MCG: 50 INJECTION INTRAMUSCULAR; INTRAVENOUS at 12:03

## 2021-03-05 RX ADMIN — GLYCOPYRROLATE 0.6 MCG: 0.2 INJECTION, SOLUTION INTRAMUSCULAR; INTRAVITREAL at 01:03

## 2021-03-05 RX ADMIN — MIDAZOLAM 2 MG: 1 INJECTION INTRAMUSCULAR; INTRAVENOUS at 12:03

## 2021-03-05 RX ADMIN — LIDOCAINE HYDROCHLORIDE 50 MG: 20 INJECTION, SOLUTION EPIDURAL; INFILTRATION; INTRACAUDAL at 12:03

## 2021-03-05 RX ADMIN — FENTANYL CITRATE 50 MCG: 50 INJECTION INTRAMUSCULAR; INTRAVENOUS at 01:03

## 2021-03-05 RX ADMIN — Medication 100 MCG: at 01:03

## 2021-03-05 RX ADMIN — SODIUM CHLORIDE: 0.9 INJECTION, SOLUTION INTRAVENOUS at 12:03

## 2021-03-05 RX ADMIN — ONDANSETRON 4 MG: 2 INJECTION INTRAMUSCULAR; INTRAVENOUS at 01:03

## 2021-03-05 RX ADMIN — ROCURONIUM BROMIDE 50 MG: 10 INJECTION, SOLUTION INTRAVENOUS at 12:03

## 2021-03-05 RX ADMIN — PROPOFOL 200 MG: 10 INJECTION, EMULSION INTRAVENOUS at 12:03

## 2021-03-05 RX ADMIN — NEOSTIGMINE METHYLSULFATE 5 MG: 0.5 INJECTION INTRAVENOUS at 01:03

## 2021-03-08 ENCOUNTER — TELEPHONE (OUTPATIENT)
Dept: UROLOGY | Facility: CLINIC | Age: 61
End: 2021-03-08

## 2021-03-15 LAB
COMMENT: NORMAL
FINAL PATHOLOGIC DIAGNOSIS: NORMAL
GROSS: NORMAL
Lab: NORMAL

## 2021-03-18 ENCOUNTER — TELEPHONE (OUTPATIENT)
Dept: UROLOGY | Facility: CLINIC | Age: 61
End: 2021-03-18

## 2021-03-18 DIAGNOSIS — C61 PROSTATE CANCER: Primary | ICD-10-CM

## 2021-04-13 ENCOUNTER — OFFICE VISIT (OUTPATIENT)
Dept: INTERNAL MEDICINE | Facility: CLINIC | Age: 61
End: 2021-04-13
Payer: COMMERCIAL

## 2021-04-13 VITALS
SYSTOLIC BLOOD PRESSURE: 110 MMHG | RESPIRATION RATE: 18 BRPM | WEIGHT: 193.56 LBS | HEART RATE: 76 BPM | BODY MASS INDEX: 26.22 KG/M2 | HEIGHT: 72 IN | DIASTOLIC BLOOD PRESSURE: 80 MMHG

## 2021-04-13 DIAGNOSIS — N30.01 ACUTE CYSTITIS WITH HEMATURIA: ICD-10-CM

## 2021-04-13 DIAGNOSIS — R30.0 DYSURIA: Primary | ICD-10-CM

## 2021-04-13 LAB
BILIRUB SERPL-MCNC: ABNORMAL MG/DL
BLOOD URINE, POC: ABNORMAL
CLARITY, POC UA: ABNORMAL
COLOR, POC UA: YELLOW
GLUCOSE UR QL STRIP: ABNORMAL
KETONES UR QL STRIP: ABNORMAL
LEUKOCYTE ESTERASE URINE, POC: ABNORMAL
NITRITE, POC UA: ABNORMAL
PH, POC UA: 5
PROTEIN, POC: ABNORMAL
SPECIFIC GRAVITY, POC UA: 1.02
UROBILINOGEN, POC UA: ABNORMAL

## 2021-04-13 PROCEDURE — 3008F BODY MASS INDEX DOCD: CPT | Mod: CPTII,S$GLB,, | Performed by: INTERNAL MEDICINE

## 2021-04-13 PROCEDURE — 81002 URINALYSIS NONAUTO W/O SCOPE: CPT | Mod: S$GLB,,, | Performed by: INTERNAL MEDICINE

## 2021-04-13 PROCEDURE — 1125F AMNT PAIN NOTED PAIN PRSNT: CPT | Mod: S$GLB,,, | Performed by: INTERNAL MEDICINE

## 2021-04-13 PROCEDURE — 3008F PR BODY MASS INDEX (BMI) DOCUMENTED: ICD-10-PCS | Mod: CPTII,S$GLB,, | Performed by: INTERNAL MEDICINE

## 2021-04-13 PROCEDURE — 81002 POCT URINE DIPSTICK WITHOUT MICROSCOPE: ICD-10-PCS | Mod: S$GLB,,, | Performed by: INTERNAL MEDICINE

## 2021-04-13 PROCEDURE — 99213 PR OFFICE/OUTPT VISIT, EST, LEVL III, 20-29 MIN: ICD-10-PCS | Mod: 25,S$GLB,, | Performed by: INTERNAL MEDICINE

## 2021-04-13 PROCEDURE — 99213 OFFICE O/P EST LOW 20 MIN: CPT | Mod: 25,S$GLB,, | Performed by: INTERNAL MEDICINE

## 2021-04-13 PROCEDURE — 99999 PR PBB SHADOW E&M-EST. PATIENT-LVL III: CPT | Mod: PBBFAC,,, | Performed by: INTERNAL MEDICINE

## 2021-04-13 PROCEDURE — 99999 PR PBB SHADOW E&M-EST. PATIENT-LVL III: ICD-10-PCS | Mod: PBBFAC,,, | Performed by: INTERNAL MEDICINE

## 2021-04-13 PROCEDURE — 1125F PR PAIN SEVERITY QUANTIFIED, PAIN PRESENT: ICD-10-PCS | Mod: S$GLB,,, | Performed by: INTERNAL MEDICINE

## 2021-04-13 RX ORDER — NITROFURANTOIN 25; 75 MG/1; MG/1
100 CAPSULE ORAL DAILY
Qty: 20 CAPSULE | Refills: 0 | Status: SHIPPED | OUTPATIENT
Start: 2021-04-13 | End: 2021-12-03

## 2021-04-24 NOTE — ED TRIAGE NOTES
60 y.o. male presents to ER qTrack 01/qTrk 01   Chief Complaint   Patient presents with    Urinary Catheter Problem     PT states his catheter came out and he needs it replaced   . No acute distress noted.  
1 or 2

## 2021-05-04 ENCOUNTER — PATIENT MESSAGE (OUTPATIENT)
Dept: RESEARCH | Facility: HOSPITAL | Age: 61
End: 2021-05-04

## 2021-05-10 ENCOUNTER — PATIENT MESSAGE (OUTPATIENT)
Dept: RESEARCH | Facility: HOSPITAL | Age: 61
End: 2021-05-10

## 2021-05-16 NOTE — SIGNIFICANT EVENT
Called to perform TTE to assess for type A dissection. Bedside TTE with normal EF, and no WMA. No aortic regurg. Proximal aorta difficult to assess past 2 cm from STJ, but no obvious dissection flap identified from what I was able to visualize. No dilation of sinuses or STJ. No pericardial effusion. Normal RV function.  Cr 2.8. CT without contrast with aneurysm of descending aorta and what appears to be dissection flap. There also appears to be a possible dissection flap in ascending aorta. This bedside TTE does not rule out type A dissection or aortic aneurysm, and would recommend emergent JANAE for appropriate assessment if clinical suspicion exists.    Yonis Melendez MD  Cardiology  PGY-5  Pager: [phone]     show

## 2021-06-08 DIAGNOSIS — I71.019 CHRONIC THORACIC AORTIC DISSECTION: Primary | ICD-10-CM

## 2021-06-08 DIAGNOSIS — I71.019 AORTIC DISSECTION DISTAL TO LEFT SUBCLAVIAN: ICD-10-CM

## 2021-06-08 DIAGNOSIS — I77.9 DISORDER OF ARTERIES AND ARTERIOLES, UNSPECIFIED: ICD-10-CM

## 2021-06-16 ENCOUNTER — OFFICE VISIT (OUTPATIENT)
Dept: UROLOGY | Facility: CLINIC | Age: 61
End: 2021-06-16
Payer: COMMERCIAL

## 2021-06-16 ENCOUNTER — LAB VISIT (OUTPATIENT)
Dept: LAB | Facility: HOSPITAL | Age: 61
End: 2021-06-16
Attending: FAMILY MEDICINE
Payer: COMMERCIAL

## 2021-06-16 VITALS
SYSTOLIC BLOOD PRESSURE: 127 MMHG | DIASTOLIC BLOOD PRESSURE: 79 MMHG | HEIGHT: 72 IN | WEIGHT: 192.88 LBS | HEART RATE: 57 BPM | BODY MASS INDEX: 26.12 KG/M2

## 2021-06-16 DIAGNOSIS — N40.1 BENIGN NON-NODULAR PROSTATIC HYPERPLASIA WITH LOWER URINARY TRACT SYMPTOMS: ICD-10-CM

## 2021-06-16 DIAGNOSIS — C61 PROSTATE CANCER: ICD-10-CM

## 2021-06-16 DIAGNOSIS — C61 PROSTATE CANCER: Primary | ICD-10-CM

## 2021-06-16 LAB
BACTERIA #/AREA URNS AUTO: ABNORMAL /HPF
BILIRUB SERPL-MCNC: NORMAL MG/DL
BILIRUB UR QL STRIP: NEGATIVE
BLOOD URINE, POC: NORMAL
CLARITY UR REFRACT.AUTO: CLEAR
COLOR UR AUTO: YELLOW
COLOR, POC UA: YELLOW
COMPLEXED PSA SERPL-MCNC: 2.3 NG/ML (ref 0–4)
GLUCOSE UR QL STRIP: NEGATIVE
GLUCOSE UR QL STRIP: NORMAL
HGB UR QL STRIP: NEGATIVE
KETONES UR QL STRIP: NEGATIVE
KETONES UR QL STRIP: NORMAL
LEUKOCYTE ESTERASE UR QL STRIP: ABNORMAL
LEUKOCYTE ESTERASE URINE, POC: NORMAL
MICROSCOPIC COMMENT: ABNORMAL
NITRITE UR QL STRIP: NEGATIVE
NITRITE, POC UA: NORMAL
PH UR STRIP: 5 [PH] (ref 5–8)
PH, POC UA: 1.02
PROT UR QL STRIP: NEGATIVE
PROTEIN, POC: NORMAL
RBC #/AREA URNS AUTO: 1 /HPF (ref 0–4)
SP GR UR STRIP: 1.01 (ref 1–1.03)
SPECIFIC GRAVITY, POC UA: 5
SQUAMOUS #/AREA URNS AUTO: 0 /HPF
URN SPEC COLLECT METH UR: ABNORMAL
UROBILINOGEN, POC UA: NORMAL
WBC #/AREA URNS AUTO: 21 /HPF (ref 0–5)

## 2021-06-16 PROCEDURE — 87086 URINE CULTURE/COLONY COUNT: CPT | Performed by: UROLOGY

## 2021-06-16 PROCEDURE — 99214 OFFICE O/P EST MOD 30 MIN: CPT | Mod: 25,S$GLB,, | Performed by: UROLOGY

## 2021-06-16 PROCEDURE — 99999 PR PBB SHADOW E&M-EST. PATIENT-LVL III: CPT | Mod: PBBFAC,,, | Performed by: UROLOGY

## 2021-06-16 PROCEDURE — 81001 URINALYSIS AUTO W/SCOPE: CPT | Performed by: UROLOGY

## 2021-06-16 PROCEDURE — 99999 PR PBB SHADOW E&M-EST. PATIENT-LVL III: ICD-10-PCS | Mod: PBBFAC,,, | Performed by: UROLOGY

## 2021-06-16 PROCEDURE — 1126F AMNT PAIN NOTED NONE PRSNT: CPT | Mod: S$GLB,,, | Performed by: UROLOGY

## 2021-06-16 PROCEDURE — 99214 PR OFFICE/OUTPT VISIT, EST, LEVL IV, 30-39 MIN: ICD-10-PCS | Mod: 25,S$GLB,, | Performed by: UROLOGY

## 2021-06-16 PROCEDURE — 81001 URINALYSIS AUTO W/SCOPE: CPT | Mod: S$GLB,,, | Performed by: UROLOGY

## 2021-06-16 PROCEDURE — 84153 ASSAY OF PSA TOTAL: CPT | Performed by: UROLOGY

## 2021-06-16 PROCEDURE — 3008F BODY MASS INDEX DOCD: CPT | Mod: CPTII,S$GLB,, | Performed by: UROLOGY

## 2021-06-16 PROCEDURE — 81001 POCT URINALYSIS, DIPSTICK OR TABLET REAGENT, AUTOMATED, WITH MICROSCOP: ICD-10-PCS | Mod: S$GLB,,, | Performed by: UROLOGY

## 2021-06-16 PROCEDURE — 3008F PR BODY MASS INDEX (BMI) DOCUMENTED: ICD-10-PCS | Mod: CPTII,S$GLB,, | Performed by: UROLOGY

## 2021-06-16 PROCEDURE — 36415 COLL VENOUS BLD VENIPUNCTURE: CPT | Performed by: UROLOGY

## 2021-06-16 PROCEDURE — 1126F PR PAIN SEVERITY QUANTIFIED, NO PAIN PRESENT: ICD-10-PCS | Mod: S$GLB,,, | Performed by: UROLOGY

## 2021-06-18 LAB — BACTERIA UR CULT: NO GROWTH

## 2021-06-21 ENCOUNTER — OFFICE VISIT (OUTPATIENT)
Dept: VASCULAR SURGERY | Facility: CLINIC | Age: 61
End: 2021-06-21
Attending: SURGERY
Payer: COMMERCIAL

## 2021-06-21 ENCOUNTER — HOSPITAL ENCOUNTER (OUTPATIENT)
Dept: RADIOLOGY | Facility: HOSPITAL | Age: 61
Discharge: HOME | End: 2021-06-21
Attending: SURGERY
Payer: COMMERCIAL

## 2021-06-21 ENCOUNTER — HOSPITAL ENCOUNTER (OUTPATIENT)
Dept: VASCULAR SURGERY | Facility: CLINIC | Age: 61
Discharge: HOME | End: 2021-06-21
Attending: SURGERY
Payer: COMMERCIAL

## 2021-06-21 VITALS
BODY MASS INDEX: 25.98 KG/M2 | SYSTOLIC BLOOD PRESSURE: 110 MMHG | HEIGHT: 72 IN | DIASTOLIC BLOOD PRESSURE: 72 MMHG | HEART RATE: 62 BPM | WEIGHT: 191.81 LBS | TEMPERATURE: 98 F

## 2021-06-21 DIAGNOSIS — I71.019 AORTIC DISSECTION DISTAL TO LEFT SUBCLAVIAN: ICD-10-CM

## 2021-06-21 DIAGNOSIS — I71.019 CHRONIC THORACIC AORTIC DISSECTION: ICD-10-CM

## 2021-06-21 DIAGNOSIS — I65.23 BILATERAL CAROTID ARTERY STENOSIS: ICD-10-CM

## 2021-06-21 DIAGNOSIS — I71.019 AORTIC DISSECTION DISTAL TO LEFT SUBCLAVIAN: Primary | ICD-10-CM

## 2021-06-21 LAB
CREAT SERPL-MCNC: 1 MG/DL (ref 0.5–1.4)
SAMPLE: NORMAL

## 2021-06-21 PROCEDURE — 71275 CTA CHEST ABDOMEN PELVIS: ICD-10-PCS | Mod: 26,,, | Performed by: RADIOLOGY

## 2021-06-21 PROCEDURE — 93880 EXTRACRANIAL BILAT STUDY: CPT | Mod: S$GLB,,, | Performed by: SURGERY

## 2021-06-21 PROCEDURE — 93880 PR DUPLEX SCAN EXTRACRANIAL,BILAT: ICD-10-PCS | Mod: S$GLB,,, | Performed by: SURGERY

## 2021-06-21 PROCEDURE — 1126F AMNT PAIN NOTED NONE PRSNT: CPT | Mod: S$GLB,,, | Performed by: SURGERY

## 2021-06-21 PROCEDURE — 3008F BODY MASS INDEX DOCD: CPT | Mod: CPTII,S$GLB,, | Performed by: SURGERY

## 2021-06-21 PROCEDURE — 74174 CTA CHEST ABDOMEN PELVIS: ICD-10-PCS | Mod: 26,,, | Performed by: RADIOLOGY

## 2021-06-21 PROCEDURE — 1126F PR PAIN SEVERITY QUANTIFIED, NO PAIN PRESENT: ICD-10-PCS | Mod: S$GLB,,, | Performed by: SURGERY

## 2021-06-21 PROCEDURE — 71275 CT ANGIOGRAPHY CHEST: CPT | Mod: TC

## 2021-06-21 PROCEDURE — 71275 CT ANGIOGRAPHY CHEST: CPT | Mod: 26,,, | Performed by: RADIOLOGY

## 2021-06-21 PROCEDURE — 3008F PR BODY MASS INDEX (BMI) DOCUMENTED: ICD-10-PCS | Mod: CPTII,S$GLB,, | Performed by: SURGERY

## 2021-06-21 PROCEDURE — 99999 PR PBB SHADOW E&M-EST. PATIENT-LVL III: CPT | Mod: PBBFAC,,, | Performed by: SURGERY

## 2021-06-21 PROCEDURE — 99999 PR PBB SHADOW E&M-EST. PATIENT-LVL III: ICD-10-PCS | Mod: PBBFAC,,, | Performed by: SURGERY

## 2021-06-21 PROCEDURE — 99213 PR OFFICE/OUTPT VISIT, EST, LEVL III, 20-29 MIN: ICD-10-PCS | Mod: S$GLB,,, | Performed by: SURGERY

## 2021-06-21 PROCEDURE — 99213 OFFICE O/P EST LOW 20 MIN: CPT | Mod: S$GLB,,, | Performed by: SURGERY

## 2021-06-21 PROCEDURE — 74174 CTA ABD&PLVS W/CONTRAST: CPT | Mod: TC

## 2021-06-21 PROCEDURE — 74174 CTA ABD&PLVS W/CONTRAST: CPT | Mod: 26,,, | Performed by: RADIOLOGY

## 2021-06-21 PROCEDURE — 25500020 PHARM REV CODE 255: Performed by: SURGERY

## 2021-06-21 RX ADMIN — IOHEXOL 100 ML: 350 INJECTION, SOLUTION INTRAVENOUS at 10:06

## 2021-08-10 NOTE — NURSING
Patient present for PPD placement  Pre-operative checklist completed and in pt chart. Pre-operative flowsheet completed. MD and Anesthesia at bedside to obtain consent and answer any questions for patient and family. Consent obtained and in chart. Patient assessed. VSS. Room air. Connected to portable monitor. Pulliam emptied. Oxygen and ambu bag in bed with patient for transport to the OR. Oncoming nurse at bedside to receive report.

## 2021-08-30 ENCOUNTER — HOSPITAL ENCOUNTER (EMERGENCY)
Facility: HOSPITAL | Age: 61
Discharge: HOME | End: 2021-08-30
Attending: SURGERY
Payer: COMMERCIAL

## 2021-08-30 VITALS
SYSTOLIC BLOOD PRESSURE: 131 MMHG | OXYGEN SATURATION: 97 % | WEIGHT: 185 LBS | BODY MASS INDEX: 25.09 KG/M2 | RESPIRATION RATE: 16 BRPM | TEMPERATURE: 97 F | DIASTOLIC BLOOD PRESSURE: 75 MMHG | HEART RATE: 67 BPM

## 2021-08-30 DIAGNOSIS — R53.1 WEAKNESS: ICD-10-CM

## 2021-08-30 DIAGNOSIS — E86.0 DEHYDRATION: Primary | ICD-10-CM

## 2021-08-30 LAB
ALBUMIN SERPL BCP-MCNC: 4.4 G/DL (ref 3.5–5.2)
ALP SERPL-CCNC: 37 U/L (ref 55–135)
ALT SERPL W/O P-5'-P-CCNC: 25 U/L (ref 10–44)
ANION GAP SERPL CALC-SCNC: 15 MMOL/L (ref 8–16)
AST SERPL-CCNC: 17 U/L (ref 10–40)
BASOPHILS # BLD AUTO: 0.07 K/UL (ref 0–0.2)
BASOPHILS NFR BLD: 0.7 % (ref 0–1.9)
BILIRUB SERPL-MCNC: 0.6 MG/DL (ref 0.1–1)
BNP SERPL-MCNC: 72 PG/ML (ref 0–99)
BUN SERPL-MCNC: 14 MG/DL (ref 6–20)
CALCIUM SERPL-MCNC: 10.3 MG/DL (ref 8.7–10.5)
CHLORIDE SERPL-SCNC: 109 MMOL/L (ref 95–110)
CK MB SERPL-MCNC: 2.3 NG/ML (ref 0.1–6.5)
CK MB SERPL-MCNC: 3.2 NG/ML (ref 0.1–6.5)
CK MB SERPL-RTO: 1.9 % (ref 0–5)
CK MB SERPL-RTO: 2.1 % (ref 0–5)
CK SERPL-CCNC: 122 U/L (ref 20–200)
CK SERPL-CCNC: 122 U/L (ref 20–200)
CK SERPL-CCNC: 153 U/L (ref 20–200)
CK SERPL-CCNC: 153 U/L (ref 20–200)
CO2 SERPL-SCNC: 20 MMOL/L (ref 23–29)
CREAT SERPL-MCNC: 1.2 MG/DL (ref 0.5–1.4)
DIFFERENTIAL METHOD: ABNORMAL
EOSINOPHIL # BLD AUTO: 0.7 K/UL (ref 0–0.5)
EOSINOPHIL NFR BLD: 7.2 % (ref 0–8)
ERYTHROCYTE [DISTWIDTH] IN BLOOD BY AUTOMATED COUNT: 13.2 % (ref 11.5–14.5)
EST. GFR  (AFRICAN AMERICAN): >60 ML/MIN/1.73 M^2
EST. GFR  (NON AFRICAN AMERICAN): >60 ML/MIN/1.73 M^2
GLUCOSE SERPL-MCNC: 106 MG/DL (ref 70–110)
HCT VFR BLD AUTO: 47.3 % (ref 40–54)
HGB BLD-MCNC: 15.6 G/DL (ref 14–18)
IMM GRANULOCYTES # BLD AUTO: 0.02 K/UL (ref 0–0.04)
IMM GRANULOCYTES NFR BLD AUTO: 0.2 % (ref 0–0.5)
LYMPHOCYTES # BLD AUTO: 3 K/UL (ref 1–4.8)
LYMPHOCYTES NFR BLD: 30.6 % (ref 18–48)
MCH RBC QN AUTO: 29 PG (ref 27–31)
MCHC RBC AUTO-ENTMCNC: 33 G/DL (ref 32–36)
MCV RBC AUTO: 88 FL (ref 82–98)
MONOCYTES # BLD AUTO: 0.6 K/UL (ref 0.3–1)
MONOCYTES NFR BLD: 6.1 % (ref 4–15)
NEUTROPHILS # BLD AUTO: 5.3 K/UL (ref 1.8–7.7)
NEUTROPHILS NFR BLD: 55.2 % (ref 38–73)
NRBC BLD-RTO: 0 /100 WBC
PLATELET # BLD AUTO: 233 K/UL (ref 150–450)
PMV BLD AUTO: 10.1 FL (ref 9.2–12.9)
POTASSIUM SERPL-SCNC: 4.2 MMOL/L (ref 3.5–5.1)
PROT SERPL-MCNC: 7 G/DL (ref 6–8.4)
RBC # BLD AUTO: 5.38 M/UL (ref 4.6–6.2)
SODIUM SERPL-SCNC: 144 MMOL/L (ref 136–145)
TROPONIN I SERPL DL<=0.01 NG/ML-MCNC: 0.01 NG/ML (ref 0–0.03)
TROPONIN I SERPL DL<=0.01 NG/ML-MCNC: 0.01 NG/ML (ref 0–0.03)
WBC # BLD AUTO: 9.68 K/UL (ref 3.9–12.7)

## 2021-08-30 PROCEDURE — 83880 ASSAY OF NATRIURETIC PEPTIDE: CPT | Performed by: SURGERY

## 2021-08-30 PROCEDURE — 82553 CREATINE MB FRACTION: CPT | Performed by: SURGERY

## 2021-08-30 PROCEDURE — 93010 EKG 12-LEAD: ICD-10-PCS | Mod: ,,, | Performed by: INTERNAL MEDICINE

## 2021-08-30 PROCEDURE — 96360 HYDRATION IV INFUSION INIT: CPT | Mod: 59

## 2021-08-30 PROCEDURE — 36415 COLL VENOUS BLD VENIPUNCTURE: CPT | Performed by: SURGERY

## 2021-08-30 PROCEDURE — 80053 COMPREHEN METABOLIC PANEL: CPT | Performed by: SURGERY

## 2021-08-30 PROCEDURE — 84484 ASSAY OF TROPONIN QUANT: CPT | Performed by: SURGERY

## 2021-08-30 PROCEDURE — 25500020 PHARM REV CODE 255: Performed by: SURGERY

## 2021-08-30 PROCEDURE — 93005 ELECTROCARDIOGRAM TRACING: CPT

## 2021-08-30 PROCEDURE — 93010 ELECTROCARDIOGRAM REPORT: CPT | Mod: ,,, | Performed by: INTERNAL MEDICINE

## 2021-08-30 PROCEDURE — 99285 EMERGENCY DEPT VISIT HI MDM: CPT | Mod: 25

## 2021-08-30 PROCEDURE — 85025 COMPLETE CBC W/AUTO DIFF WBC: CPT | Performed by: SURGERY

## 2021-08-30 PROCEDURE — 25000003 PHARM REV CODE 250: Performed by: SURGERY

## 2021-08-30 RX ORDER — SODIUM CHLORIDE 9 MG/ML
INJECTION, SOLUTION INTRAVENOUS
Status: COMPLETED | OUTPATIENT
Start: 2021-08-30 | End: 2021-08-30

## 2021-08-30 RX ADMIN — SODIUM CHLORIDE: 0.9 INJECTION, SOLUTION INTRAVENOUS at 12:08

## 2021-08-30 RX ADMIN — IOHEXOL 75 ML: 350 INJECTION, SOLUTION INTRAVENOUS at 02:08

## 2021-10-25 ENCOUNTER — PATIENT MESSAGE (OUTPATIENT)
Dept: HEMATOLOGY/ONCOLOGY | Facility: CLINIC | Age: 61
End: 2021-10-25

## 2021-10-25 ENCOUNTER — OFFICE VISIT (OUTPATIENT)
Dept: HEMATOLOGY/ONCOLOGY | Facility: CLINIC | Age: 61
End: 2021-10-25
Payer: COMMERCIAL

## 2021-10-25 DIAGNOSIS — Z80.42 FAMILY HISTORY OF PROSTATE CANCER: ICD-10-CM

## 2021-10-25 DIAGNOSIS — Z71.83 ENCOUNTER FOR NONPROCREATIVE GENETIC COUNSELING: Primary | ICD-10-CM

## 2021-10-25 DIAGNOSIS — C61 PROSTATE CANCER: ICD-10-CM

## 2021-10-25 DIAGNOSIS — Z86.010 HISTORY OF COLON POLYPS: ICD-10-CM

## 2021-10-25 DIAGNOSIS — Z86.010 HISTORY OF COLON POLYPS: Primary | ICD-10-CM

## 2021-10-25 PROCEDURE — 99999 PR PBB SHADOW E&M-EST. PATIENT-LVL III: CPT | Mod: PBBFAC,,, | Performed by: NURSE PRACTITIONER

## 2021-10-25 PROCEDURE — 99204 PR OFFICE/OUTPT VISIT, NEW, LEVL IV, 45-59 MIN: ICD-10-PCS | Mod: S$GLB,,, | Performed by: NURSE PRACTITIONER

## 2021-10-25 PROCEDURE — 1159F PR MEDICATION LIST DOCUMENTED IN MEDICAL RECORD: ICD-10-PCS | Mod: CPTII,S$GLB,, | Performed by: NURSE PRACTITIONER

## 2021-10-25 PROCEDURE — 99204 OFFICE O/P NEW MOD 45 MIN: CPT | Mod: S$GLB,,, | Performed by: NURSE PRACTITIONER

## 2021-10-25 PROCEDURE — 99999 PR PBB SHADOW E&M-EST. PATIENT-LVL III: ICD-10-PCS | Mod: PBBFAC,,, | Performed by: NURSE PRACTITIONER

## 2021-10-25 PROCEDURE — 1159F MED LIST DOCD IN RCRD: CPT | Mod: CPTII,S$GLB,, | Performed by: NURSE PRACTITIONER

## 2021-12-03 DIAGNOSIS — N40.1 BPH WITH URINARY OBSTRUCTION: ICD-10-CM

## 2021-12-03 DIAGNOSIS — N13.8 BPH WITH URINARY OBSTRUCTION: ICD-10-CM

## 2021-12-03 DIAGNOSIS — R33.9 URINARY RETENTION: ICD-10-CM

## 2021-12-03 RX ORDER — TAMSULOSIN HYDROCHLORIDE 0.4 MG/1
0.4 CAPSULE ORAL NIGHTLY
Qty: 90 CAPSULE | Refills: 3 | Status: SHIPPED | OUTPATIENT
Start: 2021-12-03 | End: 2023-02-01

## 2021-12-06 ENCOUNTER — PATIENT MESSAGE (OUTPATIENT)
Dept: VASCULAR SURGERY | Facility: CLINIC | Age: 61
End: 2021-12-06
Payer: COMMERCIAL

## 2021-12-07 DIAGNOSIS — I71.019 AORTIC DISSECTION DISTAL TO LEFT SUBCLAVIAN: Primary | ICD-10-CM

## 2021-12-07 DIAGNOSIS — I77.9 DISORDER OF ARTERIES AND ARTERIOLES, UNSPECIFIED: ICD-10-CM

## 2021-12-11 ENCOUNTER — PATIENT OUTREACH (OUTPATIENT)
Dept: ADMINISTRATIVE | Facility: OTHER | Age: 61
End: 2021-12-11
Payer: COMMERCIAL

## 2021-12-15 ENCOUNTER — OFFICE VISIT (OUTPATIENT)
Dept: UROLOGY | Facility: CLINIC | Age: 61
End: 2021-12-15
Payer: COMMERCIAL

## 2021-12-15 ENCOUNTER — TELEPHONE (OUTPATIENT)
Dept: VASCULAR SURGERY | Facility: CLINIC | Age: 61
End: 2021-12-15
Payer: COMMERCIAL

## 2021-12-15 ENCOUNTER — LAB VISIT (OUTPATIENT)
Dept: LAB | Facility: HOSPITAL | Age: 61
End: 2021-12-15
Attending: UROLOGY
Payer: COMMERCIAL

## 2021-12-15 DIAGNOSIS — N13.8 BPH WITH URINARY OBSTRUCTION: ICD-10-CM

## 2021-12-15 DIAGNOSIS — C61 PROSTATE CANCER: Primary | ICD-10-CM

## 2021-12-15 DIAGNOSIS — N40.1 BPH WITH URINARY OBSTRUCTION: ICD-10-CM

## 2021-12-15 DIAGNOSIS — C61 PROSTATE CANCER: ICD-10-CM

## 2021-12-15 LAB — COMPLEXED PSA SERPL-MCNC: 1 NG/ML (ref 0–4)

## 2021-12-15 PROCEDURE — 99214 PR OFFICE/OUTPT VISIT, EST, LEVL IV, 30-39 MIN: ICD-10-PCS | Mod: S$GLB,,, | Performed by: UROLOGY

## 2021-12-15 PROCEDURE — 99999 PR PBB SHADOW E&M-EST. PATIENT-LVL III: CPT | Mod: PBBFAC,,, | Performed by: UROLOGY

## 2021-12-15 PROCEDURE — 99999 PR PBB SHADOW E&M-EST. PATIENT-LVL III: ICD-10-PCS | Mod: PBBFAC,,, | Performed by: UROLOGY

## 2021-12-15 PROCEDURE — 84153 ASSAY OF PSA TOTAL: CPT | Performed by: UROLOGY

## 2021-12-15 PROCEDURE — 99214 OFFICE O/P EST MOD 30 MIN: CPT | Mod: S$GLB,,, | Performed by: UROLOGY

## 2021-12-15 PROCEDURE — 36415 COLL VENOUS BLD VENIPUNCTURE: CPT | Performed by: UROLOGY

## 2021-12-15 RX ORDER — LABETALOL 200 MG/1
200 TABLET, FILM COATED ORAL 2 TIMES DAILY
Qty: 60 TABLET | Refills: 11 | Status: SHIPPED | OUTPATIENT
Start: 2021-12-15 | End: 2023-07-28 | Stop reason: SDUPTHER

## 2021-12-15 RX ORDER — NAPROXEN SODIUM 220 MG/1
81 TABLET, FILM COATED ORAL DAILY
Qty: 360 TABLET | Refills: 0 | Status: SHIPPED | OUTPATIENT
Start: 2021-12-15 | End: 2024-04-01

## 2021-12-15 RX ORDER — CLOPIDOGREL BISULFATE 75 MG/1
75 TABLET ORAL DAILY
Qty: 90 TABLET | Refills: 3 | Status: SHIPPED | OUTPATIENT
Start: 2021-12-15 | End: 2023-02-03 | Stop reason: SDUPTHER

## 2021-12-15 RX ORDER — NAPROXEN SODIUM 220 MG/1
81 TABLET, FILM COATED ORAL DAILY
Qty: 360 TABLET | Refills: 0 | Status: SHIPPED | OUTPATIENT
Start: 2021-12-15 | End: 2021-12-15 | Stop reason: SDUPTHER

## 2021-12-15 RX ORDER — CLOPIDOGREL BISULFATE 75 MG/1
75 TABLET ORAL DAILY
Qty: 90 TABLET | Refills: 3 | Status: SHIPPED | OUTPATIENT
Start: 2021-12-15 | End: 2021-12-15 | Stop reason: SDUPTHER

## 2021-12-20 ENCOUNTER — OFFICE VISIT (OUTPATIENT)
Dept: VASCULAR SURGERY | Facility: CLINIC | Age: 61
End: 2021-12-20
Attending: SURGERY
Payer: COMMERCIAL

## 2021-12-20 ENCOUNTER — HOSPITAL ENCOUNTER (OUTPATIENT)
Dept: RADIOLOGY | Facility: HOSPITAL | Age: 61
Discharge: HOME | End: 2021-12-20
Attending: SURGERY
Payer: COMMERCIAL

## 2021-12-20 VITALS
WEIGHT: 200.38 LBS | TEMPERATURE: 98 F | RESPIRATION RATE: 18 BRPM | HEIGHT: 72 IN | SYSTOLIC BLOOD PRESSURE: 115 MMHG | BODY MASS INDEX: 27.14 KG/M2 | HEART RATE: 59 BPM | DIASTOLIC BLOOD PRESSURE: 58 MMHG

## 2021-12-20 DIAGNOSIS — I71.019 CHRONIC THORACIC AORTIC DISSECTION: ICD-10-CM

## 2021-12-20 DIAGNOSIS — I10 HYPERTENSION, UNSPECIFIED TYPE: ICD-10-CM

## 2021-12-20 DIAGNOSIS — I71.019 AORTIC DISSECTION DISTAL TO LEFT SUBCLAVIAN: ICD-10-CM

## 2021-12-20 DIAGNOSIS — I71.019 AORTIC DISSECTION DISTAL TO LEFT SUBCLAVIAN: Primary | ICD-10-CM

## 2021-12-20 PROCEDURE — 99999 PR PBB SHADOW E&M-EST. PATIENT-LVL III: CPT | Mod: PBBFAC,,, | Performed by: SURGERY

## 2021-12-20 PROCEDURE — 71275 CT ANGIOGRAPHY CHEST: CPT | Mod: TC

## 2021-12-20 PROCEDURE — 3078F PR MOST RECENT DIASTOLIC BLOOD PRESSURE < 80 MM HG: ICD-10-PCS | Mod: CPTII,S$GLB,, | Performed by: SURGERY

## 2021-12-20 PROCEDURE — 25500020 PHARM REV CODE 255: Performed by: SURGERY

## 2021-12-20 PROCEDURE — 99213 OFFICE O/P EST LOW 20 MIN: CPT | Mod: S$GLB,,, | Performed by: SURGERY

## 2021-12-20 PROCEDURE — 3074F PR MOST RECENT SYSTOLIC BLOOD PRESSURE < 130 MM HG: ICD-10-PCS | Mod: CPTII,S$GLB,, | Performed by: SURGERY

## 2021-12-20 PROCEDURE — 3008F BODY MASS INDEX DOCD: CPT | Mod: CPTII,S$GLB,, | Performed by: SURGERY

## 2021-12-20 PROCEDURE — 3008F PR BODY MASS INDEX (BMI) DOCUMENTED: ICD-10-PCS | Mod: CPTII,S$GLB,, | Performed by: SURGERY

## 2021-12-20 PROCEDURE — 71275 CTA CHEST ABDOMEN PELVIS: ICD-10-PCS | Mod: 26,,, | Performed by: RADIOLOGY

## 2021-12-20 PROCEDURE — 99213 PR OFFICE/OUTPT VISIT, EST, LEVL III, 20-29 MIN: ICD-10-PCS | Mod: S$GLB,,, | Performed by: SURGERY

## 2021-12-20 PROCEDURE — 74174 CTA ABD&PLVS W/CONTRAST: CPT | Mod: 26,,, | Performed by: RADIOLOGY

## 2021-12-20 PROCEDURE — 1159F PR MEDICATION LIST DOCUMENTED IN MEDICAL RECORD: ICD-10-PCS | Mod: CPTII,S$GLB,, | Performed by: SURGERY

## 2021-12-20 PROCEDURE — 3078F DIAST BP <80 MM HG: CPT | Mod: CPTII,S$GLB,, | Performed by: SURGERY

## 2021-12-20 PROCEDURE — 74174 CTA CHEST ABDOMEN PELVIS: ICD-10-PCS | Mod: 26,,, | Performed by: RADIOLOGY

## 2021-12-20 PROCEDURE — 74174 CTA ABD&PLVS W/CONTRAST: CPT | Mod: TC

## 2021-12-20 PROCEDURE — 3074F SYST BP LT 130 MM HG: CPT | Mod: CPTII,S$GLB,, | Performed by: SURGERY

## 2021-12-20 PROCEDURE — 1159F MED LIST DOCD IN RCRD: CPT | Mod: CPTII,S$GLB,, | Performed by: SURGERY

## 2021-12-20 PROCEDURE — 71275 CT ANGIOGRAPHY CHEST: CPT | Mod: 26,,, | Performed by: RADIOLOGY

## 2021-12-20 PROCEDURE — 99999 PR PBB SHADOW E&M-EST. PATIENT-LVL III: ICD-10-PCS | Mod: PBBFAC,,, | Performed by: SURGERY

## 2021-12-20 RX ADMIN — IOHEXOL 75 ML: 350 INJECTION, SOLUTION INTRAVENOUS at 08:12

## 2021-12-20 NOTE — PROGRESS NOTES
VASCULAR SURGERY NOTE    Patient ID: Luis Eduardo Curry is a 61 y.o. male.    I. HISTORY     Chief Complaint: type B aortic dissection    Interval History (12/20/21):   Patient presents to clinic today for a follow up visit.  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  He denies any chest pain.  Denies any back pain.  He has been able to return to his normal daily activities.  He is working full-time.  He has returned to hunting.  He denies any issues with the aspirin or Plavix.  His blood pressures been well controlled his current medications.  He does complain of some intermittent pain in the middle the night in his left leg over the past week.  This pain does not change with motion or ambulation.  He denies numbness or tingling.    Social History: Does not smoke, not drinking, no drugs    Family history: No fam hx of aneurysm/dissection    Past Medical History:   Diagnosis Date    Aortic dissection     Sleep apnea         Past Surgical History:   Procedure Laterality Date    BIOPSY WITH TRANSRECTAL ULTRASOUND (TRUS) GUIDANCE N/A 5/1/2019    Procedure: BIOPSY, WITH TRANSRECTAL PROSTATE ULTRASOUND;  Surgeon: Chintan Mendez Jr., MD;  Location: [facility];  Service: Urology;  Laterality: N/A;    COLONOSCOPY  2006    COLONOSCOPY N/A 1/24/2019    Procedure: COLONOSCOPY;  Surgeon: Reece Tarango MD;  Location: [facility];  Service: Endoscopy;  Laterality: N/A;    CREATION OF AORTO-CAROTID BYPASS WITH GRAFT Left 11/16/2020    Procedure: CREATION, BYPASS, ARTERIAL, AORTA TO CAROTID, USING GRAFT SUBCLAVIAN BYPASS;  Surgeon: JOSY Santos II, MD;  Location: [address];  Service: Cardiovascular;  Laterality: Left;  left common carotid to subclavian bypass    CYSTOSCOPY N/A 5/1/2019    Procedure: FLEXIBLE  CYSTOSCOPY;  Surgeon: Chintan Mendez Jr., MD;  Location: [facility];  Service: Urology;  Laterality: N/A;    ENDOVASCULAR GRAFT REPAIR OF ANEURYSM OF THORACIC AORTA N/A 11/17/2020    Procedure: REPAIR, ANEURYSM, ENDOVASCULAR GRAFT, AORTA, THORACIC Left brachial cutdown;  Surgeon: JOSY Santos II, MD;  Location: [facility] OR 2ND FLR;  Service: Cardiovascular;  Laterality: N/A;  Fluoro time 34.7 min  2002.64 mGy  432.18 Gycm2    RETROGRADE PYELOGRAPHY Right 3/5/2021    Procedure: PYELOGRAM, RETROGRADE;  Surgeon: Yuriy Woo MD;  Location: [facility] OR 1ST FLR;  Service: Urology;  Laterality: Right;    TRANSURETHRAL RESECTION OF PROSTATE N/A 2/25/2021    Procedure: TURP (TRANSURETHRAL RESECTION OF PROSTATE) BIPOLOR;  Surgeon: Yuriy Woo MD;  [facility];  Service: Urology;  Laterality: N/A;  2 hours      TRANSURETHRAL RESECTION OF PROSTATE N/A 3/5/2021    Procedure: TURP (TRANSURETHRAL RESECTION OF PROSTATE)/ BIPOLOR;  Surgeon: Yuriy Woo MD;  Location: [facility] OR [address] [place] [address];  Service: Urology;  Laterality: N/A;    UMBILICAL HERNIA REPAIR N/A 1/14/2020    Procedure: REPAIR, HERNIA, UMBILICAL;  Surgeon: Caio Mas Jr., MD;  Location: [facility];  Service: General;  Laterality: N/A;       Social History     Occupational History    Not on file   Tobacco Use    Smoking status: Former Smoker     Packs/day: 1.00     Years: 30.00     Pack years: 30.00     Types: Cigarettes    Smokeless tobacco: Never Used   Substance and Sexual Activity    Alcohol use: Not Currently     Comment: socially    Drug use: No    Sexual activity: Yes         Review of Systems   Constitutional: Negative for weight loss.   HENT: Negative for ear pain and nosebleeds.    Eyes: Negative for discharge and pain.   Cardiovascular: Negative for chest pain and palpitations.   Respiratory: Negative for cough, shortness of breath and wheezing.    Endocrine: Negative for cold intolerance, heat intolerance and polydipsia.    Hematologic/Lymphatic: Negative for adenopathy. Does not bruise/bleed easily.   Skin: Negative for itching and rash.   Musculoskeletal: Negative for joint swelling and muscle cramps.   Gastrointestinal: Negative for abdominal pain, diarrhea, nausea and vomiting.   Genitourinary: Negative for dysuria and flank pain.   Neurological: Negative for numbness and seizures.             II. PHYSICAL EXAM   Physical Exam  Constitutional:       General: He is not in acute distress.     Appearance: Normal appearance. He is normal weight. He is not ill-appearing or diaphoretic.   HENT:      Head: Normocephalic and atraumatic.   Eyes:      General: No scleral icterus.        Right eye: No discharge.         Left eye: No discharge.      Extraocular Movements: Extraocular movements intact.      Conjunctiva/sclera: Conjunctivae normal.   Cardiovascular:      Rate and Rhythm: Normal rate and regular rhythm.      Comments:   1+ L brachial pulse  2+ R brachial pulse    2+ DP pulses bilaterally  Pulmonary:      Effort: Pulmonary effort is normal. No respiratory distress.   Musculoskeletal:         General: Normal range of motion.      Cervical back: Normal range of motion and neck supple.      Right lower leg: No edema.      Left lower leg: No edema.   Skin:     General: Skin is warm and dry.      Coloration: Skin is not jaundiced or pale.      Findings: No erythema or rash.   Neurological:      General: No focal deficit present.      Mental Status: He is alert and oriented to person, place, and time. Mental status is at baseline.      Cranial Nerves: No cranial nerve deficit.   Psychiatric:         Mood and Affect: Mood normal.         Behavior: Behavior normal.          LUE BP:      III. ASSESSMENT & PLAN (MEDICAL DECISION MAKING)     1. Aortic dissection distal to left subclavian    2. Chronic thoracic aortic dissection    3. Hypertension, unspecified type        Imaging Results:  (all imaging has been personally reviewed and  interpreted by me below)  CTA CHEST/ABD/PELVIS 12/20/21:  L carotid-subclavian bypass is patent with no evidence of stenosis. The false lumen fills with contrast in the distal descending thoracic aorta via intercostals and remains patent through the abdominal aorta filling via the LEVI and a right lumbar artery. 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 The SMA is widely patent without stenosis. The right renal stent is patent and both renal arteries widely patent as well. The dissection flap terminates at the aortic bifurcation and both iliac arteries fill via the true lumen. There is good expansion of the true lumen through the abdominal aorta without increase in size or aneurysm in the abdominal aorta. There is slight growth in the size of the aorta at the level of T11 vertebra compared to the last CTA in June 2021 but all of this growth appears to be in the true lumen while the false lumen remains stable and is filling less briskly than before. Current and (prior measurements) at T11 vertebral level below. Axial measurements are not orthogonal and thus do not indicate the true diameter of the total aorta but are documented for comparison of true and false lumens diameters.  Prior measurements in parentheses    T11 measurements:  Axial: Total: 49mm (43). True 20mm (14). False 29mm (29)  Sagital: 39mm (37). Max true lumen diameter 18mm (16).  Coronal:  39mm (37). Max true lumen diameter 19mm (17mm)    CTA C/A/P 6/21/21:   L carotid-subclavian bypass patent.  Left common carotid stent(snorkel) is widely patent.  Decreased size of thoracic aorta. Persistent filling of false lumen in distal thoracic aorta and abdominal aorta without aneurysmal degeneration.  Right renal artery stent is patent.  The celiac artery is severely stenotic at its origin and may be occluded.  The SMA is widely patent.  Left renal artery is widely patent.  The bilateral iliac and femoral arteries are  widely patent.    Carotid Duplex ultrasound 06/21/2021:  Left CCA with normal upstroke.  Left carotid subclavian bypass widely patent with no evidence of stenosis.  Outflow subclavian artery with normal triphasic waveform.  Left vertebral artery with antegrade flow but blunted upstroke.  No evidence of carotid artery stenosis.    Assessment/Diagnosis and Plan:  61 y.o. male s/p above doing well.  Total diameter of the aorta at level of T11 has grown slightly in 6 months but appears to be mostly true lumen expansion without expansion of the false lumen.  No indication for any additional surgical intervention at this time. Will continue regular surveillance with repeat CTA in 6 months. Recommend continued aspirin and Plavix to maintain left common carotid snorkel patency.    -Follow up for repeat CT scan in 6 months  -continue goal SBP <120 and goal HR <80-- continue home labetalol  -Continue ASA indefinitely  -Continue home plavix indefinitely for left common carotid stent patency (refill supplied)      JOSY Santos II, MD, RPVI  Vascular Surgeon  [facility]

## 2022-01-05 ENCOUNTER — LAB VISIT (OUTPATIENT)
Dept: FAMILY MEDICINE | Facility: CLINIC | Age: 62
End: 2022-01-05
Payer: COMMERCIAL

## 2022-01-05 DIAGNOSIS — Z11.52 ENCOUNTER FOR SCREENING FOR COVID-19: Primary | ICD-10-CM

## 2022-01-05 LAB
CTP QC/QA: YES
SARS-COV-2 AG RESP QL IA.RAPID: POSITIVE

## 2022-01-05 PROCEDURE — 87811 SARS CORONAVIRUS 2 ANTIGEN POCT, MANUAL READ: ICD-10-PCS | Mod: S$GLB,,, | Performed by: INTERNAL MEDICINE

## 2022-01-05 PROCEDURE — 87811 SARS-COV-2 COVID19 W/OPTIC: CPT | Mod: S$GLB,,, | Performed by: INTERNAL MEDICINE

## 2022-01-05 NOTE — PROGRESS NOTES
Instructions for Patients with Confirmed or Suspected COVID-19    If you are awaiting your test result, you will either be called or it will be released to the patient portal.  If you have any questions about your test, please visit [email or web address] or call our COVID-19 information line at [phone].      Please isolate yourself at home.  You may leave home and/or return to work once the following conditions are met:    If you have symptoms and tested positive:   More than 5 days since symptoms first appeared AND   More than 24 hours fever free without medications AND       symptoms have improved   · For five days after ending isolation, masks are required.    If you had no symptoms but tested positive:   More than 5 days since the date of the first positive test. If you develop symptoms, then use the guidelines above  · For five days after ending isolation, masks are required.      Testing is not recommended if you are symptom free after completing isolation.

## 2022-03-10 ENCOUNTER — TELEPHONE (OUTPATIENT)
Dept: SURGERY | Facility: CLINIC | Age: 62
End: 2022-03-10
Payer: COMMERCIAL

## 2022-03-18 NOTE — PLAN OF CARE
CM notified by SICU team that patient will discharge home today.  No needs identified.  CM remains available for questions/concerns.         11/19/20 1301   Final Note   Assessment Type Final Discharge Note   Anticipated Discharge Disposition Home       Casie Abreu MPH, RN, CM  Ext. [phone]         Yes

## 2022-06-17 ENCOUNTER — LAB VISIT (OUTPATIENT)
Dept: LAB | Facility: HOSPITAL | Age: 62
End: 2022-06-17
Attending: UROLOGY
Payer: COMMERCIAL

## 2022-06-17 DIAGNOSIS — C61 PROSTATE CANCER: ICD-10-CM

## 2022-06-17 LAB — COMPLEXED PSA SERPL-MCNC: 1.6 NG/ML (ref 0–4)

## 2022-06-17 PROCEDURE — 36415 COLL VENOUS BLD VENIPUNCTURE: CPT | Performed by: UROLOGY

## 2022-06-17 PROCEDURE — 84153 ASSAY OF PSA TOTAL: CPT | Performed by: UROLOGY

## 2022-06-20 ENCOUNTER — OFFICE VISIT (OUTPATIENT)
Dept: UROLOGY | Facility: CLINIC | Age: 62
End: 2022-06-20
Payer: COMMERCIAL

## 2022-06-20 DIAGNOSIS — N40.1 BENIGN NON-NODULAR PROSTATIC HYPERPLASIA WITH LOWER URINARY TRACT SYMPTOMS: ICD-10-CM

## 2022-06-20 DIAGNOSIS — C61 PROSTATE CANCER: Primary | ICD-10-CM

## 2022-06-20 PROCEDURE — 1159F PR MEDICATION LIST DOCUMENTED IN MEDICAL RECORD: ICD-10-PCS | Mod: CPTII,S$GLB,, | Performed by: UROLOGY

## 2022-06-20 PROCEDURE — 1160F RVW MEDS BY RX/DR IN RCRD: CPT | Mod: CPTII,S$GLB,, | Performed by: UROLOGY

## 2022-06-20 PROCEDURE — 99214 OFFICE O/P EST MOD 30 MIN: CPT | Mod: S$GLB,,, | Performed by: UROLOGY

## 2022-06-20 PROCEDURE — 99214 PR OFFICE/OUTPT VISIT, EST, LEVL IV, 30-39 MIN: ICD-10-PCS | Mod: S$GLB,,, | Performed by: UROLOGY

## 2022-06-20 PROCEDURE — 1160F PR REVIEW ALL MEDS BY PRESCRIBER/CLIN PHARMACIST DOCUMENTED: ICD-10-PCS | Mod: CPTII,S$GLB,, | Performed by: UROLOGY

## 2022-06-20 PROCEDURE — 1159F MED LIST DOCD IN RCRD: CPT | Mod: CPTII,S$GLB,, | Performed by: UROLOGY

## 2022-06-20 PROCEDURE — 99999 PR PBB SHADOW E&M-EST. PATIENT-LVL III: CPT | Mod: PBBFAC,,, | Performed by: UROLOGY

## 2022-06-20 PROCEDURE — 99999 PR PBB SHADOW E&M-EST. PATIENT-LVL III: ICD-10-PCS | Mod: PBBFAC,,, | Performed by: UROLOGY

## 2022-06-20 NOTE — PROGRESS NOTES
"    Urology - [facility]    SUBJECTIVE:     Chief Complaint: Urinary retention    History of Present Illness:  Luis Eduardo Curry is a 62 y.o. male who presents to clinic for urinary retention. He is Established to our clinic.    Patient had type B aortic dissection extending past renal arteries w/ false lumen supplying L kidney. S/p carotid to subclavian bypass on 11/16 followed by TEVAR on 11/17. Went into urinary retention during that admission necessitating Buckley with 2 L output on initial buckley placement. CHRISTINE w/ Cr 2.3 (b/l 1.1) which has improved to 1.0. Failed outpatient VT 10/16/20 and 11/9/20. Patient had baseline LUTS prior to this admission for which he was on Flomax. He had to strain to void, weakened stream, frequency, and nocturia x2-3.    PSA 4.6 in 1/2019. TRUS bx w/ Dr. Mendez in 5/2019 was negative for malignancy. Volume 46 g. Cysto revealed 4 cm prostatic length with lateral lobe hyperplasia. Trabeculated bladder.    Underwent a TURP on 3/5/21.  Doing well.  On his pathology showed 40% tissue involvement of [place] 3+3 prostate cancer.     No complaints today.  He is voiding very well.  He is pleased.  He underwent a PSA on June 17, 2022 which was 1.6.    Lab Results   Component Value Date    PSA 4.6 (H) 01/05/2019    PSADIAG 1.6 06/17/2022    PSADIAG 1.0 12/15/2021    PSADIAG 2.3 06/16/2021           PROSTATE CHIPS, TRANSURETHRAL RESECTION OF PROSTATE 3/5/21:  - Prostatic adenocarcinoma, Kervin score 3+3=6 (Grade group 1) involving approximately 40% of the  entirely submitted specimen.  - See comment.    +fh of prostate cancer in father (who takes a "chemo pill"---details unknown).  Has 2 brothers who do not have prostate cancer.     Lab Results   Component Value Date    PSA 4.6 (H) 01/05/2019    PSADIAG 1.6 06/17/2022    PSADIAG 1.0 12/15/2021    PSADIAG 2.3 06/16/2021             He is on aspirin and plavix. Has a history of HTN.         Review of patient's allergies indicates:  No Known " Allergies    Past Medical History:   Diagnosis Date    Aortic dissection     Sleep apnea      Past Surgical History:   Procedure Laterality Date    BIOPSY WITH TRANSRECTAL ULTRASOUND (TRUS) GUIDANCE N/A 5/1/2019    Procedure: BIOPSY, WITH TRANSRECTAL PROSTATE ULTRASOUND;  Surgeon: Chintan Mendez Jr., MD;  [facility];  Service: Urology;  Laterality: N/A;    COLONOSCOPY  2006    COLONOSCOPY N/A 1/24/2019    Procedure: COLONOSCOPY;  Surgeon: Reece Tarango MD;  Location: [facility];  Service: Endoscopy;  Laterality: N/A;    CREATION OF AORTO-CAROTID BYPASS WITH GRAFT Left 11/16/2020    Procedure: CREATION, BYPASS, ARTERIAL, AORTA TO CAROTID, USING GRAFT SUBCLAVIAN BYPASS;  Surgeon: JOSY Santos II, MD;  [facility];  Service: Cardiovascular;  Laterality: Left;  left common carotid to subclavian bypass    CYSTOSCOPY N/A 5/1/2019    Procedure: FLEXIBLE CYSTOSCOPY;  Surgeon: Chintan Mendez Jr., MD;  Location: [facility];  Service: Urology;  Laterality: N/A;    ENDOVASCULAR GRAFT REPAIR OF ANEURYSM OF THORACIC AORTA N/A 11/17/2020    Procedure: REPAIR, ANEURYSM, ENDOVASCULAR GRAFT, AORTA, THORACIC Left brachial cutdown;  Surgeon: JOSY Santos II, MD;  [facility];  Service: Cardiovascular;  Laterality: N/A;  Fluoro time 34.7 min  2002.64 mGy  432.18 Gycm2    RETROGRADE PYELOGRAPHY Right 3/5/2021    Procedure: PYELOGRAM, RETROGRADE;  Surgeon: Yuriy Woo MD;  Location: [facility] OR [address] [place] [address];  Service: Urology;  Laterality: Right;    TRANSURETHRAL RESECTION OF PROSTATE N/A 2/25/2021    Procedure: TURP (TRANSURETHRAL RESECTION OF PROSTATE) BIPOLOR;  Surgeon: Yuriy Woo MD;  [facility];  Service: Urology;  Laterality: N/A;  2 hours      TRANSURETHRAL RESECTION OF PROSTATE N/A 3/5/2021    Procedure: TURP (TRANSURETHRAL RESECTION OF PROSTATE)/ BIPOLOR;  Surgeon: Yuriy Woo MD;  [facility];  Service: Urology;  Laterality: N/A;     UMBILICAL HERNIA REPAIR N/A 1/14/2020    Procedure: REPAIR, HERNIA, UMBILICAL;  Surgeon: Caio Mas Jr., MD;  Location: [facility] OR;  Service: General;  Laterality: N/A;     Family History   Problem Relation Age of Onset    Hypertension Father     Prostate cancer Father         dx ~60-65, no mets, took chemo pills, not cause of death    Cancer Sister         ?origin (lung?), nonsmoker    Other Paternal Uncle         possible cancer, early death     Social History     Tobacco Use    Smoking status: Former Smoker     Packs/day: 1.00     Years: 30.00     Pack years: 30.00     Types: Cigarettes    Smokeless tobacco: Never Used   Substance Use Topics    Alcohol use: Not Currently     Comment: socially    Drug use: No        Review of Systems   All other systems reviewed and negative except pertinent positives noted in HPI.     OBJECTIVE:     Anticoagulation:  Yes - ASA 81 / Plavix    Estimated body mass index is 27.38 kg/m² (pended) as calculated from the following:    Height as of this encounter: (P) 6' (1.829 m).    Weight as of this encounter: (P) 91.6 kg (201 lb 14.4 oz).    Vital Signs (Most Recent)  Pulse: (P) 70 (06/20/22 1423)  BP: (P) 108/72 (06/20/22 1423)    Physical Exam  Vitals and nursing note reviewed.   Constitutional:       Appearance: Normal appearance.   HENT:      Head: Normocephalic and atraumatic.      Mouth/Throat:      Mouth: Mucous membranes are moist.   Eyes:      Pupils: Pupils are equal, round, and reactive to light.   Cardiovascular:      Rate and Rhythm: Normal rate.   Pulmonary:      Effort: Pulmonary effort is normal. No respiratory distress.   Abdominal:      General: Abdomen is flat. There is no distension.      Tenderness: There is no abdominal tenderness.   Skin:     General: Skin is warm and dry.   Neurological:      General: No focal deficit present.      Mental Status: He is alert and oriented to person, place, and time.   Psychiatric:         Mood and Affect: Mood  normal.         Behavior: Behavior normal.         Thought Content: Thought content normal.         Judgment: Judgment normal.         BMP  Lab Results   Component Value Date     08/30/2021    K 4.2 08/30/2021     08/30/2021    CO2 20 (L) 08/30/2021    BUN 14 08/30/2021    CREATININE 1.2 08/30/2021    CALCIUM 10.3 08/30/2021    ANIONGAP 15 08/30/2021    ESTGFRAFRICA >60 08/30/2021    EGFRNONAA >60 08/30/2021       Lab Results   Component Value Date    WBC 9.68 08/30/2021    HGB 15.6 08/30/2021    HCT 47.3 08/30/2021    MCV 88 08/30/2021     08/30/2021       Lab Results   Component Value Date    PSA 4.6 (H) 01/05/2019    PSADIAG 1.6 06/17/2022    PSADIAG 1.0 12/15/2021    PSADIAG 2.3 06/16/2021      Imaging:    CTA c/a (8/30/21):  -  No hydronephrosis bilaterally  -  Previous aortic dissection present    ASSESSMENT     1. Prostate cancer    2. Benign non-nodular prostatic hyperplasia with lower urinary tract symptoms        PLAN:     Urinary retention/bph  -resolved --happy with post-op symptom resolution.  -symptoms well controlled    Prostate cancer:  -plan for MRI as part of active surveillance  -PSA reviewed and satisfactory.  Very low.        Yuriy Woo MD

## 2022-06-30 NOTE — PROGRESS NOTES
Shift assessment complete. See doc flow. No nightly medications sceduled see MAR. IVF infusing. A/O x4. Patient had a shower independently. Patient with no complaints at this time. Ambulates independently. Call light and bedside table within easy reach. Urology - [facility]    SUBJECTIVE:     Chief Complaint: Urinary retention    History of Present Illness:  Luis Eduardo Curry is a 60 y.o. male who presents to clinic for urinary retention. He is Established  to our clinic.    Patient had type B aortic dissection extending past renal arteries w/ false lumen supplying L kidney. S/p carotid to subclavian bypass on 11/16 followed by TEVAR on 11/17. Went into urinary retention during that admission necessitating Buckley with 2 L output on initial buckley placement. CHRISTINE w/ Cr 2.3 (b/l 1.1) which has improved to 1.0. Failed outpatient VT 10/16/20 and 11/9/20. Patient had baseline LUTS prior to this admission for which he was on Flomax. He had to strain to void, weakened stream, frequency, and nocturia x2-3.    PSA 4.6 in 1/2019. TRUS bx w/ Dr. Mendez in 5/2019 was negative for malignancy. Volume 46 g. Cysto revealed 4 cm prostatic length with lateral lobe hyperplasia. Trabeculated bladder.    He is on aspirin and plavix. Has a history of HTN.    Review of patient's allergies indicates:  No Known Allergies    Past Medical History:   Diagnosis Date    Aortic dissection      Past Surgical History:   Procedure Laterality Date    BIOPSY WITH TRANSRECTAL ULTRASOUND (TRUS) GUIDANCE N/A 5/1/2019    Procedure: BIOPSY, WITH TRANSRECTAL PROSTATE ULTRASOUND;  Surgeon: Chintan Mendez Jr., MD;  Location: [facility];  Service: Urology;  Laterality: N/A;    COLONOSCOPY  2006    COLONOSCOPY N/A 1/24/2019    Procedure: COLONOSCOPY;  Surgeon: Reece Tarango MD;  Location: [facility];  Service: Endoscopy;  Laterality: N/A;    CREATION OF AORTO-CAROTID BYPASS WITH GRAFT Left 11/16/2020    Procedure: CREATION, BYPASS, ARTERIAL, AORTA TO CAROTID, USING GRAFT SUBCLAVIAN BYPASS;  Surgeon: JOSY Santos II, MD;  Location: [address];  Service: Cardiovascular;  Laterality: Left;  left common carotid to subclavian bypass    CYSTOSCOPY N/A 5/1/2019    Procedure: FLEXIBLE CYSTOSCOPY;   Surgeon: Chintan Mendez Jr., MD;  [facility];  Service: Urology;  Laterality: N/A;    ENDOVASCULAR GRAFT REPAIR OF ANEURYSM OF THORACIC AORTA N/A 11/17/2020    Procedure: REPAIR, ANEURYSM, ENDOVASCULAR GRAFT, AORTA, THORACIC Left brachial cutdown;  Surgeon: JOSY Santos II, MD;  Location: [facility];  Service: Cardiovascular;  Laterality: N/A;  Fluoro time 34.7 min  2002.64 mGy  432.18 Gycm2    UMBILICAL HERNIA REPAIR N/A 1/14/2020    Procedure: REPAIR, HERNIA, UMBILICAL;  Surgeon: Caio Mas Jr., MD;  [facility];  Service: General;  Laterality: N/A;     Family History   Problem Relation Age of Onset    No Known Problems Mother     Hypertension Father     No Known Problems Sister     No Known Problems Brother     No Known Problems Daughter     No Known Problems Son     No Known Problems Maternal Aunt     No Known Problems Maternal Uncle     No Known Problems Paternal Aunt     No Known Problems Paternal Uncle     No Known Problems Maternal Grandmother     No Known Problems Maternal Grandfather     No Known Problems Paternal Grandmother     No Known Problems Paternal Grandfather      Social History     Tobacco Use    Smoking status: Former Smoker     Packs/day: 1.00     Years: 30.00     Pack years: 30.00     Types: Cigarettes    Smokeless tobacco: Never Used   Substance Use Topics    Alcohol use: Yes     Frequency: Monthly or less     Binge frequency: Weekly     Comment: socially    Drug use: No        Review of Systems   Constitutional: Negative for chills and fever.   HENT: Negative.    Eyes: Negative.    Respiratory: Negative for chest tightness and shortness of breath.    Cardiovascular: Negative for chest pain and palpitations.   Gastrointestinal: Negative for abdominal pain, nausea and vomiting.   Genitourinary: Positive for decreased urine volume and difficulty urinating. Negative for discharge, flank pain, hematuria, penile pain, penile swelling, scrotal  swelling and testicular pain.   Musculoskeletal: Negative for arthralgias and gait problem.   Skin: Negative for color change and rash.   Neurological: Negative for dizziness and headaches.   Psychiatric/Behavioral: Negative for agitation and confusion.       OBJECTIVE:     Anticoagulation:  Yes - ASA 81 / Plavix    Estimated body mass index is 27.78 kg/m² as calculated from the following:    Height as of this encounter: 6' (1.829 m).    Weight as of this encounter: 92.9 kg (204 lb 12.9 oz).    Vital Signs (Most Recent)  Pulse: 74 (11/30/20 0859)  BP: 112/67 (11/30/20 0859)    Physical Exam  Vitals signs and nursing note reviewed.   Constitutional:       Appearance: Normal appearance.   HENT:      Head: Normocephalic and atraumatic.      Mouth/Throat:      Mouth: Mucous membranes are moist.   Eyes:      Pupils: Pupils are equal, round, and reactive to light.   Cardiovascular:      Rate and Rhythm: Normal rate.   Pulmonary:      Effort: Pulmonary effort is normal. No respiratory distress.   Abdominal:      General: Abdomen is flat. There is no distension.      Tenderness: There is no abdominal tenderness.   Genitourinary:     Comments: The penis is circumcised with no evidence of plaques or induration. The urethral meatus is normal. The testes, epididymides, and cord structures are normal in size and contour bilaterally. The scrotum is normal in size and contour.    Normal anal sphincter tone. No rectal mass.    The prostate is 30 g. No nodularity or induration.    16 Fr Pulliam in place draining clear yellow urine  Skin:     General: Skin is warm and dry.   Neurological:      General: No focal deficit present.      Mental Status: He is alert and oriented to person, place, and time.   Psychiatric:         Mood and Affect: Mood normal.         Behavior: Behavior normal.         Thought Content: Thought content normal.         Judgment: Judgment normal.         BMP  Lab Results   Component Value Date     11/19/2020     K 4.2 11/19/2020     11/19/2020    CO2 23 11/19/2020    BUN 11 11/19/2020    CREATININE 1.0 11/19/2020    CALCIUM 8.5 (L) 11/19/2020    ANIONGAP 7 (L) 11/19/2020    ESTGFRAFRICA >60.0 11/19/2020    EGFRNONAA >60.0 11/19/2020       Lab Results   Component Value Date    WBC 8.98 11/19/2020    HGB 8.3 (L) 11/19/2020    HCT 24.9 (L) 11/19/2020    MCV 87 11/19/2020     (L) 11/19/2020       Lab Results   Component Value Date    PSA 4.6 (H) 01/05/2019      Imaging:    CTA c/a/p (10/26/20):  - Mild R pelviectasis, thickened R hydronephrotic ureter  - No left hydroureteronephrosis  - Bladder decompressed around Buckley  - Bladder wall thickening and diverticula  - Prostamegaly    ASSESSMENT     1. Urinary retention        PLAN:     Urinary retention  - Will discuss with vascular surgery regarding coming off Plavix for possible Rezum.  This is elective and can happen at any point in the future, but he has had UTI associated with a catheter so there is some incentive to get the buckley out as soon as reasonably possible.   - Maintain Buckley for now.  Will f/u on 12/14/20 to re-evaluate after his repeat CT scan--> will likely do a catheter change and repeat urine culture from the new buckley catheter at that time.   - CIC discussed with patient for which patient is hesitant at this time.    Right ureteral wall thickening and hydronephrosis  - Plan for cystoscopy, right retrograde pyelogram, possible right diagnostic ureteroscopy    Elevated PSA  - Will withhold from rechecking PSA at this time given indwelling Buckley.    Oswaldo Bateman MD

## 2022-07-20 ENCOUNTER — LAB VISIT (OUTPATIENT)
Dept: LAB | Facility: HOSPITAL | Age: 62
End: 2022-07-20
Attending: UROLOGY
Payer: COMMERCIAL

## 2022-07-20 DIAGNOSIS — C61 PROSTATE CANCER: ICD-10-CM

## 2022-07-20 LAB
CREAT SERPL-MCNC: 1 MG/DL (ref 0.5–1.4)
EST. GFR  (AFRICAN AMERICAN): >60 ML/MIN/1.73 M^2
EST. GFR  (NON AFRICAN AMERICAN): >60 ML/MIN/1.73 M^2

## 2022-07-20 PROCEDURE — 82565 ASSAY OF CREATININE: CPT | Performed by: UROLOGY

## 2022-07-20 PROCEDURE — 36415 COLL VENOUS BLD VENIPUNCTURE: CPT | Performed by: UROLOGY

## 2022-07-21 ENCOUNTER — HOSPITAL ENCOUNTER (OUTPATIENT)
Dept: RADIOLOGY | Facility: HOSPITAL | Age: 62
Discharge: HOME | End: 2022-07-21
Attending: UROLOGY
Payer: COMMERCIAL

## 2022-07-21 DIAGNOSIS — C61 PROSTATE CANCER: ICD-10-CM

## 2022-07-21 PROCEDURE — 72197 MRI PELVIS W/O & W/DYE: CPT | Mod: TC

## 2022-07-21 PROCEDURE — A9585 GADOBUTROL INJECTION: HCPCS | Performed by: UROLOGY

## 2022-07-21 PROCEDURE — 72197 MRI PROSTATE W W/O CONTRAST: ICD-10-PCS | Mod: 26,,, | Performed by: STUDENT IN AN ORGANIZED HEALTH CARE EDUCATION/TRAINING PROGRAM

## 2022-07-21 PROCEDURE — 72197 MRI PELVIS W/O & W/DYE: CPT | Mod: 26,,, | Performed by: STUDENT IN AN ORGANIZED HEALTH CARE EDUCATION/TRAINING PROGRAM

## 2022-07-21 PROCEDURE — 25500020 PHARM REV CODE 255: Performed by: UROLOGY

## 2022-07-21 RX ORDER — GADOBUTROL 604.72 MG/ML
10 INJECTION INTRAVENOUS
Status: COMPLETED | OUTPATIENT
Start: 2022-07-21 | End: 2022-07-21

## 2022-07-21 RX ADMIN — GADOBUTROL 10 ML: 604.72 INJECTION INTRAVENOUS at 06:07

## 2022-07-22 ENCOUNTER — TELEPHONE (OUTPATIENT)
Dept: UROLOGY | Facility: CLINIC | Age: 62
End: 2022-07-22
Payer: COMMERCIAL

## 2022-07-22 DIAGNOSIS — C61 PROSTATE CANCER: Primary | ICD-10-CM

## 2022-07-22 NOTE — TELEPHONE ENCOUNTER
I spoke with patient's wife and gave her the mri results and agreed to the 6mth f/u with psa.    ----- Message from Yuriy Woo MD sent at 7/22/2022 10:39 AM CDT -----  Patient notified of MRI results via the portal.  Needs a PSA in 6 months for surveillance of his prostate cancer.  Six-month PSA was ordered.

## 2022-10-03 ENCOUNTER — PATIENT MESSAGE (OUTPATIENT)
Dept: ADMINISTRATIVE | Facility: HOSPITAL | Age: 62
End: 2022-10-03
Payer: COMMERCIAL

## 2022-11-30 ENCOUNTER — TELEPHONE (OUTPATIENT)
Dept: INTERNAL MEDICINE | Facility: CLINIC | Age: 62
End: 2022-11-30
Payer: COMMERCIAL

## 2022-11-30 ENCOUNTER — PATIENT MESSAGE (OUTPATIENT)
Dept: VASCULAR SURGERY | Facility: CLINIC | Age: 62
End: 2022-11-30
Payer: COMMERCIAL

## 2022-11-30 DIAGNOSIS — N17.9 AKI (ACUTE KIDNEY INJURY): ICD-10-CM

## 2022-11-30 DIAGNOSIS — I95.2 HYPOTENSION DUE TO DRUGS: Primary | ICD-10-CM

## 2022-12-12 ENCOUNTER — OFFICE VISIT (OUTPATIENT)
Dept: INTERNAL MEDICINE | Facility: CLINIC | Age: 62
End: 2022-12-12
Payer: COMMERCIAL

## 2022-12-12 VITALS
WEIGHT: 199.94 LBS | SYSTOLIC BLOOD PRESSURE: 98 MMHG | RESPIRATION RATE: 20 BRPM | DIASTOLIC BLOOD PRESSURE: 78 MMHG | OXYGEN SATURATION: 98 % | BODY MASS INDEX: 27.08 KG/M2 | HEIGHT: 72 IN | HEART RATE: 74 BPM

## 2022-12-12 DIAGNOSIS — C61 PROSTATE CANCER: ICD-10-CM

## 2022-12-12 DIAGNOSIS — I65.23 BILATERAL CAROTID ARTERY STENOSIS: ICD-10-CM

## 2022-12-12 DIAGNOSIS — Z01.818 PREOPERATIVE CLEARANCE: Primary | ICD-10-CM

## 2022-12-12 DIAGNOSIS — I71.019 AORTIC DISSECTION DISTAL TO LEFT SUBCLAVIAN: ICD-10-CM

## 2022-12-12 DIAGNOSIS — Z12.11 SPECIAL SCREENING FOR MALIGNANT NEOPLASMS, COLON: ICD-10-CM

## 2022-12-12 PROCEDURE — 99999 PR PBB SHADOW E&M-EST. PATIENT-LVL III: CPT | Mod: PBBFAC,,, | Performed by: INTERNAL MEDICINE

## 2022-12-12 PROCEDURE — 3008F BODY MASS INDEX DOCD: CPT | Mod: CPTII,S$GLB,, | Performed by: INTERNAL MEDICINE

## 2022-12-12 PROCEDURE — 3078F PR MOST RECENT DIASTOLIC BLOOD PRESSURE < 80 MM HG: ICD-10-PCS | Mod: CPTII,S$GLB,, | Performed by: INTERNAL MEDICINE

## 2022-12-12 PROCEDURE — 99214 PR OFFICE/OUTPT VISIT, EST, LEVL IV, 30-39 MIN: ICD-10-PCS | Mod: 25,S$GLB,, | Performed by: INTERNAL MEDICINE

## 2022-12-12 PROCEDURE — 90471 FLU VACCINE (QUAD) GREATER THAN OR EQUAL TO 3YO PRESERVATIVE FREE IM: ICD-10-PCS | Mod: S$GLB,,, | Performed by: INTERNAL MEDICINE

## 2022-12-12 PROCEDURE — 1159F MED LIST DOCD IN RCRD: CPT | Mod: CPTII,S$GLB,, | Performed by: INTERNAL MEDICINE

## 2022-12-12 PROCEDURE — 1160F PR REVIEW ALL MEDS BY PRESCRIBER/CLIN PHARMACIST DOCUMENTED: ICD-10-PCS | Mod: CPTII,S$GLB,, | Performed by: INTERNAL MEDICINE

## 2022-12-12 PROCEDURE — 3078F DIAST BP <80 MM HG: CPT | Mod: CPTII,S$GLB,, | Performed by: INTERNAL MEDICINE

## 2022-12-12 PROCEDURE — 1159F PR MEDICATION LIST DOCUMENTED IN MEDICAL RECORD: ICD-10-PCS | Mod: CPTII,S$GLB,, | Performed by: INTERNAL MEDICINE

## 2022-12-12 PROCEDURE — 90471 IMMUNIZATION ADMIN: CPT | Mod: S$GLB,,, | Performed by: INTERNAL MEDICINE

## 2022-12-12 PROCEDURE — 1160F RVW MEDS BY RX/DR IN RCRD: CPT | Mod: CPTII,S$GLB,, | Performed by: INTERNAL MEDICINE

## 2022-12-12 PROCEDURE — 3008F PR BODY MASS INDEX (BMI) DOCUMENTED: ICD-10-PCS | Mod: CPTII,S$GLB,, | Performed by: INTERNAL MEDICINE

## 2022-12-12 PROCEDURE — 90686 IIV4 VACC NO PRSV 0.5 ML IM: CPT | Mod: S$GLB,,, | Performed by: INTERNAL MEDICINE

## 2022-12-12 PROCEDURE — 3074F PR MOST RECENT SYSTOLIC BLOOD PRESSURE < 130 MM HG: ICD-10-PCS | Mod: CPTII,S$GLB,, | Performed by: INTERNAL MEDICINE

## 2022-12-12 PROCEDURE — 99214 OFFICE O/P EST MOD 30 MIN: CPT | Mod: 25,S$GLB,, | Performed by: INTERNAL MEDICINE

## 2022-12-12 PROCEDURE — 3074F SYST BP LT 130 MM HG: CPT | Mod: CPTII,S$GLB,, | Performed by: INTERNAL MEDICINE

## 2022-12-12 PROCEDURE — 90686 FLU VACCINE (QUAD) GREATER THAN OR EQUAL TO 3YO PRESERVATIVE FREE IM: ICD-10-PCS | Mod: S$GLB,,, | Performed by: INTERNAL MEDICINE

## 2022-12-12 PROCEDURE — 99999 PR PBB SHADOW E&M-EST. PATIENT-LVL III: ICD-10-PCS | Mod: PBBFAC,,, | Performed by: INTERNAL MEDICINE

## 2022-12-12 NOTE — PROGRESS NOTES
HPI:  Luis Eduardo Curry is a 62 y.o. male here for Annual Exam and surgical clearance  R eye cataract surgery . Needs clearance   No recent labs .  Patient Active Problem List   Diagnosis    Non-thermal blister of ankle, left, initial encounter    Colon cancer screening    BPH with urinary obstruction    Urinary tract infection in male    Umbilical hernia without obstruction and without gangrene    Right inguinal hernia    Aortic dissection distal to left subclavian    Hypotension    CHRISTINE (acute kidney injury)    Aortic dissection    Urinary retention    Hydronephrosis of right kidney    Elevated PSA    BPH with obstruction/lower urinary tract symptoms    Bilateral carotid artery stenosis        Review of Systems   Constitutional:  Negative for chills and fever.   HENT:  Negative for congestion, postnasal drip and sore throat.    Eyes:  Positive for visual disturbance. Negative for photophobia.   Respiratory:  Negative for chest tightness and shortness of breath.    Cardiovascular:  Negative for chest pain.   Gastrointestinal:  Negative for abdominal distention, abdominal pain, blood in stool and vomiting.   Genitourinary:  Negative for dysuria, flank pain and hematuria.   Musculoskeletal:  Negative for back pain.   Skin:  Negative for pallor.   Neurological:  Negative for dizziness, seizures, facial asymmetry, speech difficulty and numbness.   Hematological:  Does not bruise/bleed easily.   Psychiatric/Behavioral:  Negative for agitation and suicidal ideas. The patient is not nervous/anxious.     A review of systems was performed and is negative except as noted above.    Objective:  Vitals:    12/12/22 1336   BP: 98/78   Pulse: 74   Resp: 20   SpO2: 98%   Weight: 90.7 kg (199 lb 15.3 oz)   Height: 6' (1.829 m)      Physical Exam  Vitals and nursing note reviewed.   Constitutional:       Appearance: He is well-developed.   HENT:      Head: Normocephalic and atraumatic.      Nose: Nose normal.   Eyes:       Conjunctiva/sclera: Conjunctivae normal.      Pupils: Pupils are equal, round, and reactive to light.      Comments: R eye cataract changes.   Neck:      Thyroid: No thyromegaly.      Vascular: No JVD.   Cardiovascular:      Rate and Rhythm: Normal rate and regular rhythm.      Heart sounds: Normal heart sounds.   Pulmonary:      Effort: Pulmonary effort is normal.      Breath sounds: Normal breath sounds.   Abdominal:      General: Bowel sounds are normal. There is no distension.      Palpations: Abdomen is soft. There is no mass.      Tenderness: There is no abdominal tenderness. There is no guarding.   Musculoskeletal:         General: Normal range of motion.      Cervical back: Normal range of motion and neck supple.   Lymphadenopathy:      Cervical: No cervical adenopathy.   Skin:     General: Skin is warm and dry.      Coloration: Skin is not pale.      Findings: No rash.   Neurological:      Mental Status: He is alert and oriented to person, place, and time.      Cranial Nerves: No cranial nerve deficit.      Deep Tendon Reflexes: Reflexes are normal and symmetric.        Assessment/Plan:  1. Preoperative clearance  Dear Dr. Ace Espinosa/ [facility] Garcia.    I reviewed his history.    Please proceed with surgery.    Patient appears in stable Cardiovascular  status    Patient needs to stop taking aspirin /plavix Days before the surgery.     Other recommendations include:  Telemetry       Thank you for allowing me to participate in this patient's care. Please consult me if post-operative medical care assistance is needed.         2. Special screening for malignant neoplasms, colon  -     Case Request Endoscopy: COLONOSCOPY    3. Aortic dissection distal to left subclavian  -     TSH; Future; Expected date: 12/12/2022  -     Lipid Panel; Future; Expected date: 12/12/2022    4. Bilateral carotid artery stenosis  -     CBC Auto Differential; Future; Expected date: 12/12/2022  -     Comprehensive Metabolic  Panel; Future; Expected date: 12/12/2022  -     TSH; Future; Expected date: 12/12/2022  -     Lipid Panel; Future; Expected date: 12/12/2022    5. Prostate cancer  -     PROSTATE SPECIFIC ANTIGEN, DIAGNOSTIC; Future; Expected date: 12/12/2022

## 2022-12-13 DIAGNOSIS — I71.019 CHRONIC THORACIC AORTIC DISSECTION: Primary | ICD-10-CM

## 2022-12-14 ENCOUNTER — LAB VISIT (OUTPATIENT)
Dept: LAB | Facility: HOSPITAL | Age: 62
End: 2022-12-14
Attending: INTERNAL MEDICINE
Payer: COMMERCIAL

## 2022-12-14 DIAGNOSIS — N17.9 AKI (ACUTE KIDNEY INJURY): ICD-10-CM

## 2022-12-14 DIAGNOSIS — I95.2 HYPOTENSION DUE TO DRUGS: ICD-10-CM

## 2022-12-14 LAB
ALBUMIN SERPL BCP-MCNC: 4 G/DL (ref 3.5–5.2)
ALP SERPL-CCNC: 39 U/L (ref 55–135)
ALT SERPL W/O P-5'-P-CCNC: 20 U/L (ref 10–44)
ANION GAP SERPL CALC-SCNC: 9 MMOL/L (ref 8–16)
AST SERPL-CCNC: 15 U/L (ref 10–40)
BASOPHILS # BLD AUTO: 0.05 K/UL (ref 0–0.2)
BASOPHILS NFR BLD: 0.7 % (ref 0–1.9)
BILIRUB SERPL-MCNC: 0.5 MG/DL (ref 0.1–1)
BUN SERPL-MCNC: 12 MG/DL (ref 6–20)
CALCIUM SERPL-MCNC: 9.3 MG/DL (ref 8.7–10.5)
CHLORIDE SERPL-SCNC: 105 MMOL/L (ref 95–110)
CHOLEST SERPL-MCNC: 191 MG/DL (ref 120–199)
CHOLEST/HDLC SERPL: 4.3 {RATIO} (ref 2–5)
CO2 SERPL-SCNC: 25 MMOL/L (ref 23–29)
CREAT SERPL-MCNC: 1.1 MG/DL (ref 0.5–1.4)
DIFFERENTIAL METHOD: NORMAL
EOSINOPHIL # BLD AUTO: 0.4 K/UL (ref 0–0.5)
EOSINOPHIL NFR BLD: 5.9 % (ref 0–8)
ERYTHROCYTE [DISTWIDTH] IN BLOOD BY AUTOMATED COUNT: 13 % (ref 11.5–14.5)
EST. GFR  (NO RACE VARIABLE): >60 ML/MIN/1.73 M^2
GLUCOSE SERPL-MCNC: 112 MG/DL (ref 70–110)
HCT VFR BLD AUTO: 45.6 % (ref 40–54)
HDLC SERPL-MCNC: 44 MG/DL (ref 40–75)
HDLC SERPL: 23 % (ref 20–50)
HGB BLD-MCNC: 15.3 G/DL (ref 14–18)
IMM GRANULOCYTES # BLD AUTO: 0.01 K/UL (ref 0–0.04)
IMM GRANULOCYTES NFR BLD AUTO: 0.1 % (ref 0–0.5)
LDLC SERPL CALC-MCNC: 133.4 MG/DL (ref 63–159)
LYMPHOCYTES # BLD AUTO: 2.7 K/UL (ref 1–4.8)
LYMPHOCYTES NFR BLD: 39.7 % (ref 18–48)
MCH RBC QN AUTO: 28.5 PG (ref 27–31)
MCHC RBC AUTO-ENTMCNC: 33.6 G/DL (ref 32–36)
MCV RBC AUTO: 85 FL (ref 82–98)
MONOCYTES # BLD AUTO: 0.5 K/UL (ref 0.3–1)
MONOCYTES NFR BLD: 7.5 % (ref 4–15)
NEUTROPHILS # BLD AUTO: 3.1 K/UL (ref 1.8–7.7)
NEUTROPHILS NFR BLD: 46.1 % (ref 38–73)
NONHDLC SERPL-MCNC: 147 MG/DL
NRBC BLD-RTO: 0 /100 WBC
PLATELET # BLD AUTO: 201 K/UL (ref 150–450)
PMV BLD AUTO: 10.2 FL (ref 9.2–12.9)
POTASSIUM SERPL-SCNC: 4.1 MMOL/L (ref 3.5–5.1)
PROT SERPL-MCNC: 6.8 G/DL (ref 6–8.4)
RBC # BLD AUTO: 5.36 M/UL (ref 4.6–6.2)
SODIUM SERPL-SCNC: 139 MMOL/L (ref 136–145)
TRIGL SERPL-MCNC: 68 MG/DL (ref 30–150)
TSH SERPL DL<=0.005 MIU/L-ACNC: 1.6 UIU/ML (ref 0.4–4)
WBC # BLD AUTO: 6.77 K/UL (ref 3.9–12.7)

## 2022-12-14 PROCEDURE — 80061 LIPID PANEL: CPT | Performed by: INTERNAL MEDICINE

## 2022-12-14 PROCEDURE — 80053 COMPREHEN METABOLIC PANEL: CPT | Performed by: INTERNAL MEDICINE

## 2022-12-14 PROCEDURE — 36415 COLL VENOUS BLD VENIPUNCTURE: CPT | Performed by: INTERNAL MEDICINE

## 2022-12-14 PROCEDURE — 84443 ASSAY THYROID STIM HORMONE: CPT | Performed by: INTERNAL MEDICINE

## 2022-12-14 PROCEDURE — 85025 COMPLETE CBC W/AUTO DIFF WBC: CPT | Performed by: INTERNAL MEDICINE

## 2022-12-23 ENCOUNTER — HOSPITAL ENCOUNTER (OUTPATIENT)
Dept: RADIOLOGY | Facility: HOSPITAL | Age: 62
Discharge: HOME | End: 2022-12-23
Attending: SURGERY
Payer: COMMERCIAL

## 2022-12-23 ENCOUNTER — OFFICE VISIT (OUTPATIENT)
Dept: VASCULAR SURGERY | Facility: CLINIC | Age: 62
End: 2022-12-23
Attending: SURGERY
Payer: COMMERCIAL

## 2022-12-23 ENCOUNTER — HOSPITAL ENCOUNTER (OUTPATIENT)
Dept: CARDIOLOGY | Facility: CLINIC | Age: 62
Discharge: HOME | End: 2022-12-23
Attending: SURGERY
Payer: COMMERCIAL

## 2022-12-23 VITALS
WEIGHT: 202.81 LBS | HEART RATE: 55 BPM | SYSTOLIC BLOOD PRESSURE: 121 MMHG | HEIGHT: 72 IN | TEMPERATURE: 98 F | BODY MASS INDEX: 27.47 KG/M2 | DIASTOLIC BLOOD PRESSURE: 82 MMHG

## 2022-12-23 DIAGNOSIS — I71.019 CHRONIC THORACIC AORTIC DISSECTION: ICD-10-CM

## 2022-12-23 DIAGNOSIS — Z01.818 PRE-OP EVALUATION: ICD-10-CM

## 2022-12-23 DIAGNOSIS — Z01.818 PRE-OP EVALUATION: Primary | ICD-10-CM

## 2022-12-23 DIAGNOSIS — I71.019 AORTIC DISSECTION DISTAL TO LEFT SUBCLAVIAN: Primary | ICD-10-CM

## 2022-12-23 PROCEDURE — 99213 OFFICE O/P EST LOW 20 MIN: CPT | Mod: S$GLB,,, | Performed by: SURGERY

## 2022-12-23 PROCEDURE — 99213 PR OFFICE/OUTPT VISIT, EST, LEVL III, 20-29 MIN: ICD-10-PCS | Mod: S$GLB,,, | Performed by: SURGERY

## 2022-12-23 PROCEDURE — 74174 CTA CHEST ABDOMEN PELVIS: ICD-10-PCS | Mod: 26,,, | Performed by: RADIOLOGY

## 2022-12-23 PROCEDURE — 93010 EKG 12-LEAD: ICD-10-PCS | Mod: S$GLB,,, | Performed by: INTERNAL MEDICINE

## 2022-12-23 PROCEDURE — 3079F DIAST BP 80-89 MM HG: CPT | Mod: CPTII,S$GLB,, | Performed by: SURGERY

## 2022-12-23 PROCEDURE — 1159F MED LIST DOCD IN RCRD: CPT | Mod: CPTII,S$GLB,, | Performed by: SURGERY

## 2022-12-23 PROCEDURE — 93005 EKG 12-LEAD: ICD-10-PCS | Mod: S$GLB,,, | Performed by: SURGERY

## 2022-12-23 PROCEDURE — 74174 CTA ABD&PLVS W/CONTRAST: CPT | Mod: 26,,, | Performed by: RADIOLOGY

## 2022-12-23 PROCEDURE — 71275 CT ANGIOGRAPHY CHEST: CPT | Mod: 26,,, | Performed by: RADIOLOGY

## 2022-12-23 PROCEDURE — 3074F PR MOST RECENT SYSTOLIC BLOOD PRESSURE < 130 MM HG: ICD-10-PCS | Mod: CPTII,S$GLB,, | Performed by: SURGERY

## 2022-12-23 PROCEDURE — 74174 CTA ABD&PLVS W/CONTRAST: CPT | Mod: TC

## 2022-12-23 PROCEDURE — 3008F PR BODY MASS INDEX (BMI) DOCUMENTED: ICD-10-PCS | Mod: CPTII,S$GLB,, | Performed by: SURGERY

## 2022-12-23 PROCEDURE — 25500020 PHARM REV CODE 255: Performed by: SURGERY

## 2022-12-23 PROCEDURE — 3008F BODY MASS INDEX DOCD: CPT | Mod: CPTII,S$GLB,, | Performed by: SURGERY

## 2022-12-23 PROCEDURE — 93005 ELECTROCARDIOGRAM TRACING: CPT | Mod: S$GLB,,, | Performed by: SURGERY

## 2022-12-23 PROCEDURE — 93010 ELECTROCARDIOGRAM REPORT: CPT | Mod: S$GLB,,, | Performed by: INTERNAL MEDICINE

## 2022-12-23 PROCEDURE — 99999 PR PBB SHADOW E&M-EST. PATIENT-LVL III: ICD-10-PCS | Mod: PBBFAC,,, | Performed by: SURGERY

## 2022-12-23 PROCEDURE — 71275 CTA CHEST ABDOMEN PELVIS: ICD-10-PCS | Mod: 26,,, | Performed by: RADIOLOGY

## 2022-12-23 PROCEDURE — 3079F PR MOST RECENT DIASTOLIC BLOOD PRESSURE 80-89 MM HG: ICD-10-PCS | Mod: CPTII,S$GLB,, | Performed by: SURGERY

## 2022-12-23 PROCEDURE — 1159F PR MEDICATION LIST DOCUMENTED IN MEDICAL RECORD: ICD-10-PCS | Mod: CPTII,S$GLB,, | Performed by: SURGERY

## 2022-12-23 PROCEDURE — 3074F SYST BP LT 130 MM HG: CPT | Mod: CPTII,S$GLB,, | Performed by: SURGERY

## 2022-12-23 PROCEDURE — 99999 PR PBB SHADOW E&M-EST. PATIENT-LVL III: CPT | Mod: PBBFAC,,, | Performed by: SURGERY

## 2022-12-23 RX ADMIN — IOHEXOL 100 ML: 350 INJECTION, SOLUTION INTRAVENOUS at 08:12

## 2022-12-23 NOTE — PROGRESS NOTES
VASCULAR SURGERY NOTE    Patient ID: Luis Eduardo Curry is a 62 y.o. male.    I. HISTORY     Chief Complaint: type B aortic dissection    HPI 12/23/22:  Patient denies any changes in medical history since he was last seen in clinic.  He continues taking his medications aspirin and labetalol blood pressure control.  He denies any chest pain or back pain.  He hunts regularly for deer.  He is stayed active and walks frequently.  He returns for surveillance CT scan for his known type B aortic dissection previously treated by me.    Interval History (12/20/21):   Patient presents to clinic today for a follow up visit.  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  He denies any chest pain.  Denies any back pain.  He has been able to return to his normal daily activities.  He is working full-time.  He has returned to hunting.  He denies any issues with the aspirin or Plavix.  His blood pressures been well controlled his current medications.  He does complain of some intermittent pain in the middle the night in his left leg over the past week.  This pain does not change with motion or ambulation.  He denies numbness or tingling.    Social History: Does not smoke, not drinking, no drugs    Family history: No fam hx of aneurysm/dissection    Past Medical History:   Diagnosis Date    Aortic dissection     Sleep apnea         Past Surgical History:   Procedure Laterality Date    BIOPSY WITH TRANSRECTAL ULTRASOUND (TRUS) GUIDANCE N/A 5/1/2019    Procedure: BIOPSY, WITH TRANSRECTAL PROSTATE ULTRASOUND;  Surgeon: Chintan Mendez Jr., MD;  Location: [facility];  Service: Urology;  Laterality: N/A;    COLONOSCOPY  2006    COLONOSCOPY N/A 1/24/2019    Procedure: COLONOSCOPY;  Surgeon: Reece Tarango MD;  Location: [facility];  Service:  Endoscopy;  Laterality: N/A;    CREATION OF AORTO-CAROTID BYPASS WITH GRAFT Left 11/16/2020    Procedure: CREATION, BYPASS, ARTERIAL, AORTA TO CAROTID, USING GRAFT SUBCLAVIAN BYPASS;  Surgeon: JOSY Santos II, MD;  Location: [facility] OR 2ND FLR;  Service: Cardiovascular;  Laterality: Left;  left common carotid to subclavian bypass    CYSTOSCOPY N/A 5/1/2019    Procedure: FLEXIBLE CYSTOSCOPY;  Surgeon: Chintan Mendez Jr., MD;  Location: [facility];  Service: Urology;  Laterality: N/A;    ENDOVASCULAR GRAFT REPAIR OF ANEURYSM OF THORACIC AORTA N/A 11/17/2020    Procedure: REPAIR, ANEURYSM, ENDOVASCULAR GRAFT, AORTA, THORACIC Left brachial cutdown;  Surgeon: JOSY Santos II, MD;  Location: [facility] OR 2ND FLR;  Service: Cardiovascular;  Laterality: N/A;  Fluoro time 34.7 min  2002.64 mGy  432.18 Gycm2    RETROGRADE PYELOGRAPHY Right 3/5/2021    Procedure: PYELOGRAM, RETROGRADE;  Surgeon: Yuriy Woo MD;  [facility];  Service: Urology;  Laterality: Right;    TRANSURETHRAL RESECTION OF PROSTATE N/A 2/25/2021    Procedure: TURP (TRANSURETHRAL RESECTION OF PROSTATE) BIPOLOR;  Surgeon: Yuriy Woo MD;  [facility];  Service: Urology;  Laterality: N/A;  2 hours      TRANSURETHRAL RESECTION OF PROSTATE N/A 3/5/2021    Procedure: TURP (TRANSURETHRAL RESECTION OF PROSTATE)/ BIPOLOR;  Surgeon: Yuriy Woo MD;  Location: [facility] OR [address] [place] [address];  Service: Urology;  Laterality: N/A;    UMBILICAL HERNIA REPAIR N/A 1/14/2020    Procedure: REPAIR, HERNIA, UMBILICAL;  Surgeon: Caio Mas Jr., MD;  Location: [facility];  Service: General;  Laterality: N/A;       Social History     Occupational History    Not on file   Tobacco Use    Smoking status: Former     Packs/day: 1.00     Years: 30.00     Pack years: 30.00     Types: Cigarettes    Smokeless tobacco: Never   Substance and Sexual Activity    Alcohol use: Not Currently     Comment: socially    Drug use: No    Sexual activity: Yes          Review of Systems   Constitutional: Negative for weight loss.   HENT:  Negative for ear pain and nosebleeds.    Eyes:  Negative for discharge and pain.   Cardiovascular:  Negative for chest pain and palpitations.   Respiratory:  Negative for cough, shortness of breath and wheezing.    Endocrine: Negative for cold intolerance, heat intolerance and polydipsia.   Hematologic/Lymphatic: Negative for adenopathy. Does not bruise/bleed easily.   Skin:  Negative for itching and rash.   Musculoskeletal:  Negative for joint swelling and muscle cramps.   Gastrointestinal:  Negative for abdominal pain, diarrhea, nausea and vomiting.   Genitourinary:  Negative for dysuria and flank pain.   Neurological:  Negative for numbness and seizures.           II. PHYSICAL EXAM   Physical Exam  Constitutional:       General: He is not in acute distress.     Appearance: Normal appearance. He is normal weight. He is not ill-appearing or diaphoretic.   HENT:      Head: Normocephalic and atraumatic.   Eyes:      General: No scleral icterus.        Right eye: No discharge.         Left eye: No discharge.      Extraocular Movements: Extraocular movements intact.      Conjunctiva/sclera: Conjunctivae normal.   Cardiovascular:      Rate and Rhythm: Normal rate and regular rhythm.      Comments:   1+ L brachial pulse  2+ R brachial pulse    2+ DP pulses bilaterally  Pulmonary:      Effort: Pulmonary effort is normal. No respiratory distress.   Musculoskeletal:         General: Normal range of motion.      Cervical back: Normal range of motion and neck supple.      Right lower leg: No edema.      Left lower leg: No edema.   Skin:     General: Skin is warm and dry.      Coloration: Skin is not jaundiced or pale.      Findings: No erythema or rash.   Neurological:      General: No focal deficit present.      Mental Status: He is alert and oriented to person, place, and time. Mental status is at baseline.      Cranial Nerves: No cranial nerve  deficit.   Psychiatric:         Mood and Affect: Mood normal.         Behavior: Behavior normal.        LUE BP:      III. ASSESSMENT & PLAN (MEDICAL DECISION MAKING)     No diagnosis found.      Imaging Results:  (all imaging has been personally reviewed and interpreted by me below)  CTA CHEST/ABD/PELVIS 12/20/21:  L carotid-subclavian bypass is patent with no evidence of stenosis. The false lumen fills with contrast in the distal descending thoracic aorta via intercostals and remains patent through the abdominal aorta filling via the LEVI and a right lumbar artery. 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 The SMA is widely patent without stenosis. The right renal stent is patent and both renal arteries widely patent as well. The dissection flap terminates at the aortic bifurcation and both iliac arteries fill via the true lumen. There is good expansion of the true lumen through the abdominal aorta without increase in size or aneurysm in the abdominal aorta. There is slight growth in the size of the aorta at the level of T11 vertebra compared to the last CTA in June 2021 but all of this growth appears to be in the true lumen while the false lumen remains stable and is filling less briskly than before. Current and (prior measurements) at T11 vertebral level below. Axial measurements are not orthogonal and thus do not indicate the true diameter of the total aorta but are documented for comparison of true and false lumens diameters.  Prior measurements in parentheses    T11 measurements:  Axial: Total: 49mm (43). True 20mm (14). False 29mm (29)  Sagital: 39mm (37). Max true lumen diameter 18mm (16).  Coronal:  39mm (37). Max true lumen diameter 19mm (17mm)    CTA C/A/P 12/23/22:  T11 measurements:  Axial: Total 52 (49). True 20 (20). False 32 (29)  Sagital: 42mm (39). Max true lumen 18mm (18)  Coronal: 42mm (39). Max true lumen (19)    Assessment/Diagnosis and Plan:  62 y.o.  male s/p above with continued growth of his false lumen. He would benefit from TEVAR to treat distal endoleak and reduce growth of the false lumen.  Risks benefits and alternatives were explained to the patient who expressed full understanding and agreed to proceed.    -Will plan TEVAR down to level of celiac artery origin.  -continue goal SBP <120 and goal HR <80-- continue home labetalol  -Continue ASA indefinitely  -hold plavix 5 days pre-op    JOSY Santos II, MD, RPVI  Vascular Surgeon  [facility]

## 2023-01-20 ENCOUNTER — LAB VISIT (OUTPATIENT)
Dept: LAB | Facility: HOSPITAL | Age: 63
End: 2023-01-20
Attending: UROLOGY
Payer: COMMERCIAL

## 2023-01-20 DIAGNOSIS — C61 PROSTATE CANCER: ICD-10-CM

## 2023-01-20 LAB — COMPLEXED PSA SERPL-MCNC: 1.2 NG/ML (ref 0–4)

## 2023-01-20 PROCEDURE — 36415 COLL VENOUS BLD VENIPUNCTURE: CPT | Performed by: UROLOGY

## 2023-01-20 PROCEDURE — 84153 ASSAY OF PSA TOTAL: CPT | Performed by: UROLOGY

## 2023-01-24 ENCOUNTER — OFFICE VISIT (OUTPATIENT)
Dept: UROLOGY | Facility: CLINIC | Age: 63
End: 2023-01-24
Payer: COMMERCIAL

## 2023-01-24 VITALS
DIASTOLIC BLOOD PRESSURE: 81 MMHG | BODY MASS INDEX: 28.16 KG/M2 | WEIGHT: 207.88 LBS | HEIGHT: 72 IN | SYSTOLIC BLOOD PRESSURE: 131 MMHG | HEART RATE: 64 BPM

## 2023-01-24 DIAGNOSIS — C61 PROSTATE CANCER: Primary | ICD-10-CM

## 2023-01-24 DIAGNOSIS — N40.1 BENIGN NON-NODULAR PROSTATIC HYPERPLASIA WITH LOWER URINARY TRACT SYMPTOMS: ICD-10-CM

## 2023-01-24 PROCEDURE — 3008F BODY MASS INDEX DOCD: CPT | Mod: CPTII,S$GLB,, | Performed by: UROLOGY

## 2023-01-24 PROCEDURE — 3079F PR MOST RECENT DIASTOLIC BLOOD PRESSURE 80-89 MM HG: ICD-10-PCS | Mod: CPTII,S$GLB,, | Performed by: UROLOGY

## 2023-01-24 PROCEDURE — 3008F PR BODY MASS INDEX (BMI) DOCUMENTED: ICD-10-PCS | Mod: CPTII,S$GLB,, | Performed by: UROLOGY

## 2023-01-24 PROCEDURE — 3079F DIAST BP 80-89 MM HG: CPT | Mod: CPTII,S$GLB,, | Performed by: UROLOGY

## 2023-01-24 PROCEDURE — 1160F RVW MEDS BY RX/DR IN RCRD: CPT | Mod: CPTII,S$GLB,, | Performed by: UROLOGY

## 2023-01-24 PROCEDURE — 1159F MED LIST DOCD IN RCRD: CPT | Mod: CPTII,S$GLB,, | Performed by: UROLOGY

## 2023-01-24 PROCEDURE — 99999 PR PBB SHADOW E&M-EST. PATIENT-LVL III: CPT | Mod: PBBFAC,,, | Performed by: UROLOGY

## 2023-01-24 PROCEDURE — 1159F PR MEDICATION LIST DOCUMENTED IN MEDICAL RECORD: ICD-10-PCS | Mod: CPTII,S$GLB,, | Performed by: UROLOGY

## 2023-01-24 PROCEDURE — 3075F SYST BP GE 130 - 139MM HG: CPT | Mod: CPTII,S$GLB,, | Performed by: UROLOGY

## 2023-01-24 PROCEDURE — 1160F PR REVIEW ALL MEDS BY PRESCRIBER/CLIN PHARMACIST DOCUMENTED: ICD-10-PCS | Mod: CPTII,S$GLB,, | Performed by: UROLOGY

## 2023-01-24 PROCEDURE — 99214 PR OFFICE/OUTPT VISIT, EST, LEVL IV, 30-39 MIN: ICD-10-PCS | Mod: S$GLB,,, | Performed by: UROLOGY

## 2023-01-24 PROCEDURE — 99999 PR PBB SHADOW E&M-EST. PATIENT-LVL III: ICD-10-PCS | Mod: PBBFAC,,, | Performed by: UROLOGY

## 2023-01-24 PROCEDURE — 99214 OFFICE O/P EST MOD 30 MIN: CPT | Mod: S$GLB,,, | Performed by: UROLOGY

## 2023-01-24 PROCEDURE — 3075F PR MOST RECENT SYSTOLIC BLOOD PRESS GE 130-139MM HG: ICD-10-PCS | Mod: CPTII,S$GLB,, | Performed by: UROLOGY

## 2023-01-24 NOTE — PROGRESS NOTES
"  Urology - [facility]    SUBJECTIVE:     Chief Complaint: prostate cancer    History of Present Illness:  Luis Eduardo Curry is a 62 y.o. male who presents to clinic for urinary retention. He is Established to our clinic.    Patient had type B aortic dissection extending past renal arteries w/ false lumen supplying L kidney. S/p carotid to subclavian bypass on 11/16 followed by TEVAR on 11/17. Went into urinary retention during that admission necessitating Buckley with 2 L output on initial buckley placement. CHRISTINE w/ Cr 2.3 (b/l 1.1) which has improved to 1.0. Failed outpatient VT 10/16/20 and 11/9/20. Patient had baseline LUTS prior to this admission for which he was on Flomax. He had to strain to void, weakened stream, frequency, and nocturia x2-3.    PSA 4.6 in 1/2019. TRUS bx w/ Dr. Mendez in 5/2019 was negative for malignancy. Volume 46 g. Cysto revealed 4 cm prostatic length with lateral lobe hyperplasia. Trabeculated bladder.    Underwent a TURP on 3/5/21.  Doing well.  On his pathology showed 40% tissue involvement of [place] 3+3 prostate cancer.     No complaints today.  He is voiding very well.  He is pleased.  He underwent a PSA on June 17, 2022 which was 1.6.  Most recently, psa was 1.2 on 1/20/23.     Lab Results   Component Value Date    PSA 4.6 (H) 01/05/2019    PSADIAG 1.2 01/20/2023    PSADIAG 1.6 06/17/2022    PSADIAG 1.0 12/15/2021    PSADIAG 2.3 06/16/2021           PROSTATE CHIPS, TRANSURETHRAL RESECTION OF PROSTATE 3/5/21:  - Prostatic adenocarcinoma, Kervin score 3+3=6 (Grade group 1) involving approximately 40% of the  entirely submitted specimen.  - See comment.    +fh of prostate cancer in father (who takes a "chemo pill"---details unknown).  Has 2 brothers who do not have prostate cancer.     Lab Results   Component Value Date    PSA 4.6 (H) 01/05/2019    PSADIAG 1.2 01/20/2023    PSADIAG 1.6 06/17/2022    PSADIAG 1.0 12/15/2021    PSADIAG 2.3 06/16/2021             He is on aspirin and " plavix. Has a history of HTN.         Review of patient's allergies indicates:  No Known Allergies    Past Medical History:   Diagnosis Date    Aortic dissection     Sleep apnea      Past Surgical History:   Procedure Laterality Date    BIOPSY WITH TRANSRECTAL ULTRASOUND (TRUS) GUIDANCE N/A 5/1/2019    Procedure: BIOPSY, WITH TRANSRECTAL PROSTATE ULTRASOUND;  Surgeon: Chintan Mendez Jr., MD;  Location: [facility];  Service: Urology;  Laterality: N/A;    COLONOSCOPY  2006    COLONOSCOPY N/A 1/24/2019    Procedure: COLONOSCOPY;  Surgeon: Reece Tarango MD;  Location: [facility];  Service: Endoscopy;  Laterality: N/A;    CREATION OF AORTO-CAROTID BYPASS WITH GRAFT Left 11/16/2020    Procedure: CREATION, BYPASS, ARTERIAL, AORTA TO CAROTID, USING GRAFT SUBCLAVIAN BYPASS;  Surgeon: JOSY Santos II, MD;  Location: [facility] OR [address] [place] [address];  Service: Cardiovascular;  Laterality: Left;  left common carotid to subclavian bypass    CYSTOSCOPY N/A 5/1/2019    Procedure: FLEXIBLE CYSTOSCOPY;  Surgeon: Chintan Mendez Jr., MD;  Location: [facility];  Service: Urology;  Laterality: N/A;    ENDOVASCULAR GRAFT REPAIR OF ANEURYSM OF THORACIC AORTA N/A 11/17/2020    Procedure: REPAIR, ANEURYSM, ENDOVASCULAR GRAFT, AORTA, THORACIC Left brachial cutdown;  Surgeon: JOSY Santos II, MD;  [facility];  Service: Cardiovascular;  Laterality: N/A;  Fluoro time 34.7 min  2002.64 mGy  432.18 Gycm2    RETROGRADE PYELOGRAPHY Right 3/5/2021    Procedure: PYELOGRAM, RETROGRADE;  Surgeon: Yuriy Woo MD;  Location: [facility] OR [address] [place] [address];  Service: Urology;  Laterality: Right;    TRANSURETHRAL RESECTION OF PROSTATE N/A 2/25/2021    Procedure: TURP (TRANSURETHRAL RESECTION OF PROSTATE) BIPOLOR;  Surgeon: Yuriy Woo MD;  [facility];  Service: Urology;  Laterality: N/A;  2 hours      TRANSURETHRAL RESECTION OF PROSTATE N/A 3/5/2021    Procedure: TURP (TRANSURETHRAL RESECTION OF PROSTATE)/ BIPOLOR;  Surgeon: Yuriy CHANEL  MD Chilo;  Location: [address];  Service: Urology;  Laterality: N/A;    UMBILICAL HERNIA REPAIR N/A 1/14/2020    Procedure: REPAIR, HERNIA, UMBILICAL;  Surgeon: Caio Mas Jr., MD;  Location: [facility];  Service: General;  Laterality: N/A;     Family History   Problem Relation Age of Onset    Hypertension Father     Prostate cancer Father         dx ~60-65, no mets, took chemo pills, not cause of death    Cancer Sister         ?origin (lung?), nonsmoker    Other Paternal Uncle         possible cancer, early death     Social History     Tobacco Use    Smoking status: Former     Packs/day: 1.00     Years: 30.00     Pack years: 30.00     Types: Cigarettes    Smokeless tobacco: Never   Substance Use Topics    Alcohol use: Not Currently     Comment: socially    Drug use: No        Review of Systems   All other systems reviewed and negative except pertinent positives noted in HPI.     OBJECTIVE:     Anticoagulation:  Yes - ASA 81 / Plavix    Estimated body mass index is 27.51 kg/m² as calculated from the following:    Height as of 12/23/22: 6' (1.829 m).    Weight as of 12/23/22: 92 kg (202 lb 13.2 oz).    Vital Signs (Most Recent)       Physical Exam  Vitals and nursing note reviewed.   Constitutional:       Appearance: Normal appearance.   HENT:      Head: Normocephalic and atraumatic.      Mouth/Throat:      Mouth: Mucous membranes are moist.   Eyes:      Pupils: Pupils are equal, round, and reactive to light.   Cardiovascular:      Rate and Rhythm: Normal rate.   Pulmonary:      Effort: Pulmonary effort is normal. No respiratory distress.   Abdominal:      General: Abdomen is flat. There is no distension.      Tenderness: There is no abdominal tenderness.   Skin:     General: Skin is warm and dry.   Neurological:      General: No focal deficit present.      Mental Status: He is alert and oriented to person, place, and time.   Psychiatric:         Mood and Affect: Mood normal.         Behavior: Behavior  normal.         Thought Content: Thought content normal.         Judgment: Judgment normal.       BMP  Lab Results   Component Value Date     12/14/2022    K 4.1 12/14/2022     12/14/2022    CO2 25 12/14/2022    BUN 12 12/14/2022    CREATININE 1.1 12/14/2022    CALCIUM 9.3 12/14/2022    ANIONGAP 9 12/14/2022    ESTGFRAFRICA >60 07/20/2022    EGFRNONAA >60 07/20/2022       Lab Results   Component Value Date    WBC 6.77 12/14/2022    HGB 15.3 12/14/2022    HCT 45.6 12/14/2022    MCV 85 12/14/2022     12/14/2022       Lab Results   Component Value Date    PSA 4.6 (H) 01/05/2019    PSADIAG 1.2 01/20/2023    PSADIAG 1.6 06/17/2022    PSADIAG 1.0 12/15/2021    PSADIAG 2.3 06/16/2021      Imaging:    CTA c/a (8/30/21):  -  No hydronephrosis bilaterally  -  Previous aortic dissection present    ASSESSMENT     1. Prostate cancer    2. Benign non-nodular prostatic hyperplasia with lower urinary tract symptoms          PLAN:     Urinary retention/bph  -resolved --happy with post-op symptom resolution.  -symptoms well controlled  -discussed a trial of d/c of the flomax---patient will do that    Prostate cancer:  -MRI of the prostate from 7/21/22 independently reviewed and reveals a volume of 23cc and no concerning/suspicious lesions within the prostate.   -PSA reviewed and satisfactory.  Very low.  -repeat psa in 6 months. Will notify of results.         Yuriy Woo MD

## 2023-02-03 RX ORDER — CLOPIDOGREL BISULFATE 75 MG/1
75 TABLET ORAL DAILY
Qty: 90 TABLET | Refills: 3 | Status: SHIPPED | OUTPATIENT
Start: 2023-02-03 | End: 2024-03-11 | Stop reason: SDUPTHER

## 2023-02-03 NOTE — PROGRESS NOTES
Report received from Anesthesia. Pt transported to SICU [id number]. with portable telemetry. Pt connected to ICU monitor Wall O2. MD called and made aware of patient arrival. New orders received and implemented. Pulliam catheter exchanged. Pt assessed, immediate needs met. Family brought to bedside, updated on the patient's current condition and plan of care. Family also educated on visiting hours. All questions answered, emotional support provided.     Admit Skin Note: all skin assessed on arrival to SICU, sacrum and heels CDI with new foam dressings in place, no redness or breakdown noted, pt moves all extremities independently, AMRITA drain site from OR CDI, A-Line site CDI, waffle inflated, bed plugged into wall, no new injuries or breakdown during shift.          Physiological Fall

## 2023-02-10 ENCOUNTER — ANESTHESIA EVENT (OUTPATIENT)
Dept: SURGERY | Facility: HOSPITAL | Age: 63
DRG: 221 | End: 2023-02-10
Payer: COMMERCIAL

## 2023-02-13 ENCOUNTER — TELEPHONE (OUTPATIENT)
Dept: VASCULAR SURGERY | Facility: CLINIC | Age: 63
End: 2023-02-13
Payer: COMMERCIAL

## 2023-02-13 RX ORDER — PREDNISOLONE SODIUM PHOSPHATE 10 MG/ML
1 SOLUTION/ DROPS OPHTHALMIC 3 TIMES DAILY
COMMUNITY
End: 2024-03-14

## 2023-02-13 NOTE — TELEPHONE ENCOUNTER
"Spoke with  Patricio, time of arrival 0500am 2nd floor DOSC for surgery on 2/14/2023 confirmed. Also Mr Márquezne states," I stopped my Plavix 5 days before my surgery date as instructed."  "

## 2023-02-13 NOTE — ANESTHESIA PREPROCEDURE EVALUATION
[facility]  Anesthesia Pre-Operative Evaluation         Patient Name: Luis Eduardo Curry  YOB: 1960  MRN: [id number]    SUBJECTIVE:     Pre-operative evaluation for Procedure(s) (LRB):  REPAIR, ANEURYSM, ENDOVASCULAR GRAFT, AORTA, THORACIC (N/A)     02/13/2023    Luis Eduardo Curry is a 62 y.o. male w/ a significant PMHx of type B aortic dissection extending past renal arteries w/ false lumen supplying L kidney s/p carotid to subclavian bypass on 11/16/22 followed by TEVAR 11/17/22. Seen by Urology for urinary retention during admission necessitating Pulliam which has since resolved.   Now presents for the above procedure.      TTE 10/5/20   Aortic dissection from aortic arch to decending aorta. There also appears to be a dissection flap in the ascending aorta but I cannot be certain.   Ascending aorta at the upper limits of normal.   Enlarged descending thoracic aorta and arch.   The left ventricle is normal in size with normal systolic function. The estimated ejection fraction is 55%.   Normal right ventricular systolic function.   No thrombus is present in the appendage.   No interatrial septal defect present.   Trivial pericardial effusion.   Normal left ventricular diastolic function.      LDA: None documented.    Prev airway:  Intubation:     Induction:  Intravenous    Intubated:  Postinduction    Mask Ventilation:  Easy mask    Attempts:  1    Attempted By:  CRNA    Method of Intubation:  Direct    Blade:  Delgado 2    Laryngeal View Grade: Grade I - full view of chords      Difficult Airway Encountered?: No      Complications:  None    Airway Device:  Oral endotracheal tube    Airway Device Size:  7.5    Style/Cuff Inflation:  Cuffed    Tube secured:  22    Secured at:  The lips    Complicating Factors:  None    Findings Post-Intubation:  BS equal bilateral and atraumatic/condition of teeth unchanged    Drips: None documented.    Patient Active Problem List    Diagnosis    Non-thermal blister of ankle, left, initial encounter    Colon cancer screening    BPH with urinary obstruction    Urinary tract infection in male    Umbilical hernia without obstruction and without gangrene    Right inguinal hernia    Aortic dissection distal to left subclavian    Hypotension    CHRISTINE (acute kidney injury)    Aortic dissection    Urinary retention    Hydronephrosis of right kidney    Elevated PSA    BPH with obstruction/lower urinary tract symptoms    Bilateral carotid artery stenosis    Prostate cancer       Review of patient's allergies indicates:  No Known Allergies    Current Inpatient Medications:      No current facility-administered medications on file prior to encounter.     Current Outpatient Medications on File Prior to Encounter   Medication Sig Dispense Refill    aspirin 81 MG Chew Chew and swallow 1 tablet (81 mg total) by mouth once daily. 360 tablet 0    labetaloL (NORMODYNE) 200 MG tablet Take 1 tablet (200 mg total) by mouth 2 (two) times daily. OK TO TAKE 60 tablet 11       Past Surgical History:   Procedure Laterality Date    BIOPSY WITH TRANSRECTAL ULTRASOUND (TRUS) GUIDANCE N/A 5/1/2019    Procedure: BIOPSY, WITH TRANSRECTAL PROSTATE ULTRASOUND;  Surgeon: Chintan Mendez Jr., MD;  Location: [facility];  Service: Urology;  Laterality: N/A;    COLONOSCOPY  2006    COLONOSCOPY N/A 1/24/2019    Procedure: COLONOSCOPY;  Surgeon: Reece Tarango MD;  Location[facility];  Service: Endoscopy;  Laterality: N/A;    CREATION OF AORTO-CAROTID BYPASS WITH GRAFT Left 11/16/2020    Procedure: CREATION, BYPASS, ARTERIAL, AORTA TO CAROTID, USING GRAFT SUBCLAVIAN BYPASS;  Surgeon: JOSY Santos II, MD;  Location: [address];  Service: Cardiovascular;  Laterality: Left;  left common carotid to subclavian bypass    CYSTOSCOPY N/A 5/1/2019    Procedure: FLEXIBLE CYSTOSCOPY;  Surgeon: Chintan Mendez Jr., MD;  Location: [facility];  Service: Urology;   Laterality: N/A;    ENDOVASCULAR GRAFT REPAIR OF ANEURYSM OF THORACIC AORTA N/A 11/17/2020    Procedure: REPAIR, ANEURYSM, ENDOVASCULAR GRAFT, AORTA, THORACIC Left brachial cutdown;  Surgeon: JOSY Santos II, MD;  Location: [facility] OR [facility];  Service: Cardiovascular;  Laterality: N/A;  Fluoro time 34.7 min  2002.64 mGy  432.18 Gycm2    RETROGRADE PYELOGRAPHY Right 3/5/2021    Procedure: PYELOGRAM, RETROGRADE;  Surgeon: Yuriy Woo MD;  Location: [facility] OR [address] [place] [address];  Service: Urology;  Laterality: Right;    TRANSURETHRAL RESECTION OF PROSTATE N/A 2/25/2021    Procedure: TURP (TRANSURETHRAL RESECTION OF PROSTATE) BIPOLOR;  Surgeon: Yuriy Woo MD;  Location: [facility] OR [address] [place] [address];  Service: Urology;  Laterality: N/A;  2 hours      TRANSURETHRAL RESECTION OF PROSTATE N/A 3/5/2021    Procedure: TURP (TRANSURETHRAL RESECTION OF PROSTATE)/ BIPOLOR;  Surgeon: Yuriy Woo MD;  Location: [facility] OR [address] [place] [address];  Service: Urology;  Laterality: N/A;    UMBILICAL HERNIA REPAIR N/A 1/14/2020    Procedure: REPAIR, HERNIA, UMBILICAL;  Surgeon: Caio Mas Jr., MD;  Location: [facility];  Service: General;  Laterality: N/A;       Social History     Socioeconomic History    Marital status:    Tobacco Use    Smoking status: Former     Packs/day: 1.00     Years: 30.00     Pack years: 30.00     Types: Cigarettes    Smokeless tobacco: Never   Substance and Sexual Activity    Alcohol use: Not Currently     Comment: socially    Drug use: No    Sexual activity: Yes       OBJECTIVE:     Vital Signs Range (Last 24H):         CBC:   No results for input(s): WBC, RBC, HGB, HCT, PLT, MCV, MCH, MCHC in the last 72 hours.    CMP: No results for input(s): NA, K, CL, CO2, BUN, CREATININE, GLU, MG, PHOS, CALCIUM, ALBUMIN, PROT, ALKPHOS, ALT, AST, BILITOT in the last 72 hours.    INR:  No results for input(s): PT, INR, PROTIME, APTT in the last 72 hours.    Diagnostic Studies: No relevant studies.    EKG: No recent  studies available.    2D ECHO:  No results found for this or any previous visit.      ASSESSMENT/PLAN:           Pre-op Assessment    I have reviewed the Patient Summary Reports.    I have reviewed the NPO Status.   I have reviewed the Medications.     Review of Systems  Anesthesia Hx:  No problems with previous Anesthesia    Hematology/Oncology:  Hematology Normal   Oncology Normal     EENT/Dental:EENT/Dental Normal   Cardiovascular:  Cardiovascular Normal  Aortic dissection   Pulmonary:   Sleep Apnea    Renal/:   Chronic Renal Disease    Hepatic/GI:  Hepatic/GI Normal    Neurological:  Neurology Normal    Endocrine:  Endocrine Normal    Psych:  Psychiatric Normal           Physical Exam  General: Well nourished, Alert, Oriented and Cooperative    Airway:  Mallampati: III / II  Mouth Opening: Normal  TM Distance: Normal  Tongue: Normal  Neck ROM: Normal ROM    Chest/Lungs:  Normal Respiratory Rate    Heart:  Rate: Normal        Anesthesia Plan  Type of Anesthesia, risks & benefits discussed:    Anesthesia Type: Gen ETT  Intra-op Monitoring Plan: Standard ASA Monitors  Post Op Pain Control Plan: multimodal analgesia  Induction:  IV  Airway Plan: Direct, Post-Induction  Informed Consent: Informed consent signed with the Patient and all parties understand the risks and agree with anesthesia plan.  All questions answered.   ASA Score: 3  Day of Surgery Review of History & Physical: H&P Update referred to the surgeon/provider.    Ready For Surgery From Anesthesia Perspective.     .

## 2023-02-13 NOTE — PRE-PROCEDURE INSTRUCTIONS
PreOp Instructions given:   - Verbal medication information (what to hold and what to take)   - NPO guidelines [id number]  - Arrival place directions given; time to be given the day before procedure by the   Surgeon's Office [address]  - Bathing with antibacterial soap   - Don't wear any jewelry or bring any valuables AM of surgery   - No makeup or moisturizer to face   - No perfume/cologne, powder, lotions or aftershave   Pt. verbalized understanding.   Pt denies any h/o Anesthesia/Sedation complications or side effects.

## 2023-02-14 ENCOUNTER — ANESTHESIA (OUTPATIENT)
Dept: SURGERY | Facility: HOSPITAL | Age: 63
DRG: 221 | End: 2023-02-14
Payer: COMMERCIAL

## 2023-02-14 ENCOUNTER — HOSPITAL ENCOUNTER (INPATIENT)
Facility: HOSPITAL | Age: 63
LOS: 2 days | Discharge: HOME | DRG: 221 | End: 2023-02-16
Attending: SURGERY | Admitting: SURGERY
Payer: COMMERCIAL

## 2023-02-14 DIAGNOSIS — I71.019 AORTIC DISSECTION DISTAL TO LEFT SUBCLAVIAN: Primary | ICD-10-CM

## 2023-02-14 DIAGNOSIS — I71.40 ABDOMINAL AORTIC ANEURYSM: ICD-10-CM

## 2023-02-14 LAB
ABO + RH BLD: NORMAL
ANION GAP SERPL CALC-SCNC: 6 MMOL/L (ref 8–16)
BASOPHILS # BLD AUTO: 0.01 K/UL (ref 0–0.2)
BASOPHILS NFR BLD: 0.2 % (ref 0–1.9)
BLD GP AB SCN CELLS X3 SERPL QL: NORMAL
BUN SERPL-MCNC: 10 MG/DL (ref 6–20)
CALCIUM SERPL-MCNC: 8.8 MG/DL (ref 8.7–10.5)
CHLORIDE SERPL-SCNC: 110 MMOL/L (ref 95–110)
CO2 SERPL-SCNC: 21 MMOL/L (ref 23–29)
CREAT SERPL-MCNC: 1 MG/DL (ref 0.5–1.4)
DIFFERENTIAL METHOD: ABNORMAL
EOSINOPHIL # BLD AUTO: 0 K/UL (ref 0–0.5)
EOSINOPHIL NFR BLD: 0.2 % (ref 0–8)
ERYTHROCYTE [DISTWIDTH] IN BLOOD BY AUTOMATED COUNT: 13.1 % (ref 11.5–14.5)
EST. GFR  (NO RACE VARIABLE): >60 ML/MIN/1.73 M^2
GLUCOSE SERPL-MCNC: 126 MG/DL (ref 70–110)
HCT VFR BLD AUTO: 42.7 % (ref 40–54)
HGB BLD-MCNC: 14.1 G/DL (ref 14–18)
IMM GRANULOCYTES # BLD AUTO: 0.03 K/UL (ref 0–0.04)
IMM GRANULOCYTES NFR BLD AUTO: 0.5 % (ref 0–0.5)
LYMPHOCYTES # BLD AUTO: 0.7 K/UL (ref 1–4.8)
LYMPHOCYTES NFR BLD: 11.9 % (ref 18–48)
MAGNESIUM SERPL-MCNC: 1.9 MG/DL (ref 1.6–2.6)
MCH RBC QN AUTO: 29.3 PG (ref 27–31)
MCHC RBC AUTO-ENTMCNC: 33 G/DL (ref 32–36)
MCV RBC AUTO: 89 FL (ref 82–98)
MONOCYTES # BLD AUTO: 0.3 K/UL (ref 0.3–1)
MONOCYTES NFR BLD: 4.1 % (ref 4–15)
NEUTROPHILS # BLD AUTO: 5 K/UL (ref 1.8–7.7)
NEUTROPHILS NFR BLD: 83.1 % (ref 38–73)
NRBC BLD-RTO: 0 /100 WBC
PHOSPHATE SERPL-MCNC: 2.7 MG/DL (ref 2.7–4.5)
PLATELET # BLD AUTO: 168 K/UL (ref 150–450)
PMV BLD AUTO: 10.6 FL (ref 9.2–12.9)
POTASSIUM SERPL-SCNC: 4.4 MMOL/L (ref 3.5–5.1)
RBC # BLD AUTO: 4.81 M/UL (ref 4.6–6.2)
SODIUM SERPL-SCNC: 137 MMOL/L (ref 136–145)
WBC # BLD AUTO: 6.04 K/UL (ref 3.9–12.7)

## 2023-02-14 PROCEDURE — 37252 PR IVUS, NON-CORONARY, 1ST VESSEL: ICD-10-PCS | Mod: ,,, | Performed by: SURGERY

## 2023-02-14 PROCEDURE — 36200 PR PLACE CATH AORTA: ICD-10-PCS | Mod: 51,,, | Performed by: SURGERY

## 2023-02-14 PROCEDURE — 71000039 HC RECOVERY, EACH ADD'L HOUR: Performed by: SURGERY

## 2023-02-14 PROCEDURE — C1887 CATHETER, GUIDING: HCPCS | Performed by: SURGERY

## 2023-02-14 PROCEDURE — 33881 EVASC RPR TA NDGFT XCOV LSA: CPT | Mod: ,,, | Performed by: SURGERY

## 2023-02-14 PROCEDURE — 25000003 PHARM REV CODE 250: Performed by: STUDENT IN AN ORGANIZED HEALTH CARE EDUCATION/TRAINING PROGRAM

## 2023-02-14 PROCEDURE — 63600175 PHARM REV CODE 636 W HCPCS: Performed by: STUDENT IN AN ORGANIZED HEALTH CARE EDUCATION/TRAINING PROGRAM

## 2023-02-14 PROCEDURE — C1760 CLOSURE DEV, VASC: HCPCS | Performed by: SURGERY

## 2023-02-14 PROCEDURE — C1894 INTRO/SHEATH, NON-LASER: HCPCS | Performed by: SURGERY

## 2023-02-14 PROCEDURE — 27000221 HC OXYGEN, UP TO 24 HOURS

## 2023-02-14 PROCEDURE — 99900035 HC TECH TIME PER 15 MIN (STAT)

## 2023-02-14 PROCEDURE — 36620 INSERTION CATHETER ARTERY: CPT | Mod: ,,, | Performed by: STUDENT IN AN ORGANIZED HEALTH CARE EDUCATION/TRAINING PROGRAM

## 2023-02-14 PROCEDURE — D9220A PRA ANESTHESIA: ICD-10-PCS | Mod: ,,, | Performed by: ANESTHESIOLOGY

## 2023-02-14 PROCEDURE — 33881 PR ENDOVASC TAA REW/O SUBCL: ICD-10-PCS | Mod: ,,, | Performed by: SURGERY

## 2023-02-14 PROCEDURE — 34713 PR ACCESS/CLOSURE, FEM ART, DLVR OF ENDOGRAFT, PERC: ICD-10-PCS | Mod: RT,,, | Performed by: SURGERY

## 2023-02-14 PROCEDURE — 37252 INTRVASC US NONCORONARY 1ST: CPT | Mod: ,,, | Performed by: SURGERY

## 2023-02-14 PROCEDURE — 27201423 OPTIME MED/SURG SUP & DEVICES STERILE SUPPLY: Performed by: SURGERY

## 2023-02-14 PROCEDURE — 36000712 HC OR TIME LEV V 1ST 15 MIN: Performed by: SURGERY

## 2023-02-14 PROCEDURE — 75957 PR  ENDOVASC REPAIR THOR AORTA EXCL SUBCLAVIAN: CPT | Mod: 26,,, | Performed by: SURGERY

## 2023-02-14 PROCEDURE — 27201037 HC PRESSURE MONITORING SET UP

## 2023-02-14 PROCEDURE — 99291 PR CRITICAL CARE, E/M 30-74 MINUTES: ICD-10-PCS | Mod: ,,, | Performed by: INTERNAL MEDICINE

## 2023-02-14 PROCEDURE — 94761 N-INVAS EAR/PLS OXIMETRY MLT: CPT

## 2023-02-14 PROCEDURE — 63600175 PHARM REV CODE 636 W HCPCS: Performed by: SURGERY

## 2023-02-14 PROCEDURE — 71000033 HC RECOVERY, INTIAL HOUR: Performed by: SURGERY

## 2023-02-14 PROCEDURE — 85025 COMPLETE CBC W/AUTO DIFF WBC: CPT | Performed by: STUDENT IN AN ORGANIZED HEALTH CARE EDUCATION/TRAINING PROGRAM

## 2023-02-14 PROCEDURE — C1874 STENT, COATED/COV W/DEL SYS: HCPCS | Performed by: SURGERY

## 2023-02-14 PROCEDURE — 36200 PLACE CATHETER IN AORTA: CPT | Mod: 51,,, | Performed by: SURGERY

## 2023-02-14 PROCEDURE — C1753 CATH, INTRAVAS ULTRASOUND: HCPCS | Performed by: SURGERY

## 2023-02-14 PROCEDURE — 36620 ARTERIAL: ICD-10-PCS | Mod: ,,, | Performed by: STUDENT IN AN ORGANIZED HEALTH CARE EDUCATION/TRAINING PROGRAM

## 2023-02-14 PROCEDURE — 37000009 HC ANESTHESIA EA ADD 15 MINS: Performed by: SURGERY

## 2023-02-14 PROCEDURE — 84100 ASSAY OF PHOSPHORUS: CPT | Performed by: STUDENT IN AN ORGANIZED HEALTH CARE EDUCATION/TRAINING PROGRAM

## 2023-02-14 PROCEDURE — 20000000 HC ICU ROOM

## 2023-02-14 PROCEDURE — 99291 CRITICAL CARE FIRST HOUR: CPT | Mod: ,,, | Performed by: INTERNAL MEDICINE

## 2023-02-14 PROCEDURE — 34713 PERQ ACCESS & CLSR FEM ART: CPT | Mod: RT,,, | Performed by: SURGERY

## 2023-02-14 PROCEDURE — 36000713 HC OR TIME LEV V EA ADD 15 MIN: Performed by: SURGERY

## 2023-02-14 PROCEDURE — C1769 GUIDE WIRE: HCPCS | Performed by: SURGERY

## 2023-02-14 PROCEDURE — 80048 BASIC METABOLIC PNL TOTAL CA: CPT | Performed by: STUDENT IN AN ORGANIZED HEALTH CARE EDUCATION/TRAINING PROGRAM

## 2023-02-14 PROCEDURE — 25500020 PHARM REV CODE 255: Performed by: SURGERY

## 2023-02-14 PROCEDURE — 83735 ASSAY OF MAGNESIUM: CPT | Performed by: STUDENT IN AN ORGANIZED HEALTH CARE EDUCATION/TRAINING PROGRAM

## 2023-02-14 PROCEDURE — D9220A PRA ANESTHESIA: Mod: ,,, | Performed by: ANESTHESIOLOGY

## 2023-02-14 PROCEDURE — 75957 PR  ENDOVASC REPAIR THOR AORTA EXCL SUBCLAVIAN: ICD-10-PCS | Mod: 26,,, | Performed by: SURGERY

## 2023-02-14 PROCEDURE — 86900 BLOOD TYPING SEROLOGIC ABO: CPT | Performed by: SURGERY

## 2023-02-14 PROCEDURE — 37000008 HC ANESTHESIA 1ST 15 MINUTES: Performed by: SURGERY

## 2023-02-14 RX ORDER — POTASSIUM CHLORIDE 7.45 MG/ML
80 INJECTION INTRAVENOUS
Status: DISCONTINUED | OUTPATIENT
Start: 2023-02-14 | End: 2023-02-15

## 2023-02-14 RX ORDER — SODIUM CHLORIDE 9 MG/ML
INJECTION, SOLUTION INTRAVENOUS CONTINUOUS PRN
Status: DISCONTINUED | OUTPATIENT
Start: 2023-02-14 | End: 2023-02-14

## 2023-02-14 RX ORDER — HEPARIN SODIUM 1000 [USP'U]/ML
INJECTION, SOLUTION INTRAVENOUS; SUBCUTANEOUS
Status: DISCONTINUED | OUTPATIENT
Start: 2023-02-14 | End: 2023-02-14

## 2023-02-14 RX ORDER — POLYETHYLENE GLYCOL 3350 17 G/17G
17 POWDER, FOR SOLUTION ORAL 2 TIMES DAILY PRN
Status: DISCONTINUED | OUTPATIENT
Start: 2023-02-14 | End: 2023-02-17 | Stop reason: HOSPADM

## 2023-02-14 RX ORDER — POTASSIUM CHLORIDE 7.45 MG/ML
40 INJECTION INTRAVENOUS
Status: DISCONTINUED | OUTPATIENT
Start: 2023-02-14 | End: 2023-02-15

## 2023-02-14 RX ORDER — FENTANYL CITRATE 50 UG/ML
INJECTION, SOLUTION INTRAMUSCULAR; INTRAVENOUS
Status: DISCONTINUED | OUTPATIENT
Start: 2023-02-14 | End: 2023-02-14

## 2023-02-14 RX ORDER — LABETALOL 100 MG/1
200 TABLET, FILM COATED ORAL DAILY
Status: DISCONTINUED | OUTPATIENT
Start: 2023-02-15 | End: 2023-02-14

## 2023-02-14 RX ORDER — LABETALOL 200 MG/1
200 TABLET, FILM COATED ORAL 2 TIMES DAILY
Status: DISCONTINUED | OUTPATIENT
Start: 2023-02-14 | End: 2023-02-14

## 2023-02-14 RX ORDER — ROCURONIUM BROMIDE 10 MG/ML
INJECTION, SOLUTION INTRAVENOUS
Status: DISCONTINUED | OUTPATIENT
Start: 2023-02-14 | End: 2023-02-14

## 2023-02-14 RX ORDER — SODIUM CHLORIDE 0.9 % (FLUSH) 0.9 %
10 SYRINGE (ML) INJECTION
Status: DISCONTINUED | OUTPATIENT
Start: 2023-02-14 | End: 2023-02-14 | Stop reason: HOSPADM

## 2023-02-14 RX ORDER — CALCIUM GLUCONATE 20 MG/ML
3 INJECTION, SOLUTION INTRAVENOUS
Status: DISCONTINUED | OUTPATIENT
Start: 2023-02-14 | End: 2023-02-15

## 2023-02-14 RX ORDER — HYDROCODONE BITARTRATE AND ACETAMINOPHEN 5; 325 MG/1; MG/1
1 TABLET ORAL EVERY 4 HOURS PRN
Status: DISCONTINUED | OUTPATIENT
Start: 2023-02-14 | End: 2023-02-15

## 2023-02-14 RX ORDER — ENOXAPARIN SODIUM 100 MG/ML
40 INJECTION SUBCUTANEOUS EVERY 24 HOURS
Status: DISCONTINUED | OUTPATIENT
Start: 2023-02-15 | End: 2023-02-17 | Stop reason: HOSPADM

## 2023-02-14 RX ORDER — CALCIUM GLUCONATE 20 MG/ML
2 INJECTION, SOLUTION INTRAVENOUS
Status: DISCONTINUED | OUTPATIENT
Start: 2023-02-14 | End: 2023-02-15

## 2023-02-14 RX ORDER — MUPIROCIN 20 MG/G
OINTMENT TOPICAL 2 TIMES DAILY
Status: DISCONTINUED | OUTPATIENT
Start: 2023-02-14 | End: 2023-02-17 | Stop reason: HOSPADM

## 2023-02-14 RX ORDER — CEFAZOLIN SODIUM 1 G/3ML
INJECTION, POWDER, FOR SOLUTION INTRAMUSCULAR; INTRAVENOUS
Status: DISCONTINUED | OUTPATIENT
Start: 2023-02-14 | End: 2023-02-14

## 2023-02-14 RX ORDER — PHENYLEPHRINE HYDROCHLORIDE 10 MG/ML
INJECTION INTRAVENOUS
Status: DISCONTINUED | OUTPATIENT
Start: 2023-02-14 | End: 2023-02-14

## 2023-02-14 RX ORDER — ONDANSETRON 4 MG/1
4 TABLET, FILM COATED ORAL EVERY 6 HOURS PRN
Status: DISCONTINUED | OUTPATIENT
Start: 2023-02-14 | End: 2023-02-17 | Stop reason: HOSPADM

## 2023-02-14 RX ORDER — NAPROXEN SODIUM 220 MG/1
81 TABLET, FILM COATED ORAL DAILY
Status: DISCONTINUED | OUTPATIENT
Start: 2023-02-15 | End: 2023-02-17 | Stop reason: HOSPADM

## 2023-02-14 RX ORDER — DEXAMETHASONE SODIUM PHOSPHATE 4 MG/ML
INJECTION, SOLUTION INTRA-ARTICULAR; INTRALESIONAL; INTRAMUSCULAR; INTRAVENOUS; SOFT TISSUE
Status: DISCONTINUED | OUTPATIENT
Start: 2023-02-14 | End: 2023-02-14

## 2023-02-14 RX ORDER — LIDOCAINE HYDROCHLORIDE 20 MG/ML
INJECTION INTRAVENOUS
Status: DISCONTINUED | OUTPATIENT
Start: 2023-02-14 | End: 2023-02-14

## 2023-02-14 RX ORDER — HYDROMORPHONE HYDROCHLORIDE 1 MG/ML
0.2 INJECTION, SOLUTION INTRAMUSCULAR; INTRAVENOUS; SUBCUTANEOUS EVERY 5 MIN PRN
Status: DISCONTINUED | OUTPATIENT
Start: 2023-02-14 | End: 2023-02-14 | Stop reason: HOSPADM

## 2023-02-14 RX ORDER — PROTAMINE SULFATE 10 MG/ML
INJECTION, SOLUTION INTRAVENOUS
Status: DISCONTINUED | OUTPATIENT
Start: 2023-02-14 | End: 2023-02-14

## 2023-02-14 RX ORDER — PROPOFOL 10 MG/ML
VIAL (ML) INTRAVENOUS
Status: DISCONTINUED | OUTPATIENT
Start: 2023-02-14 | End: 2023-02-14

## 2023-02-14 RX ORDER — HALOPERIDOL 5 MG/ML
0.5 INJECTION INTRAMUSCULAR EVERY 10 MIN PRN
Status: DISCONTINUED | OUTPATIENT
Start: 2023-02-14 | End: 2023-02-14 | Stop reason: HOSPADM

## 2023-02-14 RX ORDER — ONDANSETRON 4 MG/1
4 TABLET, FILM COATED ORAL ONCE
Status: DISCONTINUED | OUTPATIENT
Start: 2023-02-14 | End: 2023-02-14

## 2023-02-14 RX ORDER — LABETALOL HCL 20 MG/4 ML
5 SYRINGE (ML) INTRAVENOUS EVERY 4 HOURS PRN
Status: DISCONTINUED | OUTPATIENT
Start: 2023-02-14 | End: 2023-02-17 | Stop reason: HOSPADM

## 2023-02-14 RX ORDER — MIDAZOLAM HYDROCHLORIDE 1 MG/ML
INJECTION INTRAMUSCULAR; INTRAVENOUS
Status: DISCONTINUED | OUTPATIENT
Start: 2023-02-14 | End: 2023-02-14

## 2023-02-14 RX ORDER — ONDANSETRON 2 MG/ML
INJECTION INTRAMUSCULAR; INTRAVENOUS
Status: DISCONTINUED | OUTPATIENT
Start: 2023-02-14 | End: 2023-02-14

## 2023-02-14 RX ORDER — SODIUM CHLORIDE 9 MG/ML
INJECTION, SOLUTION INTRAVENOUS CONTINUOUS
Status: DISCONTINUED | OUTPATIENT
Start: 2023-02-14 | End: 2023-02-14

## 2023-02-14 RX ORDER — TAMSULOSIN HYDROCHLORIDE 0.4 MG/1
1 CAPSULE ORAL NIGHTLY
Status: DISCONTINUED | OUTPATIENT
Start: 2023-02-14 | End: 2023-02-17 | Stop reason: HOSPADM

## 2023-02-14 RX ORDER — FENTANYL CITRATE 50 UG/ML
25 INJECTION, SOLUTION INTRAMUSCULAR; INTRAVENOUS EVERY 5 MIN PRN
Status: DISCONTINUED | OUTPATIENT
Start: 2023-02-14 | End: 2023-02-14 | Stop reason: HOSPADM

## 2023-02-14 RX ORDER — MAGNESIUM SULFATE HEPTAHYDRATE 40 MG/ML
2 INJECTION, SOLUTION INTRAVENOUS
Status: DISCONTINUED | OUTPATIENT
Start: 2023-02-14 | End: 2023-02-15

## 2023-02-14 RX ORDER — CALCIUM GLUCONATE 20 MG/ML
1 INJECTION, SOLUTION INTRAVENOUS
Status: DISCONTINUED | OUTPATIENT
Start: 2023-02-14 | End: 2023-02-15

## 2023-02-14 RX ORDER — MAGNESIUM SULFATE HEPTAHYDRATE 40 MG/ML
4 INJECTION, SOLUTION INTRAVENOUS
Status: DISCONTINUED | OUTPATIENT
Start: 2023-02-14 | End: 2023-02-15

## 2023-02-14 RX ORDER — PREDNISOLONE ACETATE 10 MG/ML
1 SUSPENSION/ DROPS OPHTHALMIC 3 TIMES DAILY
Status: DISCONTINUED | OUTPATIENT
Start: 2023-02-14 | End: 2023-02-15

## 2023-02-14 RX ORDER — IODIXANOL 320 MG/ML
INJECTION, SOLUTION INTRAVASCULAR
Status: DISCONTINUED | OUTPATIENT
Start: 2023-02-14 | End: 2023-02-14

## 2023-02-14 RX ORDER — HYDRALAZINE HYDROCHLORIDE 20 MG/ML
10 INJECTION INTRAMUSCULAR; INTRAVENOUS EVERY 4 HOURS PRN
Status: DISCONTINUED | OUTPATIENT
Start: 2023-02-14 | End: 2023-02-17 | Stop reason: HOSPADM

## 2023-02-14 RX ORDER — POTASSIUM CHLORIDE 7.45 MG/ML
60 INJECTION INTRAVENOUS
Status: DISCONTINUED | OUTPATIENT
Start: 2023-02-14 | End: 2023-02-15

## 2023-02-14 RX ORDER — PREDNISOLONE ACETATE 10 MG/ML
1 SUSPENSION/ DROPS OPHTHALMIC 2 TIMES DAILY
Status: DISCONTINUED | OUTPATIENT
Start: 2023-02-18 | End: 2023-02-15

## 2023-02-14 RX ORDER — POLYETHYLENE GLYCOL 3350 17 G/17G
17 POWDER, FOR SOLUTION ORAL DAILY
Status: DISCONTINUED | OUTPATIENT
Start: 2023-02-15 | End: 2023-02-14

## 2023-02-14 RX ORDER — SODIUM CHLORIDE, SODIUM LACTATE, POTASSIUM CHLORIDE, CALCIUM CHLORIDE 600; 310; 30; 20 MG/100ML; MG/100ML; MG/100ML; MG/100ML
INJECTION, SOLUTION INTRAVENOUS CONTINUOUS
Status: DISCONTINUED | OUTPATIENT
Start: 2023-02-14 | End: 2023-02-17 | Stop reason: HOSPADM

## 2023-02-14 RX ADMIN — PHENYLEPHRINE HYDROCHLORIDE 100 MCG: 10 INJECTION INTRAVENOUS at 08:02

## 2023-02-14 RX ADMIN — MUPIROCIN: 20 OINTMENT TOPICAL at 08:02

## 2023-02-14 RX ADMIN — PHENYLEPHRINE HYDROCHLORIDE 100 MCG: 10 INJECTION INTRAVENOUS at 07:02

## 2023-02-14 RX ADMIN — SODIUM PHOSPHATE, MONOBASIC, MONOHYDRATE AND SODIUM PHOSPHATE, DIBASIC, ANHYDROUS 15 MMOL: 142; 276 INJECTION, SOLUTION INTRAVENOUS at 08:02

## 2023-02-14 RX ADMIN — CEFAZOLIN 2 G: 2 INJECTION, POWDER, FOR SOLUTION INTRAMUSCULAR; INTRAVENOUS at 11:02

## 2023-02-14 RX ADMIN — HEPARIN SODIUM 8000 UNITS: 1000 INJECTION, SOLUTION INTRAVENOUS; SUBCUTANEOUS at 08:02

## 2023-02-14 RX ADMIN — SODIUM CHLORIDE: 9 INJECTION, SOLUTION INTRAVENOUS at 07:02

## 2023-02-14 RX ADMIN — SODIUM CHLORIDE, SODIUM GLUCONATE, SODIUM ACETATE, POTASSIUM CHLORIDE, MAGNESIUM CHLORIDE, SODIUM PHOSPHATE, DIBASIC, AND POTASSIUM PHOSPHATE: .53; .5; .37; .037; .03; .012; .00082 INJECTION, SOLUTION INTRAVENOUS at 07:02

## 2023-02-14 RX ADMIN — GLYCOPYRROLATE 0.2 MG: 0.2 INJECTION, SOLUTION INTRAMUSCULAR; INTRAVENOUS at 08:02

## 2023-02-14 RX ADMIN — ROCURONIUM BROMIDE 50 MG: 10 INJECTION INTRAVENOUS at 07:02

## 2023-02-14 RX ADMIN — HYDROMORPHONE HYDROCHLORIDE 0.2 MG: 1 INJECTION, SOLUTION INTRAMUSCULAR; INTRAVENOUS; SUBCUTANEOUS at 09:02

## 2023-02-14 RX ADMIN — FENTANYL CITRATE 100 MCG: 50 INJECTION, SOLUTION INTRAMUSCULAR; INTRAVENOUS at 07:02

## 2023-02-14 RX ADMIN — SODIUM CHLORIDE, SODIUM LACTATE, POTASSIUM CHLORIDE, AND CALCIUM CHLORIDE: 600; 310; 30; 20 INJECTION, SOLUTION INTRAVENOUS at 04:02

## 2023-02-14 RX ADMIN — SUGAMMADEX 200 MG: 100 INJECTION, SOLUTION INTRAVENOUS at 08:02

## 2023-02-14 RX ADMIN — LIDOCAINE HYDROCHLORIDE 100 MG: 20 INJECTION INTRAVENOUS at 07:02

## 2023-02-14 RX ADMIN — DEXAMETHASONE SODIUM PHOSPHATE 4 MG: 4 INJECTION, SOLUTION INTRAMUSCULAR; INTRAVENOUS at 07:02

## 2023-02-14 RX ADMIN — ONDANSETRON 4 MG: 2 INJECTION INTRAMUSCULAR; INTRAVENOUS at 08:02

## 2023-02-14 RX ADMIN — CEFAZOLIN 2 G: 2 INJECTION, POWDER, FOR SOLUTION INTRAMUSCULAR; INTRAVENOUS at 04:02

## 2023-02-14 RX ADMIN — PROPOFOL 150 MG: 10 INJECTION, EMULSION INTRAVENOUS at 07:02

## 2023-02-14 RX ADMIN — PREDNISOLONE ACETATE 1 DROP: 10 SUSPENSION/ DROPS OPHTHALMIC at 10:02

## 2023-02-14 RX ADMIN — HYDROCODONE BITARTRATE AND ACETAMINOPHEN 1 TABLET: 5; 325 TABLET ORAL at 09:02

## 2023-02-14 RX ADMIN — SODIUM CHLORIDE, SODIUM LACTATE, POTASSIUM CHLORIDE, AND CALCIUM CHLORIDE: 600; 310; 30; 20 INJECTION, SOLUTION INTRAVENOUS at 09:02

## 2023-02-14 RX ADMIN — PHENYLEPHRINE HYDROCHLORIDE 200 MCG: 10 INJECTION INTRAVENOUS at 08:02

## 2023-02-14 RX ADMIN — CEFAZOLIN 2 G: 330 INJECTION, POWDER, FOR SOLUTION INTRAMUSCULAR; INTRAVENOUS at 07:02

## 2023-02-14 RX ADMIN — ROCURONIUM BROMIDE 50 MG: 10 INJECTION INTRAVENOUS at 08:02

## 2023-02-14 RX ADMIN — PROTAMINE SULFATE 50 MG: 10 INJECTION, SOLUTION INTRAVENOUS at 08:02

## 2023-02-14 RX ADMIN — HYDROCODONE BITARTRATE AND ACETAMINOPHEN 1 TABLET: 5; 325 TABLET ORAL at 08:02

## 2023-02-14 RX ADMIN — MIDAZOLAM HYDROCHLORIDE 2 MG: 1 INJECTION, SOLUTION INTRAMUSCULAR; INTRAVENOUS at 07:02

## 2023-02-14 NOTE — TRANSFER OF CARE
Anesthesia Transfer of Care Note    Patient: Luis Eduardo Curry    Procedure(s) Performed: Procedure(s) (LRB):  REPAIR, ANEURYSM, ENDOVASCULAR GRAFT, AORTA, THORACIC (N/A)    Patient location: PACU    Anesthesia Type: general    Transport from OR: Transported from OR on 6-10 L/min O2 by face mask with adequate spontaneous ventilation    Post pain: adequate analgesia    Post assessment: no apparent anesthetic complications    Post vital signs: stable    Level of consciousness: responds to stimulation    Nausea/Vomiting: no nausea/vomiting    Complications: none    Transfer of care protocol was followed      Last vitals:   Visit Vitals  BP (!) 106/54   Pulse 69   Temp 36.4 °C (97.5 °F) (Temporal)   Resp 16   Ht 6' (1.829 m)   Wt 90.7 kg (200 lb)   SpO2 100%   BMI 27.12 kg/m²

## 2023-02-14 NOTE — HPI
Chief Complaint: type B aortic dissection     HPI 12/23/22:  Patient denies any changes in medical history since he was last seen in clinic.  He continues taking his medications aspirin and labetalol blood pressure control.  He denies any chest pain or back pain.  He hunts regularly for deer.  He is stayed active and walks frequently.  He returns for surveillance CT scan for his known type B aortic dissection previously treated by me.     Interval History (12/20/21):   Patient presents to clinic today for a follow up visit.  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  He denies any chest pain.  Denies any back pain.  He has been able to return to his normal daily activities.  He is working full-time.  He has returned to hunting.  He denies any issues with the aspirin or Plavix.  His blood pressures been well controlled his current medications.  He does complain of some intermittent pain in the middle the night in his left leg over the past week.  This pain does not change with motion or ambulation.  He denies numbness or tingling.    Interval History 2/14/23:  Here today for planned TEVAR down to level of celiac artery origin     Social History: Does not smoke, not drinking, no drugs     Family history: No fam hx of aneurysm/dissection

## 2023-02-14 NOTE — ANESTHESIA POSTPROCEDURE EVALUATION
Anesthesia Post Evaluation    Patient: Luis Eduardo Curry    Procedure(s) Performed: Procedure(s) (LRB):  REPAIR, ANEURYSM, ENDOVASCULAR GRAFT, AORTA, THORACIC (N/A)    Final Anesthesia Type: general      Patient location during evaluation: PACU  Patient participation: Yes- Able to Participate  Level of consciousness: awake and alert  Post-procedure vital signs: reviewed and stable  Pain management: adequate  Airway patency: patent    PONV status at discharge: No PONV  Anesthetic complications: no      Cardiovascular status: blood pressure returned to baseline  Respiratory status: spontaneous ventilation and room air  Hydration status: euvolemic  Follow-up not needed.          Vitals Value Taken Time   BP 98/65 02/14/23 0951   Temp 36.4 °C (97.5 °F) 02/14/23 0912   Pulse 54 02/14/23 1000   Resp 12 02/14/23 1000   SpO2 96 % 02/14/23 1000   Vitals shown include unvalidated device data.      No case tracking events are documented in the log.      Pain/Chon Score: Pain Rating Prior to Med Admin: 6 (2/14/2023  9:25 AM)  Chon Score: 10 (2/14/2023  9:30 AM)

## 2023-02-14 NOTE — SUBJECTIVE & OBJECTIVE
Follow-up For: Procedure(s) (LRB):  REPAIR, ANEURYSM, ENDOVASCULAR GRAFT, AORTA, THORACIC (N/A)    Post-Operative Day:       Past Medical History:   Diagnosis Date    Aortic dissection     Sleep apnea        Past Surgical History:   Procedure Laterality Date    BIOPSY WITH TRANSRECTAL ULTRASOUND (TRUS) GUIDANCE N/A 5/1/2019    Procedure: BIOPSY, WITH TRANSRECTAL PROSTATE ULTRASOUND;  Surgeon: Chintan Mendez Jr., MD;  Location: [facility] OR;  Service: Urology;  Laterality: N/A;    COLONOSCOPY  2006    COLONOSCOPY N/A 1/24/2019    Procedure: COLONOSCOPY;  Surgeon: Reece Tarango MD;  Location: [facility];  Service: Endoscopy;  Laterality: N/A;    CREATION OF AORTO-CAROTID BYPASS WITH GRAFT Left 11/16/2020    Procedure: CREATION, BYPASS, ARTERIAL, AORTA TO CAROTID, USING GRAFT SUBCLAVIAN BYPASS;  Surgeon: JOSY Santos II, MD;  Location: [facility] OR [address] [place] [address];  Service: Cardiovascular;  Laterality: Left;  left common carotid to subclavian bypass    CYSTOSCOPY N/A 5/1/2019    Procedure: FLEXIBLE CYSTOSCOPY;  Surgeon: Chintan Mendez Jr., MD;  Location: [facility];  Service: Urology;  Laterality: N/A;    ENDOVASCULAR GRAFT REPAIR OF ANEURYSM OF THORACIC AORTA N/A 11/17/2020    Procedure: REPAIR, ANEURYSM, ENDOVASCULAR GRAFT, AORTA, THORACIC Left brachial cutdown;  Surgeon: JOSY Santos II, MD;  [facility];  Service: Cardiovascular;  Laterality: N/A;  Fluoro time 34.7 min  2002.64 mGy  432.18 Gycm2    RETROGRADE PYELOGRAPHY Right 3/5/2021    Procedure: PYELOGRAM, RETROGRADE;  Surgeon: Yuriy Woo MD;  Location: [facility] OR [address] [place] [address];  Service: Urology;  Laterality: Right;    TRANSURETHRAL RESECTION OF PROSTATE N/A 2/25/2021    Procedure: TURP (TRANSURETHRAL RESECTION OF PROSTATE) BIPOLOR;  Surgeon: Yuriy Woo MD;  Location: [facility] OR [address] [place] [address];  Service: Urology;  Laterality: N/A;  2 hours      TRANSURETHRAL RESECTION OF PROSTATE N/A 3/5/2021    Procedure: TURP (TRANSURETHRAL RESECTION OF PROSTATE)/  BIPOLOR;  Surgeon: Yuriy Woo MD;  Location: [address];  Service: Urology;  Laterality: N/A;    UMBILICAL HERNIA REPAIR N/A 1/14/2020    Procedure: REPAIR, HERNIA, UMBILICAL;  Surgeon: Caio Mas Jr., MD;  Location: [facility];  Service: General;  Laterality: N/A;       Review of patient's allergies indicates:  No Known Allergies    Family History       Problem Relation (Age of Onset)    Cancer Sister    Hypertension Father    Other Paternal Uncle    Prostate cancer Father          Tobacco Use    Smoking status: Former     Packs/day: 1.00     Years: 30.00     Pack years: 30.00     Types: Cigarettes    Smokeless tobacco: Never   Substance and Sexual Activity    Alcohol use: Not Currently     Comment: socially    Drug use: No    Sexual activity: Yes      Review of Systems  Review of Systems   Constitutional: Negative for weight loss.   HENT:  Negative for ear pain and nosebleeds.    Eyes:  Negative for discharge and pain.   Cardiovascular:  Negative for chest pain and palpitations.   Respiratory:  Negative for cough, shortness of breath and wheezing.    Endocrine: Negative for cold intolerance, heat intolerance and polydipsia.   Hematologic/Lymphatic: Negative for adenopathy. Does not bruise/bleed easily.   Skin:  Negative for itching and rash.   Musculoskeletal:  Negative for joint swelling and muscle cramps.   Gastrointestinal:  Negative for abdominal pain, diarrhea, nausea and vomiting.   Genitourinary:  Negative for dysuria and flank pain.   Neurological:  Negative for numbness and seizures.                 Objective:     Vital Signs (Most Recent):    Vital Signs (24h Range):           There is no height or weight on file to calculate BMI.    No intake or output data in the 24 hours ending 02/14/23 0548    Physical Exam  Constitutional:       General: He is not in acute distress.     Appearance: Normal appearance. He is normal weight. He is not ill-appearing or diaphoretic.   HENT:      Head:  Normocephalic and atraumatic.   Eyes:      General: No scleral icterus.        Right eye: No discharge.         Left eye: No discharge.      Extraocular Movements: Extraocular movements intact.      Conjunctiva/sclera: Conjunctivae normal.   Cardiovascular:      Rate and Rhythm: regular rhythm.      Comments:   1+ L brachial pulse  2+ R brachial pulse     2+ DP pulses bilaterally  Pulmonary:      Effort: Pulmonary effort is normal. No respiratory distress.   Musculoskeletal:         General: Normal range of motion.      Cervical back: Normal range of motion and neck supple.      Right lower leg: No edema.      Left lower leg: No edema.   Skin:     General: Skin is warm and dry.      Coloration: Skin is not jaundiced or pale.      Findings: No erythema or rash.   Neurological:      General: No focal deficit present.      Mental Status: He is alert and oriented to person, place, and time. Mental status is at baseline.      Cranial Nerves: No cranial nerve deficit.   Psychiatric:         Mood and Affect: Mood normal.         Behavior: Behavior normal.   Vents:       Lines/Drains/Airways       None                   Significant Labs:    CBC/Anemia Profile:  No results for input(s): WBC, HGB, HCT, PLT, MCV, RDW, IRON, FERRITIN, RETIC, FOLATE, [id number], OCCULTBLOOD in the last 48 hours.     Chemistries:  No results for input(s): NA, K, CL, CO2, BUN, CREATININE, CALCIUM, ALBUMIN, PROT, BILITOT, ALKPHOS, ALT, AST, GLUCOSE, MG, PHOS in the last 48 hours.    All pertinent labs within the past 24 hours have been reviewed.    Significant Imaging: I have reviewed all pertinent imaging results/findings within the past 24 hours.  No results found in the last 24 hours.

## 2023-02-14 NOTE — ANESTHESIA PROCEDURE NOTES
Intubation    Date/Time: 2/14/2023 7:42 AM  Performed by: Gianni Copeland MD  Authorized by: Juan Carlos Hobbs MD     Intubation:     Induction:  Intravenous    Intubated:  Postinduction    Mask Ventilation:  Easy with oral airway    Attempts:  1    Attempted By:  Resident anesthesiologist    Method of Intubation:  Direct    Blade:  Yousif 3    Laryngeal View Grade: Grade IIA - cords partially seen      Difficult Airway Encountered?: No      Complications:  None    Airway Device:  Oral endotracheal tube    Airway Device Size:  7.5    Style/Cuff Inflation:  Cuffed (inflated to minimal occlusive pressure)    Inflation Amount (mL):  10    Tube secured:  24    Secured at:  The teeth    Placement Verified By:  Capnometry    Complicating Factors:  None    Findings Post-Intubation:  BS equal bilateral and atraumatic/condition of teeth unchanged

## 2023-02-14 NOTE — ASSESSMENT & PLAN NOTE
62M w staged L carotid subclavian bypass followed by TEVAR with left CCA snorkel, left subclavian artery embolization, and right renal artery stenting with me for subacute Type B dissection with aneurysmal degeneration on 11/16/2020 and 11/17/2020 here today for planned TEVAR down to level of celiac    Plan:  --TEVAR  --NPO since midnight  --Consent obtained

## 2023-02-14 NOTE — BRIEF OP NOTE
[facility] - Surgery ([facility])  Brief Operative Note    SUMMARY     Surgery Date: 2/14/2023     Surgeon(s) and Role:     * JOSY Santos II, MD - Primary     * Jon Lala MD - Fellow    Assisting Surgeon: None    Pre-op Diagnosis:  Chronic thoracic aortic dissection [I71.019]    Post-op Diagnosis:  Post-Op Diagnosis Codes:     * Chronic thoracic aortic dissection [I71.019]    Procedure(s) (LRB):  US guided right CFA access  REPAIR, ANEURYSM, ENDOVASCULAR GRAFT, AORTA, THORACIC 31 x 31 x 200mm [place] CTAG  IVUS  Perclose closure x 3 right CFA    Anesthesia: General    Operative Findings: Good expansion of true lumen post deployment. IVUS confirmed we were in true lumen the entire time. 2 + PT pulse at conclusion of case    Estimated Blood Loss: * No values recorded between 2/14/2023  8:06 AM and 2/14/2023  9:07 AM *    Estimated Blood Loss has been documented.         Specimens:   Specimen (24h ago, onward)      None            [id number]    Jon Lala MD PGY7  Vascular Surgery Fellow  [phone]

## 2023-02-14 NOTE — PROGRESS NOTES
Pt doing well in PACU. AAOx4. Neuro and neurovascularly intact- q 1 hour checks continued. Dressing to R groin CDI. Pt tolerating PO, pain tolerable, no N/V. SCDs in place. Family to bedside every 2 hours for visits in PACU.

## 2023-02-14 NOTE — H&P
[facility] - [facility] ([facility])  Vascular Surgery  History & Physical    Patient Name: Luis Eduardo Curry  MRN: [id number]  Admission Date: (Not on file)  Code Status: Prior  Attending Physician: JOSY Santos II, MD   Primary Care Provider: Kiko Connor MD   Principal Problem: <principal problem not specified>    Subjective:     HPI:  Chief Complaint: type B aortic dissection     HPI 12/23/22:  Patient denies any changes in medical history since he was last seen in clinic.  He continues taking his medications aspirin and labetalol blood pressure control.  He denies any chest pain or back pain.  He hunts regularly for deer.  He is stayed active and walks frequently.  He returns for surveillance CT scan for his known type B aortic dissection previously treated by me.     Interval History (12/20/21):   Patient presents to clinic today for a follow up visit.  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  He denies any chest pain.  Denies any back pain.  He has been able to return to his normal daily activities.  He is working full-time.  He has returned to hunting.  He denies any issues with the aspirin or Plavix.  His blood pressures been well controlled his current medications.  He does complain of some intermittent pain in the middle the night in his left leg over the past week.  This pain does not change with motion or ambulation.  He denies numbness or tingling.    Interval History 2/14/23:  Here today for planned TEVAR down to level of celiac artery origin     Social History: Does not smoke, not drinking, no drugs     Family history: No fam hx of aneurysm/dissection      Hospital/ICU Course:  No notes on file    Follow-up For: Procedure(s) (LRB):  REPAIR, ANEURYSM, ENDOVASCULAR GRAFT, AORTA, THORACIC  (N/A)    Post-Operative Day:       Past Medical History:   Diagnosis Date    Aortic dissection     Sleep apnea        Past Surgical History:   Procedure Laterality Date    BIOPSY WITH TRANSRECTAL ULTRASOUND (TRUS) GUIDANCE N/A 5/1/2019    Procedure: BIOPSY, WITH TRANSRECTAL PROSTATE ULTRASOUND;  Surgeon: Chintan Mendez Jr., MD;  Location: [facility];  Service: Urology;  Laterality: N/A;    COLONOSCOPY  2006    COLONOSCOPY N/A 1/24/2019    Procedure: COLONOSCOPY;  Surgeon: Reece Tarango MD;  Location: [facility];  Service: Endoscopy;  Laterality: N/A;    CREATION OF AORTO-CAROTID BYPASS WITH GRAFT Left 11/16/2020    Procedure: CREATION, BYPASS, ARTERIAL, AORTA TO CAROTID, USING GRAFT SUBCLAVIAN BYPASS;  Surgeon: JOSY Santos II, MD;  Location: [facility] OR 2ND FLR;  Service: Cardiovascular;  Laterality: Left;  left common carotid to subclavian bypass    CYSTOSCOPY N/A 5/1/2019    Procedure: FLEXIBLE CYSTOSCOPY;  Surgeon: Chintan Mendez Jr., MD;  Location: [facility];  Service: Urology;  Laterality: N/A;    ENDOVASCULAR GRAFT REPAIR OF ANEURYSM OF THORACIC AORTA N/A 11/17/2020    Procedure: REPAIR, ANEURYSM, ENDOVASCULAR GRAFT, AORTA, THORACIC Left brachial cutdown;  Surgeon: JOSY Santos II, MD;  Location: [facility] OR 2ND FLR;  Service: Cardiovascular;  Laterality: N/A;  Fluoro time 34.7 min  2002.64 mGy  432.18 Gycm2    RETROGRADE PYELOGRAPHY Right 3/5/2021    Procedure: PYELOGRAM, RETROGRADE;  Surgeon: Yuriy Woo MD;  [facility];  Service: Urology;  Laterality: Right;    TRANSURETHRAL RESECTION OF PROSTATE N/A 2/25/2021    Procedure: TURP (TRANSURETHRAL RESECTION OF PROSTATE) BIPOLOR;  Surgeon: Yuriy Woo MD;  Location: [facility] OR 1ST FLR;  Service: Urology;  Laterality: N/A;  2 hours      TRANSURETHRAL RESECTION OF PROSTATE N/A 3/5/2021    Procedure: TURP (TRANSURETHRAL RESECTION OF PROSTATE)/ BIPOLOR;  Surgeon: Yuriy Woo MD;  Location: [facility] OR 1ST FLR;  Service: Urology;   Laterality: N/A;    UMBILICAL HERNIA REPAIR N/A 1/14/2020    Procedure: REPAIR, HERNIA, UMBILICAL;  Surgeon: Caio Mas Jr., MD;  [facility];  Service: General;  Laterality: N/A;       Review of patient's allergies indicates:  No Known Allergies    Family History       Problem Relation (Age of Onset)    Cancer Sister    Hypertension Father    Other Paternal Uncle    Prostate cancer Father          Tobacco Use    Smoking status: Former     Packs/day: 1.00     Years: 30.00     Pack years: 30.00     Types: Cigarettes    Smokeless tobacco: Never   Substance and Sexual Activity    Alcohol use: Not Currently     Comment: socially    Drug use: No    Sexual activity: Yes      Review of Systems  Review of Systems   Constitutional: Negative for weight loss.   HENT:  Negative for ear pain and nosebleeds.    Eyes:  Negative for discharge and pain.   Cardiovascular:  Negative for chest pain and palpitations.   Respiratory:  Negative for cough, shortness of breath and wheezing.    Endocrine: Negative for cold intolerance, heat intolerance and polydipsia.   Hematologic/Lymphatic: Negative for adenopathy. Does not bruise/bleed easily.   Skin:  Negative for itching and rash.   Musculoskeletal:  Negative for joint swelling and muscle cramps.   Gastrointestinal:  Negative for abdominal pain, diarrhea, nausea and vomiting.   Genitourinary:  Negative for dysuria and flank pain.   Neurological:  Negative for numbness and seizures.                 Objective:     Vital Signs (Most Recent):    Vital Signs (24h Range):           There is no height or weight on file to calculate BMI.    No intake or output data in the 24 hours ending 02/14/23 0548    Physical Exam  Constitutional:       General: He is not in acute distress.     Appearance: Normal appearance. He is normal weight. He is not ill-appearing or diaphoretic.   HENT:      Head: Normocephalic and atraumatic.   Eyes:      General: No scleral icterus.        Right eye: No  discharge.         Left eye: No discharge.      Extraocular Movements: Extraocular movements intact.      Conjunctiva/sclera: Conjunctivae normal.   Cardiovascular:      Rate and Rhythm: regular rhythm.      Comments:   1+ L brachial pulse  2+ R brachial pulse     2+ DP pulses bilaterally  Pulmonary:      Effort: Pulmonary effort is normal. No respiratory distress.   Musculoskeletal:         General: Normal range of motion.      Cervical back: Normal range of motion and neck supple.      Right lower leg: No edema.      Left lower leg: No edema.   Skin:     General: Skin is warm and dry.      Coloration: Skin is not jaundiced or pale.      Findings: No erythema or rash.   Neurological:      General: No focal deficit present.      Mental Status: He is alert and oriented to person, place, and time. Mental status is at baseline.      Cranial Nerves: No cranial nerve deficit.   Psychiatric:         Mood and Affect: Mood normal.         Behavior: Behavior normal.   Vents:       Lines/Drains/Airways       None                   Significant Labs:    CBC/Anemia Profile:  No results for input(s): WBC, HGB, HCT, PLT, MCV, RDW, IRON, FERRITIN, RETIC, FOLATE, [id number], OCCULTBLOOD in the last 48 hours.     Chemistries:  No results for input(s): NA, K, CL, CO2, BUN, CREATININE, CALCIUM, ALBUMIN, PROT, BILITOT, ALKPHOS, ALT, AST, GLUCOSE, MG, PHOS in the last 48 hours.    All pertinent labs within the past 24 hours have been reviewed.    Significant Imaging: I have reviewed all pertinent imaging results/findings within the past 24 hours.  No results found in the last 24 hours.      Assessment/Plan:     Aortic dissection distal to left subclavian  62M w staged L carotid subclavian bypass followed by TEVAR with left CCA snorkel, left subclavian artery embolization, and right renal artery stenting with me for subacute Type B dissection with aneurysmal degeneration on 11/16/2020 and 11/17/2020 here today for planned TEVAR down to  level of celiac    Plan:  --TEVAR  --NPO since midnight  --Consent obtained        Chris Wagner MD  Critical Care - Surgery  [facility] - [facility] (2nd Fl)

## 2023-02-14 NOTE — NURSING TRANSFER
Nursing Transfer Note      2/14/2023     Reason patient is being transferred: ICU transfer from PACU    Transfer To: [id number]    Transfer via bed    Transfer with IV pole/fluids/bag of belongings/clothes, pt's cell phone    Transported by PCT and RN    Medicines sent: none    Any special needs or follow-up needed: q 1 hour neurovascular checks    Chart send with patient: Yes    Notified: spouse    Patient reassessed at: 1630

## 2023-02-14 NOTE — SUBJECTIVE & OBJECTIVE
Follow-up For: Procedure(s) (LRB):  REPAIR, ANEURYSM, ENDOVASCULAR GRAFT, AORTA, THORACIC (N/A)    Post-Operative Day: Day of Surgery     Past Medical History:   Diagnosis Date    Aortic dissection     Sleep apnea        Past Surgical History:   Procedure Laterality Date    BIOPSY WITH TRANSRECTAL ULTRASOUND (TRUS) GUIDANCE N/A 05/01/2019    Procedure: BIOPSY, WITH TRANSRECTAL PROSTATE ULTRASOUND;  Surgeon: Chintan Mendez Jr., MD;  Location: [facility] OR;  Service: Urology;  Laterality: N/A;    COLONOSCOPY  2006    COLONOSCOPY N/A 01/24/2019    Procedure: COLONOSCOPY;  Surgeon: Reece Tarango MD;  Location: [facility];  Service: Endoscopy;  Laterality: N/A;    CREATION OF AORTO-CAROTID BYPASS WITH GRAFT Left 11/16/2020    Procedure: CREATION, BYPASS, ARTERIAL, AORTA TO CAROTID, USING GRAFT SUBCLAVIAN BYPASS;  Surgeon: JOSY Santos II, MD;  Location: [facility] OR 2ND FLR;  Service: Cardiovascular;  Laterality: Left;  left common carotid to subclavian bypass    CYSTOSCOPY N/A 05/01/2019    Procedure: FLEXIBLE CYSTOSCOPY;  Surgeon: Chintan Mendez Jr., MD;  Location: [facility] OR;  Service: Urology;  Laterality: N/A;    ENDOVASCULAR GRAFT REPAIR OF ANEURYSM OF THORACIC AORTA N/A 11/17/2020    Procedure: REPAIR, ANEURYSM, ENDOVASCULAR GRAFT, AORTA, THORACIC Left brachial cutdown;  Surgeon: JOSY Santos II, MD;  Location: [facility] OR 2ND FLR;  Service: Cardiovascular;  Laterality: N/A;  Fluoro time 34.7 min  2002.64 mGy  432.18 Gycm2    EXTRACTION OF CATARACT  01/18/2023    RETROGRADE PYELOGRAPHY Right 03/05/2021    Procedure: PYELOGRAM, RETROGRADE;  Surgeon: Yuriy Woo MD;  Location: [facility] OR 1ST FLR;  Service: Urology;  Laterality: Right;    TRANSURETHRAL RESECTION OF PROSTATE N/A 02/25/2021    Procedure: TURP (TRANSURETHRAL RESECTION OF PROSTATE) BIPOLOR;  Surgeon: Yuriy Woo MD;  Location: [facility] OR 1ST FLR;  Service: Urology;  Laterality: N/A;  2 hours      TRANSURETHRAL RESECTION OF PROSTATE N/A 03/05/2021     Procedure: TURP (TRANSURETHRAL RESECTION OF PROSTATE)/ BIPOLOR;  Surgeon: Yuriy Woo MD;  Location: [facility] OR [address] [place] [address];  Service: Urology;  Laterality: N/A;    UMBILICAL HERNIA REPAIR N/A 2020    Procedure: REPAIR, HERNIA, UMBILICAL;  Surgeon: Caio Mas Jr., MD;  Location: [facility] OR;  Service: General;  Laterality: N/A;       Review of patient's allergies indicates:  No Known Allergies    Family History       Problem Relation (Age of Onset)    Cancer Sister    Hypertension Father    Other Paternal Uncle    Prostate cancer Father          Tobacco Use    Smoking status: Former     Packs/day: 1.00     Years: 30.00     Pack years: 30.00     Types: Cigarettes     Quit date:      Years since quittin.1    Smokeless tobacco: Never   Substance and Sexual Activity    Alcohol use: Not Currently     Comment: socially    Drug use: No    Sexual activity: Yes      Review of Systems   Constitutional: Negative.  Negative for fever and unexpected weight change.   HENT: Negative.     Eyes: Negative.  Negative for pain.   Respiratory: Negative.  Negative for chest tightness and shortness of breath.    Cardiovascular: Negative.  Negative for chest pain and leg swelling.   Gastrointestinal: Negative.  Negative for abdominal pain and constipation.   Endocrine: Negative.    Genitourinary:         Right groin pain 2/2 cath site   Musculoskeletal: Negative.  Negative for back pain and joint swelling.   Skin: Negative.  Negative for color change and rash.   Allergic/Immunologic: Negative.    Neurological: Negative.  Negative for dizziness and numbness.   Hematological: Negative.    Psychiatric/Behavioral: Negative.     All other systems reviewed and are negative.  Objective:     Vital Signs (Most Recent):  Temp: 97.7 °F (36.5 °C) (23)  Pulse: 62 (23)  Resp: 13 (23)  BP: 104/74 (23)  SpO2: 95 % (23) Vital Signs (24h Range):  Temp:  [97 °F (36.1 °C)-97.7 °F (36.5  °C)] 97.7 °F (36.5 °C)  Pulse:  [52-71] 62  Resp:  [0-23] 13  SpO2:  [95 %-100 %] 95 %  BP: ()/(54-77) 104/74  Arterial Line BP: (113-142)/(57-77) 136/65     Weight: 90.7 kg (200 lb)  Body mass index is 27.12 kg/m².      Intake/Output Summary (Last 24 hours) at 2/14/2023 1741  Last data filed at 2/14/2023 1700  Gross per 24 hour   Intake 3350.01 ml   Output 1050 ml   Net 2300.01 ml       Physical Exam  Constitutional:       Appearance: Normal appearance. He is normal weight. He is not ill-appearing.   HENT:      Head: Normocephalic and atraumatic.      Right Ear: Tympanic membrane and external ear normal.      Left Ear: Tympanic membrane and external ear normal.      Nose: Nose normal.      Mouth/Throat:      Mouth: Mucous membranes are moist.      Pharynx: Oropharynx is clear.   Eyes:      Extraocular Movements: Extraocular movements intact.      Conjunctiva/sclera: Conjunctivae normal.      Pupils: Pupils are equal, round, and reactive to light.   Cardiovascular:      Rate and Rhythm: Normal rate and regular rhythm.      Pulses: Normal pulses.      Heart sounds: Normal heart sounds.      Comments: Well healed lt scapular scar 2/2 to prior procedure  Pulmonary:      Effort: Pulmonary effort is normal.      Breath sounds: Normal breath sounds.   Abdominal:      General: Bowel sounds are normal.      Palpations: Abdomen is soft.   Musculoskeletal:         General: No swelling or deformity.      Cervical back: Normal range of motion.   Skin:     General: Skin is warm and dry.      Capillary Refill: Capillary refill takes less than 2 seconds.      Comments: Clean bandage over right femoral artery consistent with well healing surgical site   Neurological:      General: No focal deficit present.      Mental Status: He is alert and oriented to person, place, and time. Mental status is at baseline.   Psychiatric:         Mood and Affect: Mood normal.         Behavior: Behavior normal.         Thought Content: Thought  content normal.       Vents:  Oxygen Concentration (%): 2 (02/14/23 1627)    Lines/Drains/Airways       Drain  Duration                  Urethral Catheter 02/14/23 0740 Non-latex 16 Fr. <1 day              Arterial Line  Duration             Arterial Line 02/14/23 0752 Right Radial <1 day              Peripheral Intravenous Line  Duration                  Peripheral IV - Single Lumen 02/14/23 0720 18 G Right Antecubital <1 day         Peripheral IV - Single Lumen 02/14/23 16 G Anterior;Left Forearm <1 day                    Significant Labs:    CBC/Anemia Profile:  No results for input(s): WBC, HGB, HCT, PLT, MCV, RDW, IRON, FERRITIN, RETIC, FOLATE, [id number], OCCULTBLOOD in the last 48 hours.     Chemistries:  No results for input(s): NA, K, CL, CO2, BUN, CREATININE, CALCIUM, ALBUMIN, PROT, BILITOT, ALKPHOS, ALT, AST, GLUCOSE, MG, PHOS in the last 48 hours.    Recent Lab Results         02/14/23  0720        Group & Rh O POS       INDIRECT CHERIE NEG               Significant Imaging: I have reviewed all pertinent imaging results/findings within the past 24 hours.

## 2023-02-15 LAB
ANION GAP SERPL CALC-SCNC: 6 MMOL/L (ref 8–16)
BASOPHILS # BLD AUTO: 0.02 K/UL (ref 0–0.2)
BASOPHILS NFR BLD: 0.2 % (ref 0–1.9)
BUN SERPL-MCNC: 9 MG/DL (ref 6–20)
CALCIUM SERPL-MCNC: 8.2 MG/DL (ref 8.7–10.5)
CHLORIDE SERPL-SCNC: 111 MMOL/L (ref 95–110)
CO2 SERPL-SCNC: 20 MMOL/L (ref 23–29)
CREAT SERPL-MCNC: 0.9 MG/DL (ref 0.5–1.4)
DIFFERENTIAL METHOD: ABNORMAL
EOSINOPHIL # BLD AUTO: 0 K/UL (ref 0–0.5)
EOSINOPHIL NFR BLD: 0.2 % (ref 0–8)
ERYTHROCYTE [DISTWIDTH] IN BLOOD BY AUTOMATED COUNT: 13.1 % (ref 11.5–14.5)
EST. GFR  (NO RACE VARIABLE): >60 ML/MIN/1.73 M^2
GLUCOSE SERPL-MCNC: 117 MG/DL (ref 70–110)
HCT VFR BLD AUTO: 39.6 % (ref 40–54)
HGB BLD-MCNC: 13 G/DL (ref 14–18)
IMM GRANULOCYTES # BLD AUTO: 0.06 K/UL (ref 0–0.04)
IMM GRANULOCYTES NFR BLD AUTO: 0.6 % (ref 0–0.5)
LYMPHOCYTES # BLD AUTO: 1.4 K/UL (ref 1–4.8)
LYMPHOCYTES NFR BLD: 13.4 % (ref 18–48)
MAGNESIUM SERPL-MCNC: 1.8 MG/DL (ref 1.6–2.6)
MCH RBC QN AUTO: 29.7 PG (ref 27–31)
MCHC RBC AUTO-ENTMCNC: 32.8 G/DL (ref 32–36)
MCV RBC AUTO: 90 FL (ref 82–98)
MONOCYTES # BLD AUTO: 0.7 K/UL (ref 0.3–1)
MONOCYTES NFR BLD: 6.3 % (ref 4–15)
NEUTROPHILS # BLD AUTO: 8.3 K/UL (ref 1.8–7.7)
NEUTROPHILS NFR BLD: 79.3 % (ref 38–73)
NRBC BLD-RTO: 0 /100 WBC
PHOSPHATE SERPL-MCNC: 3.2 MG/DL (ref 2.7–4.5)
PLATELET # BLD AUTO: 162 K/UL (ref 150–450)
PMV BLD AUTO: 10.7 FL (ref 9.2–12.9)
POC ACTIVATED CLOTTING TIME K: 245 SEC (ref 74–137)
POTASSIUM SERPL-SCNC: 4.3 MMOL/L (ref 3.5–5.1)
RBC # BLD AUTO: 4.38 M/UL (ref 4.6–6.2)
SAMPLE: ABNORMAL
SODIUM SERPL-SCNC: 137 MMOL/L (ref 136–145)
WBC # BLD AUTO: 10.46 K/UL (ref 3.9–12.7)

## 2023-02-15 PROCEDURE — 20600001 HC STEP DOWN PRIVATE ROOM

## 2023-02-15 PROCEDURE — 99291 CRITICAL CARE FIRST HOUR: CPT | Mod: ,,, | Performed by: INTERNAL MEDICINE

## 2023-02-15 PROCEDURE — 84100 ASSAY OF PHOSPHORUS: CPT | Performed by: STUDENT IN AN ORGANIZED HEALTH CARE EDUCATION/TRAINING PROGRAM

## 2023-02-15 PROCEDURE — 85025 COMPLETE CBC W/AUTO DIFF WBC: CPT | Performed by: STUDENT IN AN ORGANIZED HEALTH CARE EDUCATION/TRAINING PROGRAM

## 2023-02-15 PROCEDURE — 83735 ASSAY OF MAGNESIUM: CPT | Performed by: STUDENT IN AN ORGANIZED HEALTH CARE EDUCATION/TRAINING PROGRAM

## 2023-02-15 PROCEDURE — 94799 UNLISTED PULMONARY SVC/PX: CPT

## 2023-02-15 PROCEDURE — 97535 SELF CARE MNGMENT TRAINING: CPT

## 2023-02-15 PROCEDURE — 63600175 PHARM REV CODE 636 W HCPCS

## 2023-02-15 PROCEDURE — 97162 PT EVAL MOD COMPLEX 30 MIN: CPT

## 2023-02-15 PROCEDURE — 94761 N-INVAS EAR/PLS OXIMETRY MLT: CPT

## 2023-02-15 PROCEDURE — 80048 BASIC METABOLIC PNL TOTAL CA: CPT | Performed by: STUDENT IN AN ORGANIZED HEALTH CARE EDUCATION/TRAINING PROGRAM

## 2023-02-15 PROCEDURE — 97116 GAIT TRAINING THERAPY: CPT

## 2023-02-15 PROCEDURE — 63600175 PHARM REV CODE 636 W HCPCS: Performed by: STUDENT IN AN ORGANIZED HEALTH CARE EDUCATION/TRAINING PROGRAM

## 2023-02-15 PROCEDURE — 25000003 PHARM REV CODE 250: Performed by: STUDENT IN AN ORGANIZED HEALTH CARE EDUCATION/TRAINING PROGRAM

## 2023-02-15 PROCEDURE — 99291 PR CRITICAL CARE, E/M 30-74 MINUTES: ICD-10-PCS | Mod: ,,, | Performed by: INTERNAL MEDICINE

## 2023-02-15 PROCEDURE — 97165 OT EVAL LOW COMPLEX 30 MIN: CPT

## 2023-02-15 RX ORDER — POLYETHYLENE GLYCOL 3350 17 G/17G
17 POWDER, FOR SOLUTION ORAL DAILY
Status: CANCELLED | OUTPATIENT
Start: 2023-02-15

## 2023-02-15 RX ORDER — ACETAMINOPHEN 325 MG/1
650 TABLET ORAL EVERY 6 HOURS
Status: DISCONTINUED | OUTPATIENT
Start: 2023-02-15 | End: 2023-02-17 | Stop reason: HOSPADM

## 2023-02-15 RX ORDER — PREDNISOLONE ACETATE 10 MG/ML
1 SUSPENSION/ DROPS OPHTHALMIC 2 TIMES DAILY
Status: DISCONTINUED | OUTPATIENT
Start: 2023-02-18 | End: 2023-02-17 | Stop reason: HOSPADM

## 2023-02-15 RX ORDER — OXYCODONE HYDROCHLORIDE 5 MG/1
5 TABLET ORAL EVERY 6 HOURS PRN
Status: DISCONTINUED | OUTPATIENT
Start: 2023-02-15 | End: 2023-02-17 | Stop reason: HOSPADM

## 2023-02-15 RX ORDER — PREDNISOLONE ACETATE 10 MG/ML
1 SUSPENSION/ DROPS OPHTHALMIC 3 TIMES DAILY
Status: DISCONTINUED | OUTPATIENT
Start: 2023-02-15 | End: 2023-02-17 | Stop reason: HOSPADM

## 2023-02-15 RX ORDER — OXYCODONE HYDROCHLORIDE 10 MG/1
10 TABLET ORAL EVERY 4 HOURS PRN
Status: CANCELLED | OUTPATIENT
Start: 2023-02-15

## 2023-02-15 RX ORDER — OXYCODONE HYDROCHLORIDE 5 MG/1
5 TABLET ORAL EVERY 4 HOURS PRN
Status: CANCELLED | OUTPATIENT
Start: 2023-02-15

## 2023-02-15 RX ADMIN — ACETAMINOPHEN 650 MG: 325 TABLET ORAL at 05:02

## 2023-02-15 RX ADMIN — PREDNISOLONE ACETATE 1 DROP: 10 SUSPENSION/ DROPS OPHTHALMIC at 09:02

## 2023-02-15 RX ADMIN — MAGNESIUM SULFATE 2 G: 2 INJECTION INTRAVENOUS at 06:02

## 2023-02-15 RX ADMIN — MUPIROCIN: 20 OINTMENT TOPICAL at 09:02

## 2023-02-15 RX ADMIN — SODIUM CHLORIDE, SODIUM LACTATE, POTASSIUM CHLORIDE, AND CALCIUM CHLORIDE: 600; 310; 30; 20 INJECTION, SOLUTION INTRAVENOUS at 11:02

## 2023-02-15 RX ADMIN — PREDNISOLONE ACETATE 1 DROP: 10 SUSPENSION/ DROPS OPHTHALMIC at 02:02

## 2023-02-15 RX ADMIN — HYDROCODONE BITARTRATE AND ACETAMINOPHEN 1 TABLET: 5; 325 TABLET ORAL at 02:02

## 2023-02-15 RX ADMIN — ENOXAPARIN SODIUM 40 MG: 40 INJECTION SUBCUTANEOUS at 06:02

## 2023-02-15 RX ADMIN — ASPIRIN 81 MG: 81 TABLET, CHEWABLE ORAL at 09:02

## 2023-02-15 RX ADMIN — PREDNISOLONE ACETATE 1 DROP: 10 SUSPENSION/ DROPS OPHTHALMIC at 06:02

## 2023-02-15 NOTE — NURSING
SICU PLAN OF CARE NOTE    Dx: Aortic dissection distal to left subclavian    Shift Events: NAEON    Goals of Care: SBP <180    Neuro: AAO x4, Follows Commands, and Moves All Extremities    Vital Signs: BP (!) 113/53 (BP Location: Left arm, Patient Position: Lying)   Pulse (!) 53   Temp 98.7 °F (37.1 °C) (Oral)   Resp 16   Ht 6' (1.829 m)   Wt 90.7 kg (200 lb)   SpO2 (!) 94%   BMI 27.12 kg/m²     Respiratory: Room Air    Diet: Cardiac Diet    Gtts: MIVF LR @ 125cc/hr    Urine Output: Urinary Catheter 2055 cc/shift     Labs/Accuchecks: daily labs.    Skin: No skin breakdown noted on shift. R groin site CDI, tender to touch but soft. Foams in place. OOBTC this AM. Adhesive use limited.

## 2023-02-15 NOTE — OP NOTE
Vascular Surgery Op Note    Date of Operation/Procedure: 2/14/23    Pre-operative Diagnosis:   Chronic Type B aortic dissection  Thoracic aortic aneuryusm    Post-operative Diagnosis: same    Anesthesia: general    Operation/Procedure Performed:   TEVAR (31mm x 20cm [place] CTAG conformable with active control)  IVUS of abdominal and thoracic aorta  Percutaneous closure of large bore arterial access in the right common femoral artery (20 Macedonian)    Attending Surgeon: JOSY Santos II, MD    Resident/Fellow: Jon Lala MD PGY7    Indications:   62-year-old male with previous type B dissection and proximal TEVAR who had persistent growth of his false lumen on follow-up CT scans.  Plan for TEVAR to treat the remaining descending thoracic aorta and prevent subsequent growth of his aneurysm.    Procedure in Detail:   The patient was brought to the operating room supine position.   Appropriate hemodynamic monitors were put in place by the anesthesia team.  General anesthesia was administered by the anesthesia team. The bilateral groins were prepped and draped in normal sterile fashion.  A time-out was performed to confirm appropriate patient identification and procedure.  Preoperative antibiotics were administered.    We began by obtaining ultrasound-guided percutaneous access to the right common femoral artery using a micropuncture access kit. We confirmed appropriate access over the femoral head using fluoroscopy.  1cm skin incisions was made at the entry point using an 11 blade. Bentson wire was advanced through the bilateral common femoral arteries into the distal abdominal aorta. The micropuncture sheath was exchanged for 8 Macedonian dilator.  The dilator was advanced over the wire into the common femoral artery.  Pressure was held over the artery and the dilator was removed while the tract was dilated with hemostat with the wire in place.  ProGlide devices were then advanced over the wire. Two proglides were  deployed sequentially at the 10 and 2 oclock positions on each artery in standard preclosure fashion. Sutures were tagged with hemostats on the lateral and medial sides of each acces site while taking care not to pull tension on the white suture.  The Bentson wire was replaced in the artery as the final proglide was removed. 8F sheath was then advanced over the wire into the artery. There was still bleeding around the 8f sheath so the sheath was exchanged over the Bentson wire and an additional ProGlide device was then deployed at the 12 o'clock position.  We then placed a 10 Surinamese sheath over the wire.  The sheath sideport withdrew blood and flushed easily with heparinized saline. There was no bleeding from around the sheath. Wire were confirmed in the proximal abdominal aorta.     We then administered IV heparin for systemic anticoagulation. We then  advanced a IVUS catheter over the Bentson wire which confirmed that the wire had taken the true lumen all the way up into the endograft.  Having confirmed that the wire path was maintained in the true lumen throughout its course into the endograft we then exchanged the Bentson wire for a Lunderquist wire into the arch.  We then removed the IVUS catheter.  We then exchanged the 10 Surinamese sheath over the Lunderquist wire for a 20 Surinamese Kingman DrySeal sheath.  We then advanced a pigtail catheter into the arch and performed an aortogram from within the previous endograft.  Aortography showed patent thoracic aorta with filling of the false lumen in the distal descending thoracic aorta. The celiac artery origin could be visualized filling from the proximal abdominal aorta. We then replaced the Lunderquist wire and removed the pigtail catheter.  We then advanced a 31 mm x 20 cm Kingman C tag device over the wire and deployed it with 6 cm of overlap in the previous TEVAR device.  The device deployed appropriately and the distal end landed approximately 2 cm above the celiac  artery origin.  We removed the device deployment Manifold over the wire.  We then readvanced a pigtail catheter over the wire.  We then performed completion angiography from within the previous TEVAR.  Completion angiogram showed no evidence of endoleak between the 2 stent grafts.  There was no contrast visualized was then the false lumen of the descending thoracic aorta.  The distal end of the endograft was seated approximately 2 cm above the origin of the celiac artery which filled briskly.  We then withdrew the pigtail catheter down into the abdominal aorta and performed abdominal aortogram to confirm that the previously placed right renal artery stent has not been disrupted with passage of our endograft.  This confirmed brisk filling of the bilateral renal arteries with no apparent stenosis of the right renal artery stent.  We could see that the false lumen within the abdominal aorta did have some contrast filling with contrast but there no apparent filling of the aneurysmal segment of the thoracic aorta.  Satisfied with our results we then advanced a Revetto wire through the pigtail catheter and removed the catheter over the wire.  We then replaced the dilator to the sheath and removed the sheath over the wire while holding pressure over the right groin access site.  The pre closure sutures were tightened with the wire still in place and there was excellent hemostasis.  The wire was then removed and all sutures were read tightened in standard fashion and cut.  Hemostasis was excellent.  Heparin was reversed with protamine.  Distal pedal pulses were checked and were easily palpable.  The access site incision was closed with a 4-0 Monocryl and Dermabond glue.  The patient tolerated the procedure well and awoke in stable condition moving all of his extremities.  The patient was then transferred to the PACU for recovery and bedrest.      Estimated Blood loss: 100ml    Complications: none    JOSY Santos II,  MD, RPVI  Vascular Surgery  [facility]

## 2023-02-15 NOTE — CARE UPDATE
SICU Staff Addendum  Please see resident note dated 2/15 for full progress note     Reason for admission:  Aortic dissection distal to left subclavian  Present on Admission:   Aortic dissection distal to left subclavian      Goals for Today:   - Pod 1 s/p TEVAR in the setting of a type B dissection down to the level of the celiac  - Multimodal pain control, DC robaxin   - BP goal <140, holding home labetalol will confirm with patient if he takes it at home prior to restarting   - q1h neuro checks, concern for spinal artery perfusion. No deficits, ambulating with PT   - kidney function stable   - cardiac diet, passing gas, denies abdominal pain, nausea and vomiting   - Recommend DC IVF / go down on rate    40  minutes of critical care time was spent personally by me on the following activities: development of treatment plan with patient or surrogate and bedside caregivers, discussions with consultants, evaluation of patient's response to treatment, examination of patient, ordering and performing treatments and interventions, ordering and review of laboratory studies, ordering and review of radiographic studies, pulse oximetry, re-evaluation of patient's condition.  This critical care time did not overlap with that of any other provider or involve time for any procedures.    Casie Billings MD  Anesthesia Critical Care  [facility] [phone]

## 2023-02-15 NOTE — CARE UPDATE
Vascular Surgery Update    OK to move to q4 hr vascular checks. OK to step down from SICU. Please remove arterial line upon stepdown.    Jeremi Weiner MD  [facility] PGY3

## 2023-02-15 NOTE — H&P
[facility] - [facility] Intensive Care  Critical Care - Surgery  History & Physical    Patient Name: Luis Eduardo Curry  MRN: [id number]  Admission Date: 2/14/2023  Code Status: Full Code  Attending Physician: JOSY Santos II, MD   Primary Care Provider: Kiko Connor MD   Principal Problem: Aortic dissection distal to left subclavian    Subjective:     HPI:  Mr. Curry is a 61yo M w/ h/o staged lt. Carotid subclavian bypass followed by TEVAR w/ left CCA snorkel, left subclavian artery embolization, and right renal artery stenting for type B dissection. Presenting on 2/14 s/p TEVAR down to level of celiac artery. Stable on presentation without complaint or concern at this time. Restarting home meds as tolerated.      Chief Complaint: type B aortic dissection     HPI 12/23/22:  Patient denies any changes in medical history since he was last seen in clinic.  He continues taking his medications aspirin and labetalol blood pressure control.  He denies any chest pain or back pain.  He hunts regularly for deer.  He is stayed active and walks frequently.  He returns for surveillance CT scan for his known type B aortic dissection previously treated by me.     Interval History (12/20/21):   Patient presents to clinic today for a follow up visit.  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  He denies any chest pain.  Denies any back pain.  He has been able to return to his normal daily activities.  He is working full-time.  He has returned to [employer].  He denies any issues with the aspirin or Plavix.  His blood pressures been well controlled his current medications.  He does complain of some intermittent pain in the middle the night in his left leg over the past week.  This pain does not change with motion or ambulation.   He denies numbness or tingling.    Interval History 2/14/23:  Here today for planned TEVAR down to level of celiac artery origin     Social History: Does not smoke, not drinking, no drugs     Family history: No fam hx of aneurysm/dissection      Hospital/ICU Course:  No notes on file    Follow-up For: Procedure(s) (LRB):  REPAIR, ANEURYSM, ENDOVASCULAR GRAFT, AORTA, THORACIC (N/A)    Post-Operative Day: Day of Surgery     Past Medical History:   Diagnosis Date    Aortic dissection     Sleep apnea        Past Surgical History:   Procedure Laterality Date    BIOPSY WITH TRANSRECTAL ULTRASOUND (TRUS) GUIDANCE N/A 05/01/2019    Procedure: BIOPSY, WITH TRANSRECTAL PROSTATE ULTRASOUND;  Surgeon: Chintan Mendez Jr., MD;  Location: [facility];  Service: Urology;  Laterality: N/A;    COLONOSCOPY  2006    COLONOSCOPY N/A 01/24/2019    Procedure: COLONOSCOPY;  Surgeon: Reece Tarango MD;  Location: [facility];  Service: Endoscopy;  Laterality: N/A;    CREATION OF AORTO-CAROTID BYPASS WITH GRAFT Left 11/16/2020    Procedure: CREATION, BYPASS, ARTERIAL, AORTA TO CAROTID, USING GRAFT SUBCLAVIAN BYPASS;  Surgeon: JOSY Santos II, MD;  Location: [facility] OR [address] [place] [address];  Service: Cardiovascular;  Laterality: Left;  left common carotid to subclavian bypass    CYSTOSCOPY N/A 05/01/2019    Procedure: FLEXIBLE CYSTOSCOPY;  Surgeon: Chintan Mendez Jr., MD;  Location: [facility];  Service: Urology;  Laterality: N/A;    ENDOVASCULAR GRAFT REPAIR OF ANEURYSM OF THORACIC AORTA N/A 11/17/2020    Procedure: REPAIR, ANEURYSM, ENDOVASCULAR GRAFT, AORTA, THORACIC Left brachial cutdown;  Surgeon: JOSY Santos II, MD;  Location: [facility] OR 2ND FLR;  Service: Cardiovascular;  Laterality: N/A;  Fluoro time 34.7 min  2002.64 mGy  432.18 Gycm2    EXTRACTION OF CATARACT  01/18/2023    RETROGRADE PYELOGRAPHY Right 03/05/2021    Procedure: PYELOGRAM, RETROGRADE;  Surgeon: Yuriy Woo MD;  Location: [facility] OR 1ST FLR;  Service: Urology;  Laterality:  Right;    TRANSURETHRAL RESECTION OF PROSTATE N/A 2021    Procedure: TURP (TRANSURETHRAL RESECTION OF PROSTATE) BIPOLOR;  Surgeon: Yuriy Woo MD;  Location: [facility] OR 1ST FLR;  Service: Urology;  Laterality: N/A;  2 hours      TRANSURETHRAL RESECTION OF PROSTATE N/A 2021    Procedure: TURP (TRANSURETHRAL RESECTION OF PROSTATE)/ BIPOLOR;  Surgeon: Yuriy Woo MD;  [facility];  Service: Urology;  Laterality: N/A;    UMBILICAL HERNIA REPAIR N/A 2020    Procedure: REPAIR, HERNIA, UMBILICAL;  Surgeon: Caio Mas Jr., MD;  Location: [facility] OR;  Service: General;  Laterality: N/A;       Review of patient's allergies indicates:  No Known Allergies    Family History       Problem Relation (Age of Onset)    Cancer Sister    Hypertension Father    Other Paternal Uncle    Prostate cancer Father          Tobacco Use    Smoking status: Former     Packs/day: 1.00     Years: 30.00     Pack years: 30.00     Types: Cigarettes     Quit date:      Years since quittin.    Smokeless tobacco: Never   Substance and Sexual Activity    Alcohol use: Not Currently     Comment: socially    Drug use: No    Sexual activity: Yes      Review of Systems   Constitutional: Negative.  Negative for fever and unexpected weight change.   HENT: Negative.     Eyes: Negative.  Negative for pain.   Respiratory: Negative.  Negative for chest tightness and shortness of breath.    Cardiovascular: Negative.  Negative for chest pain and leg swelling.   Gastrointestinal: Negative.  Negative for abdominal pain and constipation.   Endocrine: Negative.    Genitourinary:         Right groin pain 2/2 cath site   Musculoskeletal: Negative.  Negative for back pain and joint swelling.   Skin: Negative.  Negative for color change and rash.   Allergic/Immunologic: Negative.    Neurological: Negative.  Negative for dizziness and numbness.   Hematological: Negative.    Psychiatric/Behavioral: Negative.     All other  systems reviewed and are negative.  Objective:     Vital Signs (Most Recent):  Temp: 97.7 °F (36.5 °C) (02/14/23 1700)  Pulse: 62 (02/14/23 1700)  Resp: 13 (02/14/23 1700)  BP: 104/74 (02/14/23 1700)  SpO2: 95 % (02/14/23 1700) Vital Signs (24h Range):  Temp:  [97 °F (36.1 °C)-97.7 °F (36.5 °C)] 97.7 °F (36.5 °C)  Pulse:  [52-71] 62  Resp:  [0-23] 13  SpO2:  [95 %-100 %] 95 %  BP: ()/(54-77) 104/74  Arterial Line BP: (113-142)/(57-77) 136/65     Weight: 90.7 kg (200 lb)  Body mass index is 27.12 kg/m².      Intake/Output Summary (Last 24 hours) at 2/14/2023 1741  Last data filed at 2/14/2023 1700  Gross per 24 hour   Intake 3350.01 ml   Output 1050 ml   Net 2300.01 ml       Physical Exam  Constitutional:       Appearance: Normal appearance. He is normal weight. He is not ill-appearing.   HENT:      Head: Normocephalic and atraumatic.      Right Ear: Tympanic membrane and external ear normal.      Left Ear: Tympanic membrane and external ear normal.      Nose: Nose normal.      Mouth/Throat:      Mouth: Mucous membranes are moist.      Pharynx: Oropharynx is clear.   Eyes:      Extraocular Movements: Extraocular movements intact.      Conjunctiva/sclera: Conjunctivae normal.      Pupils: Pupils are equal, round, and reactive to light.   Cardiovascular:      Rate and Rhythm: Normal rate and regular rhythm.      Pulses: Normal pulses.      Heart sounds: Normal heart sounds.      Comments: Well healed lt scapular scar 2/2 to prior procedure  Pulmonary:      Effort: Pulmonary effort is normal.      Breath sounds: Normal breath sounds.   Abdominal:      General: Bowel sounds are normal.      Palpations: Abdomen is soft.   Musculoskeletal:         General: No swelling or deformity.      Cervical back: Normal range of motion.   Skin:     General: Skin is warm and dry.      Capillary Refill: Capillary refill takes less than 2 seconds.      Comments: Clean bandage over right femoral artery consistent with well healing  surgical site   Neurological:      General: No focal deficit present.      Mental Status: He is alert and oriented to person, place, and time. Mental status is at baseline.   Psychiatric:         Mood and Affect: Mood normal.         Behavior: Behavior normal.         Thought Content: Thought content normal.       Vents:  Oxygen Concentration (%): 2 (02/14/23 1627)    Lines/Drains/Airways       Drain  Duration                  Urethral Catheter 02/14/23 0740 Non-latex 16 Fr. <1 day              Arterial Line  Duration             Arterial Line 02/14/23 0752 Right Radial <1 day              Peripheral Intravenous Line  Duration                  Peripheral IV - Single Lumen 02/14/23 0720 18 G Right Antecubital <1 day         Peripheral IV - Single Lumen 02/14/23 16 G Anterior;Left Forearm <1 day                    Significant Labs:    CBC/Anemia Profile:  No results for input(s): WBC, HGB, HCT, PLT, MCV, RDW, IRON, FERRITIN, RETIC, FOLATE, [id number], OCCULTBLOOD in the last 48 hours.     Chemistries:  No results for input(s): NA, K, CL, CO2, BUN, CREATININE, CALCIUM, ALBUMIN, PROT, BILITOT, ALKPHOS, ALT, AST, GLUCOSE, MG, PHOS in the last 48 hours.    Recent Lab Results         02/14/23  0720        Group & Rh O POS       INDIRECT CHERIE NEG               Significant Imaging: I have reviewed all pertinent imaging results/findings within the past 24 hours.    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       Neuro/Psych:   -- Sedation: N/a  -- Pain: Percocet 5/325, ASA, gabapentin/robaxin if needed  -- Continue prednisolone OD as prescribed s/p rt. cataracts             Cards:   -- HDS  -- Holding  home labetelol  -- Cw ASA, holding home plavix  -- Hydralazine 10mg IV prn to meet goal  -- Labetelol prn for BP > goal  -- Goal Syst < 160  -- Q1hr neurovascular checks      Pulm:   -- Goal O2 sat > 90%  -- Wean as able      Renal:  -- Keep buckley for strict I/O  -- BUN/Cr 10/1.0      FEN / GI:   -- Net +2.3L  -- Replace lytes as needed  -- Nutrition: NPO  -- GI ppx: N/a  -- Zofran prn nausea  -- Bowel reg: PEG prn      ID:   -- Tm: afebrile; WBC 6.0  -- Ancef 2g post op ppx x2 doses      Heme/Onc:   -- H/H stable 14/42  -- Daily CBC      Endo:   -- Endocrine consulted, appreciate recs  -- BG goal 140-180  -- ISS      PPx:   Feeding: Cardiac diet  Analgesia/Sedation: Percocet, robaxin/gabapentin if needed / No sedation  Thromboembolic prevention: Lovenox   HOB >30: N/a  Stress Ulcer ppx: N/a  Glucose control: Critical care goal 140-180 g/dl, ISS    Lines/Drains/Airway: Rt. Ac PIV, Lt forearm PIV, a-line, buckley      Dispo/Code Status/Palliative:   -- SICU / Full Code            Critical care was time spent personally by me on the following activities: development of treatment plan with patient or surrogate and bedside caregivers, discussions with consultants, evaluation of patient's response to treatment, examination of patient, ordering and performing treatments and interventions, ordering and review of laboratory studies, ordering and review of radiographic studies, pulse oximetry, re-evaluation of patient's condition.  This critical care time did not overlap with that of any other provider or involve time for any procedures.     Yuriy Alexis, DO  Critical Care - Surgery  [facility] - Surgical Intensive Care

## 2023-02-15 NOTE — CARE UPDATE
SICU Staff Addendum  Please see full H and P written on 2/14    Reason for admission:  Aortic dissection distal to left subclavian  Present on Admission:   Aortic dissection distal to left subclavian      Goals for Today:   - POD 0 s/p TEVAR  - arrives to the ICU extubated off vasoactive medications  - SBP <160, PRN hydralazine and labetalol. Can reinitiate home antihypertensives tomorrow   - q1h neuro checks. No deficits on arrival   - Pulliam for strict I/O  - ASA   - periop abx with ancef   - PT/OT   - enoxaparin for DVT ppx     36 minutes of critical care time was spent personally by me on the following activities: development of treatment plan with patient or surrogate and bedside caregivers, discussions with consultants, evaluation of patient's response to treatment, examination of patient, ordering and performing treatments and interventions, ordering and review of laboratory studies, ordering and review of radiographic studies, pulse oximetry, re-evaluation of patient's condition.  This critical care time did not overlap with that of any other provider or involve time for any procedures.    Casie Billings MD  Anesthesia Critical Care  [facility] [phone]

## 2023-02-15 NOTE — PT/OT/SLP EVAL
Physical Therapy Evaluation and Discharge Note    Patient Name:  Luis Eduardo Curry   MRN:  [id number]  Admitting Diagnosis:  Aortic dissection distal to left subclavian   Recent Surgery: Procedure(s) (LRB):  REPAIR, ANEURYSM, ENDOVASCULAR GRAFT, AORTA, THORACIC (N/A) 1 Day Post-Op  Admit Date: 2/14/2023  Length of Stay: 1 days    Recommendations:     Discharge Recommendations:  home   Discharge Equipment Recommendations: none   Barriers to discharge: None    Assessment:     Luis Eduardo Curry is a 62 y.o. male admitted with a medical diagnosis of Aortic dissection distal to left subclavian.  At this time, patient is functioning at their prior level of function and does not require further acute PT services. PT evaluation complete and no goals established. Pt is functioning at high level and does not required acute care physical therapy services. Education provided to ambulate in hallway 3x/day with RN in order to maintain strength and endurance. Pt verbalized understanding. Please re-consult if functional mobility changes. Anticipate d/c to home; no needs.     Plan:     During this hospitalization, patient does not require further acute PT services.  Please re-consult if situation changes.      Subjective   Communicated with RN prior to session.  Patient found up in chair with telemetry, pulse ox (continuous), blood pressure cuff, peripheral IV, buckley catheter, arterial line upon PT entry to room, agreeable to evaluation. Luis Eduardo Curry's alone during session.    Chief Complaint: none reported   Patient/Family Comments/goals: to get better and return home   Pain/Comfort:  Pain Rating 1: 0/10  Pain Rating Post-Intervention 1: 0/10    Living Environment:  Pt lives with wife in a [facility] with no LEATHA. Pt was ind with ambulation and ADLs. Patient uses DME as follows: none. DME owned (not currently used): none.    Pt will have assistance from wife.    Objective:   Patient found with: telemetry, pulse ox (continuous), blood  pressure cuff, peripheral IV, buckley catheter, arterial line   General Precautions: Standard, fall   Orthopedic Precautions:    Braces:     Oxygen Device: Room Air  Vitals: /67 (BP Location: Left arm, Patient Position: Sitting)   Pulse (!) 52   Temp 98 °F (36.7 °C) (Oral)   Resp 17   Ht 6' (1.829 m)   Wt 90.7 kg (200 lb)   SpO2 100%   BMI 27.12 kg/m²     Exams:  Cognition:   Alert and Cooperative  Ox4  Command following: Follows multistep  commands  Fluency: clear/fluent    RLE ROM: WFL  RLE Strength: WFL  LLE ROM: WFL  LLE Strength: WFL      Outcome Measures:  AM-PAC 6 CLICK MOBILITY  Turning over in bed (including adjusting bedclothes, sheets and blankets)?: 4  Sitting down on and standing up from a chair with arms (e.g., wheelchair, bedside commode, etc.): 4  Moving from lying on back to sitting on the side of the bed?: 4  Moving to and from a bed to a chair (including a wheelchair)?: 4  Need to walk in hospital room?: 3  Climbing 3-5 steps with a railing?: 3  Basic Mobility Total Score: 22     Functional Mobility:  Additional Staff Present: OT  Bed Mobility:   Not performed 2nd to pt found in chair     Transfers:   Sit <> Stand Transfer: independence with no assistive device from chair       Gait:   Patient ambulated: 300ft    Patient required: supervision  Patient used:  no assistive device   Gait Pattern observed: reciprocal gait  Gait Deviation(s): decreased josie  Impairments due to:  impaired endurance   Comments:   Mask donned; portable monitor intact and RN present   No LOB  Mild SOB  Verbal cuing for pacing, upright posture, and purposeful steps.    Therapeutic Activities, Exercises, & Education:   Educated pt on PT role/POC  Educated pt on importance of OOB activity and daily ambulation   Pt verbalized understanding     Pt performed ADLs standing at the sink     Patient left up in chair with all lines intact, call button in reach, and RN notified.    GOALS: None identified at this  time    History:     Past Medical History:   Diagnosis Date    Aortic dissection     Sleep apnea        Past Surgical History:   Procedure Laterality Date    BIOPSY WITH TRANSRECTAL ULTRASOUND (TRUS) GUIDANCE N/A 05/01/2019    Procedure: BIOPSY, WITH TRANSRECTAL PROSTATE ULTRASOUND;  Surgeon: Chintan Mendez Jr., MD;  Location: [facility];  Service: Urology;  Laterality: N/A;    COLONOSCOPY  2006    COLONOSCOPY N/A 01/24/2019    Procedure: COLONOSCOPY;  Surgeon: Reece Tarango MD;  Location: [facility];  Service: Endoscopy;  Laterality: N/A;    CREATION OF AORTO-CAROTID BYPASS WITH GRAFT Left 11/16/2020    Procedure: CREATION, BYPASS, ARTERIAL, AORTA TO CAROTID, USING GRAFT SUBCLAVIAN BYPASS;  Surgeon: JOSY Santos II, MD;  Location: [facility] OR 2ND FLR;  Service: Cardiovascular;  Laterality: Left;  left common carotid to subclavian bypass    CYSTOSCOPY N/A 05/01/2019    Procedure: FLEXIBLE CYSTOSCOPY;  Surgeon: Chintan Mendez Jr., MD;  Location: [facility];  Service: Urology;  Laterality: N/A;    ENDOVASCULAR GRAFT REPAIR OF ANEURYSM OF THORACIC AORTA N/A 11/17/2020    Procedure: REPAIR, ANEURYSM, ENDOVASCULAR GRAFT, AORTA, THORACIC Left brachial cutdown;  Surgeon: JOSY Santos II, MD;  Location: [facility] OR 2ND FLR;  Service: Cardiovascular;  Laterality: N/A;  Fluoro time 34.7 min  2002.64 mGy  432.18 Gycm2    ENDOVASCULAR GRAFT REPAIR OF ANEURYSM OF THORACIC AORTA N/A 2/14/2023    Procedure: REPAIR, ANEURYSM, ENDOVASCULAR GRAFT, AORTA, THORACIC;  Surgeon: JOSY Santos II, MD;  Location: [facility] OR 2ND FLR;  Service: Vascular;  Laterality: N/A;    EXTRACTION OF CATARACT  01/18/2023    RETROGRADE PYELOGRAPHY Right 03/05/2021    Procedure: PYELOGRAM, RETROGRADE;  Surgeon: Yuriy Woo MD;  Location: [facility] OR 1ST FLR;  Service: Urology;  Laterality: Right;    TRANSURETHRAL RESECTION OF PROSTATE N/A 02/25/2021    Procedure: TURP (TRANSURETHRAL RESECTION OF PROSTATE) BIPOLOR;  Surgeon: Yuriy Woo MD;   [facility];  Service: Urology;  Laterality: N/A;  2 hours      TRANSURETHRAL RESECTION OF PROSTATE N/A 03/05/2021    Procedure: TURP (TRANSURETHRAL RESECTION OF PROSTATE)/ BIPOLOR;  Surgeon: Yuriy Woo MD;  [facility];  Service: Urology;  Laterality: N/A;    UMBILICAL HERNIA REPAIR N/A 01/14/2020    Procedure: REPAIR, HERNIA, UMBILICAL;  Surgeon: Caio Mas Jr., MD;  Location: [facility];  Service: General;  Laterality: N/A;       Time Tracking:     PT Received On: 02/15/23  PT Start Time: 0840     PT Stop Time: 0858  PT Total Time (min): 18 min     Billable Minutes: Evaluation 5 and Gait Training 8

## 2023-02-15 NOTE — PROGRESS NOTES
[facility]  Critical Care - Surgery  Progress Note    Patient Name: Luis Eduardo Curry  MRN: [id number]  Admission Date: 2/14/2023  Hospital Length of Stay: 1 days  Code Status: Full Code  Attending Provider: JOSY Santos II, MD  Primary Care Provider: Kiko Connor MD   Principal Problem: Aortic dissection distal to left subclavian    Subjective:     Hospital/ICU Course:  S/p TEVAR on 2/15.       Interval History/Significant Events: NAEO. Afebrile, HDS. Patient denies having any pain this am.     Follow-up For: Procedure(s) (LRB):  REPAIR, ANEURYSM, ENDOVASCULAR GRAFT, AORTA, THORACIC (N/A)    Post-Operative Day: 1 Day Post-Op    Objective:     Vital Signs (Most Recent):  Temp: 98.7 °F (37.1 °C) (02/14/23 2300)  Pulse: (!) 52 (02/15/23 0600)  Resp: 10 (02/15/23 0600)  BP: (!) 87/51 (02/15/23 0600)  SpO2: 95 % (02/15/23 0600)   Vital Signs (24h Range):  Temp:  [97.4 °F (36.3 °C)-98.7 °F (37.1 °C)] 98.7 °F (37.1 °C)  Pulse:  [49-71] 52  Resp:  [0-23] 10  SpO2:  [93 %-100 %] 95 %  BP: ()/(51-74) 87/51  Arterial Line BP: (106-142)/(49-77) 108/51     Weight: 90.7 kg (200 lb)  Body mass index is 27.12 kg/m².      Intake/Output Summary (Last 24 hours) at 2/15/2023 0735  Last data filed at 2/15/2023 0600  Gross per 24 hour   Intake 5272.67 ml   Output 3255 ml   Net 2017.67 ml       Physical Exam  Vitals and nursing note reviewed.   Constitutional:       Appearance: Normal appearance. He is normal weight. He is not ill-appearing.   HENT:      Head: Normocephalic and atraumatic.      Right Ear: Tympanic membrane and external ear normal.      Left Ear: Tympanic membrane and external ear normal.      Nose: Nose normal.      Mouth/Throat:      Mouth: Mucous membranes are moist.      Pharynx: Oropharynx is clear.   Eyes:      Extraocular Movements: Extraocular movements intact.      Conjunctiva/sclera: Conjunctivae normal.      Pupils: Pupils are equal, round, and reactive to light.    Cardiovascular:      Rate and Rhythm: Normal rate and regular rhythm.      Pulses: Normal pulses.      Heart sounds: Normal heart sounds.      Comments: Well healed lt scapular scar 2/2 to prior procedure  Pulmonary:      Effort: Pulmonary effort is normal.      Breath sounds: Normal breath sounds.   Abdominal:      General: Bowel sounds are normal.      Palpations: Abdomen is soft.   Musculoskeletal:         General: No swelling or deformity.      Cervical back: Normal range of motion.   Skin:     General: Skin is warm and dry.      Capillary Refill: Capillary refill takes less than 2 seconds.      Comments: Clean bandage over right femoral artery consistent with well healing surgical site   Neurological:      General: No focal deficit present.      Mental Status: He is alert and oriented to person, place, and time. Mental status is at baseline.   Psychiatric:         Mood and Affect: Mood normal.         Behavior: Behavior normal.         Thought Content: Thought content normal.       Vents:  Oxygen Concentration (%): 21 (02/14/23 2058)    Lines/Drains/Airways       Drain  Duration                  Urethral Catheter 02/14/23 0740 Non-latex 16 Fr. <1 day              Arterial Line  Duration             Arterial Line 02/14/23 0752 Right Radial <1 day              Peripheral Intravenous Line  Duration                  Peripheral IV - Single Lumen 02/14/23 0720 18 G Right Antecubital 1 day         Peripheral IV - Single Lumen 02/14/23 16 G Anterior;Left Forearm 1 day                    Significant Labs:    CBC/Anemia Profile:  Recent Labs   Lab 02/14/23  1811 02/15/23  0216   WBC 6.04 10.46   HGB 14.1 13.0*   HCT 42.7 39.6*    162   MCV 89 90   RDW 13.1 13.1        Chemistries:  Recent Labs   Lab 02/14/23  1811 02/15/23  0216    137   K 4.4 4.3    111*   CO2 21* 20*   BUN 10 9   CREATININE 1.0 0.9   CALCIUM 8.8 8.2*   MG 1.9 1.8   PHOS 2.7 3.2       All pertinent labs within the past 24 hours have  been reviewed.    Significant Imaging:  I have reviewed all pertinent imaging results/findings within the past 24 hours.    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 Now s/p TEVAR on 2/14 by Dr. Santos.       Neuro/Psych:   -- Sedation: none   -- Pain: tylenol, oxycodone, ASA  -- Continue prednisolone OD as prescribed s/p rt. cataracts             Cards:   -- HDS  -- Will restart home labetalol when appropriate    -- Goal Syst < 160  -- PRN hydralazine   -- Q1hr neurovascular checks      Pulm:   -- Stable on room air   -- Goal O2 sat > 90%  -- Wean as able      Renal:  -- Keep buckley for strict I/O  -- BUN/Cr 9/0.9  -- UOP: 3.3L/24H      FEN / GI:   -- Net +2L/24H  -- Replace lytes as needed  -- Nutrition: cardiac diet   -- GI ppx: N/a  -- Zofran prn nausea  -- Bowel reg: PEG prn      ID:   -- Tm: afebrile; WBC pending  -- Ancef 2g post op ppx x2 doses      Heme/Onc:   -- H/H stable 13/39.6 from 14.1/42.7  -- Daily CBC      Endo:   -- BG goal 140-180  -- ISS      PPx:   Feeding: Cardiac diet  Analgesia/Sedation: tylenol, oxycodone/ none   Thromboembolic prevention: Lovenox   HOB >30: Yes   Stress Ulcer ppx: N/a  Glucose control: Critical care goal 140-180 g/dl, ISS    Lines/Drains/Airway: Rt. Ac PIV, Lt forearm PIV, a-line       Dispo/Code Status/Palliative:   -- SICU / Full Code         Critical care was time spent personally by me on the following activities: development of treatment plan with patient or surrogate and bedside caregivers, discussions with consultants, evaluation of patient's response to treatment, examination of patient, ordering and performing treatments and interventions, ordering and review of laboratory studies,  ordering and review of radiographic studies, pulse oximetry, re-evaluation of patient's condition.  This critical care time did not overlap with that of any other provider or involve time for any procedures.     Larry Cummins MD  Critical Care - Surgery  [facility]

## 2023-02-15 NOTE — SUBJECTIVE & OBJECTIVE
Medications:  Continuous Infusions:   lactated ringers 125 mL/hr at 02/15/23 0800     Scheduled Meds:   acetaminophen  650 mg Oral Q6H    aspirin  81 mg Oral Daily    enoxaparin  40 mg Subcutaneous Daily    mupirocin   Nasal BID    prednisoLONE acetate  1 drop Right Eye TID    tamsulosin  1 capsule Oral QHS     PRN Meds:calcium gluconate IVPB, calcium gluconate IVPB, calcium gluconate IVPB, hydrALAZINE, labetalol, magnesium sulfate IVPB, magnesium sulfate IVPB, ondansetron, oxyCODONE, polyethylene glycol, potassium chloride **AND** potassium chloride **AND** potassium chloride, sodium phosphate IVPB, sodium phosphate IVPB, sodium phosphate IVPB     Objective:     Vital Signs (Most Recent):  Temp: 98 °F (36.7 °C) (02/15/23 0730)  Pulse: 65 (02/15/23 0800)  Resp: (!) 24 (02/15/23 0800)  BP: 115/63 (02/15/23 0800)  SpO2: 96 % (02/15/23 0800)   Vital Signs (24h Range):  Temp:  [97.4 °F (36.3 °C)-98.7 °F (37.1 °C)] 98 °F (36.7 °C)  Pulse:  [49-71] 65  Resp:  [0-24] 24  SpO2:  [93 %-100 %] 96 %  BP: ()/(51-79) 115/63  Arterial Line BP: (106-142)/(49-77) 131/63     Date 02/15/23 0700 - 02/16/23 0659   Shift 8233-0275 4864-4259 6091-3690 24 Hour Total   INTAKE   I.V.(mL/kg) 36.4(0.4)   36.4(0.4)   Shift Total(mL/kg) 36.4(0.4)   36.4(0.4)   OUTPUT   Shift Total(mL/kg)       Weight (kg) 90.7 90.7 90.7 90.7       Physical Exam  Constitutional:       General: He is not in acute distress.     Appearance: Normal appearance. He is normal weight. He is not ill-appearing or diaphoretic.   HENT:      Head: Normocephalic and atraumatic.   Eyes:      General: No scleral icterus.        Right eye: No discharge.         Left eye: No discharge.      Extraocular Movements: Extraocular movements intact.      Conjunctiva/sclera: Conjunctivae normal.   Cardiovascular:      Rate and Rhythm: regular rhythm.      Comments:   2+ DP pulses bilaterally  Pulmonary:      Effort: Pulmonary effort is normal. No respiratory distress.    Musculoskeletal:         General: Normal range of motion.      Cervical back: Normal range of motion and neck supple.      Right lower leg: No edema.      Left lower leg: No edema.   Skin:     General: Skin is warm and dry.      Coloration: Skin is not jaundiced or pale.      Findings: No erythema or rash.   Dressing over groin access site, soft  Neurological:      General: No focal deficit present.      Mental Status: He is alert and oriented to person, place, and time. Mental status is at baseline.      Cranial Nerves: No cranial nerve deficit.   Psychiatric:         Mood and Affect: Mood normal.         Behavior: Behavior normal.   Significant Labs:  All pertinent labs from the last 24 hours have been reviewed.    Significant Diagnostics:  I have reviewed and interpreted all pertinent imaging results/findings within the past 24 hours.

## 2023-02-15 NOTE — PLAN OF CARE
POC reviewed with pt, verbalized understanding. AAOx4, VSS on RA. SR on tele. Denies pain. Incision to R groin c/d/I. Pulses palpable bilat. Ambulates independently. Adequate UOP. Cardiac diet. Call bell within reach. Frequent rounds for safety.    Problem: Adult Inpatient Plan of Care  Goal: Plan of Care Review  Outcome: Ongoing, Progressing  Goal: Patient-Specific Goal (Individualized)  Outcome: Ongoing, Progressing  Goal: Absence of Hospital-Acquired Illness or Injury  Outcome: Ongoing, Progressing  Goal: Optimal Comfort and Wellbeing  Outcome: Ongoing, Progressing  Goal: Readiness for Transition of Care  Outcome: Ongoing, Progressing     Problem: Infection  Goal: Absence of Infection Signs and Symptoms  Outcome: Ongoing, Progressing     Problem: Skin Injury Risk Increased  Goal: Skin Health and Integrity  Outcome: Ongoing, Progressing     Problem: Fall Injury Risk  Goal: Absence of Fall and Fall-Related Injury  Outcome: Ongoing, Progressing

## 2023-02-15 NOTE — PLAN OF CARE
Pt arrived to unit via wheelchair, AAOx4, VSS on RA. Tele in place. IVF infusing. Incision to groin c/d/I. Pt ambulating independently. Instructed pt to use call bell.

## 2023-02-15 NOTE — ASSESSMENT & PLAN NOTE
62M with carotid subclavian bypass followed by TEVAR, left subclavian artery embolization, right renal artery stenting for type B dissection. 2/14 TEVAR down to level of celiac artery    Plan:   --Continue ICU care for today given peak onset of any spinal cord ischemia would be 4-48 hours from procedure  --Continue serial neuro exam  --Diet ordered  --d/c buckley  --Continue A-line

## 2023-02-15 NOTE — ASSESSMENT & PLAN NOTE
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       Neuro/Psych:   -- Sedation: N/a  -- Pain: Percocet 5/325, ASA, gabapentin/robaxin if needed  -- Continue prednisolone OD as prescribed s/p rt. cataracts             Cards:   -- HDS  -- Home labetelol and ASA  -- Goal Syst < 160  -- Q1hr neurovascular checks      Pulm:   -- Goal O2 sat > 90%  -- Wean as able      Renal:  -- Keep buckley for strict I/O  -- BUN/Cr pending      FEN / GI:   -- Net +2.3L  -- Replace lytes as needed  -- Nutrition: NPO  -- GI ppx: N/a  -- Zofran prn nausea  -- Bowel reg: PEG prn      ID:   -- Tm: afebrile; WBC pending  -- Ancef 2g post op ppx x2 doses      Heme/Onc:   -- H/H stable pending  -- Daily CBC      Endo:   -- Endocrine consulted, appreciate recs  -- BG goal 140-180  -- ISS      PPx:   Feeding: Cardiac diet  Analgesia/Sedation: Percocet, robaxin/gabapentin if needed / No sedation  Thromboembolic prevention: Lovenox   HOB >30: N/a  Stress Ulcer ppx: N/a  Glucose control: Critical care goal 140-180 g/dl, ISS    Lines/Drains/Airway: Rt. Ac PIV, Lt forearm PIV, buckley, a-line,       Dispo/Code Status/Palliative:   -- SICU / Full Code

## 2023-02-15 NOTE — PLAN OF CARE
Problem: Physical Therapy  Goal: Physical Therapy Goal  Outcome: Met         Eval completed. Pt safe with functional mobility.

## 2023-02-15 NOTE — SUBJECTIVE & OBJECTIVE
Interval History/Significant Events: NAEO. Afebrile, HDS. Patient denies having any pain this am.     Follow-up For: Procedure(s) (LRB):  REPAIR, ANEURYSM, ENDOVASCULAR GRAFT, AORTA, THORACIC (N/A)    Post-Operative Day: 1 Day Post-Op    Objective:     Vital Signs (Most Recent):  Temp: 98.7 °F (37.1 °C) (02/14/23 2300)  Pulse: (!) 52 (02/15/23 0600)  Resp: 10 (02/15/23 0600)  BP: (!) 87/51 (02/15/23 0600)  SpO2: 95 % (02/15/23 0600)   Vital Signs (24h Range):  Temp:  [97.4 °F (36.3 °C)-98.7 °F (37.1 °C)] 98.7 °F (37.1 °C)  Pulse:  [49-71] 52  Resp:  [0-23] 10  SpO2:  [93 %-100 %] 95 %  BP: ()/(51-74) 87/51  Arterial Line BP: (106-142)/(49-77) 108/51     Weight: 90.7 kg (200 lb)  Body mass index is 27.12 kg/m².      Intake/Output Summary (Last 24 hours) at 2/15/2023 0735  Last data filed at 2/15/2023 0600  Gross per 24 hour   Intake 5272.67 ml   Output 3255 ml   Net 2017.67 ml       Physical Exam  Vitals and nursing note reviewed.   Constitutional:       Appearance: Normal appearance. He is normal weight. He is not ill-appearing.   HENT:      Head: Normocephalic and atraumatic.      Right Ear: Tympanic membrane and external ear normal.      Left Ear: Tympanic membrane and external ear normal.      Nose: Nose normal.      Mouth/Throat:      Mouth: Mucous membranes are moist.      Pharynx: Oropharynx is clear.   Eyes:      Extraocular Movements: Extraocular movements intact.      Conjunctiva/sclera: Conjunctivae normal.      Pupils: Pupils are equal, round, and reactive to light.   Cardiovascular:      Rate and Rhythm: Normal rate and regular rhythm.      Pulses: Normal pulses.      Heart sounds: Normal heart sounds.      Comments: Well healed lt scapular scar 2/2 to prior procedure  Pulmonary:      Effort: Pulmonary effort is normal.      Breath sounds: Normal breath sounds.   Abdominal:      General: Bowel sounds are normal.      Palpations: Abdomen is soft.   Musculoskeletal:         General: No swelling or  deformity.      Cervical back: Normal range of motion.   Skin:     General: Skin is warm and dry.      Capillary Refill: Capillary refill takes less than 2 seconds.      Comments: Clean bandage over right femoral artery consistent with well healing surgical site   Neurological:      General: No focal deficit present.      Mental Status: He is alert and oriented to person, place, and time. Mental status is at baseline.   Psychiatric:         Mood and Affect: Mood normal.         Behavior: Behavior normal.         Thought Content: Thought content normal.       Vents:  Oxygen Concentration (%): 21 (02/14/23 2058)    Lines/Drains/Airways       Drain  Duration                  Urethral Catheter 02/14/23 0740 Non-latex 16 Fr. <1 day              Arterial Line  Duration             Arterial Line 02/14/23 0752 Right Radial <1 day              Peripheral Intravenous Line  Duration                  Peripheral IV - Single Lumen 02/14/23 0720 18 G Right Antecubital 1 day         Peripheral IV - Single Lumen 02/14/23 16 G Anterior;Left Forearm 1 day                    Significant Labs:    CBC/Anemia Profile:  Recent Labs   Lab 02/14/23  1811 02/15/23  0216   WBC 6.04 10.46   HGB 14.1 13.0*   HCT 42.7 39.6*    162   MCV 89 90   RDW 13.1 13.1        Chemistries:  Recent Labs   Lab 02/14/23  1811 02/15/23  0216    137   K 4.4 4.3    111*   CO2 21* 20*   BUN 10 9   CREATININE 1.0 0.9   CALCIUM 8.8 8.2*   MG 1.9 1.8   PHOS 2.7 3.2       All pertinent labs within the past 24 hours have been reviewed.    Significant Imaging:  I have reviewed all pertinent imaging results/findings within the past 24 hours.

## 2023-02-15 NOTE — PROGRESS NOTES
[facility]  Vascular Surgery  Progress Note    Patient Name: Luis Eduardo Curry  MRN: [id number]  Admission Date: 2/14/2023  Primary Care Provider: Kiko Connor MD    Subjective:     Interval History: POD 1 TEVAR down to level of celiac artery. Patient doing well post op, no acute complaints or events overnight. Groin access site soft. Resuming diet today, normalizing goals, maintaining A-line. Will keep in ICU for one more day with likely d/c home tomorrow pending stability    Post-Op Info:  Procedure(s) (LRB):  REPAIR, ANEURYSM, ENDOVASCULAR GRAFT, AORTA, THORACIC (N/A)   1 Day Post-Op       Medications:  Continuous Infusions:   lactated ringers 125 mL/hr at 02/15/23 0800     Scheduled Meds:   acetaminophen  650 mg Oral Q6H    aspirin  81 mg Oral Daily    enoxaparin  40 mg Subcutaneous Daily    mupirocin   Nasal BID    prednisoLONE acetate  1 drop Right Eye TID    tamsulosin  1 capsule Oral QHS     PRN Meds:calcium gluconate IVPB, calcium gluconate IVPB, calcium gluconate IVPB, hydrALAZINE, labetalol, magnesium sulfate IVPB, magnesium sulfate IVPB, ondansetron, oxyCODONE, polyethylene glycol, potassium chloride **AND** potassium chloride **AND** potassium chloride, sodium phosphate IVPB, sodium phosphate IVPB, sodium phosphate IVPB     Objective:     Vital Signs (Most Recent):  Temp: 98 °F (36.7 °C) (02/15/23 0730)  Pulse: 65 (02/15/23 0800)  Resp: (!) 24 (02/15/23 0800)  BP: 115/63 (02/15/23 0800)  SpO2: 96 % (02/15/23 0800)   Vital Signs (24h Range):  Temp:  [97.4 °F (36.3 °C)-98.7 °F (37.1 °C)] 98 °F (36.7 °C)  Pulse:  [49-71] 65  Resp:  [0-24] 24  SpO2:  [93 %-100 %] 96 %  BP: ()/(51-79) 115/63  Arterial Line BP: (106-142)/(49-77) 131/63     Date 02/15/23 0700 - 02/16/23 0659   Shift [id number] 2842-6420 5210-0659 24 Hour Total   INTAKE   I.V.(mL/kg) 36.4(0.4)   36.4(0.4)   Shift Total(mL/kg) 36.4(0.4)   36.4(0.4)   OUTPUT   Shift Total(mL/kg)       Weight (kg) 90.7 90.7  90.7 90.7       Physical Exam  Constitutional:       General: He is not in acute distress.     Appearance: Normal appearance. He is normal weight. He is not ill-appearing or diaphoretic.   HENT:      Head: Normocephalic and atraumatic.   Eyes:      General: No scleral icterus.        Right eye: No discharge.         Left eye: No discharge.      Extraocular Movements: Extraocular movements intact.      Conjunctiva/sclera: Conjunctivae normal.   Cardiovascular:      Rate and Rhythm: regular rhythm.      Comments:   2+ DP pulses bilaterally  Pulmonary:      Effort: Pulmonary effort is normal. No respiratory distress.   Musculoskeletal:         General: Normal range of motion.      Cervical back: Normal range of motion and neck supple.      Right lower leg: No edema.      Left lower leg: No edema.   Skin:     General: Skin is warm and dry.      Coloration: Skin is not jaundiced or pale.      Findings: No erythema or rash.   Dressing over groin access site, soft  Neurological:      General: No focal deficit present.      Mental Status: He is alert and oriented to person, place, and time. Mental status is at baseline.      Cranial Nerves: No cranial nerve deficit.   Psychiatric:         Mood and Affect: Mood normal.         Behavior: Behavior normal.   Significant Labs:  All pertinent labs from the last 24 hours have been reviewed.    Significant Diagnostics:  I have reviewed and interpreted all pertinent imaging results/findings within the past 24 hours.    Assessment/Plan:     * Aortic dissection distal to left subclavian  62M with carotid subclavian bypass followed by TEVAR, left subclavian artery embolization, right renal artery stenting for type B dissection. 2/14 TEVAR down to level of celiac artery    Plan:   --Continue ICU care for today given peak onset of any spinal cord ischemia would be 4-48 hours from procedure  --Continue serial neuro exam  --Diet ordered  --d/c ina  --Continue A-line    Dispo: Anticipate  home tomorrow    Chris Wagner MD  Vascular Surgery  [facility]

## 2023-02-15 NOTE — PT/OT/SLP EVAL
Occupational Therapy   Evaluation and Discharge Note    Name: Luis Eduardo Curry  MRN: [id number]  Admitting Diagnosis: Aortic dissection distal to left subclavian  Recent Surgery: Procedure(s) (LRB):  REPAIR, ANEURYSM, ENDOVASCULAR GRAFT, AORTA, THORACIC (N/A) 1 Day Post-Op    Recommendations:     Discharge Recommendations: home  Discharge Equipment Recommendations: none  Barriers to discharge:  None    Assessment:     Luis Eduardo Curry is a 62 y.o. male with a medical diagnosis of Aortic dissection distal to left subclavian. At this time, patient is functioning at their prior level of function and does not require further acute OT services.     Plan:     During this hospitalization, patient does not require further acute OT services.  Please re-consult if situation changes.    Plan of Care Reviewed with: patient    Subjective     Chief Complaint: pt pleasant with no c/o  Patient/Family Comments/goals: to return home     Occupational Profile:  Living Environment: Pt and his wife live in a [facility] with 0 LEATHA. He has a tub/shower  Previous level of function: independent  Roles and Routines: works at [employer] as , drives  Equipment Used at home: none  Assistance upon Discharge: Upon discharge, pt will have assistance from his wife when she is not working    Pain/Comfort:  Pain Rating 1: 0/10  Pain Rating Post-Intervention 1: 0/10    Patients cultural, spiritual, Caodaism conflicts given the current situation: no    Objective:     Communicated with: RN and PT prior to session.  Patient found up in chair with telemetry, pulse ox (continuous), blood pressure cuff, peripheral IV, buckley catheter, arterial line upon OT entry to room.    General Precautions: Standard, fall  Orthopedic Precautions: N/A  Braces: N/A  Respiratory Status: Room air     Occupational Performance:    Functional Mobility/Transfers:  Patient completed Sit <> Stand Transfer with independence  with  no assistive device   Functional  Mobility: Pt ambulated bed>bathroom and household distance in [place] with supervision and no AD in order to maximize functional activity tolerance and standing balance required for engagement in occupations of choice.    No LOB, SOB, or c/o of dizziness  Portable monitor in [place], VSS    Activities of Daily Living:  Grooming: supervision to perform oral care standing at the sink  Upper Body Dressing: supervision to don posterior gown    Cognitive/Visual Perceptual:  Cognitive/Psychosocial Skills:     -       Oriented to: Person, Place, Time, and Situation   -       Follows Commands/attention:Follows multistep  commands  -       Communication: clear/fluent  -       Memory: No Deficits noted  -       Safety awareness/insight to disability: intact   -       Mood/Affect/Coping skills/emotional control: Appropriate to situation    Physical Exam:  Sensation:    -       Intact  Upper Extremity Range of Motion:     -       Right Upper Extremity: WFL  -       Left Upper Extremity: WFL  Upper Extremity Strength:    -       Right Upper Extremity: WFL  -       Left Upper Extremity: WFL   Strength:    -       Right Upper Extremity: WFL  -       Left Upper Extremity: WFL  Fine Motor Coordination:    -       Intact    AMPAC 6 Click ADL:  AMPAC Total Score: 24    Treatment & Education:  -Therapist provided facilitation and instruction of proper body mechanics, energy conservation, and fall prevention strategies during tasks listed above.  -Pt educated on role of OT, POC and goals for therapy  -Pt educated on importance of OOB activities with staff member assistance and sitting OOB majority of the day.   -Pt verbalized understanding. Pt expressed no further concerns/questions  -Whiteboard updated   Evaluation completed with PT to best establish plan of care for acute setting.     Patient left up in chair with all lines intact, call button in reach, and RN notified    GOALS:   Multidisciplinary Problems       Occupational Therapy  Goals       Not on file              Multidisciplinary Problems (Resolved)          Problem: Occupational Therapy    Goal Priority Disciplines Outcome Interventions   Occupational Therapy Goal   (Resolved)     OT, PT/OT Met    Description: Pt is currently performing ADLs, functional mobility and transfers at baseline. OT services are not recommended at this time and pt is safe to discharge home.                          History:     Past Medical History:   Diagnosis Date    Aortic dissection     Sleep apnea          Past Surgical History:   Procedure Laterality Date    BIOPSY WITH TRANSRECTAL ULTRASOUND (TRUS) GUIDANCE N/A 05/01/2019    Procedure: BIOPSY, WITH TRANSRECTAL PROSTATE ULTRASOUND;  Surgeon: Chintan Mendez Jr., MD;  Location: [facility];  Service: Urology;  Laterality: N/A;    COLONOSCOPY  2006    COLONOSCOPY N/A 01/24/2019    Procedure: COLONOSCOPY;  Surgeon: Reece Tarango MD;  Location: [facility];  Service: Endoscopy;  Laterality: N/A;    CREATION OF AORTO-CAROTID BYPASS WITH GRAFT Left 11/16/2020    Procedure: CREATION, BYPASS, ARTERIAL, AORTA TO CAROTID, USING GRAFT SUBCLAVIAN BYPASS;  Surgeon: JOSY Santos II, MD;  Location: [address];  Service: Cardiovascular;  Laterality: Left;  left common carotid to subclavian bypass    CYSTOSCOPY N/A 05/01/2019    Procedure: FLEXIBLE CYSTOSCOPY;  Surgeon: Chintan Mendez Jr., MD;  Location: [facility];  Service: Urology;  Laterality: N/A;    ENDOVASCULAR GRAFT REPAIR OF ANEURYSM OF THORACIC AORTA N/A 11/17/2020    Procedure: REPAIR, ANEURYSM, ENDOVASCULAR GRAFT, AORTA, THORACIC Left brachial cutdown;  Surgeon: JOSY Santos II, MD;  Location: [address];  Service: Cardiovascular;  Laterality: N/A;  Fluoro time 34.7 min  2002.64 mGy  432.18 Gycm2    ENDOVASCULAR GRAFT REPAIR OF ANEURYSM OF THORACIC AORTA N/A 2/14/2023    Procedure: REPAIR, ANEURYSM, ENDOVASCULAR GRAFT, AORTA, THORACIC;  Surgeon: JOSY Santos II, MD;  Location: [address]  [facility];  Service: Vascular;  Laterality: N/A;    EXTRACTION OF CATARACT  01/18/2023    RETROGRADE PYELOGRAPHY Right 03/05/2021    Procedure: PYELOGRAM, RETROGRADE;  Surgeon: Yuriy Woo MD;  Location: [facility] OR 1ST FLR;  Service: Urology;  Laterality: Right;    TRANSURETHRAL RESECTION OF PROSTATE N/A 02/25/2021    Procedure: TURP (TRANSURETHRAL RESECTION OF PROSTATE) BIPOLOR;  Surgeon: Yuriy Woo MD;  Location: [facility] OR 1ST FLR;  Service: Urology;  Laterality: N/A;  2 hours      TRANSURETHRAL RESECTION OF PROSTATE N/A 03/05/2021    Procedure: TURP (TRANSURETHRAL RESECTION OF PROSTATE)/ BIPOLOR;  Surgeon: Yuriy Woo MD;  [facility];  Service: Urology;  Laterality: N/A;    UMBILICAL HERNIA REPAIR N/A 01/14/2020    Procedure: REPAIR, HERNIA, UMBILICAL;  Surgeon: Caio Mas Jr., MD;  Location: [facility];  Service: General;  Laterality: N/A;       Time Tracking:     OT Date of Treatment: 02/15/23  OT Start Time: 0840  OT Stop Time: 0856  OT Total Time (min): 16 min    Billable Minutes:Evaluation 8  Self Care/Home Management 8    2/15/2023

## 2023-02-15 NOTE — ASSESSMENT & PLAN NOTE
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 Now s/p TEVAR on 2/14 by Dr. Santos.       Neuro/Psych:   -- Sedation: none   -- Pain: tylenol, oxycodone, ASA  -- Continue prednisolone OD as prescribed s/p rt. cataracts             Cards:   -- HDS  -- Will restart home labetalol when appropriate    -- Goal Syst < 160  -- PRN hydralazine   -- Q1hr neurovascular checks      Pulm:   -- Stable on room air   -- Goal O2 sat > 90%  -- Wean as able      Renal:  -- Keep buckley for strict I/O  -- BUN/Cr 9/0.9  -- UOP: 3.3L/24H      FEN / GI:   -- Net +2L/24H  -- Replace lytes as needed  -- Nutrition: cardiac diet   -- GI ppx: N/a  -- Zofran prn nausea  -- Bowel reg: PEG prn      ID:   -- Tm: afebrile; WBC pending  -- Ancef 2g post op ppx x2 doses      Heme/Onc:   -- H/H stable 13/39.6 from 14.1/42.7  -- Daily CBC      Endo:   -- BG goal 140-180  -- ISS      PPx:   Feeding: Cardiac diet  Analgesia/Sedation: tylenol, oxycodone/ none   Thromboembolic prevention: Lovenox   HOB >30: Yes   Stress Ulcer ppx: N/a  Glucose control: Critical care goal 140-180 g/dl, ISS    Lines/Drains/Airway: Rt. Ac PIV, Lt forearm PIV, a-line       Dispo/Code Status/Palliative:   -- SICU / Full Code

## 2023-02-15 NOTE — NURSING TRANSFER
Nursing Transfer Note      2/15/2023     Patient ambulated in halls with PT/OT.   HR 70s NSR. SpO2 >96% on room air with unlabored breathing. MAP >65 & SBP <180. MIVF infusing @75cc/hr. UOP 400cc this AM per urinal after buckley removal. Last BM 02/14.    Report given to RN. Patient stepped down to [facility] per vascular surgery's order. Q4hr neurovascular checks ordered. All pulses palpable. Telemetry monitoring in place and notified tele room. All belongings with patient. No acute distress noted.     Skin: Skin intact. No breakdown noted. Groin incision dressing clean,dry,intact.

## 2023-02-15 NOTE — NURSING
Pt transported to SICU [id number] with portable telemetry. Pt connected to ICU monitor. SICU MD called to bedside for patient arrival. Report received from [place] PACU RN. New orders received and implemented. Pt assessed, immediate needs met. Family brought to bedside, updated on the patient's current condition and PoC for remainder of shift. Family also given ICU Welcome packet and educated on visiting hours. All questions answered, emotional support provided.     Admit Skin Note:   Skin assessed during: Admit      [x] No Pressure Injuries Present    []Prevention Measures Documented      [] Yes- Altered Skin Integrity Present or Discovered   [] LDA Added if Not in Epic (Describe Wound)   [] New Altered Skin Integrity was Present on Admit and Documented in LDA   [] Wound Image Taken    Wound Care Consulted? No    Attending Nurse:  Hailey Iyer RN     Second RN/Staff Member: Dallin Brown RN

## 2023-02-15 NOTE — PLAN OF CARE
[facility] - Surgical Intensive Care  Initial Discharge Assessment       Primary Care Provider: Kiko Connor MD    Admission Diagnosis: Chronic thoracic aortic dissection [I71.019]  Abdominal aortic aneurysm [I71.40]    Admission Date: 2/14/2023  Expected Discharge Date:     Discharge Barriers Identified: None    Payor: BLUE CROSS BLUE SHIELD / Plan: BCBS ALL OUT OF STATE / Product Type: PPO /     Extended Emergency Contact Information  Primary Emergency Contact: Katherine Curry  Address: [address] [place] [address] [address]           [place], [place] [id number] [place] [address]  Home Phone: [phone]  Mobile Phone: [phone]  Relation: Spouse    Discharge Plan A: Home  Discharge Plan B: Home with family      [facility] [place] - [address]  [address]  [facility] [address]  Phone: [phone] Fax: [phone]    [id number] - [place] - [address] [place] [address]  Phone: [phone] Fax: [phone]      Initial Assessment (most recent)       Adult Discharge Assessment - 02/15/23 0947          Discharge Assessment    Assessment Type Discharge Planning Assessment     Confirmed/corrected address, phone number and insurance Yes     Confirmed Demographics Correct on Facesheet     Source of Information patient     Communicated KATHIE with patient/caregiver Date not available/Unable to determine     Reason For Admission Aortic dissection distal to left subclavian     People in Home spouse     Do you expect to return to your current living situation? Yes     Do you have help at home or someone to help you manage your care at home? Yes     Who are your caregiver(s) and their phone number(s)? Katherine Curry [phone]     Prior to hospitilization cognitive status: Alert/Oriented     Current cognitive status: Alert/Oriented     Home Layout Able to live on 1st floor     Equipment Currently Used at Home none     Patient currently being followed by  outpatient case management? No     Do you currently have service(s) that help you manage your care at home? No     Do you take prescription medications? Yes     Is the patient taking medications as prescribed? yes     Who is going to help you get home at discharge? [facility] BLUE SHIELD - BCBS ALL OUT OF STATE     How do you get to doctors appointments? car, drives self     Are you on dialysis? No     Do you take coumadin? No     Discharge Plan A Home     Discharge Plan B Home with family     DME Needed Upon Discharge  other (see comments)   TBD    Discharge Plan discussed with: Patient     Discharge Barriers Identified None                      This SW met with patient at bedside to complete DPA. Questions answered / contact numbers provided.  Use PREFERRED PHARMACY / BEDSIDE DELIVERY for any necessary medications at time of discharge. The patient is independent with all ADLs - does not use DME, In-home equipment, is not on HD, BTs or home oxygen.The patient's wife will be assisting with help upon discharge. The patient's wife  will be providing transportation home. Hospital follow up will be scheduled with PCP. Will continue to follow for course of hospitalization.     Dawna Renee LMSW  Case Management [facility]  Ext: [phone]

## 2023-02-16 ENCOUNTER — PATIENT MESSAGE (OUTPATIENT)
Dept: VASCULAR SURGERY | Facility: CLINIC | Age: 63
End: 2023-02-16
Payer: COMMERCIAL

## 2023-02-16 VITALS
HEIGHT: 72 IN | SYSTOLIC BLOOD PRESSURE: 128 MMHG | RESPIRATION RATE: 18 BRPM | HEART RATE: 81 BPM | BODY MASS INDEX: 27.09 KG/M2 | WEIGHT: 200 LBS | OXYGEN SATURATION: 93 % | TEMPERATURE: 98 F | DIASTOLIC BLOOD PRESSURE: 65 MMHG

## 2023-02-16 LAB
MAGNESIUM SERPL-MCNC: 1.9 MG/DL (ref 1.6–2.6)
PHOSPHATE SERPL-MCNC: 2.9 MG/DL (ref 2.7–4.5)

## 2023-02-16 PROCEDURE — 25000003 PHARM REV CODE 250: Performed by: STUDENT IN AN ORGANIZED HEALTH CARE EDUCATION/TRAINING PROGRAM

## 2023-02-16 PROCEDURE — 63600175 PHARM REV CODE 636 W HCPCS

## 2023-02-16 PROCEDURE — 36415 COLL VENOUS BLD VENIPUNCTURE: CPT | Performed by: STUDENT IN AN ORGANIZED HEALTH CARE EDUCATION/TRAINING PROGRAM

## 2023-02-16 PROCEDURE — 84100 ASSAY OF PHOSPHORUS: CPT | Performed by: STUDENT IN AN ORGANIZED HEALTH CARE EDUCATION/TRAINING PROGRAM

## 2023-02-16 PROCEDURE — 83735 ASSAY OF MAGNESIUM: CPT | Performed by: STUDENT IN AN ORGANIZED HEALTH CARE EDUCATION/TRAINING PROGRAM

## 2023-02-16 PROCEDURE — 20600001 HC STEP DOWN PRIVATE ROOM

## 2023-02-16 RX ORDER — ACETAMINOPHEN 325 MG/1
650 TABLET ORAL EVERY 6 HOURS
Qty: 30 TABLET | Refills: 0 | Status: SHIPPED | OUTPATIENT
Start: 2023-02-16

## 2023-02-16 RX ADMIN — ASPIRIN 81 MG: 81 TABLET, CHEWABLE ORAL at 08:02

## 2023-02-16 RX ADMIN — SODIUM CHLORIDE, SODIUM LACTATE, POTASSIUM CHLORIDE, AND CALCIUM CHLORIDE: 600; 310; 30; 20 INJECTION, SOLUTION INTRAVENOUS at 12:02

## 2023-02-16 RX ADMIN — MUPIROCIN: 20 OINTMENT TOPICAL at 08:02

## 2023-02-16 RX ADMIN — PREDNISOLONE ACETATE 1 DROP: 10 SUSPENSION/ DROPS OPHTHALMIC at 08:02

## 2023-02-16 RX ADMIN — ACETAMINOPHEN 650 MG: 325 TABLET ORAL at 12:02

## 2023-02-16 RX ADMIN — ACETAMINOPHEN 650 MG: 325 TABLET ORAL at 06:02

## 2023-02-16 NOTE — DISCHARGE SUMMARY
[facility]  Vascular Surgery  Discharge Summary      Patient Name: Luis Eduardo Curry  MRN: [id number]  Admission Date: 2/14/2023  Hospital Length of Stay: 2 days  Discharge Date and Time:  02/16/2023 8:07 AM  Attending Physician: JOSY Santos II, MD   Discharging Provider: Jacqueline Almonte NP  Primary Care Provider: Kiko Connor MD    HPI:   No notes on file    Procedure(s) (LRB):  REPAIR, ANEURYSM, ENDOVASCULAR GRAFT, AORTA, THORACIC (N/A)     Hospital Course: For details of hospital stay, please refer to daily progress notes. Briefly, this is a 62 y.o. male presented on 2/14 for planned surgical intervention for type B aortic dissection.  Pt underwent EVAR. Post-operatively patient was admitted to the ICU and was subsequently stepped down as his condition improved.  On the day of discharge, the patient was ambulating without difficulty, voiding spontaneously, was tolerating a diet without nausea or vomiting, and pain was well controlled on PO pain medications. Discharge instructions were explained to the patient and appropriate follow-up was arranged.       Goals of Care Treatment Preferences:  Code Status: Full Code      Consults:     Significant Diagnostic Studies: Labs:   CMP   Recent Labs   Lab 02/14/23  1811 02/15/23  0216    137   K 4.4 4.3    111*   CO2 21* 20*   * 117*   BUN 10 9   CREATININE 1.0 0.9   CALCIUM 8.8 8.2*   ANIONGAP 6* 6*    and CBC   Recent Labs   Lab 02/14/23  1811 02/15/23  0216   WBC 6.04 10.46   HGB 14.1 13.0*   HCT 42.7 39.6*    162       Pending Diagnostic Studies:     None        Final Active Diagnoses:    Diagnosis Date Noted POA    PRINCIPAL PROBLEM:  Aortic dissection distal to left subclavian [I71.019] 10/05/2020 Yes      Problems Resolved During this Admission:      Discharged Condition: good    Disposition: Home or Self Care    Follow Up:      Patient Instructions:      Diet Cardiac     Notify your health care provider if you experience any  of the following:  temperature >100.4     Notify your health care provider if you experience any of the following:  persistent nausea and vomiting or diarrhea     Notify your health care provider if you experience any of the following:  severe uncontrolled pain     Notify your health care provider if you experience any of the following:  redness, tenderness, or signs of infection (pain, swelling, redness, odor or green/yellow discharge around incision site)     Notify your health care provider if you experience any of the following:  difficulty breathing or increased cough     Notify your health care provider if you experience any of the following:     Notify your health care provider if you experience any of the following:  increased confusion or weakness     Notify your health care provider if you experience any of the following:  persistent dizziness, light-headedness, or visual disturbances     Notify your health care provider if you experience any of the following:  worsening rash     Notify your health care provider if you experience any of the following:  severe persistent headache     Change dressing (specify)   Order Comments: Keep dressing on groin clean and dry  Shower daily     Activity as tolerated     Medications:    Reconciled Home Medications:      Medication List      START taking these medications    acetaminophen 325 MG tablet  Commonly known as: TYLENOL  Take 2 tablets (650 mg total) by mouth every 6 (six) hours.        CHANGE how you take these medications    labetaloL 200 MG tablet  Commonly known as: NORMODYNE  Take 1 tablet (200 mg total) by mouth 2 (two) times daily. OK TO TAKE  What changed: when to take this        CONTINUE taking these medications    aspirin 81 MG Chew  Chew and swallow 1 tablet (81 mg total) by mouth once daily.     clopidogreL 75 mg tablet  Commonly known as: PLAVIX  Take 1 tablet (75 mg total) by mouth once daily.     prednisoLONE sodium phosphate 1 % Drop  Commonly  known as: INFLAMASE FORTE  Place 1 drop into the right eye 3 (three) times daily.     tamsulosin 0.4 mg Cap  Commonly known as: FLOMAX  TAKE ONE CAPSULE BY MOUTH EVERY EVENING            Jacqueline Almonte NP  Vascular Surgery  [facility]

## 2023-02-16 NOTE — HOSPITAL COURSE
For details of hospital stay, please refer to daily progress notes. Briefly, this is a 62 y.o. male presented on 2/14 for planned surgical intervention for type B aortic dissection.  Pt underwent EVAR. Post-operatively patient was admitted to the ICU and was subsequently stepped down as his condition improved.  On the day of discharge, the patient was ambulating without difficulty, voiding spontaneously, was tolerating a diet without nausea or vomiting, and pain was well controlled on PO pain medications. Discharge instructions were explained to the patient and appropriate follow-up was arranged.

## 2023-02-16 NOTE — ASSESSMENT & PLAN NOTE
62M with carotid subclavian bypass followed by TEVAR, left subclavian artery embolization, right renal artery stenting for type B dissection. 2/14 TEVAR down to level of celiac artery    Plan:   --Diet ordered  --Frequent ambulation  --Keep incisions clean and dry  --Follow up Dr. Santos four weeks with CTA chest/abd

## 2023-02-16 NOTE — NURSING
Pt d/c to home. Medication and d/c instructions given to pt and family at bedside in detail. MD removed groin dressing on am rounds and instructed pt to leave open to air. Site c/d/I with some mild bruising noted around site but unchanged from prior assessments. Pt verbalized understanding of d/c instructions with no additional questions asked. Ivs out, caths intact, tely off, pt belongings packed. Pt left unit via ambulation with wife to private car outside hospital.

## 2023-02-16 NOTE — DISCHARGE INSTRUCTIONS
VASCULAR SURGERY DISCHARGE INSTRUCTIONS    Woundcare:  -keep incision clean and dry  - Shower daily and pad your incision site dry  - Leave sutures in place  - It is normal to notice some bruising at your incision site in the first 1-3 days after surgery    Activity:  - Activity is good for you. Try to walk frequently and increase walking distance every day  - No heavy lifting for 2 weeks  - No baths, swimming in pools, lakes, jacuzzi etc. for 2 weeks     Diet:  -Resume your pre-operative home diet    Follow up:  -Follow up for ultrasound and vascular surgery clinic appointment in ~2 weeks    Call Vascular Surgery Office at [phone] if you experience:  -Increased redness, warmth, tenderness, or draining pus from your incision  -Increased pain/swelling at your incision  -Worsening fevers, chills, nausea/vomiting  -Pain, weakness, coldness, or numbness in your legs  -Uncontrolled pain  -Your call will be returned within 24 hours and further instructions will be provided    Go to ER/Urgent Care if you experience:  -Worsening shortness of breath or chest pain  -Sudden severe pain and swelling at your incision site

## 2023-02-16 NOTE — PLAN OF CARE
02/16/23 1018   Final Note   Assessment Type Final Discharge Note   Anticipated Discharge Disposition Home   Post-Acute Status   Patient choice form signed by patient/caregiver List with quality metrics by geographic area provided   Discharge Delays None known at this time     Pt d/c home with family. No d/c needs reported by medical team at this time.       Tracey Carreno LCSW  Case Management/[facility]  [phone]

## 2023-02-16 NOTE — PLAN OF CARE
Patient doing well this shift, has sat up in the chair.  Ate well at dinner, tolerating po intake of food and fluids well without nausea or emesis.  Patient ambulating in room, urinating without problems.  Patient passing gas but still has not had a BM.  IVF continue to infuse.  Right groin surgical site clean, dry and intact.  Slight bruising noted around site but unchanged with no swelling noted to site.  Patient reports minimal pain that needs more than tylenol to relieve.  No acute distress is noted.  Pt wearing tele, sinus arrhythmia noted with infreq PAC's and PVC's.  Patient asymptomatic.

## 2023-02-17 ENCOUNTER — HOSPITAL ENCOUNTER (EMERGENCY)
Facility: HOSPITAL | Age: 63
Discharge: HOME | End: 2023-02-17
Attending: SURGERY
Payer: COMMERCIAL

## 2023-02-17 VITALS
BODY MASS INDEX: 27.25 KG/M2 | TEMPERATURE: 99 F | DIASTOLIC BLOOD PRESSURE: 71 MMHG | WEIGHT: 200.94 LBS | HEART RATE: 72 BPM | OXYGEN SATURATION: 99 % | SYSTOLIC BLOOD PRESSURE: 134 MMHG | RESPIRATION RATE: 18 BRPM

## 2023-02-17 DIAGNOSIS — S39.012A STRAIN OF LUMBAR REGION, INITIAL ENCOUNTER: Primary | ICD-10-CM

## 2023-02-17 DIAGNOSIS — M54.9 BACK PAIN: ICD-10-CM

## 2023-02-17 LAB
ALBUMIN SERPL BCP-MCNC: 3.8 G/DL (ref 3.5–5.2)
ALP SERPL-CCNC: 55 U/L (ref 55–135)
ALT SERPL W/O P-5'-P-CCNC: 63 U/L (ref 10–44)
ANION GAP SERPL CALC-SCNC: 11 MMOL/L (ref 8–16)
AST SERPL-CCNC: 39 U/L (ref 10–40)
BASOPHILS # BLD AUTO: 0.02 K/UL (ref 0–0.2)
BASOPHILS NFR BLD: 0.2 % (ref 0–1.9)
BILIRUB SERPL-MCNC: 1.2 MG/DL (ref 0.1–1)
BILIRUB UR QL STRIP: NEGATIVE
BUN SERPL-MCNC: 14 MG/DL (ref 6–20)
CALCIUM SERPL-MCNC: 9.9 MG/DL (ref 8.7–10.5)
CHLORIDE SERPL-SCNC: 104 MMOL/L (ref 95–110)
CK SERPL-CCNC: 35 U/L (ref 20–200)
CLARITY UR: CLEAR
CO2 SERPL-SCNC: 19 MMOL/L (ref 23–29)
COLOR UR: YELLOW
CREAT SERPL-MCNC: 1.1 MG/DL (ref 0.5–1.4)
DIFFERENTIAL METHOD: ABNORMAL
EOSINOPHIL # BLD AUTO: 0.2 K/UL (ref 0–0.5)
EOSINOPHIL NFR BLD: 1.6 % (ref 0–8)
ERYTHROCYTE [DISTWIDTH] IN BLOOD BY AUTOMATED COUNT: 13.2 % (ref 11.5–14.5)
EST. GFR  (NO RACE VARIABLE): >60 ML/MIN/1.73 M^2
GLUCOSE SERPL-MCNC: 100 MG/DL (ref 70–110)
GLUCOSE UR QL STRIP: NEGATIVE
HCT VFR BLD AUTO: 44.1 % (ref 40–54)
HGB BLD-MCNC: 14.8 G/DL (ref 14–18)
HGB UR QL STRIP: ABNORMAL
IMM GRANULOCYTES # BLD AUTO: 0.08 K/UL (ref 0–0.04)
IMM GRANULOCYTES NFR BLD AUTO: 0.9 % (ref 0–0.5)
KETONES UR QL STRIP: NEGATIVE
LACTATE SERPL-SCNC: 0.9 MMOL/L (ref 0.5–2.2)
LEUKOCYTE ESTERASE UR QL STRIP: NEGATIVE
LYMPHOCYTES # BLD AUTO: 1.7 K/UL (ref 1–4.8)
LYMPHOCYTES NFR BLD: 18.3 % (ref 18–48)
MCH RBC QN AUTO: 29.2 PG (ref 27–31)
MCHC RBC AUTO-ENTMCNC: 33.6 G/DL (ref 32–36)
MCV RBC AUTO: 87 FL (ref 82–98)
MONOCYTES # BLD AUTO: 0.9 K/UL (ref 0.3–1)
MONOCYTES NFR BLD: 9.9 % (ref 4–15)
NEUTROPHILS # BLD AUTO: 6.5 K/UL (ref 1.8–7.7)
NEUTROPHILS NFR BLD: 69.1 % (ref 38–73)
NITRITE UR QL STRIP: NEGATIVE
NRBC BLD-RTO: 0 /100 WBC
PH UR STRIP: 7 [PH] (ref 5–8)
PLATELET # BLD AUTO: 164 K/UL (ref 150–450)
PMV BLD AUTO: 10.7 FL (ref 9.2–12.9)
POTASSIUM SERPL-SCNC: 4.3 MMOL/L (ref 3.5–5.1)
PROT SERPL-MCNC: 7.2 G/DL (ref 6–8.4)
PROT UR QL STRIP: NEGATIVE
RBC # BLD AUTO: 5.06 M/UL (ref 4.6–6.2)
SODIUM SERPL-SCNC: 134 MMOL/L (ref 136–145)
SP GR UR STRIP: 1.02 (ref 1–1.03)
TROPONIN I SERPL DL<=0.01 NG/ML-MCNC: <0.006 NG/ML (ref 0–0.03)
URN SPEC COLLECT METH UR: ABNORMAL
UROBILINOGEN UR STRIP-ACNC: NEGATIVE EU/DL
WBC # BLD AUTO: 9.35 K/UL (ref 3.9–12.7)

## 2023-02-17 PROCEDURE — 84484 ASSAY OF TROPONIN QUANT: CPT | Performed by: SURGERY

## 2023-02-17 PROCEDURE — 81003 URINALYSIS AUTO W/O SCOPE: CPT | Performed by: SURGERY

## 2023-02-17 PROCEDURE — 99285 EMERGENCY DEPT VISIT HI MDM: CPT | Mod: 25

## 2023-02-17 PROCEDURE — 96372 THER/PROPH/DIAG INJ SC/IM: CPT | Performed by: SURGERY

## 2023-02-17 PROCEDURE — 93010 ELECTROCARDIOGRAM REPORT: CPT | Mod: ,,, | Performed by: INTERNAL MEDICINE

## 2023-02-17 PROCEDURE — 63600175 PHARM REV CODE 636 W HCPCS: Performed by: SURGERY

## 2023-02-17 PROCEDURE — 96365 THER/PROPH/DIAG IV INF INIT: CPT

## 2023-02-17 PROCEDURE — 82550 ASSAY OF CK (CPK): CPT | Performed by: SURGERY

## 2023-02-17 PROCEDURE — 83605 ASSAY OF LACTIC ACID: CPT | Performed by: SURGERY

## 2023-02-17 PROCEDURE — 87040 BLOOD CULTURE FOR BACTERIA: CPT | Performed by: SURGERY

## 2023-02-17 PROCEDURE — 93010 EKG 12-LEAD: ICD-10-PCS | Mod: ,,, | Performed by: INTERNAL MEDICINE

## 2023-02-17 PROCEDURE — 93005 ELECTROCARDIOGRAM TRACING: CPT

## 2023-02-17 PROCEDURE — 80053 COMPREHEN METABOLIC PANEL: CPT | Performed by: SURGERY

## 2023-02-17 PROCEDURE — 25500020 PHARM REV CODE 255: Performed by: SURGERY

## 2023-02-17 PROCEDURE — 85025 COMPLETE CBC W/AUTO DIFF WBC: CPT | Performed by: SURGERY

## 2023-02-17 RX ORDER — CIPROFLOXACIN 500 MG/1
500 TABLET ORAL 2 TIMES DAILY
Qty: 14 TABLET | Refills: 0 | Status: SHIPPED | OUTPATIENT
Start: 2023-02-17 | End: 2023-02-24

## 2023-02-17 RX ORDER — CEFTRIAXONE 1 G/1
1 INJECTION, POWDER, FOR SOLUTION INTRAMUSCULAR; INTRAVENOUS
Status: DISCONTINUED | OUTPATIENT
Start: 2023-02-17 | End: 2023-02-17

## 2023-02-17 RX ORDER — CEFTRIAXONE 2 G/50ML
2 INJECTION, SOLUTION INTRAVENOUS
Status: COMPLETED | OUTPATIENT
Start: 2023-02-17 | End: 2023-02-17

## 2023-02-17 RX ADMIN — IOHEXOL 100 ML: 350 INJECTION, SOLUTION INTRAVENOUS at 04:02

## 2023-02-17 RX ADMIN — CEFTRIAXONE 2 G: 2 INJECTION, SOLUTION INTRAVENOUS at 05:02

## 2023-02-17 NOTE — ED PROVIDER NOTES
Encounter Date: 2/17/2023       History     Chief Complaint   Patient presents with    Back Pain     Pt c/o left side flank pain and mild fever, Pt states he thinks he has an UTI     Luis Eduardo Curry is a 62 y.o. male that presents with left flank pain today  Patient thinks he possibly has urinary tract infection, low-grade temperature  Patient has no definitive fever with a 99.2° Fahrenheit temperature on triage  Patient states mild dysuria as well, previous history of urinary tract infection  Patient however had a recent endograft for placement yesterday at [facility]  Patient was diagnosed with an aortic dissection in 2020, followed by vascular  Patient states the procedure went off yesterday without difficulty or problem    Review of patient's allergies indicates:  No Known Allergies    Past Medical History:   Diagnosis Date    Aortic dissection     Sleep apnea      Past Surgical History:   Procedure Laterality Date    BIOPSY WITH TRANSRECTAL ULTRASOUND (TRUS) GUIDANCE N/A 05/01/2019    Procedure: BIOPSY, WITH TRANSRECTAL PROSTATE ULTRASOUND;  Surgeon: Chintan Mendez Jr., MD;  Location: [facility];  Service: Urology;  Laterality: N/A;    COLONOSCOPY  2006    COLONOSCOPY N/A 01/24/2019    Procedure: COLONOSCOPY;  Surgeon: Reece Tarango MD;  Location: [facility];  Service: Endoscopy;  Laterality: N/A;    CREATION OF AORTO-CAROTID BYPASS WITH GRAFT Left 11/16/2020    Procedure: CREATION, BYPASS, ARTERIAL, AORTA TO CAROTID, USING GRAFT SUBCLAVIAN BYPASS;  Surgeon: JOSY Santos II, MD;  Location: [address];  Service: Cardiovascular;  Laterality: Left;  left common carotid to subclavian bypass    CYSTOSCOPY N/A 05/01/2019    Procedure: FLEXIBLE CYSTOSCOPY;  Surgeon: Chintan Mendez Jr., MD;  Location: [facility];  Service: Urology;  Laterality: N/A;    ENDOVASCULAR GRAFT REPAIR OF ANEURYSM OF THORACIC AORTA N/A 11/17/2020    Procedure: REPAIR, ANEURYSM, ENDOVASCULAR GRAFT, AORTA, THORACIC Left brachial cutdown;   Surgeon: JOSY Santos II, MD;  Location: [facility] OR 2ND FLR;  Service: Cardiovascular;  Laterality: N/A;  Fluoro time 34.7 min  2002.64 mGy  432.18 Gycm2    ENDOVASCULAR GRAFT REPAIR OF ANEURYSM OF THORACIC AORTA N/A 2023    Procedure: REPAIR, ANEURYSM, ENDOVASCULAR GRAFT, AORTA, THORACIC;  Surgeon: JOSY Santos II, MD;  Location: [facility] OR 2ND FLR;  Service: Vascular;  Laterality: N/A;    EXTRACTION OF CATARACT  2023    RETROGRADE PYELOGRAPHY Right 2021    Procedure: PYELOGRAM, RETROGRADE;  Surgeon: Yuriy Woo MD;  Location: [facility] OR 1ST FLR;  Service: Urology;  Laterality: Right;    TRANSURETHRAL RESECTION OF PROSTATE N/A 2021    Procedure: TURP (TRANSURETHRAL RESECTION OF PROSTATE) BIPOLOR;  Surgeon: Yuriy Woo MD;  [facility];  Service: Urology;  Laterality: N/A;  2 hours      TRANSURETHRAL RESECTION OF PROSTATE N/A 2021    Procedure: TURP (TRANSURETHRAL RESECTION OF PROSTATE)/ BIPOLOR;  Surgeon: Yuriy Woo MD;  [facility];  Service: Urology;  Laterality: N/A;    UMBILICAL HERNIA REPAIR N/A 2020    Procedure: REPAIR, HERNIA, UMBILICAL;  Surgeon: Caio Mas Jr., MD;  Location: [facility] OR;  Service: General;  Laterality: N/A;     Family History   Problem Relation Age of Onset    Hypertension Father     Prostate cancer Father         dx ~60-65, no mets, took chemo pills, not cause of death    Cancer Sister         ?origin (lung?), nonsmoker    Other Paternal Uncle         possible cancer, early death     Social History     Tobacco Use    Smoking status: Former     Packs/day: 1.00     Years: 30.00     Pack years: 30.00     Types: Cigarettes     Quit date:      Years since quittin.1    Smokeless tobacco: Never   Substance Use Topics    Alcohol use: Not Currently     Comment: socially    Drug use: No       Review of Systems   Constitutional:  Negative for activity change, appetite change, fatigue, fever and  unexpected weight change.   HENT:  Negative for congestion, ear pain, mouth sores, nosebleeds, rhinorrhea, sinus pressure, sneezing and sore throat.    Eyes:  Negative for pain, discharge, redness and itching.   Respiratory:  Negative for apnea, cough, chest tightness and shortness of breath.    Cardiovascular:  Negative for chest pain, palpitations and leg swelling.   Gastrointestinal:  Negative for abdominal distention, abdominal pain, anal bleeding, constipation, diarrhea, nausea and vomiting.   Endocrine: Negative.    Genitourinary:  Positive for dysuria and flank pain. Negative for enuresis and frequency.   Musculoskeletal:  Negative for arthralgias, back pain, neck pain and neck stiffness.   Skin:  Negative for color change and wound.   Allergic/Immunologic: Negative.    Neurological:  Negative for dizziness, tremors, syncope, facial asymmetry, speech difficulty, weakness, light-headedness, numbness and headaches.   Hematological:  Negative for adenopathy. Does not bruise/bleed easily.   Psychiatric/Behavioral:  Negative for agitation, behavioral problems, hallucinations, self-injury and suicidal ideas. The patient is not nervous/anxious.      Physical Exam     Initial Vitals   BP Pulse Resp Temp SpO2   02/17/23 1428 02/17/23 1430 02/17/23 1430 02/17/23 1430 02/17/23 1430   126/83 90 18 99.2 °F (37.3 °C) 98 %      MAP       --                Physical Exam    Nursing note and vitals reviewed.  Constitutional: Vital signs are normal. He appears well-developed and well-nourished. He is cooperative.   HENT:   Head: Normocephalic and atraumatic.   Right Ear: Hearing, tympanic membrane, external ear and ear canal normal.   Left Ear: Hearing, tympanic membrane, external ear and ear canal normal.   Nose: Nose normal.   Mouth/Throat: Uvula is midline, oropharynx is clear and moist and mucous membranes are normal.   Eyes: Conjunctivae, EOM and lids are normal. Pupils are equal, round, and reactive to light.   Neck: Neck  supple. No JVD present.   Normal range of motion.   Full passive range of motion without pain.     Cardiovascular:  Normal rate, regular rhythm, S1 normal, S2 normal, normal heart sounds, intact distal pulses and normal pulses.           Pulmonary/Chest: Effort normal and breath sounds normal.   Abdominal: Abdomen is soft and flat. Bowel sounds are normal.   Musculoskeletal:         General: Normal range of motion.      Cervical back: Full passive range of motion without pain, normal range of motion and neck supple.     Neurological: He is alert and oriented to person, place, and time. He has normal strength.   Skin: Skin is warm, dry and intact. Capillary refill takes less than 2 seconds.       ED Course   Procedures  Labs Reviewed   COMPREHENSIVE METABOLIC PANEL - Abnormal; Notable for the following components:       Result Value    Sodium 134 (*)     CO2 19 (*)     Total Bilirubin 1.2 (*)     ALT 63 (*)     All other components within normal limits   CBC W/ AUTO DIFFERENTIAL - Abnormal; Notable for the following components:    Immature Granulocytes 0.9 (*)     Immature Grans (Abs) 0.08 (*)     All other components within normal limits   URINALYSIS, REFLEX TO URINE CULTURE - Abnormal; Notable for the following components:    Occult Blood UA Trace (*)     All other components within normal limits    Narrative:     Specimen Source->Urine   CULTURE, BLOOD   CULTURE, BLOOD   LACTIC ACID, PLASMA   TROPONIN I   CK   LACTIC ACID, PLASMA     EKG Readings: (Independently Interpreted)   No STEMI  Normal sinus rhythm  No ectopy  Normal conduction  Normal ST segments  Normal T-wave  Normal axis  Heart rate in the 60s     Imaging Results              CTA Chest Abdomen Pelvis (Final result)  Result time 02/17/23 17:26:33      Final result by Esteban Jimenez Jr., MD (02/17/23 17:26:33)                   Impression:      A large stent extends from the midthoracic arch down to the diaphragm.  It covers the origins of the left  carotid and left subclavian artery however there is patent flow in both arteries.    Below the stent in the abdominal aorta there is a dissection noted extending into the aortic bifurcation with 2 lumens each supplying 1 common carotid artery.  A stent is identified in the origin of the right renal artery with patent flow seen.  Flow in the celiac trunk, superior mesenteric artery, inferior mesenteric artery, both renal arteries and both common iliac arteries is seen.    Elevation of the left hemidiaphragm.  Mild bibasilar atelectasis.  Diverticulosis coli.      Electronically signed by: Esteban Jimenez MD  Date:    02/17/2023  Time:    17:26               Narrative:    EXAMINATION:  CTA CHEST ABDOMEN PELVIS    CLINICAL HISTORY:  Thoracic aorta disease, post-op;    TECHNIQUE:  Axial images are obtained through the chest abdomen and pelvis during the IV administration of 100 cc IV contrast.  Multiplanar reformatted images are obtained through the.    COMPARISON:  Prior CTA of December 23, 2022.    FINDINGS:  The ascending thoracic aorta is of normal caliber. There is a large stent which extends from the midthoracic arch apparently overlying the origin of the left carotid and left subclavian arteries. There is flow in the right innominate artery, left carotid and left subclavian arteries seen. The stent extends down the entire thoracic aorta without aneurysm seen. At the diaphragm the stent stops and there is seen dissection of the abdominal aorta with 2 limits extending all the way into the iliac vessels. One iliac artery is supplied by 0 1 lumen and the other iliac artery by the other lumen.    In the abdomen the celiac trunk superior mesenteric artery and both renal arteries are supplied by the left-sided lumen. There is a stent in the right renal artery origin.  The same lumen supplies the right common iliac artery. 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  The    The heart  is of normal size and contour. There is elevation of the left hemidiaphragm.  Mild atelectasis is identified at both lung bases.    There is diverticulosis coli without CT evidence of diverticulitis.                                       Medications   cefTRIAXone 2 gram/50 mL IVPB 2 g (0 g Intravenous Stopped 23 1803)   iohexoL (OMNIPAQUE 350) injection 100 mL (100 mLs Intravenous Given 23 1619)     Medical Decision Makin-year-old male with a low-grade temperature, dysuria & flank pain on interview today  Patient's urine does not look infected, lab work within normal limits in the ER today  Patient with normal lactic acid, blood cultures as a precaution; stable lab work today  Patient had a endovascular graft procedure yesterday at [facility] for dissection  Patient was diagnosed in  with dissection, is compliant with all meds & treatment  Groin site looks clean dry & intact; no signs of active infection on ER evaluation now  As a precaution, we will start the patient on Cipro, possible prostate versus early UTI  IV Rocephin given the ER with close follow-up with PCP & vascular surgery on DC  Patient counseled to return to the ER with any concerning symptoms on discharge                        Clinical Impression:   Final diagnoses:  [M54.9] Back pain  [S39.012A] Strain of lumbar region, initial encounter (Primary)        ED Disposition Condition    Discharge Stable          ED Prescriptions       Medication Sig Dispense Start Date End Date Auth. Provider    ciprofloxacin HCl (CIPRO) 500 MG tablet Take 1 tablet (500 mg total) by mouth 2 (two) times daily. for 7 days 14 tablet 2023 Ramón West MD          Follow-up Information    None          Ramón West MD  [phone]

## 2023-02-22 LAB
BACTERIA BLD CULT: NORMAL
BACTERIA BLD CULT: NORMAL

## 2023-03-09 NOTE — PLAN OF CARE
Patient with complaints of the inability to pass any foods down his throat and elevated blood pressure. Patient sitting at the side of the bed with wife at his side. Pulse ox 98% with good perfusion. Dr. Mas made aware. House supervisor to notify anesthesia.   98.7

## 2023-03-16 ENCOUNTER — HOSPITAL ENCOUNTER (OUTPATIENT)
Dept: RADIOLOGY | Facility: HOSPITAL | Age: 63
Discharge: HOME | End: 2023-03-16
Payer: COMMERCIAL

## 2023-03-16 DIAGNOSIS — I71.019 AORTIC DISSECTION DISTAL TO LEFT SUBCLAVIAN: ICD-10-CM

## 2023-03-16 PROCEDURE — 74174 CTA CHEST ABDOMEN PELVIS: ICD-10-PCS | Mod: 26,,, | Performed by: RADIOLOGY

## 2023-03-16 PROCEDURE — 71275 CT ANGIOGRAPHY CHEST: CPT | Mod: TC

## 2023-03-16 PROCEDURE — 25500020 PHARM REV CODE 255

## 2023-03-16 PROCEDURE — 71275 CT ANGIOGRAPHY CHEST: CPT | Mod: 26,,, | Performed by: RADIOLOGY

## 2023-03-16 PROCEDURE — 74174 CTA ABD&PLVS W/CONTRAST: CPT | Mod: TC

## 2023-03-16 PROCEDURE — 74174 CTA ABD&PLVS W/CONTRAST: CPT | Mod: 26,,, | Performed by: RADIOLOGY

## 2023-03-16 PROCEDURE — 71275 CTA CHEST ABDOMEN PELVIS: ICD-10-PCS | Mod: 26,,, | Performed by: RADIOLOGY

## 2023-03-16 RX ADMIN — IOHEXOL 100 ML: 350 INJECTION, SOLUTION INTRAVENOUS at 10:03

## 2023-08-03 RX ORDER — LABETALOL 200 MG/1
200 TABLET, FILM COATED ORAL 2 TIMES DAILY
Qty: 60 TABLET | Refills: 11 | Status: SHIPPED | OUTPATIENT
Start: 2023-08-03 | End: 2024-03-14 | Stop reason: SDUPTHER

## 2024-03-06 ENCOUNTER — PATIENT OUTREACH (OUTPATIENT)
Dept: ADMINISTRATIVE | Facility: HOSPITAL | Age: 64
End: 2024-03-06
Payer: COMMERCIAL

## 2024-03-06 ENCOUNTER — PATIENT MESSAGE (OUTPATIENT)
Dept: ADMINISTRATIVE | Facility: HOSPITAL | Age: 64
End: 2024-03-06
Payer: COMMERCIAL

## 2024-03-06 DIAGNOSIS — Z12.11 COLON CANCER SCREENING: Primary | ICD-10-CM

## 2024-03-06 NOTE — PROGRESS NOTES
Population Health Chart Review & Patient Outreach Details      Additional [facility] Notes:    Patient replied to portal outreach message.  Attempted to contact patient by telephone to discuss Colorectal Cancer Screening and Statin Therapy.  No answer, left voicemail for patient to return call to clinic.      NOV with PCP: 2024.  LOV with PCP: 2022.           Updates Requested / Reviewed:      Updated Care Coordination Note, Care Everywhere, , External Sources: [facility] and [facility], Care Team Updated, Removed  or Duplicate Orders, and Immunizations Reconciliation Completed or Queried: [facility] Topics Overdue:      [facility] Score: 2     Colon Cancer Screening  Hemoglobin A1c    Pneumonia Vaccine  Shingles/Zoster Vaccine  RSV Vaccine                  Health Maintenance Topic(s) Outreach Outcomes & Actions Taken:    Colorectal Cancer Screening - Outreach Outcomes & Actions Taken  : No answer, left voicemail for patient to return call to clinic.     Medication Adherence / Statins - Outreach Outcomes & Actions Taken  : Sent Provider Message to Review to Evaluate Pt for Statin, Add Exclusion Dx Codes, Document Exclusion in Problem List, Change Statin Intensity Level to Moderate or High Intensity if Applicable

## 2024-03-07 ENCOUNTER — TELEPHONE (OUTPATIENT)
Dept: UROLOGY | Facility: HOSPITAL | Age: 64
End: 2024-03-07
Payer: COMMERCIAL

## 2024-03-07 DIAGNOSIS — I77.9 DISORDER OF ARTERIES AND ARTERIOLES, UNSPECIFIED: Primary | ICD-10-CM

## 2024-03-07 DIAGNOSIS — C61 PROSTATE CANCER: Primary | ICD-10-CM

## 2024-03-07 DIAGNOSIS — I71.019 AORTIC DISSECTION DISTAL TO LEFT SUBCLAVIAN: ICD-10-CM

## 2024-03-07 NOTE — PROGRESS NOTES
Patient returned call to clinic.  Patient is ready to schedule colonoscopy.  Case request order placed for Endoscopy.    Social Determinants Reviewed and Updated:  Tobacco Use  Financial Resource Strain  Transportation  Food Insecurity         Tobacco History - Outreach Outcomes & Actions Taken  : Tobacco History Updated, Pt is a Former Smoker

## 2024-03-07 NOTE — TELEPHONE ENCOUNTER
Psa ordered.  Office visit non-urgent but yes he should see us.     MM        ----- Message from Jorge Luis Ramires MA sent at 3/7/2024 10:11 AM CST -----  Regarding: FW: Appointment Advice  Contact: pt.  [phone]  On your last note the pt was due to repeat PSA in 6 months. Pt last office visit was 1/24/23 last PSA was on 1/20/23. Can you place an order for PSA and also would you like for him to f/u in clinic. Pt stated that you said he didn't have to come into the office. Please advise.  ----- Message -----  From: Shivam Hedrick  Sent: 3/7/2024   9:48 AM CST  To: [facility] Staff  Subject: Appointment Advice                               Pt is calling in ref to speaking to someone about his next appt. Pt says he is not sure when he should be seen again. Pt states he is having no problems. Pt says he was previously diagnosed with low grade prostate cancer. Patient Requesting Call Back @  [phone]

## 2024-03-11 RX ORDER — CLOPIDOGREL BISULFATE 75 MG/1
75 TABLET ORAL DAILY
Qty: 90 TABLET | Refills: 3 | Status: SHIPPED | OUTPATIENT
Start: 2024-03-11 | End: 2024-03-14 | Stop reason: SDUPTHER

## 2024-03-14 ENCOUNTER — OFFICE VISIT (OUTPATIENT)
Dept: INTERNAL MEDICINE | Facility: CLINIC | Age: 64
End: 2024-03-14
Payer: COMMERCIAL

## 2024-03-14 VITALS
OXYGEN SATURATION: 93 % | HEART RATE: 72 BPM | RESPIRATION RATE: 20 BRPM | WEIGHT: 210.13 LBS | BODY MASS INDEX: 28.46 KG/M2 | SYSTOLIC BLOOD PRESSURE: 104 MMHG | DIASTOLIC BLOOD PRESSURE: 70 MMHG | HEIGHT: 72 IN

## 2024-03-14 DIAGNOSIS — Z00.00 WELL ADULT EXAM: Primary | ICD-10-CM

## 2024-03-14 DIAGNOSIS — Z12.11 SPECIAL SCREENING FOR MALIGNANT NEOPLASMS, COLON: ICD-10-CM

## 2024-03-14 DIAGNOSIS — I65.23 BILATERAL CAROTID ARTERY STENOSIS: ICD-10-CM

## 2024-03-14 PROCEDURE — 3008F BODY MASS INDEX DOCD: CPT | Mod: CPTII,S$GLB,, | Performed by: INTERNAL MEDICINE

## 2024-03-14 PROCEDURE — 1159F MED LIST DOCD IN RCRD: CPT | Mod: CPTII,S$GLB,, | Performed by: INTERNAL MEDICINE

## 2024-03-14 PROCEDURE — 99999 PR PBB SHADOW E&M-EST. PATIENT-LVL III: CPT | Mod: PBBFAC,,, | Performed by: INTERNAL MEDICINE

## 2024-03-14 PROCEDURE — 90677 PCV20 VACCINE IM: CPT | Mod: S$GLB,,, | Performed by: INTERNAL MEDICINE

## 2024-03-14 PROCEDURE — 90471 IMMUNIZATION ADMIN: CPT | Mod: S$GLB,,, | Performed by: INTERNAL MEDICINE

## 2024-03-14 PROCEDURE — 3074F SYST BP LT 130 MM HG: CPT | Mod: CPTII,S$GLB,, | Performed by: INTERNAL MEDICINE

## 2024-03-14 PROCEDURE — 99214 OFFICE O/P EST MOD 30 MIN: CPT | Mod: 25,S$GLB,, | Performed by: INTERNAL MEDICINE

## 2024-03-14 PROCEDURE — 1160F RVW MEDS BY RX/DR IN RCRD: CPT | Mod: CPTII,S$GLB,, | Performed by: INTERNAL MEDICINE

## 2024-03-14 PROCEDURE — 3078F DIAST BP <80 MM HG: CPT | Mod: CPTII,S$GLB,, | Performed by: INTERNAL MEDICINE

## 2024-03-14 RX ORDER — LABETALOL 200 MG/1
200 TABLET, FILM COATED ORAL DAILY
Qty: 180 TABLET | Refills: 1 | Status: SHIPPED | OUTPATIENT
Start: 2024-03-14 | End: 2024-04-01 | Stop reason: DRUGHIGH

## 2024-03-14 RX ORDER — CLOPIDOGREL BISULFATE 75 MG/1
75 TABLET ORAL DAILY
Qty: 90 TABLET | Refills: 3 | Status: SHIPPED | OUTPATIENT
Start: 2024-03-14 | End: 2024-04-01 | Stop reason: SDUPTHER

## 2024-03-14 NOTE — PROGRESS NOTES
HPI:  Luis Eduardo Curry is a 63 y.o. male here for Annual Exam  .  No recent labs     Review of Systems   Constitutional:  Negative for activity change, chills, fever and unexpected weight change.   HENT:  Negative for congestion, hearing loss, postnasal drip, rhinorrhea, sore throat and trouble swallowing.    Eyes:  Negative for photophobia, discharge and visual disturbance.   Respiratory:  Negative for chest tightness, shortness of breath and wheezing.    Cardiovascular:  Negative for chest pain and palpitations.   Gastrointestinal:  Negative for abdominal distention, abdominal pain, blood in stool, constipation, diarrhea and vomiting.   Endocrine: Negative for polydipsia and polyuria.   Genitourinary:  Negative for difficulty urinating, dysuria, flank pain, hematuria and urgency.   Musculoskeletal:  Negative for arthralgias, back pain, joint swelling and neck pain.   Skin:  Negative for pallor.   Neurological:  Negative for dizziness, seizures, facial asymmetry, speech difficulty, weakness, numbness and headaches.   Hematological:  Does not bruise/bleed easily.   Psychiatric/Behavioral:  Negative for agitation, confusion, dysphoric mood and suicidal ideas. The patient is not nervous/anxious.     A review of systems was performed and is negative except as noted above.    Objective:  Vitals:    03/14/24 1550   BP: 104/70   Pulse: 72   Resp: 20   SpO2: (!) 93%   Weight: 95.3 kg (210 lb 1.6 oz)   Height: 6' (1.829 m)      Physical Exam  Vitals and nursing note reviewed.   Constitutional:       Appearance: He is well-developed.   HENT:      Head: Normocephalic and atraumatic.      Nose: Nose normal.   Eyes:      Conjunctiva/sclera: Conjunctivae normal.      Pupils: Pupils are equal, round, and reactive to light.   Neck:      Thyroid: No thyromegaly.      Vascular: No JVD.   Cardiovascular:      Rate and Rhythm: Normal rate and regular rhythm.      Heart sounds: Normal heart sounds.   Pulmonary:      Effort: Pulmonary  effort is normal.      Breath sounds: Normal breath sounds.   Abdominal:      General: Bowel sounds are normal. There is no distension.      Palpations: Abdomen is soft. There is no mass.      Tenderness: There is no abdominal tenderness. There is no guarding.   Musculoskeletal:         General: Normal range of motion.      Cervical back: Normal range of motion and neck supple.   Lymphadenopathy:      Cervical: No cervical adenopathy.   Skin:     General: Skin is warm and dry.      Coloration: Skin is not pale.      Findings: No rash.   Neurological:      Mental Status: He is alert and oriented to person, place, and time.      Cranial Nerves: No cranial nerve deficit.      Deep Tendon Reflexes: Reflexes are normal and symmetric.        Assessment/Plan:  1. Well adult exam  -     CBC Auto Differential; Future; Expected date: 03/14/2024  -     Comprehensive Metabolic Panel; Future; Expected date: 03/14/2024  -     TSH; Future; Expected date: 03/14/2024  -     Lipid Panel; Future; Expected date: 03/14/2024  -     PSA, diagnostic per urology     -     Urinalysis; Future; Expected date: 03/14/2024  -     CBC Auto Differential; Future; Expected date: 09/10/2024  -     Comprehensive Metabolic Panel; Future; Expected date: 09/10/2024  -     Lipid Panel; Future; Expected date: 09/10/2024  -     TSH; Future; Expected date: 09/10/2024    2. Bilateral carotid artery stenosis  -     clopidogreL (PLAVIX) 75 mg tablet; Take 1 tablet (75 mg total) by mouth once daily.  Dispense: 90 tablet; Refill: 3  -     labetaloL (NORMODYNE) 200 MG tablet; Take 1 tablet (200 mg total) by mouth once daily. OK TO TAKE  Dispense: 180 tablet; Refill: 1  -     CBC Auto Differential; Future; Expected date: 09/10/2024  -     Comprehensive Metabolic Panel; Future; Expected date: 09/10/2024  -     Lipid Panel; Future; Expected date: 09/10/2024  -     TSH; Future; Expected date: 09/10/2024       Special screening for malignant neoplasms, colon  -     Case  Request Endoscopy: COLONOSCOPY

## 2024-03-15 ENCOUNTER — LAB VISIT (OUTPATIENT)
Dept: LAB | Facility: HOSPITAL | Age: 64
End: 2024-03-15
Attending: UROLOGY
Payer: COMMERCIAL

## 2024-03-15 DIAGNOSIS — Z00.00 WELL ADULT EXAM: ICD-10-CM

## 2024-03-15 DIAGNOSIS — C61 PROSTATE CANCER: ICD-10-CM

## 2024-03-15 LAB
ALBUMIN SERPL BCP-MCNC: 4.2 G/DL (ref 3.5–5.2)
ALP SERPL-CCNC: 30 U/L (ref 55–135)
ALT SERPL W/O P-5'-P-CCNC: 21 U/L (ref 10–44)
ANION GAP SERPL CALC-SCNC: 7 MMOL/L (ref 8–16)
AST SERPL-CCNC: 16 U/L (ref 10–40)
BACTERIA #/AREA URNS HPF: NORMAL /HPF
BASOPHILS # BLD AUTO: 0.04 K/UL (ref 0–0.2)
BASOPHILS NFR BLD: 0.5 % (ref 0–1.9)
BILIRUB SERPL-MCNC: 0.9 MG/DL (ref 0.1–1)
BILIRUB UR QL STRIP: NEGATIVE
BUN SERPL-MCNC: 14 MG/DL (ref 8–23)
CALCIUM SERPL-MCNC: 9.5 MG/DL (ref 8.7–10.5)
CHLORIDE SERPL-SCNC: 105 MMOL/L (ref 95–110)
CHOLEST SERPL-MCNC: 194 MG/DL (ref 120–199)
CHOLEST/HDLC SERPL: 4.7 {RATIO} (ref 2–5)
CLARITY UR: CLEAR
CO2 SERPL-SCNC: 26 MMOL/L (ref 23–29)
COLOR UR: YELLOW
COMPLEXED PSA SERPL-MCNC: 1.5 NG/ML (ref 0–4)
CREAT SERPL-MCNC: 1.1 MG/DL (ref 0.5–1.4)
DIFFERENTIAL METHOD BLD: NORMAL
EOSINOPHIL # BLD AUTO: 0.4 K/UL (ref 0–0.5)
EOSINOPHIL NFR BLD: 4.4 % (ref 0–8)
ERYTHROCYTE [DISTWIDTH] IN BLOOD BY AUTOMATED COUNT: 13.2 % (ref 11.5–14.5)
EST. GFR  (NO RACE VARIABLE): >60 ML/MIN/1.73 M^2
GLUCOSE SERPL-MCNC: 89 MG/DL (ref 70–110)
GLUCOSE UR QL STRIP: NEGATIVE
HCT VFR BLD AUTO: 46.6 % (ref 40–54)
HDLC SERPL-MCNC: 41 MG/DL (ref 40–75)
HDLC SERPL: 21.1 % (ref 20–50)
HGB BLD-MCNC: 15.7 G/DL (ref 14–18)
HGB UR QL STRIP: ABNORMAL
HYALINE CASTS #/AREA URNS LPF: 0 /LPF
IMM GRANULOCYTES # BLD AUTO: 0.03 K/UL (ref 0–0.04)
IMM GRANULOCYTES NFR BLD AUTO: 0.4 % (ref 0–0.5)
KETONES UR QL STRIP: NEGATIVE
LDLC SERPL CALC-MCNC: 132 MG/DL (ref 63–159)
LEUKOCYTE ESTERASE UR QL STRIP: NEGATIVE
LYMPHOCYTES # BLD AUTO: 2.4 K/UL (ref 1–4.8)
LYMPHOCYTES NFR BLD: 29.6 % (ref 18–48)
MCH RBC QN AUTO: 29.5 PG (ref 27–31)
MCHC RBC AUTO-ENTMCNC: 33.7 G/DL (ref 32–36)
MCV RBC AUTO: 87 FL (ref 82–98)
MICROSCOPIC COMMENT: NORMAL
MONOCYTES # BLD AUTO: 0.7 K/UL (ref 0.3–1)
MONOCYTES NFR BLD: 8.4 % (ref 4–15)
NEUTROPHILS # BLD AUTO: 4.5 K/UL (ref 1.8–7.7)
NEUTROPHILS NFR BLD: 56.7 % (ref 38–73)
NITRITE UR QL STRIP: NEGATIVE
NONHDLC SERPL-MCNC: 153 MG/DL
NRBC BLD-RTO: 0 /100 WBC
PH UR STRIP: 6 [PH] (ref 5–8)
PLATELET # BLD AUTO: 157 K/UL (ref 150–450)
PMV BLD AUTO: 10 FL (ref 9.2–12.9)
POTASSIUM SERPL-SCNC: 4.1 MMOL/L (ref 3.5–5.1)
PROT SERPL-MCNC: 6.9 G/DL (ref 6–8.4)
PROT UR QL STRIP: NEGATIVE
RBC # BLD AUTO: 5.33 M/UL (ref 4.6–6.2)
RBC #/AREA URNS HPF: 2 /HPF (ref 0–4)
SODIUM SERPL-SCNC: 138 MMOL/L (ref 136–145)
SP GR UR STRIP: 1.02 (ref 1–1.03)
TRIGL SERPL-MCNC: 105 MG/DL (ref 30–150)
TSH SERPL DL<=0.005 MIU/L-ACNC: 1.69 UIU/ML (ref 0.4–4)
URN SPEC COLLECT METH UR: ABNORMAL
UROBILINOGEN UR STRIP-ACNC: NEGATIVE EU/DL
WBC # BLD AUTO: 8 K/UL (ref 3.9–12.7)
WBC #/AREA URNS HPF: 1 /HPF (ref 0–5)

## 2024-03-15 PROCEDURE — 80061 LIPID PANEL: CPT | Performed by: INTERNAL MEDICINE

## 2024-03-15 PROCEDURE — 84153 ASSAY OF PSA TOTAL: CPT | Performed by: UROLOGY

## 2024-03-15 PROCEDURE — 80053 COMPREHEN METABOLIC PANEL: CPT | Performed by: INTERNAL MEDICINE

## 2024-03-15 PROCEDURE — 84443 ASSAY THYROID STIM HORMONE: CPT | Performed by: INTERNAL MEDICINE

## 2024-03-15 PROCEDURE — 36415 COLL VENOUS BLD VENIPUNCTURE: CPT | Performed by: UROLOGY

## 2024-03-15 PROCEDURE — 81000 URINALYSIS NONAUTO W/SCOPE: CPT | Performed by: INTERNAL MEDICINE

## 2024-03-15 PROCEDURE — 85025 COMPLETE CBC W/AUTO DIFF WBC: CPT | Performed by: INTERNAL MEDICINE

## 2024-03-18 NOTE — PROGRESS NOTES
"  Urology - [facility]    SUBJECTIVE:     Chief Complaint: prostate cancer    History of Present Illness:  Luis Eduardo Curry is a 63 y.o. male who presents to clinic for urinary retention. He is Established to our clinic.    Patient had type B aortic dissection extending past renal arteries w/ false lumen supplying L kidney. S/p carotid to subclavian bypass on 11/16 followed by TEVAR on 11/17. Went into urinary retention during that admission necessitating Buckley with 2 L output on initial buckley placement. CHRISTINE w/ Cr 2.3 (b/l 1.1) which has improved to 1.0. Failed outpatient VT 10/16/20 and 11/9/20. Patient had baseline LUTS prior to this admission for which he was on Flomax. He had to strain to void, weakened stream, frequency, and nocturia x2-3.    PSA 4.6 in 1/2019. TRUS bx w/ Dr. Mendez in 5/2019 was negative for malignancy. Volume 46 g. Cysto revealed 4 cm prostatic length with lateral lobe hyperplasia. Trabeculated bladder.    Underwent a TURP on 3/5/21.  Did well post-op.  On his pathology showed 40% tissue involvement of Kervin 3+3 prostate cancer.     No complaints today.  He is voiding very well.  He is pleased.  He underwent a PSA on 3/15/24 which was 1.5.        Lab Results   Component Value Date    PSA 4.6 (H) 01/05/2019    PSADIAG 1.5 03/15/2024    PSADIAG 1.2 01/20/2023    PSADIAG 1.6 06/17/2022    PSADIAG 1.0 12/15/2021    PSADIAG 2.3 06/16/2021           PROSTATE CHIPS, TRANSURETHRAL RESECTION OF PROSTATE 3/5/21:  - Prostatic adenocarcinoma, [place] score 3+3=6 (Grade group 1) involving approximately 40% of the  entirely submitted specimen.  - See comment.    +fh of prostate cancer in father (who takes a "chemo pill"---details unknown).  Has 2 brothers who do not have prostate cancer.     Lab Results   Component Value Date    PSA 4.6 (H) 01/05/2019    PSADIAG 1.5 03/15/2024    PSADIAG 1.2 01/20/2023    PSADIAG 1.6 06/17/2022    PSADIAG 1.0 12/15/2021    PSADIAG 2.3 06/16/2021       He is on " aspirin and plavix. Has a history of HTN.         Review of patient's allergies indicates:  No Known Allergies    Past Medical History:   Diagnosis Date    Aortic dissection     Sleep apnea      Past Surgical History:   Procedure Laterality Date    BIOPSY WITH TRANSRECTAL ULTRASOUND (TRUS) GUIDANCE N/A 05/01/2019    Procedure: BIOPSY, WITH TRANSRECTAL PROSTATE ULTRASOUND;  Surgeon: Chintan Mendez Jr., MD;  Location: [facility];  Service: Urology;  Laterality: N/A;    COLONOSCOPY  2006    COLONOSCOPY N/A 01/24/2019    Procedure: COLONOSCOPY;  Surgeon: Reece Tarango MD;  Location: [facility];  Service: Endoscopy;  Laterality: N/A;    CREATION OF AORTO-CAROTID BYPASS WITH GRAFT Left 11/16/2020    Procedure: CREATION, BYPASS, ARTERIAL, AORTA TO CAROTID, USING GRAFT SUBCLAVIAN BYPASS;  Surgeon: JOSY Santos II, MD;  Location: [facility] OR [address] [place] [address];  Service: Cardiovascular;  Laterality: Left;  left common carotid to subclavian bypass    CYSTOSCOPY N/A 05/01/2019    Procedure: FLEXIBLE CYSTOSCOPY;  Surgeon: Chintan Mendez Jr., MD;  Location: [facility];  Service: Urology;  Laterality: N/A;    ENDOVASCULAR GRAFT REPAIR OF ANEURYSM OF THORACIC AORTA N/A 11/17/2020    Procedure: REPAIR, ANEURYSM, ENDOVASCULAR GRAFT, AORTA, THORACIC Left brachial cutdown;  Surgeon: JOSY Santos II, MD;  [facility];  Service: Cardiovascular;  Laterality: N/A;  Fluoro time 34.7 min  2002.64 mGy  432.18 Gycm2    ENDOVASCULAR GRAFT REPAIR OF ANEURYSM OF THORACIC AORTA N/A 2/14/2023    Procedure: REPAIR, ANEURYSM, ENDOVASCULAR GRAFT, AORTA, THORACIC;  Surgeon: JOSY Santos II, MD;  Location: [facility] OR 2ND FLR;  Service: Vascular;  Laterality: N/A;    EXTRACTION OF CATARACT  01/18/2023    RETROGRADE PYELOGRAPHY Right 03/05/2021    Procedure: PYELOGRAM, RETROGRADE;  Surgeon: Yuriy Woo MD;  Location: [facility] OR 1ST FLR;  Service: Urology;  Laterality: Right;    TRANSURETHRAL RESECTION OF PROSTATE N/A 02/25/2021    Procedure:  TURP (TRANSURETHRAL RESECTION OF PROSTATE) BIPOLOR;  Surgeon: Yuriy Woo MD;  Location: [facility] OR 1ST FLR;  Service: Urology;  Laterality: N/A;  2 hours      TRANSURETHRAL RESECTION OF PROSTATE N/A 2021    Procedure: TURP (TRANSURETHRAL RESECTION OF PROSTATE)/ BIPOLOR;  Surgeon: Yuriy Woo MD;  Location: [facility] OR 1ST FLR;  Service: Urology;  Laterality: N/A;    UMBILICAL HERNIA REPAIR N/A 2020    Procedure: REPAIR, HERNIA, UMBILICAL;  Surgeon: Caio Mas Jr., MD;  Location: [facility];  Service: General;  Laterality: N/A;     Family History   Problem Relation Age of Onset    Hypertension Father     Prostate cancer Father         dx ~60-65, no mets, took chemo pills, not cause of death    Cancer Sister         ?origin (lung?), nonsmoker    Other Paternal Uncle         possible cancer, early death     Social History     Tobacco Use    Smoking status: Former     Current packs/day: 0.00     Average packs/day: 1 pack/day for 30.0 years (30.0 ttl pk-yrs)     Types: Cigarettes     Start date:      Quit date:      Years since quittin.2    Smokeless tobacco: Never   Substance Use Topics    Alcohol use: Not Currently     Comment: socially    Drug use: No        Review of Systems   All other systems reviewed and negative except pertinent positives noted in HPI.     OBJECTIVE:     Anticoagulation:  Yes - ASA 81 / Plavix    Estimated body mass index is 28.49 kg/m² as calculated from the following:    Height as of 3/14/24: 6' (1.829 m).    Weight as of 3/14/24: 95.3 kg (210 lb 1.6 oz).    Vital Signs (Most Recent)       Physical Exam  Vitals and nursing note reviewed.   Constitutional:       Appearance: Normal appearance.   HENT:      Head: Normocephalic and atraumatic.      Mouth/Throat:      Mouth: Mucous membranes are moist.   Eyes:      Pupils: Pupils are equal, round, and reactive to light.   Cardiovascular:      Rate and Rhythm: Normal rate.   Pulmonary:      Effort: Pulmonary  effort is normal. No respiratory distress.   Abdominal:      General: Abdomen is flat. There is no distension.      Tenderness: There is no abdominal tenderness.   Skin:     General: Skin is warm and dry.   Neurological:      General: No focal deficit present.      Mental Status: He is alert and oriented to person, place, and time.   Psychiatric:         Mood and Affect: Mood normal.         Behavior: Behavior normal.         Thought Content: Thought content normal.         Judgment: Judgment normal.         BMP  Lab Results   Component Value Date     03/15/2024    K 4.1 03/15/2024     03/15/2024    CO2 26 03/15/2024    BUN 14 03/15/2024    CREATININE 1.1 03/15/2024    CALCIUM 9.5 03/15/2024    ANIONGAP 7 (L) 03/15/2024    ESTGFRAFRICA >60 07/20/2022    EGFRNONAA >60 07/20/2022       Lab Results   Component Value Date    WBC 8.00 03/15/2024    HGB 15.7 03/15/2024    HCT 46.6 03/15/2024    MCV 87 03/15/2024     03/15/2024       Lab Results   Component Value Date    PSA 4.6 (H) 01/05/2019    PSADIAG 1.5 03/15/2024    PSADIAG 1.2 01/20/2023    PSADIAG 1.6 06/17/2022    PSADIAG 1.0 12/15/2021    PSADIAG 2.3 06/16/2021      Imaging:    CTA c/a (8/30/21):  -  No hydronephrosis bilaterally  -  Previous aortic dissection present    ASSESSMENT     1. Prostate cancer    2. Benign non-nodular prostatic hyperplasia with lower urinary tract symptoms            PLAN:     Urinary retention/bph  -resolved --happy with post-op symptom resolution.  -symptoms well controlled  -patient no longer taking Flomax and happy with his voiding symptoms.    Prostate cancer:  -MRI of the prostate from 7/21/22 independently reviewed and reveals a volume of 23cc and no concerning/suspicious lesions within the prostate.   -PSA reviewed and satisfactory.  Very low.  -repeat psa in 6 months. Will notify of results.         Yuriy Woo MD         The patient was seen and examined with the resident physician.  All questions were  answered.  The plan was discussed with the patient and I concur with the resident physician's documentation.

## 2024-03-19 ENCOUNTER — OFFICE VISIT (OUTPATIENT)
Dept: UROLOGY | Facility: CLINIC | Age: 64
End: 2024-03-19
Payer: COMMERCIAL

## 2024-03-19 VITALS
HEIGHT: 72 IN | SYSTOLIC BLOOD PRESSURE: 129 MMHG | HEART RATE: 63 BPM | WEIGHT: 210 LBS | BODY MASS INDEX: 28.44 KG/M2 | DIASTOLIC BLOOD PRESSURE: 80 MMHG

## 2024-03-19 DIAGNOSIS — C61 PROSTATE CANCER: Primary | ICD-10-CM

## 2024-03-19 DIAGNOSIS — N40.1 BENIGN NON-NODULAR PROSTATIC HYPERPLASIA WITH LOWER URINARY TRACT SYMPTOMS: ICD-10-CM

## 2024-03-19 PROCEDURE — 3074F SYST BP LT 130 MM HG: CPT | Mod: CPTII,S$GLB,, | Performed by: UROLOGY

## 2024-03-19 PROCEDURE — 1160F RVW MEDS BY RX/DR IN RCRD: CPT | Mod: CPTII,S$GLB,, | Performed by: UROLOGY

## 2024-03-19 PROCEDURE — 1159F MED LIST DOCD IN RCRD: CPT | Mod: CPTII,S$GLB,, | Performed by: UROLOGY

## 2024-03-19 PROCEDURE — 99214 OFFICE O/P EST MOD 30 MIN: CPT | Mod: S$GLB,,, | Performed by: UROLOGY

## 2024-03-19 PROCEDURE — 3008F BODY MASS INDEX DOCD: CPT | Mod: CPTII,S$GLB,, | Performed by: UROLOGY

## 2024-03-19 PROCEDURE — 3079F DIAST BP 80-89 MM HG: CPT | Mod: CPTII,S$GLB,, | Performed by: UROLOGY

## 2024-03-19 PROCEDURE — 99999 PR PBB SHADOW E&M-EST. PATIENT-LVL III: CPT | Mod: PBBFAC,,, | Performed by: UROLOGY

## 2024-04-01 ENCOUNTER — OFFICE VISIT (OUTPATIENT)
Dept: VASCULAR SURGERY | Facility: CLINIC | Age: 64
End: 2024-04-01
Attending: SURGERY
Payer: COMMERCIAL

## 2024-04-01 ENCOUNTER — HOSPITAL ENCOUNTER (OUTPATIENT)
Dept: RADIOLOGY | Facility: HOSPITAL | Age: 64
Discharge: HOME | End: 2024-04-01
Attending: SURGERY
Payer: COMMERCIAL

## 2024-04-01 VITALS
DIASTOLIC BLOOD PRESSURE: 79 MMHG | HEIGHT: 72 IN | BODY MASS INDEX: 29.26 KG/M2 | HEART RATE: 59 BPM | SYSTOLIC BLOOD PRESSURE: 136 MMHG | TEMPERATURE: 98 F | WEIGHT: 216.06 LBS

## 2024-04-01 DIAGNOSIS — Z95.828 HISTORY OF LEFT COMMON CAROTID ARTERY STENT PLACEMENT: ICD-10-CM

## 2024-04-01 DIAGNOSIS — I71.03 DISSECTION OF THORACOABDOMINAL AORTA: Primary | ICD-10-CM

## 2024-04-01 DIAGNOSIS — Z98.890 HISTORY OF LEFT COMMON CAROTID ARTERY STENT PLACEMENT: ICD-10-CM

## 2024-04-01 DIAGNOSIS — I71.019 AORTIC DISSECTION DISTAL TO LEFT SUBCLAVIAN: ICD-10-CM

## 2024-04-01 PROCEDURE — 3075F SYST BP GE 130 - 139MM HG: CPT | Mod: CPTII,S$GLB,, | Performed by: SURGERY

## 2024-04-01 PROCEDURE — 74174 CTA ABD&PLVS W/CONTRAST: CPT | Mod: TC

## 2024-04-01 PROCEDURE — 25500020 PHARM REV CODE 255: Performed by: SURGERY

## 2024-04-01 PROCEDURE — 99213 OFFICE O/P EST LOW 20 MIN: CPT | Mod: S$GLB,,, | Performed by: SURGERY

## 2024-04-01 PROCEDURE — 71275 CT ANGIOGRAPHY CHEST: CPT | Mod: 26,,, | Performed by: RADIOLOGY

## 2024-04-01 PROCEDURE — 3008F BODY MASS INDEX DOCD: CPT | Mod: CPTII,S$GLB,, | Performed by: SURGERY

## 2024-04-01 PROCEDURE — 1159F MED LIST DOCD IN RCRD: CPT | Mod: CPTII,S$GLB,, | Performed by: SURGERY

## 2024-04-01 PROCEDURE — 74174 CTA ABD&PLVS W/CONTRAST: CPT | Mod: 26,,, | Performed by: RADIOLOGY

## 2024-04-01 PROCEDURE — 3078F DIAST BP <80 MM HG: CPT | Mod: CPTII,S$GLB,, | Performed by: SURGERY

## 2024-04-01 PROCEDURE — 99999 PR PBB SHADOW E&M-EST. PATIENT-LVL III: CPT | Mod: PBBFAC,,, | Performed by: SURGERY

## 2024-04-01 RX ORDER — LABETALOL 100 MG/1
100 TABLET, FILM COATED ORAL 2 TIMES DAILY
Qty: 60 TABLET | Refills: 11 | Status: SHIPPED | OUTPATIENT
Start: 2024-04-01 | End: 2025-04-01

## 2024-04-01 RX ORDER — CLOPIDOGREL BISULFATE 75 MG/1
75 TABLET ORAL DAILY
Qty: 90 TABLET | Refills: 3 | Status: SHIPPED | OUTPATIENT
Start: 2024-04-01 | End: 2025-04-01

## 2024-04-01 RX ADMIN — IOHEXOL 100 ML: 350 INJECTION, SOLUTION INTRAVENOUS at 08:04

## 2024-04-01 NOTE — PROGRESS NOTES
VASCULAR SURGERY NOTE    Patient ID: Luis Eduardo Curry is a 64 y.o. male.    I. HISTORY     Chief Complaint: type B aortic dissection    HPI 12/23/22:  Patient denies any changes in medical history since he was last seen in clinic.  He continues taking his medications aspirin and labetalol blood pressure control.  He denies any chest pain or back pain.  He hunts regularly for deer.  He is stayed active and walks frequently.  He returns for surveillance CT scan for his known type B aortic dissection previously treated by me.    Interval History (4/1/24):   Patient presents to clinic today for a follow up visit.  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  He then underwent TEVAR extension on 2/14/23 for persistent growth of the distal thoracic aorta. He returns for surveillance CTA. He denies any issues since that time. He returns for surveillance CTA.    Social History: Does not smoke, not drinking, no drugs    Family history: No fam hx of aneurysm/dissection    Past Medical History:   Diagnosis Date    Aortic dissection     Sleep apnea         Past Surgical History:   Procedure Laterality Date    BIOPSY WITH TRANSRECTAL ULTRASOUND (TRUS) GUIDANCE N/A 05/01/2019    Procedure: BIOPSY, WITH TRANSRECTAL PROSTATE ULTRASOUND;  Surgeon: Chintan Mendez Jr., MD;  Location: [facility];  Service: Urology;  Laterality: N/A;    COLONOSCOPY  2006    COLONOSCOPY N/A 01/24/2019    Procedure: COLONOSCOPY;  Surgeon: Reece Tarango MD;  Location: [facility];  Service: Endoscopy;  Laterality: N/A;    CREATION OF AORTO-CAROTID BYPASS WITH GRAFT Left 11/16/2020    Procedure: CREATION, BYPASS, ARTERIAL, AORTA TO CAROTID, USING GRAFT SUBCLAVIAN BYPASS;  Surgeon: JOSY Santos II, MD;  Location: [address];  Service:  Cardiovascular;  Laterality: Left;  left common carotid to subclavian bypass    CYSTOSCOPY N/A 2019    Procedure: FLEXIBLE CYSTOSCOPY;  Surgeon: Chintan Mendez Jr., MD;  Location: [facility];  Service: Urology;  Laterality: N/A;    ENDOVASCULAR GRAFT REPAIR OF ANEURYSM OF THORACIC AORTA N/A 2020    Procedure: REPAIR, ANEURYSM, ENDOVASCULAR GRAFT, AORTA, THORACIC Left brachial cutdown;  Surgeon: JOSY Santos II, MD;  Location: [facility] OR 2ND FLR;  Service: Cardiovascular;  Laterality: N/A;  Fluoro time 34.7 min  2002.64 mGy  432.18 Gycm2    ENDOVASCULAR GRAFT REPAIR OF ANEURYSM OF THORACIC AORTA N/A 2023    Procedure: REPAIR, ANEURYSM, ENDOVASCULAR GRAFT, AORTA, THORACIC;  Surgeon: JOSY Santos II, MD;  Location: [facility] OR 2ND FLR;  Service: Vascular;  Laterality: N/A;    EXTRACTION OF CATARACT  2023    RETROGRADE PYELOGRAPHY Right 2021    Procedure: PYELOGRAM, RETROGRADE;  Surgeon: Yuriy Woo MD;  [facility];  Service: Urology;  Laterality: Right;    TRANSURETHRAL RESECTION OF PROSTATE N/A 2021    Procedure: TURP (TRANSURETHRAL RESECTION OF PROSTATE) BIPOLOR;  Surgeon: Yuriy Woo MD;  Location: [facility] OR [address] [place] [address];  Service: Urology;  Laterality: N/A;  2 hours      TRANSURETHRAL RESECTION OF PROSTATE N/A 2021    Procedure: TURP (TRANSURETHRAL RESECTION OF PROSTATE)/ BIPOLOR;  Surgeon: Yuriy Woo MD;  Location: [facility] OR [address] [place] [address];  Service: Urology;  Laterality: N/A;    UMBILICAL HERNIA REPAIR N/A 2020    Procedure: REPAIR, HERNIA, UMBILICAL;  Surgeon: Caio Mas Jr., MD;  Location: [facility];  Service: General;  Laterality: N/A;       Social History     Occupational History    Not on file   Tobacco Use    Smoking status: Former     Current packs/day: 0.00     Average packs/day: 1 pack/day for 30.0 years (30.0 ttl pk-yrs)     Types: Cigarettes     Start date:      Quit date:      Years since quittin.2    Smokeless  tobacco: Never   Substance and Sexual Activity    Alcohol use: Not Currently     Comment: socially    Drug use: No    Sexual activity: Yes         Review of Systems   Constitutional: Negative for weight loss.   HENT:  Negative for ear pain and nosebleeds.    Eyes:  Negative for discharge and pain.   Cardiovascular:  Negative for chest pain and palpitations.   Respiratory:  Negative for cough, shortness of breath and wheezing.    Endocrine: Negative for cold intolerance, heat intolerance and polydipsia.   Hematologic/Lymphatic: Negative for adenopathy. Does not bruise/bleed easily.   Skin:  Negative for itching and rash.   Musculoskeletal:  Negative for joint swelling and muscle cramps.   Gastrointestinal:  Negative for abdominal pain, diarrhea, nausea and vomiting.   Genitourinary:  Negative for dysuria and flank pain.   Neurological:  Negative for numbness and seizures.             II. PHYSICAL EXAM   Physical Exam  Constitutional:       General: He is not in acute distress.     Appearance: Normal appearance. He is normal weight. He is not ill-appearing or diaphoretic.   HENT:      Head: Normocephalic and atraumatic.   Eyes:      General: No scleral icterus.        Right eye: No discharge.         Left eye: No discharge.      Extraocular Movements: Extraocular movements intact.      Conjunctiva/sclera: Conjunctivae normal.   Cardiovascular:      Rate and Rhythm: Normal rate and regular rhythm.      Comments:   1+ L brachial pulse  2+ R brachial pulse    2+ DP pulses bilaterally  Pulmonary:      Effort: Pulmonary effort is normal. No respiratory distress.   Musculoskeletal:         General: Normal range of motion.      Cervical back: Normal range of motion and neck supple.      Right lower leg: No edema.      Left lower leg: No edema.   Skin:     General: Skin is warm and dry.      Coloration: Skin is not jaundiced or pale.      Findings: No erythema or rash.   Neurological:      General: No focal deficit present.       Mental Status: He is alert and oriented to person, place, and time. Mental status is at baseline.      Cranial Nerves: No cranial nerve deficit.   Psychiatric:         Mood and Affect: Mood normal.         Behavior: Behavior normal.              III. ASSESSMENT & PLAN (MEDICAL DECISION MAKING)     1. Dissection of thoracoabdominal aorta    2. History of left common carotid artery stent placement          Imaging Results:  (all imaging has been personally reviewed and interpreted by me below)  CTA CHEST/ABD/PELVIS 12/20/21:  L carotid-subclavian bypass is patent with no evidence of stenosis. The false lumen fills with contrast in the distal descending thoracic aorta via intercostals and remains patent through the abdominal aorta filling via the LEVI and a right lumbar artery. 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 The SMA is widely patent without stenosis. The right renal stent is patent and both renal arteries widely patent as well. The dissection flap terminates at the aortic bifurcation and both iliac arteries fill via the true lumen. There is good expansion of the true lumen through the abdominal aorta without increase in size or aneurysm in the abdominal aorta. There is slight growth in the size of the aorta at the level of T11 vertebra compared to the last CTA in June 2021 but all of this growth appears to be in the true lumen while the false lumen remains stable and is filling less briskly than before. Current and (prior measurements) at T11 vertebral level below. Axial measurements are not orthogonal and thus do not indicate the true diameter of the total aorta but are documented for comparison of true and false lumens diameters.  Prior measurements in parentheses      T11 measurements   Axial: Total: 49mm (43). True 20mm (14). False 29mm (29)  Sagital: 39mm (37). Max true lumen diameter 18mm (16).  Coronal:  39mm (37). Max true lumen diameter 19mm  (17mm)    CTA C/A/P 12/23/22:  T11 measurements:  Axial: Total 52 (49). True 20 (20). False 32 (29)  Sagital: 42mm (39). Max true lumen 18mm (18)  Coronal: 42mm (39). Max true lumen (19)    CTA C/A/P T11 measurements 4/1/24:  Axial: total: 49 (52). True: 25. False 24  Sag: Total 40 (42). Max true lumen: 26 (18)  Coronal: Total 40 (42). Max true lumen 24 (19)  TEVAR well seated. Carotid snorkel widely patent. Carotid-subclavian bypass widely patent. Right renal stent patent. Infrarenal aorta has become progressively more tortuous. Max total diameter of infrarenal segment is 35mm. Both iliacs perfused by true lumen. Stable dilation of proximal celiac 1.6cm. minimal filling of thoracic false lumen. There is both true and false lumen filling through the abdominal aorta.      Assessment/Diagnosis and Plan:  64 y.o. male s/p above with decreasing size of total aortic lumen through the visceral aorta and decreased false lumen diameter and improved true luminal expansion. He has persistent filling of false lumen through the abdominal aorta with dilation to 3.5mm but would not recommend repair of this at this time as aneurysm size remains small with minimal growth. Will continue annual monitoring with CTA.     -continue goal SBP <120 and goal HR <80-- continue home labetalol  -Continue ASA indefinitely  -continue plavix for left common carotid stent patency  -Continue statin  -RTC Feb 2025 for surveillance CTA chest/abd/pelvis    JOSY Santos II, MD, VI  Vascular Surgeon  [facility]

## 2025-04-14 DIAGNOSIS — C61 PROSTATE CANCER: ICD-10-CM

## 2025-04-14 DIAGNOSIS — Z00.00 WELL ADULT EXAM: Primary | ICD-10-CM

## 2025-04-14 DIAGNOSIS — I71.03 DISSECTION OF THORACOABDOMINAL AORTA: Primary | ICD-10-CM

## 2025-04-15 ENCOUNTER — LAB VISIT (OUTPATIENT)
Dept: LAB | Facility: HOSPITAL | Age: 65
End: 2025-04-15
Attending: INTERNAL MEDICINE
Payer: COMMERCIAL

## 2025-04-15 DIAGNOSIS — C61 PROSTATE CANCER: ICD-10-CM

## 2025-04-15 DIAGNOSIS — Z00.00 WELL ADULT EXAM: ICD-10-CM

## 2025-04-15 LAB
ABSOLUTE EOSINOPHIL (OHS): 0.3 K/UL
ABSOLUTE MONOCYTE (OHS): 0.53 K/UL (ref 0.3–1)
ABSOLUTE NEUTROPHIL COUNT (OHS): 2.9 K/UL (ref 1.8–7.7)
ALBUMIN SERPL BCP-MCNC: 4.1 G/DL (ref 3.5–5.2)
ALP SERPL-CCNC: 30 UNIT/L (ref 40–150)
ALT SERPL W/O P-5'-P-CCNC: 23 UNIT/L (ref 10–44)
ANION GAP (OHS): 8 MMOL/L (ref 8–16)
AST SERPL-CCNC: 23 UNIT/L (ref 11–45)
BASOPHILS # BLD AUTO: 0.03 K/UL
BASOPHILS NFR BLD AUTO: 0.5 %
BILIRUB SERPL-MCNC: 0.3 MG/DL (ref 0.1–1)
BUN SERPL-MCNC: 15 MG/DL (ref 8–23)
CALCIUM SERPL-MCNC: 9.3 MG/DL (ref 8.7–10.5)
CHLORIDE SERPL-SCNC: 110 MMOL/L (ref 95–110)
CHOLEST SERPL-MCNC: 175 MG/DL (ref 120–199)
CHOLEST/HDLC SERPL: 4.6 {RATIO} (ref 2–5)
CO2 SERPL-SCNC: 23 MMOL/L (ref 23–29)
CREAT SERPL-MCNC: 1 MG/DL (ref 0.5–1.4)
ERYTHROCYTE [DISTWIDTH] IN BLOOD BY AUTOMATED COUNT: 13 % (ref 11.5–14.5)
GFR SERPLBLD CREATININE-BSD FMLA CKD-EPI: >60 ML/MIN/1.73/M2
GLUCOSE SERPL-MCNC: 107 MG/DL (ref 70–110)
HCT VFR BLD AUTO: 44.6 % (ref 40–54)
HDLC SERPL-MCNC: 38 MG/DL (ref 40–75)
HDLC SERPL: 21.7 % (ref 20–50)
HGB BLD-MCNC: 14.7 GM/DL (ref 14–18)
IMM GRANULOCYTES # BLD AUTO: 0.02 K/UL (ref 0–0.04)
IMM GRANULOCYTES NFR BLD AUTO: 0.4 % (ref 0–0.5)
LDLC SERPL CALC-MCNC: 121.4 MG/DL (ref 63–159)
LYMPHOCYTES # BLD AUTO: 1.79 K/UL (ref 1–4.8)
MCH RBC QN AUTO: 29.1 PG (ref 27–31)
MCHC RBC AUTO-ENTMCNC: 33 G/DL (ref 32–36)
MCV RBC AUTO: 88 FL (ref 82–98)
NONHDLC SERPL-MCNC: 137 MG/DL
NUCLEATED RBC (/100WBC) (OHS): 0 /100 WBC
PLATELET # BLD AUTO: 155 K/UL (ref 150–450)
PMV BLD AUTO: 10.1 FL (ref 9.2–12.9)
POTASSIUM SERPL-SCNC: 4.5 MMOL/L (ref 3.5–5.1)
PROT SERPL-MCNC: 6.7 GM/DL (ref 6–8.4)
PSA SERPL-MCNC: 1.5 NG/ML
RBC # BLD AUTO: 5.06 M/UL (ref 4.6–6.2)
RELATIVE EOSINOPHIL (OHS): 5.4 %
RELATIVE LYMPHOCYTE (OHS): 32.1 % (ref 18–48)
RELATIVE MONOCYTE (OHS): 9.5 % (ref 4–15)
RELATIVE NEUTROPHIL (OHS): 52.1 % (ref 38–73)
SODIUM SERPL-SCNC: 141 MMOL/L (ref 136–145)
TRIGL SERPL-MCNC: 78 MG/DL (ref 30–150)
TSH SERPL-ACNC: 1.66 UIU/ML (ref 0.4–4)
WBC # BLD AUTO: 5.57 K/UL (ref 3.9–12.7)

## 2025-04-15 PROCEDURE — 82040 ASSAY OF SERUM ALBUMIN: CPT

## 2025-04-15 PROCEDURE — 85025 COMPLETE CBC W/AUTO DIFF WBC: CPT

## 2025-04-15 PROCEDURE — 82465 ASSAY BLD/SERUM CHOLESTEROL: CPT

## 2025-04-15 PROCEDURE — 84153 ASSAY OF PSA TOTAL: CPT

## 2025-04-15 PROCEDURE — 36415 COLL VENOUS BLD VENIPUNCTURE: CPT

## 2025-04-15 PROCEDURE — 84443 ASSAY THYROID STIM HORMONE: CPT

## 2025-04-16 ENCOUNTER — OFFICE VISIT (OUTPATIENT)
Dept: INTERNAL MEDICINE | Facility: CLINIC | Age: 65
End: 2025-04-16
Payer: COMMERCIAL

## 2025-04-16 VITALS
RESPIRATION RATE: 17 BRPM | HEART RATE: 65 BPM | HEIGHT: 72 IN | DIASTOLIC BLOOD PRESSURE: 80 MMHG | SYSTOLIC BLOOD PRESSURE: 110 MMHG | OXYGEN SATURATION: 95 % | WEIGHT: 211.88 LBS | BODY MASS INDEX: 28.7 KG/M2

## 2025-04-16 DIAGNOSIS — I71.019 AORTIC DISSECTION DISTAL TO LEFT SUBCLAVIAN: ICD-10-CM

## 2025-04-16 DIAGNOSIS — C61 PROSTATE CANCER: ICD-10-CM

## 2025-04-16 DIAGNOSIS — K63.5 POLYP OF COLON, UNSPECIFIED PART OF COLON, UNSPECIFIED TYPE: ICD-10-CM

## 2025-04-16 DIAGNOSIS — Z00.00 WELLNESS EXAMINATION: Primary | ICD-10-CM

## 2025-04-16 PROCEDURE — 99999 PR PBB SHADOW E&M-EST. PATIENT-LVL III: CPT | Mod: PBBFAC,,, | Performed by: INTERNAL MEDICINE

## 2025-04-16 NOTE — PROGRESS NOTES
Subjective:   History of Present Illness    CHIEF COMPLAINT:  Luis Eduardo presents today for follow up    CARDIOVASCULAR:  He has a history of aortic dissection with surgical repair. He denies any current chest pain or other associated symptoms.    MEDICATIONS:  He takes Plavix but has run out of Aspirin.    LABS:  Cholesterol has improved from 194 to 175, though HDL is slightly low at 38 (normal >40). Complete blood count was normal. Chemical profile showed sodium 141, potassium 4.5, eGFR 60, glucose 107, and normal liver enzymes. Thyroid test was normal.    PROSTATE CANCER:  He has history of prostate cancer that was incidentally found on biopsy. PSA remains stable at 1.5.    PREVENTIVE CARE:  He is overdue for colonoscopy screening, which was recommended to be performed in 3 years but is now approaching 5 years since last exam.       Review of Systems   Constitutional:  Negative for activity change, chills, fever and unexpected weight change.   HENT:  Negative for congestion, hearing loss, postnasal drip, rhinorrhea, sore throat and trouble swallowing.    Eyes:  Negative for photophobia, discharge and visual disturbance.   Respiratory:  Negative for chest tightness, shortness of breath and wheezing.    Cardiovascular:  Negative for chest pain and palpitations.   Gastrointestinal:  Negative for abdominal distention, abdominal pain, blood in stool, constipation, diarrhea and vomiting.   Endocrine: Negative for polydipsia and polyuria.   Genitourinary:  Negative for difficulty urinating, dysuria, flank pain, hematuria and urgency.   Musculoskeletal:  Negative for arthralgias, back pain, joint swelling and neck pain.   Skin:  Negative for pallor.   Neurological:  Negative for dizziness, seizures, facial asymmetry, speech difficulty, weakness, numbness and headaches.   Hematological:  Does not bruise/bleed easily.   Psychiatric/Behavioral:  Negative for agitation, confusion, dysphoric mood and suicidal ideas. The patient is  not nervous/anxious.       Objective:   Physical Exam  Vitals and nursing note reviewed.   Constitutional:       Appearance: He is well-developed.   HENT:      Head: Normocephalic and atraumatic.      Nose: Nose normal.   Eyes:      Conjunctiva/sclera: Conjunctivae normal.      Pupils: Pupils are equal, round, and reactive to light.   Neck:      Thyroid: No thyromegaly.      Vascular: No JVD.   Cardiovascular:      Rate and Rhythm: Normal rate and regular rhythm.      Heart sounds: Normal heart sounds.   Pulmonary:      Effort: Pulmonary effort is normal.      Breath sounds: Normal breath sounds.   Abdominal:      General: Bowel sounds are normal. There is no distension.      Palpations: Abdomen is soft. There is no mass.      Tenderness: There is no abdominal tenderness. There is no guarding.   Musculoskeletal:         General: Normal range of motion.      Cervical back: Normal range of motion and neck supple.   Lymphadenopathy:      Cervical: No cervical adenopathy.   Skin:     General: Skin is warm and dry.      Coloration: Skin is not pale.      Findings: No rash.   Neurological:      Mental Status: He is alert and oriented to person, place, and time.      Cranial Nerves: No cranial nerve deficit.      Deep Tendon Reflexes: Reflexes are normal and symmetric.        Assessment/Plan:     1. Wellness examination        2. Aortic dissection distal to left subclavian  Status post repair.  Continue Plavix.  Lab Results   Component Value Date    .4 04/15/2025           3. Prostate cancer  Seeing Urology impressed on him to continue to see the Urology.      4. Polyp of colon, unspecified part of colon, unspecified type  Colonoscopy         Assessment & Plan    E78.6 Lipoprotein deficiency  Z95.828 Presence of other vascular implants and grafts  Z79.82 Long term (current) use of aspirin  Z85.46 Personal history of malignant neoplasm of prostate  I71.019 Dissection of thoracic aorta, unspecified    IMPRESSION:  -  Reviewed history of aortic dissection repair, noting no current chest pain or complications.  - Assessed recent lab results:  - CBC WNL  - Chemical profile shows normal sodium, potassium, and renal function  - Glucose at 107, indicating good diabetes control  - Liver enzymes within acceptable range  - Cholesterol improved from 194 to 175  - HDL slightly low at 38  - Thyroid test normal  - PSA stable at 1.5, consistent with previous results and urologist's assessment.  - Determined colonoscopy is overdue based on previous recommendation.  - Clarified that low LFAS number is not a concern from a medical perspective.  - Discussed PSA results in context of history of prostate cancer, emphasizing stability and continued monitoring.    LIPOPROTEIN DEFICIENCY / CHOLESTEROL MANAGEMENT:  - Monitored cholesterol levels, which have improved from 194 to 175.  - Noted HDL level of 38, which is below the normal threshold of 40.  - Recommend increasing physical activity to improve HDL cholesterol levels.  - Advised the patient to continue monitoring cholesterol levels.  - Recommend increasing physical activity to improve HDL cholesterol levels.    HISTORY OF AORTIC DISSECTION:  - Evaluated the patient's condition post-aortic dissection repair.  - Confirmed no current chest pain or problems reported by the patient.  - Monitored the patient's history of aortic dissection.  - Confirmed no current chest pain or problems reported by the patient following aortic dissection repair.    ANTIPLATELET THERAPY:  - Emphasized the importance of maintaining at least one antiplatelet medication (either aspirin or Plavix) for cardiovascular protection.  - Assessed the patient's current medication regimen, including Plavix and previously Aspirin.  - Noted that the patient had run out of Aspirin.  - Restarted Aspirin and continued Plavix.  - Emphasized the importance of taking at least one antiplatelet medication (Aspirin or Plavix).  - Continued  antiplatelet therapy with Plavix.  - Planned to resume Aspirin in addition to current Plavix regimen.    HISTORY OF PROSTATE CANCER:  - Monitored PSA levels, with current level at 1.5, similar to previous test.  - Noted that the current PSA level is considered normal for the general population but requires monitoring for a prostate cancer patient.  - Recommend continued monitoring of PSA levels.  - Advised follow-up with urologist.    ADDITIONAL PROCEDURES:  - Ordered colonoscopy.    FOLLOW-UP:  - Follow up in 1 year.

## 2025-04-17 ENCOUNTER — OFFICE VISIT (OUTPATIENT)
Dept: VASCULAR SURGERY | Facility: CLINIC | Age: 65
End: 2025-04-17
Attending: SURGERY
Payer: COMMERCIAL

## 2025-04-17 ENCOUNTER — HOSPITAL ENCOUNTER (OUTPATIENT)
Dept: RADIOLOGY | Facility: HOSPITAL | Age: 65
Discharge: HOME | End: 2025-04-17
Attending: SURGERY
Payer: COMMERCIAL

## 2025-04-17 VITALS
HEIGHT: 72 IN | WEIGHT: 211.63 LBS | BODY MASS INDEX: 28.66 KG/M2 | SYSTOLIC BLOOD PRESSURE: 104 MMHG | HEART RATE: 69 BPM | DIASTOLIC BLOOD PRESSURE: 71 MMHG | TEMPERATURE: 98 F

## 2025-04-17 DIAGNOSIS — I71.03 DISSECTION OF THORACOABDOMINAL AORTA: Primary | ICD-10-CM

## 2025-04-17 DIAGNOSIS — I71.03 DISSECTION OF THORACOABDOMINAL AORTA: ICD-10-CM

## 2025-04-17 PROCEDURE — 99213 OFFICE O/P EST LOW 20 MIN: CPT | Mod: S$GLB,,, | Performed by: SURGERY

## 2025-04-17 PROCEDURE — 3078F DIAST BP <80 MM HG: CPT | Mod: CPTII,S$GLB,, | Performed by: SURGERY

## 2025-04-17 PROCEDURE — 3074F SYST BP LT 130 MM HG: CPT | Mod: CPTII,S$GLB,, | Performed by: SURGERY

## 2025-04-17 PROCEDURE — 25500020 PHARM REV CODE 255: Performed by: SURGERY

## 2025-04-17 PROCEDURE — 1159F MED LIST DOCD IN RCRD: CPT | Mod: CPTII,S$GLB,, | Performed by: SURGERY

## 2025-04-17 PROCEDURE — 74174 CTA ABD&PLVS W/CONTRAST: CPT | Mod: TC

## 2025-04-17 PROCEDURE — 3008F BODY MASS INDEX DOCD: CPT | Mod: CPTII,S$GLB,, | Performed by: SURGERY

## 2025-04-17 PROCEDURE — 99999 PR PBB SHADOW E&M-EST. PATIENT-LVL III: CPT | Mod: PBBFAC,,, | Performed by: SURGERY

## 2025-04-17 RX ADMIN — IOHEXOL 100 ML: 350 INJECTION, SOLUTION INTRAVENOUS at 08:04

## 2025-04-17 NOTE — PROGRESS NOTES
VASCULAR SURGERY NOTE    Patient ID: Luis Eduardo Curry is a 65 y.o. male.    I. HISTORY     Chief Complaint: type B aortic dissection    HPI 12/23/22:  Patient denies any changes in medical history since he was last seen in clinic.  He continues taking his medications aspirin and labetalol blood pressure control.  He denies any chest pain or back pain.  He hunts regularly for deer.  He is stayed active and walks frequently.  He returns for surveillance CT scan for his known type B aortic dissection previously treated by me.    Interval History (4/1/24):   Patient presents to clinic today for a follow up visit.  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  He then underwent TEVAR extension on 2/14/23 for persistent growth of the distal thoracic aorta. He returns for surveillance CTA. He denies any issues since that time. He returns for surveillance CTA.    Social History: Does not smoke, not drinking, no drugs    Family history: No fam hx of aneurysm/dissection    Past Medical History:   Diagnosis Date    Aortic dissection     Sleep apnea         Past Surgical History:   Procedure Laterality Date    BIOPSY WITH TRANSRECTAL ULTRASOUND (TRUS) GUIDANCE N/A 05/01/2019    Procedure: BIOPSY, WITH TRANSRECTAL PROSTATE ULTRASOUND;  Surgeon: Chintan Mendez Jr., MD;  Location: [facility];  Service: Urology;  Laterality: N/A;    COLONOSCOPY  2006    COLONOSCOPY N/A 01/24/2019    Procedure: COLONOSCOPY;  Surgeon: Reece Tarango MD;  Location: [facility];  Service: Endoscopy;  Laterality: N/A;    CREATION OF AORTO-CAROTID BYPASS WITH GRAFT Left 11/16/2020    Procedure: CREATION, BYPASS, ARTERIAL, AORTA TO CAROTID, USING GRAFT SUBCLAVIAN BYPASS;  Surgeon: JOSY Santos II, MD;  Location: [address];  Service:  Cardiovascular;  Laterality: Left;  left common carotid to subclavian bypass    CYSTOSCOPY N/A 2019    Procedure: FLEXIBLE CYSTOSCOPY;  Surgeon: Chintan Mendez Jr., MD;  Location: [facility];  Service: Urology;  Laterality: N/A;    ENDOVASCULAR GRAFT REPAIR OF ANEURYSM OF THORACIC AORTA N/A 2020    Procedure: REPAIR, ANEURYSM, ENDOVASCULAR GRAFT, AORTA, THORACIC Left brachial cutdown;  Surgeon: JOSY Santos II, MD;  Location: [facility] OR 2ND FLR;  Service: Cardiovascular;  Laterality: N/A;  Fluoro time 34.7 min  2002.64 mGy  432.18 Gycm2    ENDOVASCULAR GRAFT REPAIR OF ANEURYSM OF THORACIC AORTA N/A 2023    Procedure: REPAIR, ANEURYSM, ENDOVASCULAR GRAFT, AORTA, THORACIC;  Surgeon: JOSY Santos II, MD;  Location: [facility] OR 2ND FLR;  Service: Vascular;  Laterality: N/A;    EXTRACTION OF CATARACT  2023    RETROGRADE PYELOGRAPHY Right 2021    Procedure: PYELOGRAM, RETROGRADE;  Surgeon: Yuriy Woo MD;  [facility];  Service: Urology;  Laterality: Right;    TRANSURETHRAL RESECTION OF PROSTATE N/A 2021    Procedure: TURP (TRANSURETHRAL RESECTION OF PROSTATE) BIPOLOR;  Surgeon: Yuriy Woo MD;  Location: [facility] OR [address] [place] [address];  Service: Urology;  Laterality: N/A;  2 hours      TRANSURETHRAL RESECTION OF PROSTATE N/A 2021    Procedure: TURP (TRANSURETHRAL RESECTION OF PROSTATE)/ BIPOLOR;  Surgeon: Yuriy Woo MD;  Location: [facility] OR [address] [place] [address];  Service: Urology;  Laterality: N/A;    UMBILICAL HERNIA REPAIR N/A 2020    Procedure: REPAIR, HERNIA, UMBILICAL;  Surgeon: Caio Mas Jr., MD;  Location: [facility];  Service: General;  Laterality: N/A;       Social History     Occupational History    Not on file   Tobacco Use    Smoking status: Former     Current packs/day: 0.00     Average packs/day: 1 pack/day for 30.0 years (30.0 ttl pk-yrs)     Types: Cigarettes     Start date:      Quit date:      Years since quittin.3    Smokeless  tobacco: Never   Substance and Sexual Activity    Alcohol use: Not Currently     Comment: socially    Drug use: No    Sexual activity: Yes         Review of Systems   Constitutional: Negative for weight loss.   HENT:  Negative for ear pain and nosebleeds.    Eyes:  Negative for discharge and pain.   Cardiovascular:  Negative for chest pain and palpitations.   Respiratory:  Negative for cough, shortness of breath and wheezing.    Endocrine: Negative for cold intolerance, heat intolerance and polydipsia.   Hematologic/Lymphatic: Negative for adenopathy. Does not bruise/bleed easily.   Skin:  Negative for itching and rash.   Musculoskeletal:  Negative for joint swelling and muscle cramps.   Gastrointestinal:  Negative for abdominal pain, diarrhea, nausea and vomiting.   Genitourinary:  Negative for dysuria and flank pain.   Neurological:  Negative for numbness and seizures.             II. PHYSICAL EXAM   Physical Exam  Constitutional:       General: He is not in acute distress.     Appearance: Normal appearance. He is normal weight. He is not ill-appearing or diaphoretic.   HENT:      Head: Normocephalic and atraumatic.   Eyes:      General: No scleral icterus.        Right eye: No discharge.         Left eye: No discharge.      Extraocular Movements: Extraocular movements intact.      Conjunctiva/sclera: Conjunctivae normal.   Cardiovascular:      Rate and Rhythm: Normal rate and regular rhythm.      Comments:   1+ L brachial pulse  2+ R brachial pulse    2+ DP pulses bilaterally  Pulmonary:      Effort: Pulmonary effort is normal. No respiratory distress.   Musculoskeletal:         General: Normal range of motion.      Cervical back: Normal range of motion and neck supple.      Right lower leg: No edema.      Left lower leg: No edema.   Skin:     General: Skin is warm and dry.      Coloration: Skin is not jaundiced or pale.      Findings: No erythema or rash.   Neurological:      General: No focal deficit present.       Mental Status: He is alert and oriented to person, place, and time. Mental status is at baseline.      Cranial Nerves: No cranial nerve deficit.   Psychiatric:         Mood and Affect: Mood normal.         Behavior: Behavior normal.           III. ASSESSMENT & PLAN (MEDICAL DECISION MAKING)     1. Dissection of thoracoabdominal aorta            Imaging Results:  (all imaging has been personally reviewed and interpreted by me below)     Current and (prior measurements) at T11 vertebral level below. Axial measurements are not orthogonal and thus do not indicate the true diameter of the total aorta but are documented for comparison of true and false lumens diameters.  Prior measurements in parentheses  CTA CHEST/ABD/PELVIS 4/17/25:  Axial: total: 50 (49). True: 27. False 24  Sag: Total 42 (40). Max true lumen: 22 (26)  Coronal: Total 40 (40). Max true lumen 23 (24)  TEVAR well seated. Carotid snorkel widely patent. Carotid-subclavian bypass widely patent. Right renal stent patent. Infrarenal aorta has become progressively more tortuous. Max total diameter of infrarenal segment is 43mm. Both iliacs perfused by true lumen. Stable dilation of proximal celiac 1.7cm. minimal filling of thoracic false lumen. There is both true and false lumen filling through the abdominal aorta.      CTA C/A/P 12/23/22:  T11 measurements:  Axial: Total 52 (49). True 20 (20). False 32 (29)  Sagital: 42mm (39). Max true lumen 18mm (18)  Coronal: 42mm (39). Max true lumen (19)    CTA C/A/P T11 measurements 4/1/24:  Axial: total: 49 (52). True: 25. False 24  Sag: Total 40 (42). Max true lumen: 26 (18)  Coronal: Total 40 (42). Max true lumen 24 (19)  TEVAR well seated. Carotid snorkel widely patent. Carotid-subclavian bypass widely patent. Right renal stent patent. Infrarenal aorta has become progressively more tortuous. Max total diameter of infrarenal segment is 35mm. Both iliacs perfused by true lumen. Stable dilation of proximal celiac  1.6cm. minimal filling of thoracic false lumen. There is both true and false lumen filling through the abdominal aorta.          Assessment/Diagnosis and Plan:  65 y.o. male s/p above with d progressive growth of his thoracic and abdominal aorta with stable dissection.  No evidence of malperfusion.  No evidence of rapid growth.  I discussed the findings in detail with the patient expressed understanding and agreed to the below treatment plan.  We will have him return for surveillance CT scan in 1 year.    -continue goal SBP <120 and goal HR <80-- continue home labetalol  -Continue ASA indefinitely  -continue plavix for left common carotid stent patency  -Continue statin  -RTC April 2026 for surveillance CTA chest/abd/pelvis    JOSY Santos II, MD, [facility]  Vascular Surgeon  [facility]

## 2025-05-13 ENCOUNTER — OFFICE VISIT (OUTPATIENT)
Dept: UROLOGY | Facility: CLINIC | Age: 65
End: 2025-05-13
Payer: COMMERCIAL

## 2025-05-13 VITALS
BODY MASS INDEX: 28.84 KG/M2 | DIASTOLIC BLOOD PRESSURE: 81 MMHG | WEIGHT: 212.94 LBS | SYSTOLIC BLOOD PRESSURE: 132 MMHG | HEIGHT: 72 IN | HEART RATE: 63 BPM

## 2025-05-13 DIAGNOSIS — C61 PROSTATE CANCER: Primary | ICD-10-CM

## 2025-05-13 DIAGNOSIS — N40.1 BENIGN NON-NODULAR PROSTATIC HYPERPLASIA WITH LOWER URINARY TRACT SYMPTOMS: ICD-10-CM

## 2025-05-13 PROCEDURE — 99214 OFFICE O/P EST MOD 30 MIN: CPT | Mod: S$GLB,,, | Performed by: UROLOGY

## 2025-05-13 PROCEDURE — 1159F MED LIST DOCD IN RCRD: CPT | Mod: CPTII,S$GLB,, | Performed by: UROLOGY

## 2025-05-13 PROCEDURE — G2211 COMPLEX E/M VISIT ADD ON: HCPCS | Mod: S$GLB,,, | Performed by: UROLOGY

## 2025-05-13 PROCEDURE — 3075F SYST BP GE 130 - 139MM HG: CPT | Mod: CPTII,S$GLB,, | Performed by: UROLOGY

## 2025-05-13 PROCEDURE — 3008F BODY MASS INDEX DOCD: CPT | Mod: CPTII,S$GLB,, | Performed by: UROLOGY

## 2025-05-13 PROCEDURE — 1160F RVW MEDS BY RX/DR IN RCRD: CPT | Mod: CPTII,S$GLB,, | Performed by: UROLOGY

## 2025-05-13 PROCEDURE — 3079F DIAST BP 80-89 MM HG: CPT | Mod: CPTII,S$GLB,, | Performed by: UROLOGY

## 2025-05-13 PROCEDURE — 99999 PR PBB SHADOW E&M-EST. PATIENT-LVL III: CPT | Mod: PBBFAC,,, | Performed by: UROLOGY

## 2025-05-13 NOTE — PROGRESS NOTES
"  Urology - [facility]    SUBJECTIVE:     Chief Complaint: prostate cancer    History of Present Illness:  Luis Eduardo Curry is a 65 y.o. male who presents to clinic for prostate cancer f/u. He is Established to our clinic.    Patient had type B aortic dissection extending past renal arteries w/ false lumen supplying L kidney. S/p carotid to subclavian bypass on 11/16 followed by TEVAR on 11/17. Went into urinary retention during that admission necessitating Buckley with 2 L output on initial buckley placement. CHRISTINE w/ Cr 2.3 (b/l 1.1) which normalized to 1.0. Failed outpatient VT 10/16/20 and 11/9/20. Patient had baseline LUTS prior to this admission for which he was on Flomax. He had to strain to void, weakened stream, frequency, and nocturia x2-3.    PSA 4.6 in 1/2019. TRUS bx w/ Dr. Mendez in 5/2019 was negative for malignancy. Volume 46 g. Cysto revealed 4 cm prostatic length with lateral lobe hyperplasia. Trabeculated bladder.    Underwent a TURP on 3/5/21.  Did well post-op.  On his pathology showed 40% tissue involvement of Kervin 3+3 prostate cancer.     No complaints today.  He is voiding very well.  He is pleased.  He underwent a PSA on 4/15/25 which was 1.5.        Lab Results   Component Value Date    PSA 1.50 04/15/2025    PSA 4.6 (H) 01/05/2019    PSADIAG 1.5 03/15/2024    PSADIAG 1.2 01/20/2023    PSADIAG 1.6 06/17/2022    PSADIAG 1.0 12/15/2021    PSADIAG 2.3 06/16/2021           PROSTATE CHIPS, TRANSURETHRAL RESECTION OF PROSTATE 3/5/21:  - Prostatic adenocarcinoma, [place] score 3+3=6 (Grade group 1) involving approximately 40% of the  entirely submitted specimen.  - See comment.    +fh of prostate cancer in father (who takes a "chemo pill"---details unknown).  Has 2 brothers who do not have prostate cancer.     Lab Results   Component Value Date    PSA 1.50 04/15/2025    PSA 4.6 (H) 01/05/2019    PSADIAG 1.5 03/15/2024    PSADIAG 1.2 01/20/2023    PSADIAG 1.6 06/17/2022    PSADIAG 1.0 " 12/15/2021    PSADIAG 2.3 06/16/2021       He is on aspirin and plavix. Has a history of HTN.         Review of patient's allergies indicates:  No Known Allergies    Past Medical History:   Diagnosis Date    Aortic dissection     Sleep apnea      Past Surgical History:   Procedure Laterality Date    BIOPSY WITH TRANSRECTAL ULTRASOUND (TRUS) GUIDANCE N/A 05/01/2019    Procedure: BIOPSY, WITH TRANSRECTAL PROSTATE ULTRASOUND;  Surgeon: Chintan Mendez Jr., MD;  Location: [facility] OR;  Service: Urology;  Laterality: N/A;    COLONOSCOPY  2006    COLONOSCOPY N/A 01/24/2019    Procedure: COLONOSCOPY;  Surgeon: Reece Tarango MD;  Location: [facility];  Service: Endoscopy;  Laterality: N/A;    CREATION OF AORTO-CAROTID BYPASS WITH GRAFT Left 11/16/2020    Procedure: CREATION, BYPASS, ARTERIAL, AORTA TO CAROTID, USING GRAFT SUBCLAVIAN BYPASS;  Surgeon: JOSY Santos II, MD;  Location: [facility] OR 2ND FLR;  Service: Cardiovascular;  Laterality: Left;  left common carotid to subclavian bypass    CYSTOSCOPY N/A 05/01/2019    Procedure: FLEXIBLE CYSTOSCOPY;  Surgeon: Chintan Mendez Jr., MD;  Location: [facility] OR;  Service: Urology;  Laterality: N/A;    ENDOVASCULAR GRAFT REPAIR OF ANEURYSM OF THORACIC AORTA N/A 11/17/2020    Procedure: REPAIR, ANEURYSM, ENDOVASCULAR GRAFT, AORTA, THORACIC Left brachial cutdown;  Surgeon: JOSY Santos II, MD;  Location: [facility] OR 2ND FLR;  Service: Cardiovascular;  Laterality: N/A;  Fluoro time 34.7 min  2002.64 mGy  432.18 Gycm2    ENDOVASCULAR GRAFT REPAIR OF ANEURYSM OF THORACIC AORTA N/A 2/14/2023    Procedure: REPAIR, ANEURYSM, ENDOVASCULAR GRAFT, AORTA, THORACIC;  Surgeon: JOSY Santos II, MD;  Location: [facility] OR 2ND FLR;  Service: Vascular;  Laterality: N/A;    EXTRACTION OF CATARACT  01/18/2023    RETROGRADE PYELOGRAPHY Right 03/05/2021    Procedure: PYELOGRAM, RETROGRADE;  Surgeon: Yuriy Woo MD;  Location: [facility] OR 1ST FLR;  Service: Urology;  Laterality: Right;    TRANSURETHRAL  RESECTION OF PROSTATE N/A 2021    Procedure: TURP (TRANSURETHRAL RESECTION OF PROSTATE) BIPOLOR;  Surgeon: Yuriy Woo MD;  Location: [facility] OR [address] [place] [address];  Service: Urology;  Laterality: N/A;  2 hours      TRANSURETHRAL RESECTION OF PROSTATE N/A 2021    Procedure: TURP (TRANSURETHRAL RESECTION OF PROSTATE)/ BIPOLOR;  Surgeon: Yuriy Woo MD;  Location: [facility] OR [address] [place] [address];  Service: Urology;  Laterality: N/A;    UMBILICAL HERNIA REPAIR N/A 2020    Procedure: REPAIR, HERNIA, UMBILICAL;  Surgeon: Caio Mas Jr., MD;  Location: [facility];  Service: General;  Laterality: N/A;     Family History   Problem Relation Name Age of Onset    Hypertension Father father     Prostate cancer Father father         dx ~60-65, no mets, took chemo pills, not cause of death    Cancer Sister Bijal         ?origin (lung?), nonsmoker    Other Paternal Uncle Latrell         possible cancer, early death     Social History     Tobacco Use    Smoking status: Former     Current packs/day: 0.00     Average packs/day: 1 pack/day for 30.0 years (30.0 ttl pk-yrs)     Types: Cigarettes     Start date:      Quit date:      Years since quittin.3    Smokeless tobacco: Never   Substance Use Topics    Alcohol use: Not Currently     Comment: socially    Drug use: No        Review of Systems   All other systems reviewed and negative except pertinent positives noted in HPI.     OBJECTIVE:     Anticoagulation:  Yes - ASA 81 / Plavix    Estimated body mass index is 28.7 kg/m² as calculated from the following:    Height as of 25: 6' (1.829 m).    Weight as of 25: 96 kg (211 lb 10.3 oz).    Vital Signs (Most Recent)       Physical Exam  Vitals and nursing note reviewed.   Constitutional:       Appearance: Normal appearance.   HENT:      Head: Normocephalic and atraumatic.      Mouth/Throat:      Mouth: Mucous membranes are moist.   Eyes:      Pupils: Pupils are equal, round, and reactive to light.    Cardiovascular:      Rate and Rhythm: Normal rate.   Pulmonary:      Effort: Pulmonary effort is normal. No respiratory distress.   Abdominal:      General: Abdomen is flat. There is no distension.      Tenderness: There is no abdominal tenderness.   Skin:     General: Skin is warm and dry.   Neurological:      General: No focal deficit present.      Mental Status: He is alert and oriented to person, place, and time.   Psychiatric:         Mood and Affect: Mood normal.         Behavior: Behavior normal.         Thought Content: Thought content normal.         Judgment: Judgment normal.         BMP  Lab Results   Component Value Date     04/15/2025    K 4.5 04/15/2025     04/15/2025    CO2 23 04/15/2025    BUN 15 04/15/2025    CREATININE 1.0 04/15/2025    CALCIUM 9.3 04/15/2025    ANIONGAP 8 04/15/2025    ESTGFRAFRICA >60 07/20/2022    EGFRNONAA >60 07/20/2022       Lab Results   Component Value Date    WBC 5.57 04/15/2025    HGB 14.7 04/15/2025    HCT 44.6 04/15/2025    MCV 88 04/15/2025     04/15/2025       Lab Results   Component Value Date    PSA 1.50 04/15/2025    PSA 4.6 (H) 01/05/2019    PSADIAG 1.5 03/15/2024    PSADIAG 1.2 01/20/2023    PSADIAG 1.6 06/17/2022    PSADIAG 1.0 12/15/2021    PSADIAG 2.3 06/16/2021      Imaging:    CTA c/a (8/30/21):  -  No hydronephrosis bilaterally  -  Previous aortic dissection present    ASSESSMENT     1. Prostate cancer    2. Benign non-nodular prostatic hyperplasia with lower urinary tract symptoms              PLAN:     BPH  -resolved --happy with post-op symptom resolution.  -symptoms well controlled  -patient no longer taking Flomax and happy with his voiding symptoms.    Prostate cancer:  -MRI of the prostate from 7/21/22 independently reviewed and reveals a volume of 23cc and no concerning/suspicious lesions within the prostate.   -PSA reviewed and satisfactory/stable  -given he has not had a pathological assessment of his prostate cancer since his  "TURP, and nccn guidelines suggest "most patients should have prostate biopsies every 2-5 years as part of their monitoring".   Will plan for repeat MRI now and TRUS prostate biopsy after.         Yuriy Woo MD     - code applied: patient requires or will require a continuous, longitudinal, and active collaborative plan of care related to this patient's health condition, prostate cancer, BPH --the management of which requires the direction of a practitioner with specialized clinical knowledge, skill, and expertise.             "

## 2025-05-21 ENCOUNTER — TELEPHONE (OUTPATIENT)
Dept: INTERNAL MEDICINE | Facility: CLINIC | Age: 65
End: 2025-05-21
Payer: COMMERCIAL

## 2025-05-21 NOTE — TELEPHONE ENCOUNTER
----- Message from Susanne sent at 5/21/2025  2:47 PM CDT -----  Contact: pt  Luis Eduardo Lake: [id number]: 1960PCP: Kiko Connor.Home Phone      [phone] Phone      Not on file.Mobile          [phone]: Pt states he thought he was supposed to be scheduled for a colonoscopy, but hasn't heard anything. Please advise.[phone]

## 2025-05-22 ENCOUNTER — HOSPITAL ENCOUNTER (OUTPATIENT)
Dept: RADIOLOGY | Facility: HOSPITAL | Age: 65
Discharge: HOME | End: 2025-05-22
Attending: UROLOGY
Payer: COMMERCIAL

## 2025-05-22 DIAGNOSIS — C61 PROSTATE CANCER: ICD-10-CM

## 2025-05-22 PROCEDURE — 72197 MRI PELVIS W/O & W/DYE: CPT | Mod: TC

## 2025-05-22 PROCEDURE — A9585 GADOBUTROL INJECTION: HCPCS | Performed by: UROLOGY

## 2025-05-22 PROCEDURE — 25500020 PHARM REV CODE 255: Performed by: UROLOGY

## 2025-05-22 PROCEDURE — 72197 MRI PELVIS W/O & W/DYE: CPT | Mod: 26,,, | Performed by: RADIOLOGY

## 2025-05-22 RX ORDER — GADOBUTROL 604.72 MG/ML
10 INJECTION INTRAVENOUS
Status: COMPLETED | OUTPATIENT
Start: 2025-05-22 | End: 2025-05-22

## 2025-05-22 RX ADMIN — GADOBUTROL 10 ML: 604.72 INJECTION INTRAVENOUS at 03:05

## 2025-05-25 ENCOUNTER — RESULTS FOLLOW-UP (OUTPATIENT)
Dept: UROLOGY | Facility: CLINIC | Age: 65
End: 2025-05-25

## 2025-05-25 DIAGNOSIS — C61 PROSTATE CANCER: Primary | ICD-10-CM

## 2025-05-25 RX ORDER — ENEMA 19; 7 G/133ML; G/133ML
1 ENEMA RECTAL ONCE
Qty: 1 ENEMA | Refills: 0 | Status: SHIPPED | OUTPATIENT
Start: 2025-05-25 | End: 2025-05-25

## 2025-05-25 RX ORDER — CIPROFLOXACIN 500 MG/1
TABLET, FILM COATED ORAL
Qty: 1 TABLET | Refills: 0 | Status: SHIPPED | OUTPATIENT
Start: 2025-05-25

## 2025-05-28 ENCOUNTER — PATIENT MESSAGE (OUTPATIENT)
Dept: UROLOGY | Facility: CLINIC | Age: 65
End: 2025-05-28
Payer: COMMERCIAL

## 2025-05-28 ENCOUNTER — TELEPHONE (OUTPATIENT)
Dept: UROLOGY | Facility: CLINIC | Age: 65
End: 2025-05-28
Payer: COMMERCIAL

## 2025-05-28 NOTE — TELEPHONE ENCOUNTER
Spoke with patient, scheduled biopsy.  Patient instructed to hold his blood thinner 5 days prior to biopsy, complete fleet enema the morning of the biopsy and take cipro one hour prior to scheduled biopsy time.  Patient verbalized understanding.

## 2025-06-24 ENCOUNTER — TELEPHONE (OUTPATIENT)
Dept: UROLOGY | Facility: CLINIC | Age: 65
End: 2025-06-24
Payer: COMMERCIAL

## 2025-06-24 NOTE — TELEPHONE ENCOUNTER
Confirmed appt with patient. Instructions reviewed for enema and cipro, hold off any blood thinners. Parking available on [employer] open parking lot, check in on the 2nd floor of the [facility].

## 2025-06-25 ENCOUNTER — PROCEDURE VISIT (OUTPATIENT)
Dept: UROLOGY | Facility: CLINIC | Age: 65
End: 2025-06-25
Payer: COMMERCIAL

## 2025-06-25 VITALS
BODY MASS INDEX: 28.51 KG/M2 | WEIGHT: 210.19 LBS | DIASTOLIC BLOOD PRESSURE: 85 MMHG | HEART RATE: 64 BPM | RESPIRATION RATE: 16 BRPM | SYSTOLIC BLOOD PRESSURE: 142 MMHG

## 2025-06-25 DIAGNOSIS — C61 PROSTATE CANCER: ICD-10-CM

## 2025-06-25 RX ORDER — LIDOCAINE HYDROCHLORIDE 20 MG/ML
JELLY TOPICAL
Status: COMPLETED | OUTPATIENT
Start: 2025-06-25 | End: 2025-06-25

## 2025-06-25 RX ORDER — LIDOCAINE HYDROCHLORIDE 10 MG/ML
20 INJECTION, SOLUTION INFILTRATION; PERINEURAL
Status: COMPLETED | OUTPATIENT
Start: 2025-06-25 | End: 2025-06-25

## 2025-06-25 RX ORDER — CEFTRIAXONE 1 G/1
1 INJECTION, POWDER, FOR SOLUTION INTRAMUSCULAR; INTRAVENOUS
Status: COMPLETED | OUTPATIENT
Start: 2025-06-25 | End: 2025-06-25

## 2025-06-25 RX ADMIN — LIDOCAINE HYDROCHLORIDE 10 ML: 20 JELLY TOPICAL at 01:06

## 2025-06-25 RX ADMIN — CEFTRIAXONE 1 G: 1 INJECTION, POWDER, FOR SOLUTION INTRAMUSCULAR; INTRAVENOUS at 01:06

## 2025-06-25 RX ADMIN — LIDOCAINE HYDROCHLORIDE 20 ML: 10 INJECTION, SOLUTION INFILTRATION; PERINEURAL at 02:06

## 2025-06-25 NOTE — PATIENT INSTRUCTIONS
What to Expect After a Prostate Biopsy    You may have mild bleeding from the rectum or urine for about 1 week to 1 month, or in your ejaculate for several months. This bleeding is normal and expected, and it will stop. You may have mild discomfort in your rectal or urethral area for 24-48 hours.    You cannot do any strenuous lifting, straining, or exercising for 24 hours. You may return to full activity the day after the biopsy.    You may continue to take all your regular medications after the procedure except for the blood thinners.    You may resume all blood-thinning medications once you no longer see any bleeding or whenever your physician prescribing the medication says it is all right to do so. You may take Tylenol if you have a fever and your temperature is less than 100° F or if you have some discomfort.    You will receive a call from the Urology Department at [facility] with the results of your prostate biopsy within one week.    Signs and Symptoms to Report    Call your [facility] urologist at [phone] if you develop any of the following:  Temperature greater than 101°  F  Inability to urinate  A large amount of bleeding from the rectum or in the urine  Persistent or severe pain    After hours or on weekends, you may reach a urology resident on call at this number: [phone].

## 2025-06-25 NOTE — PROCEDURES
Pre-procedure diagnosis:   1. Prostate cancer  Lab Results   Component Value Date    PSA 1.50 04/15/2025    PSA 4.6 (H) 01/05/2019    PSADIAG 1.5 03/15/2024    PSADIAG 1.2 01/20/2023    PSADIAG 1.6 06/17/2022    PSADIAG 1.0 12/15/2021    PSADIAG 2.3 06/16/2021       2. PIRADS 4 prostate lesion on MRI; 1.4 left base anterior       Post-procedure diagnosis: same    Procedure: Transrectal URONAV MRI fusion-ultrasound guided prostate biopsy    TRUS volume: 22cc  MRI volume: 35cc    Complications: none    Blood loss: minimal    Surgeon: Yuriy Woo MD    Anesthesia: 10cc lidocaine jelly, 20cc 1% lidocaine for prostatic block    Procedure: The risks and benefits of the procedure were explained to the patient and consent was obtained.  The patient laid in the lateral decubitus position.  10cc of lidocaine jelly was used for local analgesia in the rectum.  The ultrasound probe was advanced into the rectum. The prostate was visualized.  There was not a median lobe.  20cc of 1% lidocaine without epi was used for a prostatic nerve block.    At this point, a sweep of the prostate was performed from base to apex and the transverse plane using the ultrasound and URONAV platform.  Landmarks were then labeled with anterior, posterior, right, left, base and apex were labeled in the axial as well as sagittal planes.  Segmentation was then performed and manual adjustments were made as needed starting in the sagittal plane followed by the axial plane.  At this point, alignment of the ultrasound with MRI images was ensured using the toggle between ultrasound and MRI.      At this point elastic deformation was computed.  Our images were satisfactory.  At this point, we performed 3 biopsies of a target lesion.  Subsequent to that, a standard 12core biopsy was performed, 2 from the apex, mid and base from the right and left side.    The patient tolerated the procedure well and was discharged home.  We will call him when the results  are available for review.

## 2025-07-03 ENCOUNTER — RESULTS FOLLOW-UP (OUTPATIENT)
Dept: UROLOGY | Facility: HOSPITAL | Age: 65
End: 2025-07-03

## 2025-07-03 DIAGNOSIS — C61 PROSTATE CANCER: Primary | ICD-10-CM

## 2025-07-08 ENCOUNTER — TELEPHONE (OUTPATIENT)
Dept: INTERNAL MEDICINE | Facility: CLINIC | Age: 65
End: 2025-07-08
Payer: COMMERCIAL

## 2025-07-08 DIAGNOSIS — I71.019 AORTIC DISSECTION DISTAL TO LEFT SUBCLAVIAN: Primary | ICD-10-CM

## 2025-07-08 NOTE — TELEPHONE ENCOUNTER
Please check with the patient that he should be taking a cholesterol-lowering medicines called statin like Lipitor.  The goal for LDL cholesterol should be less than 70 his last blood work showed LDL of 121.  I would recommend him to start Crestor 20 mg once a day.

## 2025-07-08 NOTE — TELEPHONE ENCOUNTER
----- Message from Med Assistant Yumi sent at 7/8/2025 11:20 AM CDT -----  Regarding: Statin Therapy  Good morning,     Patient is listed on the non-compliant report for statin therapy. Patient has a diagnosis of CVD.  Please review need for statin therapy. If statin intolerant or has allergy to statin, please document in problem list and add allergy.      The following diagnosis may be used for a patient who needs to be removed from the Statin therapy recommendations list. Last office visit 4/16/25.  .   Dx: G720. Drug-induced Myopathy          G72.9 Myopathy           M60.9 Myositis         M79.10 Myalgia           Thank you,  Yumi  Gigalocal

## 2025-07-09 RX ORDER — ROSUVASTATIN CALCIUM 20 MG/1
20 TABLET, COATED ORAL DAILY
Qty: 90 TABLET | Refills: 3 | Status: SHIPPED | OUTPATIENT
Start: 2025-07-09 | End: 2026-07-09

## 2025-07-09 NOTE — TELEPHONE ENCOUNTER
Crestor 20 mg once a day is ordered for him.  Prescription is sent.  Check lipid panel and liver function tests in 3 months.  Orders are placed.

## 2025-07-09 NOTE — TELEPHONE ENCOUNTER
Patient agrees to take the Crestor. Please send Rx to [facility]. Thanks.

## 2025-07-09 NOTE — TELEPHONE ENCOUNTER
For patients with aortic dissection, the recommended LDL-C (low-density lipoprotein cholesterol) goal is typically less than 70 mg/dL (1.8 mmol/L).

## 2025-07-11 DIAGNOSIS — Z12.11 COLON CANCER SCREENING: Primary | ICD-10-CM

## 2025-07-11 RX ORDER — BISACODYL 5 MG
20 TABLET, DELAYED RELEASE (ENTERIC COATED) ORAL ONCE
Qty: 4 TABLET | Refills: 0 | Status: SHIPPED | OUTPATIENT
Start: 2025-07-20 | End: 2025-07-20

## 2025-07-11 RX ORDER — POLYETHYLENE GLYCOL 3350, SODIUM SULFATE ANHYDROUS, SODIUM BICARBONATE, SODIUM CHLORIDE, POTASSIUM CHLORIDE 236; 22.74; 6.74; 5.86; 2.97 G/4L; G/4L; G/4L; G/4L; G/4L
4000 POWDER, FOR SOLUTION ORAL ONCE
Qty: 4000 ML | Refills: 0 | Status: SHIPPED | OUTPATIENT
Start: 2025-07-20 | End: 2025-07-20

## 2025-07-14 ENCOUNTER — OFFICE VISIT (OUTPATIENT)
Dept: CARDIOLOGY | Facility: CLINIC | Age: 65
End: 2025-07-14
Payer: COMMERCIAL

## 2025-07-14 ENCOUNTER — HOSPITAL ENCOUNTER (OUTPATIENT)
Dept: PREADMISSION TESTING | Facility: HOSPITAL | Age: 65
Discharge: HOME | End: 2025-07-14
Attending: STUDENT IN AN ORGANIZED HEALTH CARE EDUCATION/TRAINING PROGRAM
Payer: COMMERCIAL

## 2025-07-14 VITALS
RESPIRATION RATE: 18 BRPM | HEIGHT: 72 IN | HEART RATE: 62 BPM | OXYGEN SATURATION: 98 % | WEIGHT: 212.75 LBS | DIASTOLIC BLOOD PRESSURE: 81 MMHG | SYSTOLIC BLOOD PRESSURE: 124 MMHG | BODY MASS INDEX: 28.82 KG/M2

## 2025-07-14 DIAGNOSIS — I71.019 DISSECTION OF THORACIC AORTA, UNSPECIFIED PART: ICD-10-CM

## 2025-07-14 DIAGNOSIS — Z91.89 AT RISK FOR CORONARY ARTERY DISEASE: ICD-10-CM

## 2025-07-14 DIAGNOSIS — Z13.6 ENCOUNTER FOR SCREENING FOR CARDIOVASCULAR DISORDERS: ICD-10-CM

## 2025-07-14 DIAGNOSIS — I71.019 AORTIC DISSECTION DISTAL TO LEFT SUBCLAVIAN: ICD-10-CM

## 2025-07-14 DIAGNOSIS — I65.23 BILATERAL CAROTID ARTERY STENOSIS: ICD-10-CM

## 2025-07-14 DIAGNOSIS — I35.8 AORTIC HEART MURMUR: Primary | ICD-10-CM

## 2025-07-14 DIAGNOSIS — I95.2 HYPOTENSION DUE TO DRUGS: ICD-10-CM

## 2025-07-14 LAB
OHS QRS DURATION: 72 MS
OHS QTC CALCULATION: 394 MS

## 2025-07-14 PROCEDURE — 3079F DIAST BP 80-89 MM HG: CPT | Mod: CPTII,S$GLB,, | Performed by: NURSE PRACTITIONER

## 2025-07-14 PROCEDURE — 3074F SYST BP LT 130 MM HG: CPT | Mod: CPTII,S$GLB,, | Performed by: NURSE PRACTITIONER

## 2025-07-14 PROCEDURE — 93000 ELECTROCARDIOGRAM COMPLETE: CPT | Mod: S$GLB,,, | Performed by: INTERNAL MEDICINE

## 2025-07-14 PROCEDURE — 3008F BODY MASS INDEX DOCD: CPT | Mod: CPTII,S$GLB,, | Performed by: NURSE PRACTITIONER

## 2025-07-14 PROCEDURE — 99204 OFFICE O/P NEW MOD 45 MIN: CPT | Mod: 25,S$GLB,, | Performed by: NURSE PRACTITIONER

## 2025-07-14 PROCEDURE — 99999 PR PBB SHADOW E&M-EST. PATIENT-LVL III: CPT | Mod: PBBFAC,,, | Performed by: NURSE PRACTITIONER

## 2025-07-14 RX ORDER — NIRMATRELVIR AND RITONAVIR 300-100 MG
KIT ORAL
COMMUNITY
Start: 2025-06-28

## 2025-07-14 RX ORDER — PRASUGREL 10 MG/1
TABLET, FILM COATED ORAL
COMMUNITY
Start: 2025-06-28 | End: 2025-07-14 | Stop reason: CLARIF

## 2025-07-14 NOTE — PROGRESS NOTES
[facility]    CC:  Cardiac clearance    Patient ID: Luis Eduardo Curry is a 65 y.o. male with a past medical history of aortic dissection distal to left subclavian, bilateral carotid artery stenosis    HPI  Here for clearance coloscopy     Aortic dissection distal to left subclavian; stable    Bilateral carotid artery stenosis; nonobstructive    Able to meet 4 METS  Denies CP, SOB/ROSADO, orthopnea, PND, syncope, palps, LE edema.         Past Medical History:   Diagnosis Date    Aortic dissection     Elevated PSA     Sleep apnea      Past Surgical History:   Procedure Laterality Date    BIOPSY WITH TRANSRECTAL ULTRASOUND (TRUS) GUIDANCE N/A 05/01/2019    Procedure: BIOPSY, WITH TRANSRECTAL PROSTATE ULTRASOUND;  Surgeon: Chintan Mendez Jr., MD;  Location: [facility];  Service: Urology;  Laterality: N/A;    COLONOSCOPY  2006    COLONOSCOPY N/A 01/24/2019    Procedure: COLONOSCOPY;  Surgeon: Reece Tarango MD;  Location: [facility];  Service: Endoscopy;  Laterality: N/A;    CREATION OF AORTO-CAROTID BYPASS WITH GRAFT Left 11/16/2020    Procedure: CREATION, BYPASS, ARTERIAL, AORTA TO CAROTID, USING GRAFT SUBCLAVIAN BYPASS;  Surgeon: JOSY Santos II, MD;  Location: [address];  Service: Cardiovascular;  Laterality: Left;  left common carotid to subclavian bypass    CYSTOSCOPY N/A 05/01/2019    Procedure: FLEXIBLE CYSTOSCOPY;  Surgeon: Chintan Mendez Jr., MD;  Location: [facility];  Service: Urology;  Laterality: N/A;    ENDOVASCULAR GRAFT REPAIR OF ANEURYSM OF THORACIC AORTA N/A 11/17/2020    Procedure: REPAIR, ANEURYSM, ENDOVASCULAR GRAFT, AORTA, THORACIC Left brachial cutdown;  Surgeon: JOSY Santos II, MD;  Location: [facility] OR [address] [place] [address];  Service: Cardiovascular;  Laterality: N/A;  Fluoro time 34.7 min  2002.64 mGy  432.18 Gycm2    ENDOVASCULAR GRAFT REPAIR OF ANEURYSM OF THORACIC AORTA N/A 2/14/2023    Procedure: REPAIR, ANEURYSM, ENDOVASCULAR GRAFT, AORTA, THORACIC;  Surgeon: JOSY Santos II,  MD;  Location: [facility] OR 2ND FLR;  Service: Vascular;  Laterality: N/A;    EXTRACTION OF CATARACT  01/18/2023    RETROGRADE PYELOGRAPHY Right 03/05/2021    Procedure: PYELOGRAM, RETROGRADE;  Surgeon: Yuriy Woo MD;  Location: [facility] OR 1ST FLR;  Service: Urology;  Laterality: Right;    TRANSURETHRAL RESECTION OF PROSTATE N/A 02/25/2021    Procedure: TURP (TRANSURETHRAL RESECTION OF PROSTATE) BIPOLOR;  Surgeon: Yuriy Woo MD;  [facility];  Service: Urology;  Laterality: N/A;  2 hours      TRANSURETHRAL RESECTION OF PROSTATE N/A 03/05/2021    Procedure: TURP (TRANSURETHRAL RESECTION OF PROSTATE)/ BIPOLOR;  Surgeon: Yuriy Woo MD;  [facility];  Service: Urology;  Laterality: N/A;    UMBILICAL HERNIA REPAIR N/A 01/14/2020    Procedure: REPAIR, HERNIA, UMBILICAL;  Surgeon: Caio Mas Jr., MD;  Location: [facility];  Service: General;  Laterality: N/A;     Social History[1]  Family History   Problem Relation Name Age of Onset    Hypertension Father father     Prostate cancer Father father         dx ~60-65, no mets, took chemo pills, not cause of death    Cancer Sister Bijal         ?origin (lung?), nonsmoker    Other Paternal Uncle Latrell         possible cancer, early death       Review of patient's allergies indicates:  No Known Allergies    Medication List with Changes/Refills   Current Medications    ASPIRIN 81 MG CHEW    Chew and swallow 1 tablet (81 mg total) by mouth once daily.    BISACODYL (DULCOLAX) 5 MG EC TABLET    Take 4 tablets (20 mg total) by mouth once. for 1 dose    CIPROFLOXACIN HCL (CIPRO) 500 MG TABLET    1 pill one hour before procedure    CLOPIDOGREL (PLAVIX) 75 MG TABLET    Take 1 tablet (75 mg total) by mouth once daily.    LABETALOL (NORMODYNE) 100 MG TABLET    Take 1 tablet (100 mg total) by mouth 2 (two) times daily.    PAXLOVID 300 MG (150 MG X 2)-100 MG COPACKAGED TABLETS (EUA)    Take by mouth.    POLYETHYLENE GLYCOL (GOLYTELY)  236-22.74-6.74 -5.86 GRAM SUSPENSION    Take 4,000 mLs by mouth once. for 1 dose    PRASUGREL HCL (EFFIENT) 10 MG TAB    Take by mouth.    ROSUVASTATIN (CRESTOR) 20 MG TABLET    Take 1 tablet (20 mg total) by mouth once daily.           Review of Systems   Constitutional: Negative.   Cardiovascular: Negative.    Respiratory: Negative.     Musculoskeletal: Negative.        Vitals:    07/14/25 0840   BP: 124/81   Pulse: 62   Resp: 18   SpO2: 98%   Weight: 96.5 kg (212 lb 11.9 oz)   Height: 6' (1.829 m)          Physical Exam  Cardiovascular:      Rate and Rhythm: Normal rate and regular rhythm.      Pulses: Normal pulses.      Heart sounds: Normal heart sounds.   Pulmonary:      Effort: Pulmonary effort is normal.      Breath sounds: Normal breath sounds.   Musculoskeletal:         General: Normal range of motion.   Skin:     General: Skin is warm.      Capillary Refill: Capillary refill takes less than 2 seconds.   Neurological:      Mental Status: He is alert and oriented to person, place, and time.           Labs:  Most Recent Data  CBC:   Lab Results   Component Value Date    WBC 5.57 04/15/2025    HGB 14.7 04/15/2025    HCT 44.6 04/15/2025     04/15/2025    MCV 88 04/15/2025    RDW 13.0 04/15/2025     BMP:   Lab Results   Component Value Date     04/15/2025    K 4.5 04/15/2025     04/15/2025    CO2 23 04/15/2025    BUN 15 04/15/2025    CREATININE 1.0 04/15/2025     04/15/2025    CALCIUM 9.3 04/15/2025    MG 1.9 02/16/2023    PHOS 2.9 02/16/2023     LFTS;   Lab Results   Component Value Date    PROT 6.7 04/15/2025    ALBUMIN 4.1 04/15/2025    BILITOT 0.3 04/15/2025    AST 23 04/15/2025    ALKPHOS 30 (L) 04/15/2025    ALT 23 04/15/2025     COAGS:   Lab Results   Component Value Date    INR 0.9 11/16/2020     FLP:   Lab Results   Component Value Date    CHOL 175 04/15/2025    HDL 38 (L) 04/15/2025    LDLCALC 121.4 04/15/2025    TRIG 78 04/15/2025    CHOLHDL 21.7 04/15/2025     CARDIAC:    Lab Results   Component Value Date    TROPONINI <0.006 2023    BNP 72 2021       Assessment/Plan:  Problem List Items Addressed This Visit    None    EKG NSR  NO angina   No SOB  Aortic dissection checked 2025 without signifiant change   Check echo     Patient cleared for colonoscopy    Follow up      Total duration of face to face visit time 30 minutes.  Total time spent counseling greater than fifty percent of total visit time.  Counseling included discussion regarding imaging findings, diagnosis, possibilities, treatment options, risks and benefits.  The patient had many questions regarding the options and long-term effects.    Joan Ram, URBANO-C  Cardiology Clinic  [facility]              [1]   Social History  Socioeconomic History    Marital status:    Tobacco Use    Smoking status: Former     Current packs/day: 0.00     Average packs/day: 1 pack/day for 30.0 years (30.0 ttl pk-yrs)     Types: Cigarettes     Start date:      Quit date:      Years since quittin.5    Smokeless tobacco: Never   Substance and Sexual Activity    Alcohol use: Not Currently     Comment: socially    Drug use: No    Sexual activity: Yes     Social Drivers of Health     Financial Resource Strain: Low Risk  (2025)    Overall Financial Resource Strain (CARDIA)     Difficulty of Paying Living Expenses: Not hard at all   Food Insecurity: No Food Insecurity (2025)    Hunger Vital Sign     Worried About Running Out of Food in the Last Year: Never true     Ran Out of Food in the Last Year: Never true   Transportation Needs: No Transportation Needs (2025)    PRAPARE - Transportation     Lack of Transportation (Medical): No     Lack of Transportation (Non-Medical): No   Physical Activity: Sufficiently Active (2025)    Exercise Vital Sign     Days of Exercise per Week: 2 days     Minutes of Exercise per Session: 120 min   Stress: No Stress Concern Present  (4/16/2025)    [employer] - Occupational Stress Questionnaire     Feeling of Stress : Not at all   Housing Stability: Low Risk  (4/16/2025)    Housing Stability Vital Sign     Unable to Pay for Housing in the Last Year: No     Number of Times Moved in the Last Year: 0     Homeless in the Last Year: No

## 2025-07-14 NOTE — DISCHARGE INSTRUCTIONS
Colonoscopy Prep Instructions      Date: Monday 7/21/25  Arrival time:       IMPORTANT: PLEASE READ YOUR INSTRUCTIONS CAREFULLY. FAILURE TO FOLLOW THESE INSTRUCTIONS MAY RESULT IN YOUR PROCEURE BEING CANCELED,RESCHEDULED, OR REPEATED.    Starting one week before your colonoscopy: (TODAY)--start eating a low/no fiber diet.  Avoid nuts, corn, seeds, seeded veggies, beans, raw veggies, and meats that are harder to digest.  Poultry and fish are preferable to beef and pork.  Make sure your veggies are cooked well.  Breads/rice/noodles--all white is better.  MAKE SURE YOU ARE HYDRATING WELL!.                                         The day before your procedure: Sunday (7/20/25)  Upon awakening begin the clear liquid diet. DO NOT EAT SOLID FOODS     Sunday morning mix the entire container of prep.  Start  with lukewarm water to mix the powder, then fill the jug to the top with any qualifying clear liquid of your choice.  You can refrigerate if you choose, as it is commonly preferred chilled.  You can also use a straw to consume to help with minimizing the taste of the medication.      Clear Liquid Diet---- you may have any of the following:     Water, tea, coffee (decaffeinated or regular)     Soft drinks (regular or sugar free)     Gelatin dessert (plain or flavored)     Juice, Gatorade, Powerade, Crystal Lite, lemonade, limeade, Davide-Aid     Bouillon, clear consommé, 100% fat free beef, chicken, or vegetable broths     Snowballs, popsicles     100% cranberry juice is the only red liquid allowed     Please Avoid the following:     Anything with RED dye     Liquids not specifically listed     Dairy (liquid and powder)     Creamers (liquid and powder)     Alcohol             At __________--take the 4 Dulcolax laxatives, follow with a bottle of water. Typically, after 2-3 hours, you will have 1-2 large bowel movements.      Drink at least 6-8 glasses of clear liquids until you begin your prep       At __________-- : Drink  half of the prep within 2 hour.  Pour yourself one cup every 10-15 minutes until half of the jug remains.                         Refrigerate the remaining half of the prep     When you are done with your prep, you can continue to have clear liquids through the night.          Monday morning, four hours before your arrival time, at __________________ am--    Drink second half of the prep within 2 hour.  Pour yourself one cup every 10-15 minutes until half of the jug remains.                                                Once your prep is complete, you can should stop drinking.  You should be done by no later than _____________am.      Before arrival, with a sip of water, take the following medications. ___________________________      YOU MUST HAVE A  HOME!

## 2025-07-20 NOTE — PROGRESS NOTES
COLONOSCOPY HISTORY AND PE    Procedure : Colonoscopy    INDICATIONS: asymptomatic screening exam, personal history of colon polyps, and most recent endoscopic exam 6 years ago    Denies family history of CRC or IBD. Personal history of prostate cancer (GG1, age 60) as well as prostate cancer in father - eval by Larry Le 10/25/21. No additional cancers or new cancer diagnoses since last visit. No recent change in bowel habits, abdominal symptoms or blood in stool. +ASA/Plavix, last dose Plavix 6d ago.    Last Colonoscopy (1/24/19, complete):  Findings:  diverticulosis,  Marked in degree, involving the sigmoid  polyp(s) #1, 4 mm in size, located in the descending colon removed by cold biopsy and sent for pathology +TA  #2, 8 mm in size, located in the rectum removed by snare cautery and retrieved for pathology +HP  #3, 10 mm in size, located in the rectum removed by snare cautery and retrieved for pathology +HP    Past Medical History:   Diagnosis Date    Aortic dissection     Elevated PSA     Sleep apnea        Past Surgical History:   Procedure Laterality Date    BIOPSY WITH TRANSRECTAL ULTRASOUND (TRUS) GUIDANCE N/A 05/01/2019    Procedure: BIOPSY, WITH TRANSRECTAL PROSTATE ULTRASOUND;  Surgeon: Chintan Mendez Jr., MD;  Location: [facility];  Service: Urology;  Laterality: N/A;    COLONOSCOPY  2006    COLONOSCOPY N/A 01/24/2019    Procedure: COLONOSCOPY;  Surgeon: Reece Tarango MD;  Location: [facility];  Service: Endoscopy;  Laterality: N/A;    CREATION OF AORTO-CAROTID BYPASS WITH GRAFT Left 11/16/2020    Procedure: CREATION, BYPASS, ARTERIAL, AORTA TO CAROTID, USING GRAFT SUBCLAVIAN BYPASS;  Surgeon: JOSY Santos II, MD;  Location: [address];  Service: Cardiovascular;  Laterality: Left;  left common carotid to subclavian bypass    CYSTOSCOPY N/A 05/01/2019    Procedure: FLEXIBLE CYSTOSCOPY;  Surgeon: Chintan Mendez Jr., MD;  Location: [facility];  Service: Urology;  Laterality: N/A;    ENDOVASCULAR  GRAFT REPAIR OF ANEURYSM OF THORACIC AORTA N/A 11/17/2020    Procedure: REPAIR, ANEURYSM, ENDOVASCULAR GRAFT, AORTA, THORACIC Left brachial cutdown;  Surgeon: JOSY Santos II, MD;  Location: [facility] OR [address] [place] [address];  Service: Cardiovascular;  Laterality: N/A;  Fluoro time 34.7 min  2002.64 mGy  432.18 Gycm2    ENDOVASCULAR GRAFT REPAIR OF ANEURYSM OF THORACIC AORTA N/A 2/14/2023    Procedure: REPAIR, ANEURYSM, ENDOVASCULAR GRAFT, AORTA, THORACIC;  Surgeon: JOSY Santos II, MD;  [facility];  Service: Vascular;  Laterality: N/A;    EXTRACTION OF CATARACT  01/18/2023    RETROGRADE PYELOGRAPHY Right 03/05/2021    Procedure: PYELOGRAM, RETROGRADE;  Surgeon: Yuriy Woo MD;  Location: [facility] OR [address] [place] [address];  Service: Urology;  Laterality: Right;    TRANSURETHRAL RESECTION OF PROSTATE N/A 02/25/2021    Procedure: TURP (TRANSURETHRAL RESECTION OF PROSTATE) BIPOLOR;  Surgeon: Yuriy Woo MD;  Location: [facility] OR [address] [place] [address];  Service: Urology;  Laterality: N/A;  2 hours      TRANSURETHRAL RESECTION OF PROSTATE N/A 03/05/2021    Procedure: TURP (TRANSURETHRAL RESECTION OF PROSTATE)/ BIPOLOR;  Surgeon: Yuriy Woo MD;  Location: [facility] OR [address] [place] [address];  Service: Urology;  Laterality: N/A;    UMBILICAL HERNIA REPAIR N/A 01/14/2020    Procedure: REPAIR, HERNIA, UMBILICAL;  Surgeon: Caio Mas Jr., MD;  Location: [facility];  Service: General;  Laterality: N/A;       Review of patient's allergies indicates:  No Known Allergies    Medications Ordered Prior to Encounter[1]    Family History   Problem Relation Name Age of Onset    Hypertension Father father     Prostate cancer Father father         dx ~60-65, no mets, took chemo pills, not cause of death    Cancer Sister Bijal         ?origin (lung?), nonsmoker    Other Paternal Uncle Latrell         possible cancer, early death       Social History[2]    Review of Systems -    Respiratory : no cough, shortness of breath, or wheezing  Cardiovascular  no chest  pain or dyspnea on exertion  Gastrointestinal no abdominal pain, change in bowel habits, or black or bloody stools  Musculoskeletal no deformities, swelling  Neurological no TIA or stroke symptoms    Physical Exam:  General: NAD  AT NC EOMI  Mallampati Score   Neck supple, trachea midline  Lungs: nl excursions, no retractions.  Breathing comfortably  Abdomen ND soft NT.  No masses  Extremities: No CCE.      ASA:  II    PLAN  COLONOSCOPY.  The details of the procedure, the possible need for biopsy or polypectomy and the potential risks including bleeding, perforation, missed polyps were discussed in detail.    Juan Jose Whipple MD  Staff Surgeon  Colon & Rectal Surgery       [1]   Current Outpatient Medications on File Prior to Visit   Medication Sig Dispense Refill    aspirin 81 MG Chew Chew and swallow 1 tablet (81 mg total) by mouth once daily. 360 tablet 0    bisacodyL (DULCOLAX) 5 mg EC tablet Take 4 tablets (20 mg total) by mouth once. for 1 dose (Patient not taking: Reported on 7/14/2025) 4 tablet 0    ciprofloxacin HCl (CIPRO) 500 MG tablet 1 pill one hour before procedure (Patient not taking: Reported on 7/14/2025) 1 tablet 0    clopidogreL (PLAVIX) 75 mg tablet Take 1 tablet (75 mg total) by mouth once daily. 90 tablet 3    labetaloL (NORMODYNE) 100 MG tablet Take 1 tablet (100 mg total) by mouth 2 (two) times daily. 60 tablet 11    PAXLOVID 300 mg (150 mg x 2)-100 mg copackaged tablets (EUA) Take by mouth. (Patient not taking: Reported on 7/14/2025)      polyethylene glycol (GOLYTELY) 236-22.74-6.74 -5.86 gram suspension Take 4,000 mLs by mouth once. for 1 dose (Patient not taking: Reported on 7/14/2025) 4000 mL 0    rosuvastatin (CRESTOR) 20 MG tablet Take 1 tablet (20 mg total) by mouth once daily. 90 tablet 3     No current facility-administered medications on file prior to visit.   [2]   Social History  Socioeconomic History    Marital status:    Tobacco Use    Smoking status: Former      Current packs/day: 0.00     Average packs/day: 1 pack/day for 30.0 years (30.0 ttl pk-yrs)     Types: Cigarettes     Start date:      Quit date: 2000     Years since quittin.5    Smokeless tobacco: Never   Substance and Sexual Activity    Alcohol use: Not Currently     Comment: socially    Drug use: No    Sexual activity: Yes     Social Drivers of Health     Financial Resource Strain: Low Risk  (2025)    Overall Financial Resource Strain (CARDIA)     Difficulty of Paying Living Expenses: Not hard at all   Food Insecurity: No Food Insecurity (2025)    Hunger Vital Sign     Worried About Running Out of Food in the Last Year: Never true     Ran Out of Food in the Last Year: Never true   Transportation Needs: No Transportation Needs (2025)    PRAPARE - Transportation     Lack of Transportation (Medical): No     Lack of Transportation (Non-Medical): No   Physical Activity: Sufficiently Active (2025)    Exercise Vital Sign     Days of Exercise per Week: 2 days     Minutes of Exercise per Session: 120 min   Stress: No Stress Concern Present (2025)    [employer] - Occupational Stress Questionnaire     Feeling of Stress : Not at all   Housing Stability: Low Risk  (2025)    Housing Stability Vital Sign     Unable to Pay for Housing in the Last Year: No     Number of Times Moved in the Last Year: 0     Homeless in the Last Year: No

## 2025-07-21 ENCOUNTER — HOSPITAL ENCOUNTER (OUTPATIENT)
Dept: ENDOSCOPY | Facility: HOSPITAL | Age: 65
Discharge: HOME | End: 2025-07-21
Attending: STUDENT IN AN ORGANIZED HEALTH CARE EDUCATION/TRAINING PROGRAM | Admitting: STUDENT IN AN ORGANIZED HEALTH CARE EDUCATION/TRAINING PROGRAM
Payer: COMMERCIAL

## 2025-07-21 ENCOUNTER — ANESTHESIA (OUTPATIENT)
Dept: ENDOSCOPY | Facility: HOSPITAL | Age: 65
End: 2025-07-21
Payer: COMMERCIAL

## 2025-07-21 ENCOUNTER — ANESTHESIA EVENT (OUTPATIENT)
Dept: ENDOSCOPY | Facility: HOSPITAL | Age: 65
End: 2025-07-21
Payer: COMMERCIAL

## 2025-07-21 VITALS
OXYGEN SATURATION: 95 % | SYSTOLIC BLOOD PRESSURE: 117 MMHG | RESPIRATION RATE: 16 BRPM | DIASTOLIC BLOOD PRESSURE: 66 MMHG | TEMPERATURE: 98 F | HEART RATE: 68 BPM

## 2025-07-21 DIAGNOSIS — Z86.0100 HISTORY OF COLON POLYPS: ICD-10-CM

## 2025-07-21 PROCEDURE — 27201089 HC SNARE, DISP (ANY): Performed by: STUDENT IN AN ORGANIZED HEALTH CARE EDUCATION/TRAINING PROGRAM

## 2025-07-21 PROCEDURE — 88305 TISSUE EXAM BY PATHOLOGIST: CPT | Mod: TC | Performed by: STUDENT IN AN ORGANIZED HEALTH CARE EDUCATION/TRAINING PROGRAM

## 2025-07-21 PROCEDURE — 37000009 HC ANESTHESIA EA ADD 15 MINS

## 2025-07-21 PROCEDURE — 63600175 PHARM REV CODE 636 W HCPCS: Performed by: NURSE ANESTHETIST, CERTIFIED REGISTERED

## 2025-07-21 PROCEDURE — 37000008 HC ANESTHESIA 1ST 15 MINUTES

## 2025-07-21 RX ORDER — PROPOFOL 10 MG/ML
VIAL (ML) INTRAVENOUS
Status: DISCONTINUED | OUTPATIENT
Start: 2025-07-21 | End: 2025-07-21

## 2025-07-21 RX ORDER — LIDOCAINE HYDROCHLORIDE 20 MG/ML
INJECTION, SOLUTION EPIDURAL; INFILTRATION; INTRACAUDAL; PERINEURAL
Status: DISCONTINUED | OUTPATIENT
Start: 2025-07-21 | End: 2025-07-21

## 2025-07-21 RX ADMIN — PROPOFOL 30 MG: 10 INJECTION, EMULSION INTRAVENOUS at 12:07

## 2025-07-21 RX ADMIN — LIDOCAINE HYDROCHLORIDE 100 MG: 20 INJECTION, SOLUTION EPIDURAL; INFILTRATION; INTRACAUDAL; PERINEURAL at 11:07

## 2025-07-21 RX ADMIN — SODIUM CHLORIDE, SODIUM LACTATE, POTASSIUM CHLORIDE, AND CALCIUM CHLORIDE: .6; .31; .03; .02 INJECTION, SOLUTION INTRAVENOUS at 11:07

## 2025-07-21 RX ADMIN — PROPOFOL 3 MG: 10 INJECTION, EMULSION INTRAVENOUS at 12:07

## 2025-07-21 RX ADMIN — PROPOFOL 150 MG: 10 INJECTION, EMULSION INTRAVENOUS at 11:07

## 2025-07-21 NOTE — ANESTHESIA POSTPROCEDURE EVALUATION
Anesthesia Post Evaluation    Patient: Luis Eduardo Curry    Procedure(s) Performed: * No procedures listed *    Final Anesthesia Type: general      Patient location during evaluation: PACU  Patient participation: Yes- Able to Participate  Level of consciousness: awake and alert, oriented and awake  Post-procedure vital signs: reviewed and stable  Pain management: adequate  Airway patency: patent    PONV status at discharge: No PONV  Anesthetic complications: no      Cardiovascular status: hemodynamically stable and stable  Respiratory status: spontaneous ventilation  Hydration status: euvolemic  Follow-up not needed.              Vitals Value Taken Time   /66 07/21/25 13:22   Temp 36.4 °C (97.5 °F) 07/21/25 12:37   Pulse 78 07/21/25 13:55   Resp 33 07/21/25 13:23   SpO2 96 % 07/21/25 13:55   Vitals shown include unfiled device data.      No case tracking events are documented in the log.      Pain/Chon Score: Chon Score: 10 (7/21/2025  1:21 PM)

## 2025-07-21 NOTE — ANESTHESIA POSTPROCEDURE EVALUATION
Anesthesia Post Evaluation    Patient: Luis Eduardo Curry    Procedure(s) Performed: * No procedures listed *    Final Anesthesia Type: general      Patient location during evaluation: PACU  Patient participation: Yes- Able to Participate  Level of consciousness: awake and alert, oriented and awake  Post-procedure vital signs: reviewed and stable  Pain management: adequate  Airway patency: patent    PONV status at discharge: No PONV  Anesthetic complications: no      Cardiovascular status: blood pressure returned to baseline  Respiratory status: unassisted, spontaneous ventilation and room air  Hydration status: euvolemic  Follow-up not needed.  Comments: [facility]              Vitals Value Taken Time   /66 07/21/25 13:20   Temp 36.4 °C (97.5 °F) 07/21/25 12:37   Pulse 61 07/21/25 13:21   Resp 27 07/21/25 13:21   SpO2 96 % 07/21/25 13:21   Vitals shown include unfiled device data.      No case tracking events are documented in the log.      Pain/Chon Score: Chon Score: 10 (7/21/2025  1:15 PM)

## 2025-07-21 NOTE — TRANSFER OF CARE
Anesthesia Transfer of Care Note    Patient: Luis Eduardo Curry    Procedure(s) Performed: * No procedures listed *    Patient location: PACU    Anesthesia Type: general    Transport from OR: Transported from OR on room air with adequate spontaneous ventilation    Post pain: adequate analgesia    Post assessment: no apparent anesthetic complications and tolerated procedure well    Post vital signs: stable    Level of consciousness: awake    Complications: none    Transfer of care protocol was followed      Last vitals: Visit Vitals  BP (!) 98/53   Pulse 68   Temp 36 °C (96.8 °F)   Resp 16   SpO2 95%

## 2025-07-24 LAB
ESTROGEN SERPL-MCNC: NORMAL PG/ML
INSULIN SERPL-ACNC: NORMAL U[IU]/ML
LAB AP CLINICAL INFORMATION: NORMAL
LAB AP GROSS DESCRIPTION: NORMAL
LAB AP PERFORMING LOCATION(S): NORMAL
LAB AP REPORT FOOTNOTES: NORMAL

## 2025-08-11 ENCOUNTER — HOSPITAL ENCOUNTER (OUTPATIENT)
Dept: PULMONOLOGY | Facility: HOSPITAL | Age: 65
Discharge: HOME | End: 2025-08-11
Attending: NURSE PRACTITIONER
Payer: COMMERCIAL

## 2025-08-11 DIAGNOSIS — I35.8 AORTIC HEART MURMUR: ICD-10-CM

## 2025-08-11 LAB
ASCENDING AORTA: 3.9 CM
AV INDEX (PROSTH): 1.08
AV MEAN GRADIENT: 1 MMHG
AV PEAK GRADIENT: 3 MMHG
AV VALVE AREA BY VELOCITY RATIO: 2.8 CM²
AV VALVE AREA: 3.4 CM²
AV VELOCITY RATIO: 0.89
CV ECHO LV RWT: 0.41 CM
DOP CALC AO PEAK VEL: 0.9 M/S
DOP CALC AO VTI: 17.9 CM
DOP CALC LVOT AREA: 3.1 CM2
DOP CALC LVOT DIAMETER: 2 CM
DOP CALC LVOT PEAK VEL: 0.8 M/S
DOP CALCLVOT PEAK VEL VTI: 19.4 CM
E WAVE DECELERATION TIME: 254 MSEC
E/A RATIO: 1.12
E/E' RATIO: 6 M/S
ECHO LV POSTERIOR WALL: 1 CM (ref 0.6–1.1)
FRACTIONAL SHORTENING: 34.7 % (ref 28–44)
INTERVENTRICULAR SEPTUM: 1.1 CM (ref 0.6–1.1)
IVC DIAMETER: 1.75 CM
IVRT: 99 MSEC
LA MAJOR: 5.3 CM
LEFT ATRIUM AREA SYSTOLIC (APICAL 2 CHAMBER): 17.23 CM2
LEFT ATRIUM AREA SYSTOLIC (APICAL 4 CHAMBER): 16 CM2
LEFT ATRIUM SIZE: 3.8 CM
LEFT ATRIUM VOLUME MOD: 41 ML
LEFT INTERNAL DIMENSION IN SYSTOLE: 3.2 CM (ref 2.1–4)
LEFT VENTRICLE DIASTOLIC VOLUME: 110 ML
LEFT VENTRICLE END SYSTOLIC VOLUME APICAL 2 CHAMBER: 44.09 ML
LEFT VENTRICLE END SYSTOLIC VOLUME APICAL 4 CHAMBER: 36.31 ML
LEFT VENTRICLE SYSTOLIC VOLUME: 40 ML
LEFT VENTRICULAR INTERNAL DIMENSION IN DIASTOLE: 4.9 CM (ref 3.5–6)
LEFT VENTRICULAR MASS: 188.1 G
LV LATERAL E/E' RATIO: 5.6 M/S
LV SEPTAL E/E' RATIO: 7 M/S
LVED V (TEICH): 109.96 ML
LVES V (TEICH): 40.1 ML
LVOT MG: 1.53 MMHG
LVOT MV: 0.59 CM/S
MV PEAK A VEL: 0.5 M/S
MV PEAK E VEL: 0.56 M/S
MV STENOSIS PRESSURE HALF TIME: 70.99 MS
MV VALVE AREA P 1/2 METHOD: 3.1 CM2
OHS CV RV/LV RATIO: 0.43 CM
PISA TR MAX VEL: 2.3 M/S
PULM VEIN S/D RATIO: 1.12
PV MV: 0.29 M/S
PV PEAK D VEL: 0.33 M/S
PV PEAK GRADIENT: 1 MMHG
PV PEAK S VEL: 0.37 M/S
PV PEAK VELOCITY: 0.46 M/S
RA MAJOR: 5.71 CM
RA PRESSURE ESTIMATED: 3 MMHG
RIGHT VENTRICLE DIASTOLIC BASEL DIMENSION: 2.1 CM
RIGHT VENTRICULAR END-DIASTOLIC DIMENSION: 2.06 CM
RV TB RVSP: 5 MMHG
RV TISSUE DOPPLER FREE WALL SYSTOLIC VELOCITY 1 (APICAL 4 CHAMBER VIEW): 11.53 CM/S
SINUS: 4 CM
STJ: 3.7 CM
TDI LATERAL: 0.1 M/S
TDI SEPTAL: 0.08 M/S
TDI: 0.09 M/S
TR MAX PG: 21 MMHG
TRICUSPID ANNULAR PLANE SYSTOLIC EXCURSION: 2 CM
TV REST PULMONARY ARTERY PRESSURE: 24 MMHG

## 2025-08-11 PROCEDURE — 93306 TTE W/DOPPLER COMPLETE: CPT

## 2025-08-11 PROCEDURE — 93306 TTE W/DOPPLER COMPLETE: CPT | Mod: 26,,, | Performed by: INTERNAL MEDICINE

## 2025-08-12 ENCOUNTER — RESULTS FOLLOW-UP (OUTPATIENT)
Dept: CARDIOLOGY | Facility: CLINIC | Age: 65
End: 2025-08-12
Payer: COMMERCIAL

## 2025-08-19 ENCOUNTER — HOSPITAL ENCOUNTER (OUTPATIENT)
Dept: RADIOLOGY | Facility: HOSPITAL | Age: 65
Discharge: HOME | End: 2025-08-19
Attending: NURSE PRACTITIONER
Payer: COMMERCIAL

## 2025-08-19 DIAGNOSIS — I35.8 AORTIC HEART MURMUR: ICD-10-CM

## 2025-08-19 DIAGNOSIS — Z91.89 AT RISK FOR CORONARY ARTERY DISEASE: ICD-10-CM

## 2025-08-19 DIAGNOSIS — Z13.6 ENCOUNTER FOR SCREENING FOR CARDIOVASCULAR DISORDERS: ICD-10-CM

## 2025-08-27 ENCOUNTER — HOSPITAL ENCOUNTER (OUTPATIENT)
Dept: RADIOLOGY | Facility: HOSPITAL | Age: 65
Discharge: HOME | End: 2025-08-27
Attending: NURSE PRACTITIONER
Payer: COMMERCIAL

## 2025-08-27 PROCEDURE — 75571 CT HRT W/O DYE W/CA TEST: CPT | Mod: TC

## 2025-08-27 PROCEDURE — 75571 CT HRT W/O DYE W/CA TEST: CPT | Mod: 26,,, | Performed by: RADIOLOGY

## 2025-09-05 ENCOUNTER — TELEPHONE (OUTPATIENT)
Dept: CARDIOLOGY | Facility: CLINIC | Age: 65
End: 2025-09-05
Payer: COMMERCIAL

## (undated) DEVICE — BLADE SCALP OPHTL BEVEL STR

## (undated) DEVICE — CYSTOSCOPE CONTINUOUS FLOW

## (undated) DEVICE — DRAPE MINOR PROCEDURE

## (undated) DEVICE — SEE MEDLINE ITEM 153688

## (undated) DEVICE — TUBING SUCTION STERILE

## (undated) DEVICE — BOOT SUTURE AID

## (undated) DEVICE — CATH AUROUS CM SZ PIGTAIL 5F

## (undated) DEVICE — STAPLER SKIN PROXIMATE WIDE

## (undated) DEVICE — SET DECANTER MEDICHOICE

## (undated) DEVICE — SET CYSTO IRRIGATION UNIV SPIK

## (undated) DEVICE — WIRE LUNDERQUIST 260

## (undated) DEVICE — ADHESIVE DERMABOND ADVANCED

## (undated) DEVICE — PACK GENERAL SURGERY

## (undated) DEVICE — CATH VISIONS PV IVUS .035

## (undated) DEVICE — DEVICE STABILIZATION STAT LOCK

## (undated) DEVICE — SUT RD QUIK LOAD POLY 2-0 53IN

## (undated) DEVICE — SUT ETHILON 3-0 PS2 18 BLK

## (undated) DEVICE — SUT PROLENE 6-0 24 BV-1

## (undated) DEVICE — SYR MED RAD 150ML

## (undated) DEVICE — Device

## (undated) DEVICE — GUIDE MACH 1 6F PV RDC1

## (undated) DEVICE — SUT MONOCYRL 4-0 PS2 UND

## (undated) DEVICE — CATH TORCOON NB ADV DAV

## (undated) DEVICE — HEMOSTAT SURGICEL 4X8IN

## (undated) DEVICE — SUT LIGACLIP SMALL XTRA

## (undated) DEVICE — CATH VALVE BALLOON 23X4

## (undated) DEVICE — APPLICATOR CHLORAPREP ORN 26ML

## (undated) DEVICE — SPONGE GAUZE 16PLY 4X4

## (undated) DEVICE — COVER LIGHT HANDLE 80/CA

## (undated) DEVICE — FORCEP STRAIGHT DISP

## (undated) DEVICE — SEE MEDLINE ITEM 157131

## (undated) DEVICE — DEVICE PERCLOSE SUT CLSR 6FR

## (undated) DEVICE — SYR 30CC LUER LOCK

## (undated) DEVICE — VALVE CONTROL COPILOT

## (undated) DEVICE — INTRODUCER VASC RADPQ 8FRX10CM

## (undated) DEVICE — CORD BIPOLAR 12 FOOT

## (undated) DEVICE — SHEATH PINNACLE 9FR 10CM .035

## (undated) DEVICE — CATH ANGLED GLIDE CATH 5FR

## (undated) DEVICE — SHEATH FLEXOR ANSEL 7FR 55CM

## (undated) DEVICE — PACK DRAPE UNIVERSAL CONVERTOR

## (undated) DEVICE — DRAPE STERI INSTRUMENT 1018

## (undated) DEVICE — GAUZE SPONGE 4X4 12PLY

## (undated) DEVICE — COVER INSTR ELASTIC BAND 40X20

## (undated) DEVICE — DRAIN CHANNEL ROUND 15FR

## (undated) DEVICE — CATH 5FR OMNIFLUSH 65CM .038

## (undated) DEVICE — SYR 50ML CATH TIP

## (undated) DEVICE — PACK UNIVERSAL SPLIT II

## (undated) DEVICE — GUIDEWIRE STF .035X260CM ANG

## (undated) DEVICE — DRESSING TRANS 4X4 TEGADERM

## (undated) DEVICE — DRESSING TELFA STRL 4X3 LF

## (undated) DEVICE — TUBING HIGH PRESSURE

## (undated) DEVICE — SYR 3CC LUER LOC

## (undated) DEVICE — DRAPE THYROID WITH ARMBOARD

## (undated) DEVICE — SEE L#95700

## (undated) DEVICE — SOL IRR NACL .9% 3000ML

## (undated) DEVICE — ANGIOGRAPHIC CATHETER

## (undated) DEVICE — ELECTRODE BLADE W/SLEEVE 2.75

## (undated) DEVICE — DRAIN PENRS STERILE LTX 18X1/2

## (undated) DEVICE — SPONGE DERMACEA 4X4IN 12PLY

## (undated) DEVICE — TRAY CATH FOL SIL URIMTR 16FR

## (undated) DEVICE — PACK CYSTO

## (undated) DEVICE — SUT 4-0 12-18IN SILK BLACK

## (undated) DEVICE — GUIDEWIRE ROSEN VAS JTIP 180CM

## (undated) DEVICE — SOL NACL IRR 1000ML BTL

## (undated) DEVICE — CATH PIGTAIL

## (undated) DEVICE — CATH GLIDE ANGLED 5FR 65CM

## (undated) DEVICE — CATH STRAIGHT GLIDE

## (undated) DEVICE — INFLATOR ENCORE

## (undated) DEVICE — ELECTRODE REM PLYHSV RETURN 9

## (undated) DEVICE — GLOVE 8 PROTEXIS PI BLUE

## (undated) DEVICE — SUT 0 36IN PDS II VIO MONO

## (undated) DEVICE — BLLN MUSTANG 5 X 40 X 75

## (undated) DEVICE — FLEXSHEATH DRYSEAL 20FR 33CM

## (undated) DEVICE — SUT 3-0 12-18IN SILK

## (undated) DEVICE — SUT PROLENE 3-0 30SH

## (undated) DEVICE — VISE RADIFOCUS MULTI TORQUE

## (undated) DEVICE — GLOVE PROTEXIS LTX MICRO  7.5

## (undated) DEVICE — LOOP VESSEL BLUE MAXI

## (undated) DEVICE — SUT 2-0 VICRYL / SH (J417)

## (undated) DEVICE — LOOP CUT RESECTSCPE 30 DG 26F

## (undated) DEVICE — SCRUB HIBICLENS 4% CHG 4OZ

## (undated) DEVICE — GUN BIOPSY 18GA MONOPLY

## (undated) DEVICE — SOL NS 1000CC

## (undated) DEVICE — NDL HYPO REG 25G X 1 1/2

## (undated) DEVICE — DRAPE INCISE IOBAN 2 23X17IN

## (undated) DEVICE — SEE MEDLINE ITEM 157194

## (undated) DEVICE — SEE MEDLINE ITEM 156911

## (undated) DEVICE — CATH ACCU-VU 21 RO MRK 5F 70CM

## (undated) DEVICE — TRAY PERIPHERAL VASCULAR OMC

## (undated) DEVICE — NDL HYPODERMIC 30GX1IN

## (undated) DEVICE — SEE MEDLINE ITEM 152622

## (undated) DEVICE — SUT 3-0 VICRYL / SH (J416)

## (undated) DEVICE — WIRE BENTSON 035/180

## (undated) DEVICE — GUIDEWIRE STF .035X180CM ANG

## (undated) DEVICE — GOWN SMARTGOWN LVL4 X-LONG XL

## (undated) DEVICE — KIT INTRO MICRO NIT VSI 4FR

## (undated) DEVICE — SPONGE WEC CEL SPEARS

## (undated) DEVICE — INTRODUCER VASC RADPQ 10FRX10C

## (undated) DEVICE — SYR 10CC LUER LOCK

## (undated) DEVICE — GUIDEWIRE STD .035X260CM ANG

## (undated) DEVICE — INSERTS STEALTH FIBRA SIZE 1.

## (undated) DEVICE — SUT VICRYL 3-0 27 SH

## (undated) DEVICE — SET IRR URLGY 2LINE UNIV SPIKE

## (undated) DEVICE — ELECTRODE BLADE INSULATED 1 IN

## (undated) DEVICE — SUT SILK 3-0 SH 18IN BLACK

## (undated) DEVICE — TRAY CYSTO BASIN

## (undated) DEVICE — SUT PROLENE 5-0 36IN C-1

## (undated) DEVICE — SUT MCRYL PLUS 4-0 PS2 27IN

## (undated) DEVICE — SUT PROLENE 6-0 C-1 30IN BL

## (undated) DEVICE — SET SINGLE BASIN

## (undated) DEVICE — TUBING CNTRST INJ ADPT 72IN

## (undated) DEVICE — CATH POLLACK OPEN-END FLEXI-TI

## (undated) DEVICE — EVACUATOR WOUND BULB 100CC

## (undated) DEVICE — SUT 2-0 12-18IN SILK

## (undated) DEVICE — INTRODUCER VASC RADPQ 5FRX10CM

## (undated) DEVICE — NDL ECLIPSE SAFETY 18GX1-1/2IN

## (undated) DEVICE — TRAY FOLEY 16FR INFECTION CONT